# Patient Record
Sex: MALE | Race: ASIAN | NOT HISPANIC OR LATINO
[De-identification: names, ages, dates, MRNs, and addresses within clinical notes are randomized per-mention and may not be internally consistent; named-entity substitution may affect disease eponyms.]

---

## 2017-10-27 ENCOUNTER — APPOINTMENT (OUTPATIENT)
Dept: ORTHOPEDIC SURGERY | Facility: CLINIC | Age: 67
End: 2017-10-27
Payer: COMMERCIAL

## 2017-10-27 VITALS
SYSTOLIC BLOOD PRESSURE: 157 MMHG | HEART RATE: 70 BPM | BODY MASS INDEX: 32.13 KG/M2 | WEIGHT: 212 LBS | DIASTOLIC BLOOD PRESSURE: 80 MMHG | HEIGHT: 68 IN

## 2017-10-27 DIAGNOSIS — Z96.652 PRESENCE OF LEFT ARTIFICIAL KNEE JOINT: ICD-10-CM

## 2017-10-27 DIAGNOSIS — M47.812 SPONDYLOSIS W/OUT MYELOPATHY OR RADICULOPATHY, CERVICAL REGION: ICD-10-CM

## 2017-10-27 DIAGNOSIS — M50.90 CERVICAL DISC DISORDER, UNSPECIFIED, UNSPECIFIED CERVICAL REGION: ICD-10-CM

## 2017-10-27 DIAGNOSIS — M19.012 PRIMARY OSTEOARTHRITIS, LEFT SHOULDER: ICD-10-CM

## 2017-10-27 PROCEDURE — 73562 X-RAY EXAM OF KNEE 3: CPT | Mod: LT

## 2017-10-27 PROCEDURE — 73030 X-RAY EXAM OF SHOULDER: CPT | Mod: LT

## 2017-10-27 PROCEDURE — 72040 X-RAY EXAM NECK SPINE 2-3 VW: CPT

## 2017-10-27 PROCEDURE — 99202 OFFICE O/P NEW SF 15 MIN: CPT

## 2017-10-27 RX ORDER — SITAGLIPTIN 100 MG/1
100 TABLET, FILM COATED ORAL
Qty: 60 | Refills: 0 | Status: ACTIVE | COMMUNITY
Start: 2017-04-12

## 2018-02-01 ENCOUNTER — APPOINTMENT (OUTPATIENT)
Dept: RADIOLOGY | Facility: IMAGING CENTER | Age: 68
End: 2018-02-01

## 2020-12-01 ENCOUNTER — NON-APPOINTMENT (OUTPATIENT)
Age: 70
End: 2020-12-01

## 2021-02-03 ENCOUNTER — APPOINTMENT (OUTPATIENT)
Dept: GASTROENTEROLOGY | Facility: CLINIC | Age: 71
End: 2021-02-03
Payer: MEDICARE

## 2021-02-03 VITALS
HEIGHT: 68 IN | SYSTOLIC BLOOD PRESSURE: 125 MMHG | BODY MASS INDEX: 27.13 KG/M2 | TEMPERATURE: 98.7 F | DIASTOLIC BLOOD PRESSURE: 75 MMHG | HEART RATE: 62 BPM | OXYGEN SATURATION: 97 % | WEIGHT: 179 LBS

## 2021-02-03 PROCEDURE — 99204 OFFICE O/P NEW MOD 45 MIN: CPT

## 2021-02-03 NOTE — CONSULT LETTER
[Dear  ___] : Dear  [unfilled], [Courtesy Letter:] : I had the pleasure of seeing your patient, [unfilled], in my office today. [Please see my note below.] : Please see my note below. [Consult Closing:] : Thank you very much for allowing me to participate in the care of this patient.  If you have any questions, please do not hesitate to contact me. [Sincerely,] : Sincerely, [FreeTextEntry2] : Miguel Jamil MD\par [address]\par [address]\[place] [address]\par Fax [phone]\par  [FreeTextEntry3] : --\par Sai Owens MD\par Director of Endoscopy\par [facility]\par Associate Professor of Medicine\par [employer] [facility] [employer] [facility] \par [facility]\par Phone: [phone]\par Fax: [phone]\par Email: [email or web address]\par

## 2021-02-03 NOTE — ASSESSMENT
[FreeTextEntry1] : 70 year old male with likely gastroparesis.  I will order an UGIS to evaluate the distended esophagus seen on CT chest/abdomen. \par \par Depending on these results, may need an esophageal motility test. Otherwise will discuss with my endosurgical colleague and coordinate a G-POEM. \par \par I discuss this all in detail with the patient and wife. \par \par

## 2021-02-03 NOTE — PHYSICAL EXAM
[General Appearance - Alert] : alert [General Appearance - In No Acute Distress] : in no acute distress [Sclera] : the sclera and conjunctiva were normal [PERRL With Normal Accommodation] : pupils were equal in size, round, and reactive to light [Neck Appearance] : the appearance of the neck was normal [] : no respiratory distress [Respiration, Rhythm And Depth] : normal respiratory rhythm and effort [Bowel Sounds] : normal bowel sounds [Abdomen Soft] : soft [Abdomen Tenderness] : non-tender [Cervical Lymph Nodes Enlarged Posterior Bilaterally] : posterior cervical [Cervical Lymph Nodes Enlarged Anterior Bilaterally] : anterior cervical

## 2021-02-03 NOTE — HISTORY OF PRESENT ILLNESS
[de-identified] : 70 year old male referred by Dr Jamil for endoscopic management of gastroparesis. Pt with a PH of emphysema (smoked for 40 years and quit 4 years ago) , diabetes A1c controlled per pt in 6s), and paraesophageal hernia that was repaired in march 2018. \par \par Patient with symptoms of early satiety for 5 months. States feels full very early.  Also with associated bloating. +weight loss with 20 pound weight loss. Also feels like food gets caught up near epigastric area. \par \par Has a GE scan that was positive.  Tried Reglan and developed TD. Tried erythromycin and developed severe diarrhea. On domperidone (has cardiac w/u) and it helps a little bit. \par \par Had EGD in October that was OK. \par Had CT chest/abd that was OK and no signs of hernia recurrence but the esophagus was distended. \par \par Negative for COVID. No h/o of recent viral illness.

## 2021-02-04 LAB
ABO + RH PNL BLD: NORMAL
ALBUMIN SERPL ELPH-MCNC: 4.1 G/DL
ALP BLD-CCNC: 86 U/L
ALT SERPL-CCNC: 10 U/L
ANION GAP SERPL CALC-SCNC: 16 MMOL/L
AST SERPL-CCNC: 16 U/L
BASOPHILS # BLD AUTO: 0.06 K/UL
BASOPHILS NFR BLD AUTO: 0.6 %
BILIRUB SERPL-MCNC: 0.3 MG/DL
BUN SERPL-MCNC: 17 MG/DL
CALCIUM SERPL-MCNC: 9.7 MG/DL
CHLORIDE SERPL-SCNC: 100 MMOL/L
CO2 SERPL-SCNC: 20 MMOL/L
CREAT SERPL-MCNC: 1.02 MG/DL
EOSINOPHIL # BLD AUTO: 0.13 K/UL
EOSINOPHIL NFR BLD AUTO: 1.4 %
ESTIMATED AVERAGE GLUCOSE: 123 MG/DL
GLUCOSE SERPL-MCNC: 160 MG/DL
HBA1C MFR BLD HPLC: 5.9 %
HCT VFR BLD CALC: 42 %
HGB BLD-MCNC: 12.2 G/DL
IMM GRANULOCYTES NFR BLD AUTO: 0.3 %
INR PPP: 0.96 RATIO
LYMPHOCYTES # BLD AUTO: 0.98 K/UL
LYMPHOCYTES NFR BLD AUTO: 10.5 %
MAN DIFF?: NORMAL
MCHC RBC-ENTMCNC: 25.3 PG
MCHC RBC-ENTMCNC: 29 GM/DL
MCV RBC AUTO: 87.1 FL
MONOCYTES # BLD AUTO: 0.88 K/UL
MONOCYTES NFR BLD AUTO: 9.5 %
NEUTROPHILS # BLD AUTO: 7.23 K/UL
NEUTROPHILS NFR BLD AUTO: 77.7 %
PLATELET # BLD AUTO: 358 K/UL
POTASSIUM SERPL-SCNC: 4.3 MMOL/L
PROT SERPL-MCNC: 6.9 G/DL
PT BLD: 11.5 SEC
RBC # BLD: 4.82 M/UL
RBC # FLD: 14.7 %
SODIUM SERPL-SCNC: 137 MMOL/L
WBC # FLD AUTO: 9.31 K/UL

## 2021-03-04 ENCOUNTER — APPOINTMENT (OUTPATIENT)
Dept: RADIOLOGY | Facility: HOSPITAL | Age: 71
End: 2021-03-04
Payer: MEDICARE

## 2021-03-04 ENCOUNTER — RESULT REVIEW (OUTPATIENT)
Age: 71
End: 2021-03-04

## 2021-03-04 ENCOUNTER — OUTPATIENT (OUTPATIENT)
Dept: OUTPATIENT SERVICES | Facility: HOSPITAL | Age: 71
LOS: 1 days | End: 2021-03-04

## 2021-03-04 DIAGNOSIS — K31.84 GASTROPARESIS: ICD-10-CM

## 2021-03-04 PROCEDURE — 74240 X-RAY XM UPR GI TRC 1CNTRST: CPT | Mod: 26

## 2021-03-15 DIAGNOSIS — K21.00 GASTRO-ESOPHAGEAL REFLUX DISEASE WITH ESOPHAGITIS, WITHOUT BLEEDING: ICD-10-CM

## 2021-03-15 RX ORDER — PANTOPRAZOLE 40 MG/1
40 TABLET, DELAYED RELEASE ORAL
Qty: 90 | Refills: 3 | Status: ACTIVE | COMMUNITY
Start: 2021-03-15 | End: 1900-01-01

## 2021-03-18 ENCOUNTER — NON-APPOINTMENT (OUTPATIENT)
Age: 71
End: 2021-03-18

## 2021-03-31 ENCOUNTER — OUTPATIENT (OUTPATIENT)
Dept: OUTPATIENT SERVICES | Facility: HOSPITAL | Age: 71
LOS: 1 days | End: 2021-03-31
Payer: MEDICARE

## 2021-03-31 ENCOUNTER — APPOINTMENT (OUTPATIENT)
Dept: CT IMAGING | Facility: IMAGING CENTER | Age: 71
End: 2021-03-31
Payer: MEDICARE

## 2021-03-31 DIAGNOSIS — Z00.8 ENCOUNTER FOR OTHER GENERAL EXAMINATION: ICD-10-CM

## 2021-03-31 PROCEDURE — 71250 CT THORAX DX C-: CPT | Mod: 26,MH

## 2021-03-31 PROCEDURE — 71250 CT THORAX DX C-: CPT

## 2021-04-05 ENCOUNTER — APPOINTMENT (OUTPATIENT)
Dept: DISASTER EMERGENCY | Facility: CLINIC | Age: 71
End: 2021-04-05

## 2021-04-05 DIAGNOSIS — Z01.818 ENCOUNTER FOR OTHER PREPROCEDURAL EXAMINATION: ICD-10-CM

## 2021-04-06 LAB — SARS-COV-2 N GENE NPH QL NAA+PROBE: NOT DETECTED

## 2021-04-09 ENCOUNTER — APPOINTMENT (OUTPATIENT)
Dept: PULMONOLOGY | Facility: CLINIC | Age: 71
End: 2021-04-09
Payer: MEDICARE

## 2021-04-09 DIAGNOSIS — Z00.00 ENCOUNTER FOR GENERAL ADULT MEDICAL EXAMINATION W/OUT ABNORMAL FINDINGS: ICD-10-CM

## 2021-04-09 PROCEDURE — 94010 BREATHING CAPACITY TEST: CPT

## 2021-04-09 PROCEDURE — 94726 PLETHYSMOGRAPHY LUNG VOLUMES: CPT

## 2021-04-09 PROCEDURE — 94729 DIFFUSING CAPACITY: CPT

## 2021-05-12 ENCOUNTER — APPOINTMENT (OUTPATIENT)
Dept: GASTROENTEROLOGY | Facility: CLINIC | Age: 71
End: 2021-05-12
Payer: MEDICARE

## 2021-05-12 VITALS
DIASTOLIC BLOOD PRESSURE: 70 MMHG | HEIGHT: 68 IN | SYSTOLIC BLOOD PRESSURE: 110 MMHG | BODY MASS INDEX: 26.52 KG/M2 | OXYGEN SATURATION: 97 % | TEMPERATURE: 98.7 F | HEART RATE: 80 BPM | WEIGHT: 175 LBS

## 2021-05-12 PROCEDURE — 99214 OFFICE O/P EST MOD 30 MIN: CPT

## 2021-05-12 NOTE — CONSULT LETTER
[Dear  ___] : Dear  [unfilled], [Courtesy Letter:] : I had the pleasure of seeing your patient, [unfilled], in my office today. [Please see my note below.] : Please see my note below. [Consult Closing:] : Thank you very much for allowing me to participate in the care of this patient.  If you have any questions, please do not hesitate to contact me. [Sincerely,] : Sincerely, [FreeTextEntry2] : Miguel Jamil MD\par [address]\par [address]\[place] [address]\par Fax [phone] [FreeTextEntry3] : --\par Sai Owens MD\par Director of Endoscopy\par [facility]\par Associate Professor of Medicine\par [facility] [employer] [facility] \par [facility]\par Phone: [phone]\par Fax: [phone]\par Email: [email or web address]\par

## 2021-05-12 NOTE — PHYSICAL EXAM
[General Appearance - Alert] : alert [General Appearance - In No Acute Distress] : in no acute distress [Sclera] : the sclera and conjunctiva were normal [Outer Ear] : the ears and nose were normal in appearance [Neck Appearance] : the appearance of the neck was normal [] : no respiratory distress [Bowel Sounds] : normal bowel sounds [Abdomen Soft] : soft [Cervical Lymph Nodes Enlarged Posterior Bilaterally] : posterior cervical [Cervical Lymph Nodes Enlarged Anterior Bilaterally] : anterior cervical [Abnormal Walk] : normal gait [Musculoskeletal - Swelling] : no joint swelling seen

## 2021-05-12 NOTE — ASSESSMENT
[FreeTextEntry1] : 70 year old male with gastroparesis refractory to medical management. \par \par I will have the office coordinate an apt with Dr Arguello for evaluation of G-POEM. \par \par Regarding constipation, I explained this could result after healing of acute infectious colitis. I recommended to wait before taking a constipation medicine as then he will be in a cycle of constipation/diarrhea. He will follow up with his primary GI for this (Dr Jamil). \par \par \par \par \par

## 2021-05-12 NOTE — HISTORY OF PRESENT ILLNESS
[de-identified] : 70 year old patient seen for follow up of gastroparesis. \par \par Patient with a HH repair/fundoplication with Dr Ferrari. main symptom is early satiety/GERD. Had a GE study (scanned in) that was positive). UGIS also c/w GE delay. Patient still taking domperidone rx by Dr Jamil. \par \par Interval events: acute diarrhea past 2-3 weeks resolved with Flagyl and Imodium given by urgent care. Now has resulting constipation.

## 2021-06-03 ENCOUNTER — APPOINTMENT (OUTPATIENT)
Dept: GASTROENTEROLOGY | Facility: CLINIC | Age: 71
End: 2021-06-03
Payer: MEDICARE

## 2021-06-03 VITALS
DIASTOLIC BLOOD PRESSURE: 70 MMHG | HEIGHT: 68 IN | BODY MASS INDEX: 26.67 KG/M2 | SYSTOLIC BLOOD PRESSURE: 110 MMHG | TEMPERATURE: 97 F | WEIGHT: 176 LBS | HEART RATE: 65 BPM | OXYGEN SATURATION: 96 %

## 2021-06-03 DIAGNOSIS — K52.9 NONINFECTIVE GASTROENTERITIS AND COLITIS, UNSPECIFIED: ICD-10-CM

## 2021-06-03 PROCEDURE — 99214 OFFICE O/P EST MOD 30 MIN: CPT

## 2021-06-25 NOTE — REVIEW OF SYSTEMS
Medicated with Vicodin one tablet PO for c/o surgical pain right buttocks  [As Noted in HPI] : as noted in HPI [Negative] : Heme/Lymph

## 2021-06-25 NOTE — PHYSICAL EXAM
[General Appearance - Alert] : alert [General Appearance - In No Acute Distress] : in no acute distress [General Appearance - Well Nourished] : well nourished [General Appearance - Well Developed] : well developed [Sclera] : the sclera and conjunctiva were normal [Neck Appearance] : the appearance of the neck was normal [Neck Cervical Mass (___cm)] : no neck mass was observed [Jugular Venous Distention Increased] : there was no jugular-venous distention [Exaggerated Use Of Accessory Muscles For Inspiration] : no accessory muscle use [Heart Sounds] : normal S1 and S2 [Edema] : there was no peripheral edema [Bowel Sounds] : normal bowel sounds [Abdomen Soft] : soft [Abdomen Tenderness] : non-tender [] : no hepato-splenomegaly [Cervical Lymph Nodes Enlarged Posterior Bilaterally] : posterior cervical [Cervical Lymph Nodes Enlarged Anterior Bilaterally] : anterior cervical [No CVA Tenderness] : no ~M costovertebral angle tenderness [No Spinal Tenderness] : no spinal tenderness [Nail Clubbing] : no clubbing  or cyanosis of the fingernails [No Focal Deficits] : no focal deficits [Oriented To Time, Place, And Person] : oriented to person, place, and time [Impaired Insight] : insight and judgment were intact [Affect] : the affect was normal

## 2021-06-25 NOTE — HISTORY OF PRESENT ILLNESS
[FreeTextEntry1] : 70 referred for gastroparesis\par Noted to have delayed gastric emptying many years ago and again on more recent study\par Had fundoplication but it seems GP preexisted this and probably related to diabetes since he has neuropathy\par Daily GERD/regurgitation symptoms\par No weight loss\par Appetite is poor and he has early satiety with most meals\par

## 2021-07-21 ENCOUNTER — OUTPATIENT (OUTPATIENT)
Dept: OUTPATIENT SERVICES | Facility: HOSPITAL | Age: 71
LOS: 1 days | End: 2021-07-21
Payer: MEDICARE

## 2021-07-21 VITALS
DIASTOLIC BLOOD PRESSURE: 60 MMHG | TEMPERATURE: 98 F | WEIGHT: 175.93 LBS | HEIGHT: 68 IN | OXYGEN SATURATION: 96 % | HEART RATE: 58 BPM | SYSTOLIC BLOOD PRESSURE: 100 MMHG | RESPIRATION RATE: 17 BRPM

## 2021-07-21 DIAGNOSIS — M12.9 ARTHROPATHY, UNSPECIFIED: ICD-10-CM

## 2021-07-21 DIAGNOSIS — K31.84 GASTROPARESIS: ICD-10-CM

## 2021-07-21 DIAGNOSIS — R06.83 SNORING: ICD-10-CM

## 2021-07-21 DIAGNOSIS — R52 PAIN, UNSPECIFIED: ICD-10-CM

## 2021-07-21 DIAGNOSIS — Z98.890 OTHER SPECIFIED POSTPROCEDURAL STATES: Chronic | ICD-10-CM

## 2021-07-21 DIAGNOSIS — R06.02 SHORTNESS OF BREATH: ICD-10-CM

## 2021-07-21 DIAGNOSIS — M12.9 ARTHROPATHY, UNSPECIFIED: Chronic | ICD-10-CM

## 2021-07-21 LAB
A1C WITH ESTIMATED AVERAGE GLUCOSE RESULT: 5.8 % — HIGH (ref 4–5.6)
ALBUMIN SERPL ELPH-MCNC: 4.2 G/DL — SIGNIFICANT CHANGE UP (ref 3.3–5)
ALP SERPL-CCNC: 85 U/L — SIGNIFICANT CHANGE UP (ref 40–120)
ALT FLD-CCNC: 10 U/L — SIGNIFICANT CHANGE UP (ref 4–41)
ANION GAP SERPL CALC-SCNC: 15 MMOL/L — HIGH (ref 7–14)
AST SERPL-CCNC: 16 U/L — SIGNIFICANT CHANGE UP (ref 4–40)
BILIRUB SERPL-MCNC: 0.3 MG/DL — SIGNIFICANT CHANGE UP (ref 0.2–1.2)
BUN SERPL-MCNC: 10 MG/DL — SIGNIFICANT CHANGE UP (ref 7–23)
CALCIUM SERPL-MCNC: 9.6 MG/DL — SIGNIFICANT CHANGE UP (ref 8.4–10.5)
CHLORIDE SERPL-SCNC: 96 MMOL/L — LOW (ref 98–107)
CO2 SERPL-SCNC: 23 MMOL/L — SIGNIFICANT CHANGE UP (ref 22–31)
CREAT SERPL-MCNC: 0.95 MG/DL — SIGNIFICANT CHANGE UP (ref 0.5–1.3)
ESTIMATED AVERAGE GLUCOSE: 120 — SIGNIFICANT CHANGE UP
GLUCOSE SERPL-MCNC: 121 MG/DL — HIGH (ref 70–99)
HCT VFR BLD CALC: 39.9 % — SIGNIFICANT CHANGE UP (ref 39–50)
HGB BLD-MCNC: 12 G/DL — LOW (ref 13–17)
MCHC RBC-ENTMCNC: 24.9 PG — LOW (ref 27–34)
MCHC RBC-ENTMCNC: 30.1 GM/DL — LOW (ref 32–36)
MCV RBC AUTO: 82.8 FL — SIGNIFICANT CHANGE UP (ref 80–100)
NRBC # BLD: 0 /100 WBCS — SIGNIFICANT CHANGE UP
NRBC # FLD: 0 K/UL — SIGNIFICANT CHANGE UP
PLATELET # BLD AUTO: 435 K/UL — HIGH (ref 150–400)
POTASSIUM SERPL-MCNC: 3.7 MMOL/L — SIGNIFICANT CHANGE UP (ref 3.5–5.3)
POTASSIUM SERPL-SCNC: 3.7 MMOL/L — SIGNIFICANT CHANGE UP (ref 3.5–5.3)
PROT SERPL-MCNC: 7.9 G/DL — SIGNIFICANT CHANGE UP (ref 6–8.3)
RBC # BLD: 4.82 M/UL — SIGNIFICANT CHANGE UP (ref 4.2–5.8)
RBC # FLD: 13.7 % — SIGNIFICANT CHANGE UP (ref 10.3–14.5)
SODIUM SERPL-SCNC: 134 MMOL/L — LOW (ref 135–145)
WBC # BLD: 12.11 K/UL — HIGH (ref 3.8–10.5)
WBC # FLD AUTO: 12.11 K/UL — HIGH (ref 3.8–10.5)

## 2021-07-21 PROCEDURE — 93010 ELECTROCARDIOGRAM REPORT: CPT

## 2021-07-21 RX ORDER — SODIUM CHLORIDE 9 MG/ML
1000 INJECTION, SOLUTION INTRAVENOUS
Refills: 0 | Status: DISCONTINUED | OUTPATIENT
Start: 2021-08-02 | End: 2021-08-16

## 2021-07-21 NOTE — H&P PST ADULT - NSICDXPASTMEDICALHX_GEN_ALL_CORE_FT
PAST MEDICAL HISTORY:  Chronic obstructive pulmonary disease     DM (diabetes mellitus) type II    Gastroparesis with pylorus dysfunction    Hypertension      PAST MEDICAL HISTORY:  Alcohol abuse quit drinking in 2016    Chronic obstructive pulmonary disease     DM (diabetes mellitus) type II    Gastroparesis with pylorus dysfunction    Hypertension

## 2021-07-21 NOTE — H&P PST ADULT - NSICDXPROBLEM_GEN_ALL_CORE_FT
PROBLEM DIAGNOSES  Problem: Pain  Assessment and Plan: This is a 72 y/o male who is scheduled for GPOEM anesthesia on 8-2-21  * Instructed to speak with surgeon regarding covid testing -- patient left card at home  * Given preop instructions    Problem: Shortness of breath  Assessment and Plan: Await cardiac evaluation to be arranged by pcp due to METS less than 4 with h/o DM type II and hypertension and stress test 5 years ago--notified surgeon via email  * as per patient, Await pulmonlogy consult requested by surgeon  * Instructed to stop Janumet 24 hour s before surgery  * Instructed to take normal am dose of   the am of surgery    Problem: Snoring  Assessment and Plan: Notified OR booking via fax of precautions, sabine    Problem: Arthropathy  Assessment and Plan: Notified OR booking via fax of left knee replacement       PROBLEM DIAGNOSES  Problem: Pain  Assessment and Plan: This is a 72 y/o male who is scheduled for GPOEM anesthesia on 8-2-21  * Instructed to speak with surgeon regarding covid testing -- patient left card at home  * Given preop instructions    Problem: Shortness of breath  Assessment and Plan: Await cardiac evaluation to be arranged by pcp due to METS less than 4 with h/o DM type II and hypertension and stress test 5 years ago--notified surgeon via email  * as per patient, Await pulmonlogy consult requested by surgeon  * Instructed to stop Janumet 24 hour s before surgery  * Instructed to take normal am dose of telmisartan-HCTZ, pantoprazole, and trelegy  the am of surgery    Problem: Snoring  Assessment and Plan: Notified OR booking via fax of precautions, sabine    Problem: Arthropathy  Assessment and Plan: Notified OR booking via fax of left knee replacement       PROBLEM DIAGNOSES  Problem: Pain  Assessment and Plan: This is a 70 y/o male who is scheduled for GPOEM anesthesia on 8-2-21  * Instructed to speak with surgeon regarding covid testing -- patient left card at home  * Given preop instructions    Problem: Shortness of breath  Assessment and Plan: Await cardiac evaluation to be arranged by pcp due to METS less than 4 with h/o DM type II and hypertension and stress test 5 years ago--notified surgeon via email  * Await old ekg for comparison from pcp  * As per patient, Await pulmonlogy consult requested by surgeon  * Instructed to stop Janumet 24 hour s before surgery  * Instructed to take normal am dose of telmisartan-HCTZ, pantoprazole, and trelegy  the am of surgery    Problem: Snoring  Assessment and Plan: Notified OR booking via fax of precautions, sabine    Problem: Arthropathy  Assessment and Plan: Notified OR booking via fax of left knee replacement

## 2021-07-21 NOTE — H&P PST ADULT - NSANTHOSAYNRD_GEN_A_CORE
No. MELVI screening performed.  STOP BANG Legend: 0-2 = LOW Risk; 3-4 = INTERMEDIATE Risk; 5-8 = HIGH Risk

## 2021-07-21 NOTE — H&P PST ADULT - HISTORY OF PRESENT ILLNESS
This is a 72 y/o male who presents with h/o pylorus dysfunction. Evaluated by MD who recommended intervention. Scheduled for GPOEM anesthesia

## 2021-07-21 NOTE — H&P PST ADULT - NSICDXPASTSURGICALHX_GEN_ALL_CORE_FT
PAST SURGICAL HISTORY:  Arthropathy left knee replacement    History of repair of hiatal hernia

## 2021-07-30 NOTE — ASU PATIENT PROFILE, ADULT - PMH
Alcohol abuse  quit drinking in 2016  Chronic obstructive pulmonary disease    DM (diabetes mellitus)  type II  Gastroparesis  with pylorus dysfunction  Hypertension

## 2021-08-01 RX ORDER — SODIUM CHLORIDE 9 MG/ML
500 INJECTION INTRAMUSCULAR; INTRAVENOUS; SUBCUTANEOUS
Refills: 0 | Status: DISCONTINUED | OUTPATIENT
Start: 2021-08-02 | End: 2021-08-16

## 2021-08-02 ENCOUNTER — APPOINTMENT (OUTPATIENT)
Dept: GASTROENTEROLOGY | Facility: HOSPITAL | Age: 71
End: 2021-08-02

## 2021-08-02 ENCOUNTER — OUTPATIENT (OUTPATIENT)
Dept: OUTPATIENT SERVICES | Facility: HOSPITAL | Age: 71
LOS: 1 days | Discharge: HOME | End: 2021-08-02
Payer: MEDICARE

## 2021-08-02 VITALS
DIASTOLIC BLOOD PRESSURE: 71 MMHG | OXYGEN SATURATION: 95 % | SYSTOLIC BLOOD PRESSURE: 142 MMHG | RESPIRATION RATE: 15 BRPM | HEART RATE: 69 BPM

## 2021-08-02 VITALS
WEIGHT: 175.05 LBS | TEMPERATURE: 97 F | HEART RATE: 57 BPM | DIASTOLIC BLOOD PRESSURE: 75 MMHG | HEIGHT: 68 IN | SYSTOLIC BLOOD PRESSURE: 122 MMHG | OXYGEN SATURATION: 95 % | RESPIRATION RATE: 16 BRPM

## 2021-08-02 DIAGNOSIS — M12.9 ARTHROPATHY, UNSPECIFIED: Chronic | ICD-10-CM

## 2021-08-02 DIAGNOSIS — K31.84 GASTROPARESIS: ICD-10-CM

## 2021-08-02 DIAGNOSIS — Z98.890 OTHER SPECIFIED POSTPROCEDURAL STATES: Chronic | ICD-10-CM

## 2021-08-02 LAB
GLUCOSE BLDC GLUCOMTR-MCNC: 109 MG/DL — HIGH (ref 70–99)
GLUCOSE BLDC GLUCOMTR-MCNC: 115 MG/DL — HIGH (ref 70–99)

## 2021-08-02 PROCEDURE — 43247 EGD REMOVE FOREIGN BODY: CPT

## 2021-08-02 RX ADMIN — SODIUM CHLORIDE 30 MILLILITER(S): 9 INJECTION, SOLUTION INTRAVENOUS at 08:09

## 2021-09-30 PROBLEM — K52.9 COLITIS: Status: ACTIVE | Noted: 2021-09-30

## 2021-10-14 ENCOUNTER — OUTPATIENT (OUTPATIENT)
Dept: OUTPATIENT SERVICES | Facility: HOSPITAL | Age: 71
LOS: 1 days | End: 2021-10-14

## 2021-10-14 VITALS
HEART RATE: 74 BPM | OXYGEN SATURATION: 99 % | RESPIRATION RATE: 16 BRPM | DIASTOLIC BLOOD PRESSURE: 73 MMHG | HEIGHT: 68 IN | TEMPERATURE: 98 F | SYSTOLIC BLOOD PRESSURE: 137 MMHG | WEIGHT: 173.94 LBS

## 2021-10-14 DIAGNOSIS — Z98.890 OTHER SPECIFIED POSTPROCEDURAL STATES: Chronic | ICD-10-CM

## 2021-10-14 DIAGNOSIS — E11.9 TYPE 2 DIABETES MELLITUS WITHOUT COMPLICATIONS: ICD-10-CM

## 2021-10-14 DIAGNOSIS — K31.84 GASTROPARESIS: ICD-10-CM

## 2021-10-14 DIAGNOSIS — Z87.09 PERSONAL HISTORY OF OTHER DISEASES OF THE RESPIRATORY SYSTEM: ICD-10-CM

## 2021-10-14 DIAGNOSIS — K51.90 ULCERATIVE COLITIS, UNSPECIFIED, WITHOUT COMPLICATIONS: ICD-10-CM

## 2021-10-14 DIAGNOSIS — E78.5 HYPERLIPIDEMIA, UNSPECIFIED: ICD-10-CM

## 2021-10-14 DIAGNOSIS — M12.9 ARTHROPATHY, UNSPECIFIED: Chronic | ICD-10-CM

## 2021-10-14 DIAGNOSIS — I10 ESSENTIAL (PRIMARY) HYPERTENSION: ICD-10-CM

## 2021-10-14 PROBLEM — J44.9 CHRONIC OBSTRUCTIVE PULMONARY DISEASE, UNSPECIFIED: Chronic | Status: ACTIVE | Noted: 2021-07-21

## 2021-10-14 LAB
A1C WITH ESTIMATED AVERAGE GLUCOSE RESULT: 5.6 % — SIGNIFICANT CHANGE UP (ref 4–5.6)
ALBUMIN SERPL ELPH-MCNC: 3.8 G/DL — SIGNIFICANT CHANGE UP (ref 3.3–5)
ALP SERPL-CCNC: 56 U/L — SIGNIFICANT CHANGE UP (ref 40–120)
ALT FLD-CCNC: 10 U/L — SIGNIFICANT CHANGE UP (ref 4–41)
ANION GAP SERPL CALC-SCNC: 12 MMOL/L — SIGNIFICANT CHANGE UP (ref 7–14)
AST SERPL-CCNC: 15 U/L — SIGNIFICANT CHANGE UP (ref 4–40)
BILIRUB SERPL-MCNC: 0.2 MG/DL — SIGNIFICANT CHANGE UP (ref 0.2–1.2)
BUN SERPL-MCNC: 10 MG/DL — SIGNIFICANT CHANGE UP (ref 7–23)
CALCIUM SERPL-MCNC: 9.6 MG/DL — SIGNIFICANT CHANGE UP (ref 8.4–10.5)
CHLORIDE SERPL-SCNC: 96 MMOL/L — LOW (ref 98–107)
CO2 SERPL-SCNC: 27 MMOL/L — SIGNIFICANT CHANGE UP (ref 22–31)
CREAT SERPL-MCNC: 0.72 MG/DL — SIGNIFICANT CHANGE UP (ref 0.5–1.3)
ESTIMATED AVERAGE GLUCOSE: 114 — SIGNIFICANT CHANGE UP
GLUCOSE SERPL-MCNC: 95 MG/DL — SIGNIFICANT CHANGE UP (ref 70–99)
HCT VFR BLD CALC: 37.9 % — LOW (ref 39–50)
HGB BLD-MCNC: 12 G/DL — LOW (ref 13–17)
MCHC RBC-ENTMCNC: 26.1 PG — LOW (ref 27–34)
MCHC RBC-ENTMCNC: 31.7 GM/DL — LOW (ref 32–36)
MCV RBC AUTO: 82.4 FL — SIGNIFICANT CHANGE UP (ref 80–100)
NRBC # BLD: 0 /100 WBCS — SIGNIFICANT CHANGE UP
NRBC # FLD: 0 K/UL — SIGNIFICANT CHANGE UP
PLATELET # BLD AUTO: 323 K/UL — SIGNIFICANT CHANGE UP (ref 150–400)
POTASSIUM SERPL-MCNC: 3.8 MMOL/L — SIGNIFICANT CHANGE UP (ref 3.5–5.3)
POTASSIUM SERPL-SCNC: 3.8 MMOL/L — SIGNIFICANT CHANGE UP (ref 3.5–5.3)
PROT SERPL-MCNC: 6.8 G/DL — SIGNIFICANT CHANGE UP (ref 6–8.3)
RBC # BLD: 4.6 M/UL — SIGNIFICANT CHANGE UP (ref 4.2–5.8)
RBC # FLD: 14.9 % — HIGH (ref 10.3–14.5)
SODIUM SERPL-SCNC: 135 MMOL/L — SIGNIFICANT CHANGE UP (ref 135–145)
WBC # BLD: 13.84 K/UL — HIGH (ref 3.8–10.5)
WBC # FLD AUTO: 13.84 K/UL — HIGH (ref 3.8–10.5)

## 2021-10-14 RX ORDER — HYDROXYZINE HCL 10 MG
1 TABLET ORAL
Qty: 0 | Refills: 0 | DISCHARGE

## 2021-10-14 RX ORDER — SODIUM CHLORIDE 9 MG/ML
1000 INJECTION, SOLUTION INTRAVENOUS
Refills: 0 | Status: DISCONTINUED | OUTPATIENT
Start: 2021-10-25 | End: 2021-11-08

## 2021-10-14 RX ORDER — FLUTICASONE FUROATE, UMECLIDINIUM BROMIDE AND VILANTEROL TRIFENATATE 200; 62.5; 25 UG/1; UG/1; UG/1
1 POWDER RESPIRATORY (INHALATION)
Qty: 0 | Refills: 0 | DISCHARGE

## 2021-10-14 RX ORDER — MESALAMINE 400 MG
4 TABLET, DELAYED RELEASE (ENTERIC COATED) ORAL
Qty: 0 | Refills: 0 | DISCHARGE

## 2021-10-14 RX ORDER — ALBUTEROL 90 UG/1
1 AEROSOL, METERED ORAL
Qty: 0 | Refills: 0 | DISCHARGE

## 2021-10-14 RX ORDER — FAMOTIDINE 10 MG/ML
1 INJECTION INTRAVENOUS
Qty: 0 | Refills: 0 | DISCHARGE

## 2021-10-14 RX ORDER — SITAGLIPTIN AND METFORMIN HYDROCHLORIDE 500; 50 MG/1; MG/1
1 TABLET, FILM COATED ORAL
Qty: 0 | Refills: 0 | DISCHARGE

## 2021-10-14 RX ORDER — TELMISARTAN AND HYDROCHLOROTHIAZIDE 40; 12.5 MG/1; MG/1
1 TABLET ORAL
Qty: 0 | Refills: 0 | DISCHARGE

## 2021-10-14 RX ORDER — GABAPENTIN 400 MG/1
1 CAPSULE ORAL
Qty: 0 | Refills: 0 | DISCHARGE

## 2021-10-14 RX ORDER — PANTOPRAZOLE SODIUM 20 MG/1
1 TABLET, DELAYED RELEASE ORAL
Qty: 0 | Refills: 0 | DISCHARGE

## 2021-10-14 NOTE — H&P PST ADULT - GASTROINTESTINAL COMMENTS
gastroparesis, see HPI, h/o colitis h/o gastroparesis gastroparesis, see HPI, h/o colitis, tapering prednisone started 09/28/21 -40 mg x 2 weeks; 10/11/21 30 mg x 1 week; 10/18/21 20 mg x 1 week; 10/25/21 10 mg

## 2021-10-14 NOTE — H&P PST ADULT - PROBLEM SELECTOR PLAN 2
Patient instructed to hold Janumet the night before and on the morning of the surgery, pt verbalized understanding.

## 2021-10-14 NOTE — H&P PST ADULT - HISTORY OF PRESENT ILLNESS
71 y.o. male with h/o HTN, HLD, DM, hiatal hernia s/p fundoplication, presents to PST with preop diagnosis of gastroparesis, scheduled for GPOEM anesthesia, pt c/o bloating, decreased appetite, denies dysphagia, abdominal pain 71 y.o. male with h/o HTN, HLD, DM, ulcerative colitis, COPD, hiatal hernia s/p fundoplication, presents to PST with preop diagnosis of gastroparesis, scheduled for GPOEM anesthesia, pt c/o bloating, decreased appetite, denies dysphagia, abdominal pain, pt states had EGD done 07/2021, "food and medications were noted in the stomach"

## 2021-10-14 NOTE — H&P PST ADULT - PROBLEM SELECTOR PLAN 1
pt scheduled for G Poem anesthesia on 10/25/21  Preop instructions provided. Pt verbalized understanding.   s/p cardiac, pulmonary and med eval in 07/2021, copies in chart, denies changes in medical condition since  pt aware of need for COVID testing 72 hrs preop, will coordinate with the surgeon's office pt scheduled for G Poem anesthesia on 10/25/21  Preop instructions provided. Pt verbalized understanding.   pt to take famotidine for GI Prophylaxis  s/p cardiac, pulmonary and med eval in 07/2021, copies in chart, denies changes in medical condition since  pt aware of need for COVID testing 72 hrs preop, will coordinate with the surgeon's office

## 2021-10-14 NOTE — H&P PST ADULT - ACTIVITY
walking, ADL, able to climb 1 flight of stairs w/0 SOB walking, ADL, reports can climb 1 flight of stairs w/o SOB

## 2021-10-14 NOTE — H&P PST ADULT - NSICDXPASTMEDICALHX_GEN_ALL_CORE_FT
PAST MEDICAL HISTORY:  Alcohol abuse quit drinking in 2016    Chronic obstructive pulmonary disease     DM (diabetes mellitus) type II    Gastroparesis with pylorus dysfunction    Hypertension      PAST MEDICAL HISTORY:  Alcohol abuse quit drinking in 2016    Chronic obstructive pulmonary disease     DM (diabetes mellitus) type II    Gastroparesis with pylorus dysfunction    Hypertension     Ulcerative colitis

## 2021-10-14 NOTE — H&P PST ADULT - VASCULAR
----- Message from Katie Muniz sent at 7/27/2018  2:32 PM CDT -----  Contact: Self/ [phone]  Patient would like a call back to make a appt for her infectious infection.     Equal and normal pulses (carotid, femoral, dorsalis pedis)

## 2021-10-15 PROBLEM — K51.90 ULCERATIVE COLITIS, UNSPECIFIED, WITHOUT COMPLICATIONS: Chronic | Status: ACTIVE | Noted: 2021-10-14

## 2021-10-22 ENCOUNTER — APPOINTMENT (OUTPATIENT)
Dept: DISASTER EMERGENCY | Facility: CLINIC | Age: 71
End: 2021-10-22

## 2021-10-25 ENCOUNTER — OUTPATIENT (OUTPATIENT)
Dept: OUTPATIENT SERVICES | Facility: HOSPITAL | Age: 71
LOS: 1 days | Discharge: HOME | End: 2021-10-25
Payer: MEDICARE

## 2021-10-25 ENCOUNTER — APPOINTMENT (OUTPATIENT)
Dept: GASTROENTEROLOGY | Facility: HOSPITAL | Age: 71
End: 2021-10-25

## 2021-10-25 VITALS
OXYGEN SATURATION: 94 % | DIASTOLIC BLOOD PRESSURE: 75 MMHG | SYSTOLIC BLOOD PRESSURE: 130 MMHG | RESPIRATION RATE: 15 BRPM | HEART RATE: 76 BPM

## 2021-10-25 VITALS
HEART RATE: 79 BPM | SYSTOLIC BLOOD PRESSURE: 129 MMHG | OXYGEN SATURATION: 95 % | TEMPERATURE: 98 F | HEIGHT: 68 IN | RESPIRATION RATE: 14 BRPM | DIASTOLIC BLOOD PRESSURE: 75 MMHG | WEIGHT: 171.08 LBS

## 2021-10-25 DIAGNOSIS — K31.84 GASTROPARESIS: ICD-10-CM

## 2021-10-25 DIAGNOSIS — Z98.890 OTHER SPECIFIED POSTPROCEDURAL STATES: Chronic | ICD-10-CM

## 2021-10-25 DIAGNOSIS — M12.9 ARTHROPATHY, UNSPECIFIED: Chronic | ICD-10-CM

## 2021-10-25 LAB
GLUCOSE BLDC GLUCOMTR-MCNC: 74 MG/DL — SIGNIFICANT CHANGE UP (ref 70–99)
GLUCOSE BLDC GLUCOMTR-MCNC: 96 MG/DL — SIGNIFICANT CHANGE UP (ref 70–99)

## 2021-10-25 PROCEDURE — 43247 EGD REMOVE FOREIGN BODY: CPT

## 2021-10-25 PROCEDURE — 43266 EGD ENDOSCOPIC STENT PLACE: CPT | Mod: 59

## 2021-10-25 RX ADMIN — SODIUM CHLORIDE 30 MILLILITER(S): 9 INJECTION, SOLUTION INTRAVENOUS at 07:50

## 2021-10-25 NOTE — ASU PATIENT PROFILE, ADULT - NSICDXPASTMEDICALHX_GEN_ALL_CORE_FT
PAST MEDICAL HISTORY:  Alcohol abuse quit drinking in 2016    Chronic obstructive pulmonary disease     DM (diabetes mellitus) type II    Gastroparesis with pylorus dysfunction    Hypertension     Ulcerative colitis

## 2021-10-27 LAB — SARS-COV-2 N GENE NPH QL NAA+PROBE: NOT DETECTED

## 2021-10-31 ENCOUNTER — TRANSCRIPTION ENCOUNTER (OUTPATIENT)
Age: 71
End: 2021-10-31

## 2021-11-01 RX ORDER — ONDANSETRON 4 MG/1
4 TABLET ORAL EVERY 8 HOURS
Qty: 90 | Refills: 0 | Status: ACTIVE | COMMUNITY
Start: 2021-11-01 | End: 1900-01-01

## 2021-11-17 ENCOUNTER — OUTPATIENT (OUTPATIENT)
Dept: OUTPATIENT SERVICES | Facility: HOSPITAL | Age: 71
LOS: 1 days | End: 2021-11-17
Payer: MEDICARE

## 2021-11-17 VITALS
RESPIRATION RATE: 18 BRPM | DIASTOLIC BLOOD PRESSURE: 70 MMHG | HEART RATE: 62 BPM | SYSTOLIC BLOOD PRESSURE: 100 MMHG | TEMPERATURE: 97 F | WEIGHT: 164.91 LBS | OXYGEN SATURATION: 95 % | HEIGHT: 68 IN

## 2021-11-17 DIAGNOSIS — M12.9 ARTHROPATHY, UNSPECIFIED: Chronic | ICD-10-CM

## 2021-11-17 DIAGNOSIS — K31.84 GASTROPARESIS: ICD-10-CM

## 2021-11-17 DIAGNOSIS — E11.9 TYPE 2 DIABETES MELLITUS WITHOUT COMPLICATIONS: ICD-10-CM

## 2021-11-17 DIAGNOSIS — Z98.890 OTHER SPECIFIED POSTPROCEDURAL STATES: Chronic | ICD-10-CM

## 2021-11-17 LAB
HCT VFR BLD CALC: 40.2 % — SIGNIFICANT CHANGE UP (ref 39–50)
HGB BLD-MCNC: 12.2 G/DL — LOW (ref 13–17)
MCHC RBC-ENTMCNC: 25.3 PG — LOW (ref 27–34)
MCHC RBC-ENTMCNC: 30.3 GM/DL — LOW (ref 32–36)
MCV RBC AUTO: 83.4 FL — SIGNIFICANT CHANGE UP (ref 80–100)
NRBC # BLD: 0 /100 WBCS — SIGNIFICANT CHANGE UP
NRBC # FLD: 0 K/UL — SIGNIFICANT CHANGE UP
PLATELET # BLD AUTO: 427 K/UL — HIGH (ref 150–400)
RBC # BLD: 4.82 M/UL — SIGNIFICANT CHANGE UP (ref 4.2–5.8)
RBC # FLD: 14 % — SIGNIFICANT CHANGE UP (ref 10.3–14.5)
WBC # BLD: 11.45 K/UL — HIGH (ref 3.8–10.5)
WBC # FLD AUTO: 11.45 K/UL — HIGH (ref 3.8–10.5)

## 2021-11-17 PROCEDURE — 93010 ELECTROCARDIOGRAM REPORT: CPT

## 2021-11-17 NOTE — H&P PST ADULT - ENDOCRINE COMMENTS
diabetes mellitus -- finger sticks in the am range 80-90's; in the evening 100-210's; denies thyroid disease

## 2021-11-17 NOTE — H&P PST ADULT - HISTORY OF PRESENT ILLNESS
71 y.o. male with h/o HTN, HLD, DM, ulcerative colitis, COPD, hiatal hernia s/p fundoplication, presents to PST with preop diagnosis of gastroparesis.  Patient c/o bloating,  decreased appetite, denies dysphagia, abdominal pain, pt states had EGD done 07/2021, "food and medications were noted in the stomach." Had POEM October 2021. Again, scheduled for POEM (peroral endoscopic myotomy) anesthesia, This is a 71 y.o. male with h/o HTN, HLD, DM, ulcerative colitis, COPD, hiatal hernia s/p fundoplication, presents to PST with preop diagnosis of gastroparesis.  Patient c/o bloating,  decreased appetite, denies dysphagia, abdominal pain, pt states had EGD done 07/2021, "food and medications were noted in the stomach." Had POEM October 2021. Again, scheduled for POEM (peroral endoscopic myotomy) anesthesia,

## 2021-11-17 NOTE — H&P PST ADULT - PROBLEM SELECTOR PLAN 1
This is a 72 y/o male who is scheduled for POEM anesthesia on 11-29-21  * Instructed to speak with surgeon regarding covid testing  * Given preop instructions

## 2021-11-17 NOTE — H&P PST ADULT - OTHER CARE PROVIDERS
Dr. Fadi Roblero/cardiologist [phone]; [facility] [phone]

## 2021-11-17 NOTE — H&P PST ADULT - PROBLEM SELECTOR PLAN 2
Await echo from pcp  * Instructed Await echo from cardiologist  * Instructed to stop Metformin 24 hours before surgery  * Instructed to take normal am dose of telmisartan, Pepcid pantoprazole, trelegy (albuterol if needed)

## 2021-11-19 DIAGNOSIS — R11.0 NAUSEA: ICD-10-CM

## 2021-11-19 RX ORDER — ONDANSETRON 8 MG/1
8 TABLET ORAL 3 TIMES DAILY
Qty: 30 | Refills: 1 | Status: ACTIVE | COMMUNITY
Start: 2021-11-19 | End: 1900-01-01

## 2021-11-26 ENCOUNTER — LABORATORY RESULT (OUTPATIENT)
Age: 71
End: 2021-11-26

## 2021-11-26 ENCOUNTER — APPOINTMENT (OUTPATIENT)
Dept: DISASTER EMERGENCY | Facility: CLINIC | Age: 71
End: 2021-11-26

## 2021-11-26 LAB
ALBUMIN SERPL ELPH-MCNC: 3.9 G/DL
ALP BLD-CCNC: 71 U/L
ALT SERPL-CCNC: 8 U/L
ANION GAP SERPL CALC-SCNC: 12 MMOL/L
AST SERPL-CCNC: 19 U/L
BASOPHILS # BLD AUTO: 0.09 K/UL
BASOPHILS NFR BLD AUTO: 1 %
BILIRUB SERPL-MCNC: 0.2 MG/DL
BUN SERPL-MCNC: 10 MG/DL
CALCIUM SERPL-MCNC: 9.5 MG/DL
CHLORIDE SERPL-SCNC: 100 MMOL/L
CO2 SERPL-SCNC: 23 MMOL/L
CREAT SERPL-MCNC: 0.93 MG/DL
EOSINOPHIL # BLD AUTO: 0.34 K/UL
EOSINOPHIL NFR BLD AUTO: 3.6 %
GLUCOSE SERPL-MCNC: 106 MG/DL
HCT VFR BLD CALC: 37.4 %
HGB BLD-MCNC: 11.4 G/DL
IMM GRANULOCYTES NFR BLD AUTO: 0.2 %
LYMPHOCYTES # BLD AUTO: 1.03 K/UL
LYMPHOCYTES NFR BLD AUTO: 10.9 %
MAN DIFF?: NORMAL
MCHC RBC-ENTMCNC: 25.5 PG
MCHC RBC-ENTMCNC: 30.5 GM/DL
MCV RBC AUTO: 83.7 FL
MONOCYTES # BLD AUTO: 1.05 K/UL
MONOCYTES NFR BLD AUTO: 11.1 %
NEUTROPHILS # BLD AUTO: 6.9 K/UL
NEUTROPHILS NFR BLD AUTO: 73.2 %
PLATELET # BLD AUTO: 369 K/UL
POTASSIUM SERPL-SCNC: 4.6 MMOL/L
PROT SERPL-MCNC: 6.8 G/DL
RBC # BLD: 4.47 M/UL
RBC # FLD: 13.6 %
SODIUM SERPL-SCNC: 135 MMOL/L
WBC # FLD AUTO: 9.43 K/UL

## 2021-11-29 ENCOUNTER — APPOINTMENT (OUTPATIENT)
Dept: GASTROENTEROLOGY | Facility: HOSPITAL | Age: 71
End: 2021-11-29

## 2021-11-29 ENCOUNTER — OUTPATIENT (OUTPATIENT)
Dept: OUTPATIENT SERVICES | Facility: HOSPITAL | Age: 71
LOS: 1 days | Discharge: HOME | End: 2021-11-29
Payer: MEDICARE

## 2021-11-29 VITALS
RESPIRATION RATE: 16 BRPM | SYSTOLIC BLOOD PRESSURE: 156 MMHG | DIASTOLIC BLOOD PRESSURE: 72 MMHG | HEART RATE: 56 BPM | OXYGEN SATURATION: 99 %

## 2021-11-29 VITALS
DIASTOLIC BLOOD PRESSURE: 71 MMHG | WEIGHT: 169.98 LBS | TEMPERATURE: 97 F | HEIGHT: 68 IN | RESPIRATION RATE: 13 BRPM | SYSTOLIC BLOOD PRESSURE: 115 MMHG | HEART RATE: 58 BPM | OXYGEN SATURATION: 99 %

## 2021-11-29 DIAGNOSIS — M12.9 ARTHROPATHY, UNSPECIFIED: Chronic | ICD-10-CM

## 2021-11-29 DIAGNOSIS — Z98.890 OTHER SPECIFIED POSTPROCEDURAL STATES: Chronic | ICD-10-CM

## 2021-11-29 DIAGNOSIS — K31.84 GASTROPARESIS: ICD-10-CM

## 2021-11-29 LAB — GLUCOSE BLDC GLUCOMTR-MCNC: 178 MG/DL — HIGH (ref 70–99)

## 2021-11-29 PROCEDURE — 43247 EGD REMOVE FOREIGN BODY: CPT

## 2021-11-29 PROCEDURE — 43270 EGD LESION ABLATION: CPT | Mod: 59

## 2021-11-29 PROCEDURE — 43236 UPPR GI SCOPE W/SUBMUC INJ: CPT | Mod: 59

## 2021-12-10 RX ORDER — SUCRALFATE 1 G/1
1 TABLET ORAL
Qty: 30 | Refills: 1 | Status: ACTIVE | COMMUNITY
Start: 2021-12-10 | End: 1900-01-01

## 2021-12-10 RX ORDER — SUCRALFATE 1 G/1
1 TABLET ORAL
Qty: 90 | Refills: 2 | Status: DISCONTINUED | COMMUNITY
Start: 2021-03-15 | End: 2021-12-10

## 2022-01-13 ENCOUNTER — APPOINTMENT (OUTPATIENT)
Dept: GASTROENTEROLOGY | Facility: CLINIC | Age: 72
End: 2022-01-13
Payer: MEDICARE

## 2022-01-13 VITALS
HEIGHT: 68 IN | WEIGHT: 156 LBS | OXYGEN SATURATION: 98 % | BODY MASS INDEX: 23.64 KG/M2 | HEART RATE: 61 BPM | TEMPERATURE: 97 F | DIASTOLIC BLOOD PRESSURE: 58 MMHG | SYSTOLIC BLOOD PRESSURE: 110 MMHG

## 2022-01-13 PROCEDURE — 99214 OFFICE O/P EST MOD 30 MIN: CPT

## 2022-02-07 ENCOUNTER — APPOINTMENT (OUTPATIENT)
Dept: NUCLEAR MEDICINE | Facility: HOSPITAL | Age: 72
End: 2022-02-07
Payer: MEDICARE

## 2022-02-07 ENCOUNTER — OUTPATIENT (OUTPATIENT)
Dept: OUTPATIENT SERVICES | Facility: HOSPITAL | Age: 72
LOS: 1 days | End: 2022-02-07

## 2022-02-07 DIAGNOSIS — M12.9 ARTHROPATHY, UNSPECIFIED: Chronic | ICD-10-CM

## 2022-02-07 DIAGNOSIS — Z98.890 OTHER SPECIFIED POSTPROCEDURAL STATES: Chronic | ICD-10-CM

## 2022-02-07 DIAGNOSIS — K31.84 GASTROPARESIS: ICD-10-CM

## 2022-02-07 PROCEDURE — G1004: CPT

## 2022-02-07 PROCEDURE — 78264 GASTRIC EMPTYING IMG STUDY: CPT | Mod: 26,ME

## 2022-02-10 ENCOUNTER — NON-APPOINTMENT (OUTPATIENT)
Age: 72
End: 2022-02-10

## 2022-02-10 ENCOUNTER — APPOINTMENT (OUTPATIENT)
Dept: GASTROENTEROLOGY | Facility: CLINIC | Age: 72
End: 2022-02-10
Payer: MEDICARE

## 2022-02-10 VITALS
DIASTOLIC BLOOD PRESSURE: 63 MMHG | HEART RATE: 64 BPM | BODY MASS INDEX: 21.52 KG/M2 | OXYGEN SATURATION: 99 % | HEIGHT: 68 IN | SYSTOLIC BLOOD PRESSURE: 120 MMHG | RESPIRATION RATE: 15 BRPM | WEIGHT: 142 LBS | TEMPERATURE: 96.7 F

## 2022-02-10 PROCEDURE — 99214 OFFICE O/P EST MOD 30 MIN: CPT

## 2022-02-15 NOTE — ASSESSMENT
[FreeTextEntry1] : Can consider various options to improve symptoms but given chronic constipation we can consider Motegrity\par Will discuss this further with motility colleagues\par QTc within range\par Prior history of colitis is vague since he no longer takes medicine for it and no longer has ongoing inflammation\par Overall a trial of low dose motegrity may be reasonable\par He cannot tolerate domperidone or Reglan and does not wish to use these given side effects of Reglan specifically

## 2022-02-15 NOTE — HISTORY OF PRESENT ILLNESS
[FreeTextEntry1] : 71 with very severe gastroparesis. Prior records seems to suggest very severe delay, probably greater than 80% retention at 4 hrs. Had a gastric bezoar twice and finally we were able to do a G POEM. Post G POEM symptoms are moderately better but not completely improved. His emptying is now 56% at 4 hrs. \par

## 2022-03-24 NOTE — ASU PATIENT PROFILE, ADULT - HISTORY OF COVID-19 VACCINATION
is visiting for a routine visit with Lucas Uribe. Active listening, pastoral rapport, and discussion of pt interests and readings. Prayer provided.
Yes

## 2022-04-07 ENCOUNTER — APPOINTMENT (OUTPATIENT)
Dept: GASTROENTEROLOGY | Facility: CLINIC | Age: 72
End: 2022-04-07
Payer: MEDICARE

## 2022-04-07 VITALS
OXYGEN SATURATION: 97 % | DIASTOLIC BLOOD PRESSURE: 75 MMHG | BODY MASS INDEX: 22.58 KG/M2 | TEMPERATURE: 96 F | HEIGHT: 68 IN | WEIGHT: 149 LBS | SYSTOLIC BLOOD PRESSURE: 115 MMHG | RESPIRATION RATE: 15 BRPM | HEART RATE: 75 BPM

## 2022-04-07 DIAGNOSIS — K31.84 GASTROPARESIS: ICD-10-CM

## 2022-04-07 PROCEDURE — 99214 OFFICE O/P EST MOD 30 MIN: CPT

## 2022-04-07 RX ORDER — PRUCALOPRIDE 1 MG/1
1 TABLET, FILM COATED ORAL
Qty: 30 | Refills: 0 | Status: ACTIVE | COMMUNITY
Start: 2022-04-07 | End: 1900-01-01

## 2022-05-24 NOTE — H&P PST ADULT - DOES THIS PATIENT HAVE A HISTORY OF OR HAS BEEN DX WITH HEART FAILURE?
The Service to orthopedic order in workqueue [id number] requested on 5/13/2022 has been cancelled as, unable to contact. Ordering provider has been notified.    Please contact patient, if further communication is needed.    
no

## 2022-06-28 PROBLEM — K31.84 GASTROPARESIS: Status: ACTIVE | Noted: 2021-02-03

## 2022-06-28 NOTE — ASSESSMENT
[FreeTextEntry1] : Hold zofran and start motegrity\par Next options could be surgical\par Will continue discussion\par Patient to stop motegrity if he develops diarrhea or other symptoms

## 2022-06-28 NOTE — HISTORY OF PRESENT ILLNESS
[FreeTextEntry1] : 71 with diabetic GOP with very severe symptoms. Now s/p GPOEM with only mild clinical improvement and modest improvement in gastric emptying. Has mild to moderate constipation. No weight loss. Daily nausea but no vomiting. Taking PPI.

## 2022-09-01 ENCOUNTER — OUTPATIENT (OUTPATIENT)
Dept: OUTPATIENT SERVICES | Facility: HOSPITAL | Age: 72
LOS: 1 days | End: 2022-09-01

## 2022-09-01 VITALS
HEIGHT: 67 IN | OXYGEN SATURATION: 97 % | SYSTOLIC BLOOD PRESSURE: 135 MMHG | DIASTOLIC BLOOD PRESSURE: 87 MMHG | HEART RATE: 61 BPM | TEMPERATURE: 97 F | WEIGHT: 143.08 LBS | RESPIRATION RATE: 16 BRPM

## 2022-09-01 DIAGNOSIS — K40.30 UNILATERAL INGUINAL HERNIA, WITH OBSTRUCTION, WITHOUT GANGRENE, NOT SPECIFIED AS RECURRENT: ICD-10-CM

## 2022-09-01 DIAGNOSIS — M12.9 ARTHROPATHY, UNSPECIFIED: Chronic | ICD-10-CM

## 2022-09-01 DIAGNOSIS — Z98.890 OTHER SPECIFIED POSTPROCEDURAL STATES: Chronic | ICD-10-CM

## 2022-09-01 DIAGNOSIS — K40.90 UNILATERAL INGUINAL HERNIA, WITHOUT OBSTRUCTION OR GANGRENE, NOT SPECIFIED AS RECURRENT: ICD-10-CM

## 2022-09-01 LAB
A1C WITH ESTIMATED AVERAGE GLUCOSE RESULT: 6 % — HIGH (ref 4–5.6)
ANION GAP SERPL CALC-SCNC: 11 MMOL/L — SIGNIFICANT CHANGE UP (ref 7–14)
BUN SERPL-MCNC: 16 MG/DL — SIGNIFICANT CHANGE UP (ref 7–23)
CALCIUM SERPL-MCNC: 10 MG/DL — SIGNIFICANT CHANGE UP (ref 8.4–10.5)
CHLORIDE SERPL-SCNC: 94 MMOL/L — LOW (ref 98–107)
CO2 SERPL-SCNC: 27 MMOL/L — SIGNIFICANT CHANGE UP (ref 22–31)
CREAT SERPL-MCNC: 0.77 MG/DL — SIGNIFICANT CHANGE UP (ref 0.5–1.3)
EGFR: 96 ML/MIN/1.73M2 — SIGNIFICANT CHANGE UP
ESTIMATED AVERAGE GLUCOSE: 126 — SIGNIFICANT CHANGE UP
GLUCOSE SERPL-MCNC: 79 MG/DL — SIGNIFICANT CHANGE UP (ref 70–99)
HCT VFR BLD CALC: 40.2 % — SIGNIFICANT CHANGE UP (ref 39–50)
HGB BLD-MCNC: 12.7 G/DL — LOW (ref 13–17)
MCHC RBC-ENTMCNC: 25.5 PG — LOW (ref 27–34)
MCHC RBC-ENTMCNC: 31.6 GM/DL — LOW (ref 32–36)
MCV RBC AUTO: 80.7 FL — SIGNIFICANT CHANGE UP (ref 80–100)
NRBC # BLD: 0 /100 WBCS — SIGNIFICANT CHANGE UP (ref 0–0)
NRBC # FLD: 0 K/UL — SIGNIFICANT CHANGE UP (ref 0–0)
PLATELET # BLD AUTO: 390 K/UL — SIGNIFICANT CHANGE UP (ref 150–400)
POTASSIUM SERPL-MCNC: 4.7 MMOL/L — SIGNIFICANT CHANGE UP (ref 3.5–5.3)
POTASSIUM SERPL-SCNC: 4.7 MMOL/L — SIGNIFICANT CHANGE UP (ref 3.5–5.3)
RBC # BLD: 4.98 M/UL — SIGNIFICANT CHANGE UP (ref 4.2–5.8)
RBC # FLD: 14.7 % — HIGH (ref 10.3–14.5)
SODIUM SERPL-SCNC: 132 MMOL/L — LOW (ref 135–145)
WBC # BLD: 10.32 K/UL — SIGNIFICANT CHANGE UP (ref 3.8–10.5)
WBC # FLD AUTO: 10.32 K/UL — SIGNIFICANT CHANGE UP (ref 3.8–10.5)

## 2022-09-01 PROCEDURE — 93010 ELECTROCARDIOGRAM REPORT: CPT

## 2022-09-01 RX ORDER — SITAGLIPTIN AND METFORMIN HYDROCHLORIDE 500; 50 MG/1; MG/1
1 TABLET, FILM COATED ORAL
Qty: 0 | Refills: 0 | DISCHARGE

## 2022-09-01 NOTE — H&P PST ADULT - OTHER CARE PROVIDERS
Dr. Fadi Roblero cardiologist [phone]; pulmonologist Dr. Falcon [facility] [phone]

## 2022-09-01 NOTE — H&P PST ADULT - GASTROINTESTINAL COMMENTS
colitis currently taking prednisone, entyvio infusions, right scrotal inguinal hernai right scrotal inguinal hernia colitis currently taking prednisone, entyvio infusions, right scrotal inguinal hernia

## 2022-09-01 NOTE — H&P PST ADULT - GASTROINTESTINAL
normal/soft/nontender/nondistended/normal active bowel sounds/no guarding/no rigidity/no organomegaly/no palpable hari/no masses palpable details…

## 2022-09-01 NOTE — H&P PST ADULT - NSICDXPASTMEDICALHX_GEN_ALL_CORE_FT
PAST MEDICAL HISTORY:  Alcohol abuse quit drinking in 2016    Chronic obstructive pulmonary disease     DM (diabetes mellitus) type II    Gastroparesis with pylorus dysfunction    Hypertension     Ulcerative colitis      PAST MEDICAL HISTORY:  Chronic obstructive pulmonary disease     DM (diabetes mellitus) type II    Gastroparesis with pylorus dysfunction    Hypertension     Inguinal hernia     Ulcerative colitis

## 2022-09-01 NOTE — H&P PST ADULT - ENDOCRINE COMMENTS
diabetes mellitus -- finger sticks in the am range 80-90's; in the evening 100-210's; denies thyroid disease dm type 2 BS 90

## 2022-09-01 NOTE — H&P PST ADULT - PROBLEM SELECTOR PLAN 1
Pt scheduled for right scrotal inguinal hernia repair on 9/15/2022.  labs done results pending, ekg done.  Pt instructed to obtain preop covid testing.  Preop teaching done, pt able to verbalize understanding.   medications day of procedure-  prednisone, pantoprazole, trelegy, ondansetron, albuterol   dm- no medications day of surgery, Janumet last dose 9/14/2022.  anesthesia- MELVI precautions  pst request   medical eval- multiple comorbidities, Dr. Greene [phone]  echo 2020 [phone]  stres 2020 [phone] Pt scheduled for right scrotal inguinal hernia repair on 9/15/2022.  labs done results pending, ekg done.  Pt instructed to obtain preop covid testing.  Preop teaching done, pt able to verbalize understanding.   medications day of procedure-  prednisone, pantoprazole, trelegy, ondansetron, albuterol   dm- no medications day of surgery, Janumet last dose 9/14/2022.  anesthesia- MELVI precautions, on prednisone  pst request   medical eval- multiple comorbidities, Dr. Greene [phone]  echo 2020 [phone]  stres 2020 [phone]

## 2022-09-01 NOTE — H&P PST ADULT - NSICDXPASTSURGICALHX_GEN_ALL_CORE_FT
PAST SURGICAL HISTORY:  Arthropathy left knee replacement    History of repair of hiatal hernia      PAST SURGICAL HISTORY:  Arthropathy left knee replacement 2010    History of repair of hiatal hernia childhood    S/P endoscopy POEM 10/2021, 11/2021

## 2022-09-01 NOTE — H&P PST ADULT - RESPIRATORY
normal/clear to auscultation bilaterally/no wheezes/no rales/no rhonchi/no respiratory distress/no use of accessory muscles/breath sounds equal/respirations non-labored

## 2022-09-01 NOTE — H&P PST ADULT - HISTORY OF PRESENT ILLNESS
73y/o male scheduled for right scrotal inguinal hernia repair on 9/15/2022.  Pt states, "has right inguinal hernia for the past 2 months, increasing in size, c/o discomfort."

## 2022-09-14 ENCOUNTER — TRANSCRIPTION ENCOUNTER (OUTPATIENT)
Age: 72
End: 2022-09-14

## 2022-09-14 VITALS
SYSTOLIC BLOOD PRESSURE: 104 MMHG | OXYGEN SATURATION: 97 % | TEMPERATURE: 97 F | HEART RATE: 61 BPM | RESPIRATION RATE: 14 BRPM | DIASTOLIC BLOOD PRESSURE: 62 MMHG | HEIGHT: 67 IN | WEIGHT: 143.08 LBS

## 2022-09-14 NOTE — ASU PREOPERATIVE ASSESSMENT, ADULT (IPARK ONLY) - FALL HARM RISK - FACTORS NURSING JUDGEMENT
Forms placed in  in box
Paperwork was faxed on 1-11-19 pt aware
Pt dropped off paperwork to be filled  out and faxed to [phone].  P laced paperwork in Dr. Idania Iverson folder
pristiq came in today pt ware med in triage room
No

## 2022-09-14 NOTE — ASU PREOPERATIVE ASSESSMENT, ADULT ([facility] ONLY) - FALL HARM RISK - UNIVERSAL INTERVENTIONS
Bed in lowest position, wheels locked, appropriate side rails in place/Call bell, personal items and telephone in reach/Instruct patient to call for assistance before getting out of bed or chair/Non-slip footwear when patient is out of bed/[place] to call system/Physically safe environment - no spills, clutter or unnecessary equipment/Purposeful Proactive Rounding/Room/bathroom lighting operational, light cord in reach

## 2022-09-15 ENCOUNTER — RESULT REVIEW (OUTPATIENT)
Age: 72
End: 2022-09-15

## 2022-09-15 ENCOUNTER — TRANSCRIPTION ENCOUNTER (OUTPATIENT)
Age: 72
End: 2022-09-15

## 2022-09-15 ENCOUNTER — OUTPATIENT (OUTPATIENT)
Dept: OUTPATIENT SERVICES | Facility: HOSPITAL | Age: 72
LOS: 1 days | Discharge: HOME | End: 2022-09-15

## 2022-09-15 VITALS
HEART RATE: 75 BPM | OXYGEN SATURATION: 97 % | SYSTOLIC BLOOD PRESSURE: 122 MMHG | TEMPERATURE: 98 F | DIASTOLIC BLOOD PRESSURE: 67 MMHG | RESPIRATION RATE: 18 BRPM

## 2022-09-15 DIAGNOSIS — K40.30 UNILATERAL INGUINAL HERNIA, WITH OBSTRUCTION, WITHOUT GANGRENE, NOT SPECIFIED AS RECURRENT: ICD-10-CM

## 2022-09-15 DIAGNOSIS — Z98.890 OTHER SPECIFIED POSTPROCEDURAL STATES: Chronic | ICD-10-CM

## 2022-09-15 DIAGNOSIS — M12.9 ARTHROPATHY, UNSPECIFIED: Chronic | ICD-10-CM

## 2022-09-15 LAB — GLUCOSE BLDC GLUCOMTR-MCNC: 87 MG/DL — SIGNIFICANT CHANGE UP (ref 70–99)

## 2022-09-15 PROCEDURE — 88302 TISSUE EXAM BY PATHOLOGIST: CPT | Mod: 26

## 2022-09-15 PROCEDURE — 88304 TISSUE EXAM BY PATHOLOGIST: CPT | Mod: 26

## 2022-09-15 DEVICE — MESH HERNIA PERFIX PLUG LARGE 1.6 X 1.90": Type: IMPLANTABLE DEVICE | Status: FUNCTIONAL

## 2022-09-15 RX ORDER — OXYCODONE HYDROCHLORIDE 5 MG/1
1 TABLET ORAL
Qty: 30 | Refills: 0
Start: 2022-09-15

## 2022-09-15 NOTE — ASU DISCHARGE PLAN (ADULT/PEDIATRIC) - NS MD DC FALL RISK RISK
For information on Fall & Injury Prevention, visit: [email or web address] OR  [email or web address] OR  https://www.cdc.gov/steadi/patient.html

## 2022-09-15 NOTE — BRIEF OPERATIVE NOTE - NSICDXBRIEFPROCEDURE_GEN_ALL_CORE_FT
PROCEDURES:  Repair of reducible inguinal hernia without history of prior repair in patient 5 years of age or older 15-Sep-2022 15:25:22  Leif Vasquez

## 2022-09-15 NOTE — ASU DISCHARGE PLAN (ADULT/PEDIATRIC) - ASU DC SPECIAL INSTRUCTIONSFT
PAIN: Take over the counter Tylenol 1000mg Q6 and, oxycodone for breakthrough   WOUND: Please keep incisions clean and dry. Please do not scrub or rub incisions. Please to do not apply lotion or powder on incisions.   BATH: Please do not submerge wound under water. You may shower or use sponge bath.  ACTIVITY: No heavy lifting or straining until your follow up appointment. Otherwise, you may return to your usual level of physical activity. If you are taking narcotic pain medication (such as Oxycodone) DO NOT drive a car, operate machinery or make important decisions.  DIET: Return to your usual diet.  NOTIFY YOUR SURGEON IF: You have any bleeding that does not stop, any pus draining from your wound(s), any fever (over 100.4 F) or chills, persistent nausea/vomiting, persistent diarrhea, or if your pain is not controlled on your discharge pain medications.  FOLLOW-UP: Please follow-up with your surgeon, within 1-2 weeks following discharge- please call to schedule an appointment.

## 2022-09-15 NOTE — ASU DISCHARGE PLAN (ADULT/PEDIATRIC) - CARE PROVIDER_API CALL
Mauro Long)  ColonRectal Surgery; Surgery  [address] [facility], [address]  [place] [address]  Phone: [phone]  Fax: [phone]  Follow Up Time:

## 2022-09-15 NOTE — ASU PREOP CHECKLIST - PATIENT'S PERSONAL PROPERTY GIVEN TO
[facility] Heart Care Note    Assessment / Plan:    Coronary artery disease: Status post prior surgical revascularization about 22 years ago with a LIMA to the LAD and a vein graft to an obtuse marginal. Currently doing well with no anginal symptoms. Last stress test in August 2014 showed some agustin-infarct apical ischemia and an inferior infarct overall no change from prior. Continue aspirin and Toprol-XL as well as statin therapy.      Cardiomyopathy: Last ejection fraction reported at 40% from his stress test. This has been stable, it appears, for a number of years. He has had no recent heart failure exacerbations. His beta blockade will be continued but cannot be titrated up further because of his relative hypotension and fatigue. He is also not on an ACE inhibitor because of his hypotension. He does have a dual-chamber ICD in place that was implanted several years ago.  As he has not had an assessment of his left ventricular systolic function in a number of years, a follow-up echocardiogram will be scheduled.     Overall doing reasonably well from the cardiac standpoint. He's been asked to continue his current medical regimen, stay active and follow-up in about a year. However he knows to call sooner than that if he has any changes or worsening symptoms.      Thank you for the opportunity to participate in the care of Daniel Alamo. Please do not hesitate to call with any questions or concerns regarding his cardiovascular status      ______________________________________________________________________    Subjective:    It was a pleasure to see Daniel Alamo at the [facility] in follow-up for his coronary artery disease and cardiomyopathy .      Daniel Alamo is a pleasant 78 y.o. male with with established coronary artery disease having had an inferior myocardial infarction approximately 23 years ago. He was treated at that time initially with lytic therapy and subsequently underwent  surgical revascularization receiving a LIMA to the LAD and a vein graft to an obvious marginal. His right coronary artery was not grafted, it appears, because of his infarct.      Over the years Mr. Alamo has been followed by Dr. Cabrera, and has actually done reasonably well from the cardiac standpoint. He states that he will get fatigued after walking about half a mile. However this is not new and has not changed significantly over the past several years. He denies any chest discomfort or shortness of breath with activity. He is also not noticed any palpitations, orthopnea, PND or syncope.      Overall while he has had to slow down somewhat, it appears that he is reasonably satisfied with his current functional capacity.     ______________________________________________________________________    Problem List:  Patient Active Problem List   Diagnosis     Ischemic Cardiomyopathy     Atrial Fibrillation     Dyslipidemia     Coronary Artery Disease       Medical History:  Past Medical History:   Diagnosis Date     Coronary artery disease        Surgical History:  Past Surgical History:   Procedure Laterality Date     CORONARY ARTERY BYPASS GRAFT       icd       INSERT / REPLACE / REMOVE PACEMAKER       KS CABG, VEIN, SINGLE      Description: CABG (CABG);  Recorded: 10/03/2012;  Comments: LIMA to LAD, SVG to OM2, SVG to RCA       Social History:  Social History     Social History     Marital status:      Spouse name: N/A     Number of children: N/A     Years of education: N/A     Occupational History     Not on file.     Social History Main Topics     Smoking status: Current Every Day Smoker     Packs/day: 0.50     Years: 30.00     Smokeless tobacco: Never Used     Alcohol use Not on file     Drug use: No     Sexual activity: Not on file     Other Topics Concern     Not on file     Social History Narrative       Review of Systems: 12 organ system review done and negative except as noted in the HPI.    Family  "History:  No family history on file.      Allergies:  Allergies   Allergen Reactions     Carvedilol        Medications:  Current Outpatient Prescriptions   Medication Sig Dispense Refill     aspirin 325 MG tablet Take 325 mg by mouth daily.       atorvastatin (LIPITOR) 80 MG tablet Take 80 mg by mouth daily.       cholecalciferol, vitamin D3, 1,000 unit tablet Take 2,000 Units by mouth daily.       gabapentin (NEURONTIN) 300 MG capsule Take 600 mg by mouth 2 (two) times a day.       metoprolol succinate (TOPROL-XL) 25 MG Take 1.5 tablets (37.5 mg total) by mouth bedtime. 135 tablet 4     No current facility-administered medications for this visit.        Objective:   Vital signs:  Visit Vitals     /80 (Patient Site: Left Arm, Patient Position: Sitting, Cuff Size: Adult Regular)     Pulse 64     Resp 16     Ht 5' 10\" (1.778 m)     Wt 165 lb (74.8 kg)     BMI 23.68 kg/m2         Physical Exam:    GENERAL APPEARANCE: Alert, cooperative and in no acute distress.  HEENT: No scleral icterus. No Xanthelasma. Oral mucuos membranes pink and moist.  NECK: No JVD. Thyroid not visualized  CHEST: clear to auscultation  CARDIOVASCULAR: S1, S2 regular. No significant murmur noted.   PULSES: Radial and posterior tibial pulses are intact and symmetric.   ABDOMEN: Nontender. BS+.  EXTREMITIES: No cyanosis, clubbing or edema.  SKIN: Warm, well perfused  NEURO: Grossly nonfocal    Lab Results:  LIPIDS:  Lab Results   Component Value Date    CHOL 143 10/28/2016    CHOL 140 08/05/2015    CHOL 126 08/12/2014     Lab Results   Component Value Date    HDL 35 (L) 10/28/2016    HDL 34 (L) 08/05/2015    HDL 31 (L) 08/12/2014     Lab Results   Component Value Date    LDLCALC 92 10/28/2016    LDLCALC 93 08/05/2015    LDLCALC 78 08/12/2014     Lab Results   Component Value Date    TRIG 78 10/28/2016    TRIG 63 08/05/2015    TRIG 85 08/12/2014     No components found for: CHOLHDL    BMP:  Lab Results   Component Value Date    CREATININE 1.02 " 10/28/2016    BUN 15 10/28/2016     10/28/2016    K 3.6 10/28/2016     (H) 10/28/2016    CO2 27 10/28/2016           SHAKA ACEVEDO MD  [facility]               on unit

## 2022-09-22 LAB — SURGICAL PATHOLOGY STUDY: SIGNIFICANT CHANGE UP

## 2022-11-14 PROBLEM — K40.90 UNILATERAL INGUINAL HERNIA, WITHOUT OBSTRUCTION OR GANGRENE, NOT SPECIFIED AS RECURRENT: Chronic | Status: ACTIVE | Noted: 2022-09-01

## 2022-11-17 ENCOUNTER — APPOINTMENT (OUTPATIENT)
Dept: CT IMAGING | Facility: IMAGING CENTER | Age: 72
End: 2022-11-17

## 2022-11-17 ENCOUNTER — OUTPATIENT (OUTPATIENT)
Dept: OUTPATIENT SERVICES | Facility: HOSPITAL | Age: 72
LOS: 1 days | End: 2022-11-17
Payer: MEDICARE

## 2022-11-17 DIAGNOSIS — Z00.8 ENCOUNTER FOR OTHER GENERAL EXAMINATION: ICD-10-CM

## 2022-11-17 DIAGNOSIS — M12.9 ARTHROPATHY, UNSPECIFIED: Chronic | ICD-10-CM

## 2022-11-17 DIAGNOSIS — Z98.890 OTHER SPECIFIED POSTPROCEDURAL STATES: Chronic | ICD-10-CM

## 2022-11-17 PROCEDURE — 71250 CT THORAX DX C-: CPT | Mod: 26,MH

## 2022-11-17 PROCEDURE — 71250 CT THORAX DX C-: CPT | Mod: MH

## 2023-01-14 ENCOUNTER — INPATIENT (INPATIENT)
Facility: HOSPITAL | Age: 73
LOS: 5 days | Discharge: HOME | End: 2023-01-20
Attending: INTERNAL MEDICINE | Admitting: INTERNAL MEDICINE
Payer: MEDICARE

## 2023-01-14 VITALS
SYSTOLIC BLOOD PRESSURE: 109 MMHG | OXYGEN SATURATION: 96 % | RESPIRATION RATE: 18 BRPM | TEMPERATURE: 98 F | HEART RATE: 74 BPM | DIASTOLIC BLOOD PRESSURE: 54 MMHG

## 2023-01-14 DIAGNOSIS — K92.1 MELENA: ICD-10-CM

## 2023-01-14 DIAGNOSIS — E87.20 ACIDOSIS, UNSPECIFIED: ICD-10-CM

## 2023-01-14 DIAGNOSIS — K52.9 NONINFECTIVE GASTROENTERITIS AND COLITIS, UNSPECIFIED: ICD-10-CM

## 2023-01-14 DIAGNOSIS — M12.9 ARTHROPATHY, UNSPECIFIED: Chronic | ICD-10-CM

## 2023-01-14 DIAGNOSIS — Z98.890 OTHER SPECIFIED POSTPROCEDURAL STATES: Chronic | ICD-10-CM

## 2023-01-14 DIAGNOSIS — J44.9 CHRONIC OBSTRUCTIVE PULMONARY DISEASE, UNSPECIFIED: ICD-10-CM

## 2023-01-14 DIAGNOSIS — E11.9 TYPE 2 DIABETES MELLITUS WITHOUT COMPLICATIONS: ICD-10-CM

## 2023-01-14 DIAGNOSIS — J18.9 PNEUMONIA, UNSPECIFIED ORGANISM: ICD-10-CM

## 2023-01-14 DIAGNOSIS — Z29.9 ENCOUNTER FOR PROPHYLACTIC MEASURES, UNSPECIFIED: ICD-10-CM

## 2023-01-14 DIAGNOSIS — K51.90 ULCERATIVE COLITIS, UNSPECIFIED, WITHOUT COMPLICATIONS: ICD-10-CM

## 2023-01-14 LAB
ALBUMIN SERPL ELPH-MCNC: 3.3 G/DL — SIGNIFICANT CHANGE UP (ref 3.3–5)
ALP SERPL-CCNC: 80 U/L — SIGNIFICANT CHANGE UP (ref 40–120)
ALT FLD-CCNC: 7 U/L — SIGNIFICANT CHANGE UP (ref 4–41)
ANION GAP SERPL CALC-SCNC: 12 MMOL/L — SIGNIFICANT CHANGE UP (ref 7–14)
AST SERPL-CCNC: 15 U/L — SIGNIFICANT CHANGE UP (ref 4–40)
BASE EXCESS BLDV CALC-SCNC: -1.2 MMOL/L — SIGNIFICANT CHANGE UP (ref -2–3)
BASOPHILS # BLD AUTO: 0.13 K/UL — SIGNIFICANT CHANGE UP (ref 0–0.2)
BASOPHILS NFR BLD AUTO: 1.2 % — SIGNIFICANT CHANGE UP (ref 0–2)
BILIRUB SERPL-MCNC: 0.3 MG/DL — SIGNIFICANT CHANGE UP (ref 0.2–1.2)
BLOOD GAS VENOUS COMPREHENSIVE RESULT: SIGNIFICANT CHANGE UP
BLOOD GAS VENOUS COMPREHENSIVE RESULT: SIGNIFICANT CHANGE UP
BUN SERPL-MCNC: 8 MG/DL — SIGNIFICANT CHANGE UP (ref 7–23)
CALCIUM SERPL-MCNC: 9.3 MG/DL — SIGNIFICANT CHANGE UP (ref 8.4–10.5)
CHLORIDE BLDV-SCNC: 99 MMOL/L — SIGNIFICANT CHANGE UP (ref 96–108)
CHLORIDE SERPL-SCNC: 97 MMOL/L — LOW (ref 98–107)
CO2 BLDV-SCNC: 27.7 MMOL/L — HIGH (ref 22–26)
CO2 SERPL-SCNC: 22 MMOL/L — SIGNIFICANT CHANGE UP (ref 22–31)
CREAT SERPL-MCNC: 1.03 MG/DL — SIGNIFICANT CHANGE UP (ref 0.5–1.3)
CRP SERPL-MCNC: 32 MG/L — HIGH
EGFR: 78 ML/MIN/1.73M2 — SIGNIFICANT CHANGE UP
EOSINOPHIL # BLD AUTO: 0.85 K/UL — HIGH (ref 0–0.5)
EOSINOPHIL NFR BLD AUTO: 8 % — HIGH (ref 0–6)
FLUAV AG NPH QL: SIGNIFICANT CHANGE UP
FLUBV AG NPH QL: SIGNIFICANT CHANGE UP
GAS PNL BLDV: 131 MMOL/L — LOW (ref 136–145)
GAS PNL BLDV: SIGNIFICANT CHANGE UP
GLUCOSE BLDC GLUCOMTR-MCNC: 124 MG/DL — HIGH (ref 70–99)
GLUCOSE BLDC GLUCOMTR-MCNC: 166 MG/DL — HIGH (ref 70–99)
GLUCOSE BLDV-MCNC: 95 MG/DL — SIGNIFICANT CHANGE UP (ref 70–99)
GLUCOSE SERPL-MCNC: 91 MG/DL — SIGNIFICANT CHANGE UP (ref 70–99)
HCO3 BLDV-SCNC: 26 MMOL/L — SIGNIFICANT CHANGE UP (ref 22–29)
HCT VFR BLD CALC: 37.5 % — LOW (ref 39–50)
HCT VFR BLDA CALC: 35 % — LOW (ref 39–51)
HGB BLD CALC-MCNC: 11.5 G/DL — LOW (ref 13–17)
HGB BLD-MCNC: 11.4 G/DL — LOW (ref 13–17)
IANC: 6.95 K/UL — SIGNIFICANT CHANGE UP (ref 1.8–7.4)
IMM GRANULOCYTES NFR BLD AUTO: 0.3 % — SIGNIFICANT CHANGE UP (ref 0–0.9)
LACTATE BLDV-MCNC: 3.7 MMOL/L — HIGH (ref 0.5–2)
LYMPHOCYTES # BLD AUTO: 1.37 K/UL — SIGNIFICANT CHANGE UP (ref 1–3.3)
LYMPHOCYTES # BLD AUTO: 13 % — SIGNIFICANT CHANGE UP (ref 13–44)
MCHC RBC-ENTMCNC: 24.8 PG — LOW (ref 27–34)
MCHC RBC-ENTMCNC: 30.4 GM/DL — LOW (ref 32–36)
MCV RBC AUTO: 81.7 FL — SIGNIFICANT CHANGE UP (ref 80–100)
MONOCYTES # BLD AUTO: 1.24 K/UL — HIGH (ref 0–0.9)
MONOCYTES NFR BLD AUTO: 11.7 % — SIGNIFICANT CHANGE UP (ref 2–14)
NEUTROPHILS # BLD AUTO: 6.95 K/UL — SIGNIFICANT CHANGE UP (ref 1.8–7.4)
NEUTROPHILS NFR BLD AUTO: 65.8 % — SIGNIFICANT CHANGE UP (ref 43–77)
NRBC # BLD: 0 /100 WBCS — SIGNIFICANT CHANGE UP (ref 0–0)
NRBC # FLD: 0 K/UL — SIGNIFICANT CHANGE UP (ref 0–0)
PCO2 BLDV: 54 MMHG — SIGNIFICANT CHANGE UP (ref 42–55)
PH BLDV: 7.29 — LOW (ref 7.32–7.43)
PLATELET # BLD AUTO: 532 K/UL — HIGH (ref 150–400)
PO2 BLDV: 30 MMHG — SIGNIFICANT CHANGE UP
POTASSIUM BLDV-SCNC: 3.7 MMOL/L — SIGNIFICANT CHANGE UP (ref 3.5–5.1)
POTASSIUM SERPL-MCNC: 3.6 MMOL/L — SIGNIFICANT CHANGE UP (ref 3.5–5.3)
POTASSIUM SERPL-SCNC: 3.6 MMOL/L — SIGNIFICANT CHANGE UP (ref 3.5–5.3)
PROT SERPL-MCNC: 7.6 G/DL — SIGNIFICANT CHANGE UP (ref 6–8.3)
RBC # BLD: 4.59 M/UL — SIGNIFICANT CHANGE UP (ref 4.2–5.8)
RBC # FLD: 14 % — SIGNIFICANT CHANGE UP (ref 10.3–14.5)
RSV RNA NPH QL NAA+NON-PROBE: SIGNIFICANT CHANGE UP
SAO2 % BLDV: 39.2 % — SIGNIFICANT CHANGE UP
SARS-COV-2 RNA SPEC QL NAA+PROBE: SIGNIFICANT CHANGE UP
SODIUM SERPL-SCNC: 131 MMOL/L — LOW (ref 135–145)
WBC # BLD: 10.57 K/UL — HIGH (ref 3.8–10.5)
WBC # FLD AUTO: 10.57 K/UL — HIGH (ref 3.8–10.5)

## 2023-01-14 PROCEDURE — 99223 1ST HOSP IP/OBS HIGH 75: CPT | Mod: GC

## 2023-01-14 PROCEDURE — 74177 CT ABD & PELVIS W/CONTRAST: CPT | Mod: 26,ME

## 2023-01-14 PROCEDURE — 99285 EMERGENCY DEPT VISIT HI MDM: CPT

## 2023-01-14 PROCEDURE — G1004: CPT

## 2023-01-14 RX ORDER — MONTELUKAST 4 MG/1
10 TABLET, CHEWABLE ORAL DAILY
Refills: 0 | Status: DISCONTINUED | OUTPATIENT
Start: 2023-01-14 | End: 2023-01-20

## 2023-01-14 RX ORDER — ENOXAPARIN SODIUM 100 MG/ML
40 INJECTION SUBCUTANEOUS EVERY 24 HOURS
Refills: 0 | Status: DISCONTINUED | OUTPATIENT
Start: 2023-01-15 | End: 2023-01-20

## 2023-01-14 RX ORDER — SODIUM CHLORIDE 9 MG/ML
1000 INJECTION, SOLUTION INTRAVENOUS
Refills: 0 | Status: DISCONTINUED | OUTPATIENT
Start: 2023-01-14 | End: 2023-01-20

## 2023-01-14 RX ORDER — FERROUS SULFATE 325(65) MG
1 TABLET ORAL
Qty: 0 | Refills: 0 | DISCHARGE

## 2023-01-14 RX ORDER — FLUTICASONE FUROATE, UMECLIDINIUM BROMIDE AND VILANTEROL TRIFENATATE 200; 62.5; 25 UG/1; UG/1; UG/1
1 POWDER RESPIRATORY (INHALATION)
Qty: 0 | Refills: 0 | DISCHARGE

## 2023-01-14 RX ORDER — INSULIN LISPRO 100/ML
VIAL (ML) SUBCUTANEOUS AT BEDTIME
Refills: 0 | Status: DISCONTINUED | OUTPATIENT
Start: 2023-01-14 | End: 2023-01-20

## 2023-01-14 RX ORDER — FAMOTIDINE 10 MG/ML
1 INJECTION INTRAVENOUS
Qty: 0 | Refills: 0 | DISCHARGE

## 2023-01-14 RX ORDER — FAMOTIDINE 10 MG/ML
20 INJECTION INTRAVENOUS
Refills: 0 | Status: DISCONTINUED | OUTPATIENT
Start: 2023-01-14 | End: 2023-01-14

## 2023-01-14 RX ORDER — PIPERACILLIN AND TAZOBACTAM 4; .5 G/20ML; G/20ML
3.38 INJECTION, POWDER, LYOPHILIZED, FOR SOLUTION INTRAVENOUS ONCE
Refills: 0 | Status: COMPLETED | OUTPATIENT
Start: 2023-01-14 | End: 2023-01-14

## 2023-01-14 RX ORDER — AZITHROMYCIN 500 MG/1
500 TABLET, FILM COATED ORAL EVERY 24 HOURS
Refills: 0 | Status: DISCONTINUED | OUTPATIENT
Start: 2023-01-14 | End: 2023-01-14

## 2023-01-14 RX ORDER — HYDROXYZINE HCL 10 MG
1 TABLET ORAL
Qty: 0 | Refills: 0 | DISCHARGE

## 2023-01-14 RX ORDER — SODIUM CHLORIDE 9 MG/ML
1000 INJECTION, SOLUTION INTRAVENOUS ONCE
Refills: 0 | Status: COMPLETED | OUTPATIENT
Start: 2023-01-14 | End: 2023-01-14

## 2023-01-14 RX ORDER — INSULIN LISPRO 100/ML
VIAL (ML) SUBCUTANEOUS
Refills: 0 | Status: DISCONTINUED | OUTPATIENT
Start: 2023-01-14 | End: 2023-01-20

## 2023-01-14 RX ORDER — FLUTICASONE PROPIONATE 50 MCG
1 SPRAY, SUSPENSION NASAL
Refills: 0 | Status: DISCONTINUED | OUTPATIENT
Start: 2023-01-14 | End: 2023-01-20

## 2023-01-14 RX ORDER — GABAPENTIN 400 MG/1
1 CAPSULE ORAL
Qty: 0 | Refills: 0 | DISCHARGE

## 2023-01-14 RX ORDER — ENOXAPARIN SODIUM 100 MG/ML
40 INJECTION SUBCUTANEOUS EVERY 24 HOURS
Refills: 0 | Status: DISCONTINUED | OUTPATIENT
Start: 2023-01-14 | End: 2023-01-14

## 2023-01-14 RX ORDER — GLYCOPYRROLATE AND FORMOTEROL FUMARATE 9; 4.8 UG/1; UG/1
2 AEROSOL, METERED RESPIRATORY (INHALATION)
Refills: 0 | Status: DISCONTINUED | OUTPATIENT
Start: 2023-01-14 | End: 2023-01-20

## 2023-01-14 RX ORDER — BUDESONIDE, MICRONIZED 100 %
3 POWDER (GRAM) MISCELLANEOUS DAILY
Refills: 0 | Status: DISCONTINUED | OUTPATIENT
Start: 2023-01-14 | End: 2023-01-18

## 2023-01-14 RX ORDER — DEXTROSE 50 % IN WATER 50 %
15 SYRINGE (ML) INTRAVENOUS ONCE
Refills: 0 | Status: DISCONTINUED | OUTPATIENT
Start: 2023-01-14 | End: 2023-01-20

## 2023-01-14 RX ORDER — MESALAMINE 400 MG
2 TABLET, DELAYED RELEASE (ENTERIC COATED) ORAL
Qty: 0 | Refills: 0 | DISCHARGE

## 2023-01-14 RX ORDER — SODIUM CHLORIDE 9 MG/ML
1000 INJECTION, SOLUTION INTRAVENOUS
Refills: 0 | Status: COMPLETED | OUTPATIENT
Start: 2023-01-14 | End: 2023-01-14

## 2023-01-14 RX ORDER — LIPASE/PROTEASE/AMYLASE 16-48-48K
2 CAPSULE,DELAYED RELEASE (ENTERIC COATED) ORAL
Refills: 0 | Status: DISCONTINUED | OUTPATIENT
Start: 2023-01-14 | End: 2023-01-20

## 2023-01-14 RX ORDER — PANTOPRAZOLE SODIUM 20 MG/1
40 TABLET, DELAYED RELEASE ORAL
Refills: 0 | Status: DISCONTINUED | OUTPATIENT
Start: 2023-01-14 | End: 2023-01-20

## 2023-01-14 RX ORDER — MESALAMINE 400 MG
1200 TABLET, DELAYED RELEASE (ENTERIC COATED) ORAL
Refills: 0 | Status: DISCONTINUED | OUTPATIENT
Start: 2023-01-14 | End: 2023-01-14

## 2023-01-14 RX ORDER — MESALAMINE 400 MG
800 TABLET, DELAYED RELEASE (ENTERIC COATED) ORAL THREE TIMES A DAY
Refills: 0 | Status: DISCONTINUED | OUTPATIENT
Start: 2023-01-14 | End: 2023-01-20

## 2023-01-14 RX ORDER — PIPERACILLIN AND TAZOBACTAM 4; .5 G/20ML; G/20ML
3.38 INJECTION, POWDER, LYOPHILIZED, FOR SOLUTION INTRAVENOUS EVERY 8 HOURS
Refills: 0 | Status: DISCONTINUED | OUTPATIENT
Start: 2023-01-14 | End: 2023-01-14

## 2023-01-14 RX ORDER — PANTOPRAZOLE SODIUM 20 MG/1
40 TABLET, DELAYED RELEASE ORAL
Refills: 0 | Status: DISCONTINUED | OUTPATIENT
Start: 2023-01-14 | End: 2023-01-14

## 2023-01-14 RX ORDER — BUDESONIDE, MICRONIZED 100 %
3 POWDER (GRAM) MISCELLANEOUS DAILY
Refills: 0 | Status: DISCONTINUED | OUTPATIENT
Start: 2023-01-14 | End: 2023-01-14

## 2023-01-14 RX ORDER — SITAGLIPTIN AND METFORMIN HYDROCHLORIDE 500; 50 MG/1; MG/1
1 TABLET, FILM COATED ORAL
Qty: 0 | Refills: 0 | DISCHARGE

## 2023-01-14 RX ORDER — DEXTROSE 50 % IN WATER 50 %
25 SYRINGE (ML) INTRAVENOUS ONCE
Refills: 0 | Status: DISCONTINUED | OUTPATIENT
Start: 2023-01-14 | End: 2023-01-20

## 2023-01-14 RX ORDER — ONDANSETRON 8 MG/1
1 TABLET, FILM COATED ORAL
Qty: 0 | Refills: 0 | DISCHARGE

## 2023-01-14 RX ORDER — GUAIFENESIN/DEXTROMETHORPHAN 600MG-30MG
1 TABLET, EXTENDED RELEASE 12 HR ORAL
Qty: 0 | Refills: 0 | DISCHARGE

## 2023-01-14 RX ORDER — SODIUM CHLORIDE 9 MG/ML
1000 INJECTION, SOLUTION INTRAVENOUS
Refills: 0 | Status: DISCONTINUED | OUTPATIENT
Start: 2023-01-14 | End: 2023-01-15

## 2023-01-14 RX ORDER — ALBUTEROL 90 UG/1
1 AEROSOL, METERED ORAL
Qty: 0 | Refills: 0 | DISCHARGE

## 2023-01-14 RX ORDER — TELMISARTAN AND HYDROCHLOROTHIAZIDE 40; 12.5 MG/1; MG/1
1 TABLET ORAL
Qty: 0 | Refills: 0 | DISCHARGE

## 2023-01-14 RX ORDER — GLUCAGON INJECTION, SOLUTION 0.5 MG/.1ML
1 INJECTION, SOLUTION SUBCUTANEOUS ONCE
Refills: 0 | Status: DISCONTINUED | OUTPATIENT
Start: 2023-01-14 | End: 2023-01-20

## 2023-01-14 RX ORDER — DEXTROSE 50 % IN WATER 50 %
12.5 SYRINGE (ML) INTRAVENOUS ONCE
Refills: 0 | Status: DISCONTINUED | OUTPATIENT
Start: 2023-01-14 | End: 2023-01-20

## 2023-01-14 RX ADMIN — Medication 1 SPRAY(S): at 20:21

## 2023-01-14 RX ADMIN — Medication 5 MILLIGRAM(S): at 11:13

## 2023-01-14 RX ADMIN — SODIUM CHLORIDE 1000 MILLILITER(S): 9 INJECTION, SOLUTION INTRAVENOUS at 11:10

## 2023-01-14 RX ADMIN — Medication 600 MILLIGRAM(S): at 20:21

## 2023-01-14 RX ADMIN — Medication 20 MILLIGRAM(S): at 17:30

## 2023-01-14 RX ADMIN — MONTELUKAST 10 MILLIGRAM(S): 4 TABLET, CHEWABLE ORAL at 20:22

## 2023-01-14 RX ADMIN — GLYCOPYRROLATE AND FORMOTEROL FUMARATE 2 PUFF(S): 9; 4.8 AEROSOL, METERED RESPIRATORY (INHALATION) at 23:40

## 2023-01-14 RX ADMIN — ENOXAPARIN SODIUM 40 MILLIGRAM(S): 100 INJECTION SUBCUTANEOUS at 17:32

## 2023-01-14 RX ADMIN — SODIUM CHLORIDE 100 MILLILITER(S): 9 INJECTION, SOLUTION INTRAVENOUS at 17:30

## 2023-01-14 RX ADMIN — PANTOPRAZOLE SODIUM 40 MILLIGRAM(S): 20 TABLET, DELAYED RELEASE ORAL at 20:47

## 2023-01-14 RX ADMIN — SODIUM CHLORIDE 1000 MILLILITER(S): 9 INJECTION, SOLUTION INTRAVENOUS at 23:27

## 2023-01-14 RX ADMIN — PIPERACILLIN AND TAZOBACTAM 200 GRAM(S): 4; .5 INJECTION, POWDER, LYOPHILIZED, FOR SOLUTION INTRAVENOUS at 12:25

## 2023-01-14 RX ADMIN — SODIUM CHLORIDE 1000 MILLILITER(S): 9 INJECTION, SOLUTION INTRAVENOUS at 17:23

## 2023-01-14 RX ADMIN — Medication 1: at 17:32

## 2023-01-14 RX ADMIN — Medication 800 MILLIGRAM(S): at 23:40

## 2023-01-14 RX ADMIN — AZITHROMYCIN 500 MILLIGRAM(S): 500 TABLET, FILM COATED ORAL at 17:31

## 2023-01-14 NOTE — H&P ADULT - PROBLEM SELECTOR PLAN 1
- infectious vs UC flare vs diverticulitis  - UC Diagnosed 1.5 yrs ago, GI: Dr. Mushtaq Wang  - colonoscopy in Dec 22 showing mucus coating entire colon, diverticulosis, ulcerative pancolitis  - previously on humira, entyvio, prednisone, mesalamine. Planned to switch to medication soon per patient  - baseline 2-3 BMs/day, sometimes watery  - mesalamine 800 tid  - hold off on steroids until GI PCR, C Diff result  - GI consult  - ESR/CRP, fecal calprotectin

## 2023-01-14 NOTE — H&P ADULT - PROBLEM SELECTOR PLAN 6
Diet: Clear liquid for now pending GI consult  DVT: hold for now given possible GI bleed, but can start 1/15 if Hb stable  Dispo: home

## 2023-01-14 NOTE — ED ADULT NURSE REASSESSMENT NOTE - NS ED NURSE REASSESS COMMENT FT1
Pt aaox4, ambulatory, laying in bed breathing even and unlabored. Pt denies pain, cp, sob, n/v, dizziness, and headache. Pt was able to tolerate eating dinner and states he has not been able to eat without having diarrhea in days. Pt states he has not had an episode of diarrhea since eating the food. Family at beside, bed in lowest position.
Pt aaox4, denies pain, cp, sob. Pt states he had a small black diarrhea bowel movement with blood only noticeable on toilet paper. Pt medicated as per orders on eMAR, pt given education on reason for antibiotics. Awaiting further orders. Call bell within reach.
Pt returned from CT, aaox4, pt denies pain, cp, sob. Pt received 1 L of LR, tolerated well. Awaiting CT results. Pt laying in bed comfortably, call bell within reach, bed in lowest position.

## 2023-01-14 NOTE — H&P ADULT - PROBLEM SELECTOR PLAN 5
Pulmonologist: Ezekiel Sandoavl  - no home O2  - continue home meds: budesonide 3mg po qd,   - montelukast, 10mg qd

## 2023-01-14 NOTE — H&P ADULT - ATTENDING COMMENTS
72M w/ UC (mesalamine, budesonide, vedolizumab last 6 weeks ago), NIDDM w/ gastroparesis s/p GPOEM, HTN, COPD, presenting with abdominal pain, melena, and bloody diarrhea x1 week.    I reviewed outside records from outpatient gastroenterologist with patient's wife, which showed colonoscopy from 12/2022 with colonic inflammation, c diff testing negative in the first week in January.     I personally reviewed the admission CT A/P which showed proctocolitis    Discussed plan with patient and his wife at bedside      - Acute, severe flare of colitis - UC flare vs infectious colitis, send GI PCR, C diff, hold off on steroids or abx pending result, GI consult  - Melena - patient describes black, tarry stools, which would be unusual for colitis alone, trend CBC, IV PPI, GI consult as above  - Lactic acidosis - likely in setting of colitis, received IVF, repeat VBG, continue IVF  - Pulmonary opacity on CT - low clinical suspicion for pneumonia, monitor off antibiotics  - DVT ppx - note that IBD flare carries high risk for VTE; hold off on ppx overnight to ensure CBC stability given report of ongoing melena, which seems outside of typical IBD flare. If Hgb is stable in AM would start lovenox ppx

## 2023-01-14 NOTE — CONSULT NOTE ADULT - ATTENDING COMMENTS
Patient seen on rounds w Fellow. AGree w above. UC flare with symptoms of bloody diarrhea. Failed humira. Currently on Entyvio, mesalamine and budesonide. Rec stool GI PCR, C diff PCR and fecal calprotectin. IV steroids once ruled out for infection. GI will follow.

## 2023-01-14 NOTE — ED PROVIDER NOTE - OBJECTIVE STATEMENT
72M h/o HTN, DM, COPD, ulcerative colitis on mesalamine p/w 1wk bloody diarrhea and abd pain. States had previously been on Humira ~3months ago but stopped bc was not helping him. Describes as black, large volume, watery stools. +nausea but no vomiting. Denies fever/chills. Last colonoscopy performed in Dec 2022 showed thick mucus through descending colon per patient. Denies other associated symptoms.

## 2023-01-14 NOTE — ED PROVIDER NOTE - NSICDXPASTSURGICALHX_GEN_ALL_CORE_FT
PAST SURGICAL HISTORY:  Arthropathy left knee replacement 2010    History of repair of hiatal hernia childhood    S/P endoscopy POEM 10/2021, 11/2021

## 2023-01-14 NOTE — CONSULT NOTE ADULT - ASSESSMENT
Eric Lozano is a 72 year old man with PMHx notable for UC, HTN,, DM c/b gastroparesis s/p G-POEM and COPD presenting with bloody BMs c/f UC flare. GI consulted for further workup and management of the above.     #Ulcerative Colitis     Recommendations:  -Please obtain outpatient GI records from primary GI including recent colonoscopy reports   -Please check C. diff, Stool Cx and GI PCR. If infectious workup negative can -START solumedrol 20mg IV TID if C. diff and GI PCR neg  -Please check U/A and BCx   -Please check calprotectin  -Trend daily ESR/CRP  -Send quantiferon gold in addition to  Hepatitis B surface Ab, surface Ag, core Ab IgM, core Ab total  -Avoid NSAIDs/opiods    -START DVT ppx w/ either heparin or lovenox subQ; despite bleeding, IBD patients are at high risk for VTE    All recommendations are tentative until note is attested by attending.     Justin Valdovinos, PGY-4  Gastroenterology/Hepatology Fellow  Available on Microsoft Teams   [phone] (Long Range Pager)  [phone] (Short Range Pager [facility])    After 5pm, please contact the on-call GI fellow. [phone]   Eric Lozano is a 72 year old man with PMHx notable for UC, HTN,, DM c/b gastroparesis s/p G-POEM and COPD presenting with bloody BMs c/f UC flare. GI consulted for further workup and management of the above.     #Ulcerative Colitis Flare  Follows with Brodie Ramos with UC around 1.5 years ago previously on Humira with nonresponse currently on Mesalamine, Bidesnodie and Entyvio. Despite the above, continues to have 10-15 loose watery, bloody BMs per day with associated nausea and abdominal discomfort with recent c-scope during December showing IH, pan-colonic UC worse in the rectosigmoid colon with bx showing active colitis though out (no comment on CMV testing).  He was being planned to start a new medication although unclear which biolgic. Presenting with   progression of above symptoms with w/u inclduign CTAP showing proctocolitis in addition to bronchiolitis and ?cytitis. Ongoing symptoms likely related to UC flare. Would obtain infectious w/u as outlined beloiw and if negatvie start IV steroids. Will likely need intiation of Remicade given severity of symptoms.     Recommendations:  -Please obtain outpatient GI records from Dr. Wang   -Please check C. diff, Stool Cx and GI PCR. If infectious workup negative can -START solumedrol 20mg IV TID if C. diff and GI PCR neg  -Please check U/A and BCx   -Please check calprotectin  -Trend daily ESR/CRP  -Send quantiferon gold in addition to  Hepatitis B surface Ab, surface Ag, core Ab IgM, core Ab total  -Avoid NSAIDs/opiods    -START DVT ppx w/ either heparin or lovenox subQ; despite bleeding, IBD patients are at high risk for VTE    All recommendations are tentative until note is attested by attending.     Justin Valdovinos, PGY-4  Gastroenterology/Hepatology Fellow  Available on Microsoft Teams   [phone] (Long Range Pager)  [phone] (Short Range Pager [facility])    After 5pm, please contact the on-call GI fellow. [phone]

## 2023-01-14 NOTE — H&P ADULT - NSHPLABSRESULTS_GEN_ALL_CORE
11.4   10.57 )-----------( 532      ( 14 Jan 2023 12:28 )             37.5     01-14    131<L>  |  97<L>  |  8   ----------------------------<  91  3.6   |  22  |  1.03    Ca    9.3      14 Jan 2023 12:28    TPro  7.6  /  Alb  3.3  /  TBili  0.3  /  DBili  x   /  AST  15  /  ALT  7   /  AlkPhos  80  01-14        LIVER FUNCTIONS - ( 14 Jan 2023 12:28 )  Alb: 3.3 g/dL / Pro: 7.6 g/dL / ALK PHOS: 80 U/L / ALT: 7 U/L / AST: 15 U/L / GGT: x                   EKG:     RADIOLOGY STUDIES:

## 2023-01-14 NOTE — H&P ADULT - NSHPREVIEWOFSYSTEMS_GEN_ALL_CORE
REVIEW OF SYSTEMS:  CONSTITUTIONAL: No weakness, fevers or chills  EYES/ENT: No visual changes;  No vertigo or throat pain   NECK: No pain or stiffness  RESPIRATORY: +mild shortness of breath, no cough, wheezing, hemoptysis;   CARDIOVASCULAR: No chest pain or palpitations  GASTROINTESTINAL: +bloody diarrhea x5d, +some abdominal cramping. No nausea, vomiting, or hematemesis;  GENITOURINARY: No dysuria, frequency or hematuria  NEUROLOGICAL: No numbness or weakness  SKIN: No itching, rashes

## 2023-01-14 NOTE — ED ADULT NURSE NOTE - OBJECTIVE STATEMENT
HPI Comments: 62 y.o. male with past medical history significant for mental retardation,  psoriasis  who presents from group home with chief complaint of bleeding from elbow. Caregiver states that he was noted this morning to have bleeding from sheets. Unsure of exact time or mechanism or injury. States that he picks at his wounds. Caregiver denies change in behavior, fever, vomiting, change in urination or bowel movements. There are no other acute medical concerns at this time. Social hx: non-smoker, lives in group home  Significant FMHx: non-contributory  PCP: Elly Crouch MD        Patient is a 62 y.o. male presenting with wound check. The history is provided by a caregiver. Wound Check           Past Medical History:   Diagnosis Date    Cerebral palsy ([facility] [address]     CP    Developmental delay     Diabetes ([facility] [address]     Hypothyroidism     Mental retardation     Mild    Psoriasis     Psychiatric disorder     Seizures ([facility] [address]        History reviewed. No pertinent surgical history. Family History:   Problem Relation Age of Onset    Family history unknown: Yes       Social History     Social History    Marital status: SINGLE     Spouse name: N/A    Number of children: N/A    Years of education: N/A     Occupational History    Not on file. Social History Main Topics    Smoking status: Never Smoker    Smokeless tobacco: Never Used    Alcohol use No    Drug use: Yes     Special: Prescription    Sexual activity: Not on file     Other Topics Concern    Not on file     Social History Narrative         ALLERGIES: Review of patient's allergies indicates no known allergies. Review of Systems   Unable to perform ROS: Other       Vitals:    05/11/18 0816   BP: 103/57   Pulse: 69   Resp: 18   Temp: 97.2 °F (36.2 °C)   SpO2: 97%   Weight: 72.9 kg (160 lb 11.5 oz)   Height: 5' 7\" (1.702 m)            Physical Exam   Constitutional: No distress. HENT:   Head: Normocephalic and atraumatic.    Eyes: Pupils are equal, round, and reactive to light. No scleral icterus. Neck: Normal range of motion. Neck supple. No tracheal deviation present. Cardiovascular: Normal rate, regular rhythm, normal heart sounds and intact distal pulses. Exam reveals no gallop and no friction rub. No murmur heard. Pulmonary/Chest: Effort normal and breath sounds normal. No respiratory distress. He has no wheezes. He has no rales. Abdominal: Soft. He exhibits no distension. There is no tenderness. There is no rebound and no guarding. Musculoskeletal: He exhibits no edema. Neurological: He is alert. Skin: Skin is warm and dry. 1 cm superficial laceration over right olecranon process. Multiple healing excoriations   Psychiatric: He has a normal mood and affect. Nursing note and vitals reviewed. [facility]      ED Course       Wound Closure by Adhesive  Date/Time: 5/11/2018 8:49 AM  Performed by: Breanna Damon Authorized by: Breanna Damon     Consent:     Consent obtained:  Verbal    Consent given by:  Guardian    Risks discussed:  Infection and pain  Laceration details:     Location: R elbow. Length (cm):  1  Treatment:     Area cleansed with:  Natalie-Anais    Amount of cleaning:  Standard    Visualized foreign bodies/material removed: no    Skin repair:     Repair method:  Tissue adhesive  Approximation:     Approximation:  Close    Vermilion border: well-aligned    Post-procedure details:     Dressing:  Non-adherent dressing    Patient tolerance of procedure: Tolerated well, no immediate complications        4:42 AM  The patient has been reevaluated. The patient is ready for discharge. The patient's signs, symptoms, diagnosis, and discharge instructions have been discussed and the patient/ family has conveyed their understanding. The patient is to follow up as recommended or return to the ED should their symptoms worsen. Plan has been discussed and the patient is in agreement.     LABORATORY TESTS:  Labs Reviewed - No data to display    IMAGING RESULTS:  No results found. MEDICATIONS GIVEN:  Medications - No data to display    IMPRESSION:  1. Laceration of skin of right elbow, initial encounter        PLAN:  1. Current Discharge Medication List        2. Follow-up Information     Follow up With Details Comments [id number] [facility] EMERGENCY DEPT  If symptoms worsen [place] [address] [facility] [address]   [phone]        3. Return to ED for new or worsening symptoms       Emma Magaña MD 72 year old male presented to ED room 2, pt c/o of black bloody diarrhea bowel movements for the past week. PMH of UC, COPD, DM, HTN. AAOx4, ambulatory, breathing equal and unlabored. Lung sounds clear, heart sounds normal, bowel sounds active, pt states he is in no pain and denies pain upon abd palpation. Pt denies n/v, cp, sob. Pt states he does not use oxygen at home. Pt states he has experienced bloody diarrhea before r/t the UC but has never had to be hospitalized before. Pt states he is having diarrhea multiple times all day long. #18G IV in right AC , labs drawn and sent as per orders, medications administered as per eMAR. Family member present at bedside. Bed in lowest position, call bell within reach. Pt sent to CT. 72 year old male presented to ED room 2, pt c/o of black bloody diarrhea bowel movements for the past week. PMH of UC, COPD, DM, HTN. AAOx4, ambulatory, breathing equal and unlabored. Lung sounds clear, heart sounds normal, bowel sounds active, pt states he is in no pain and denies pain upon abd palpation. Pt denies n/v, cp, sob. Pt states he does not use oxygen at home. Pt states he has experienced bloody diarrhea before r/t the UC but has never had to be hospitalized before. Pt states he is having diarrhea multiple times all day long. Pt put on monitor, sinus renata noted. #18G IV in right AC , labs drawn and sent as per orders, medications administered as per eMAR. Family member present at bedside. Bed in lowest position, call bell within reach. Pt sent to CT.

## 2023-01-14 NOTE — H&P ADULT - PROBLEM SELECTOR PLAN 2
- Pt reports black stool, unusual to be from rectosigmoid bleed  - Pantoprazole 40iv bid  - GI consult

## 2023-01-14 NOTE — H&P ADULT - HISTORY OF PRESENT ILLNESS
Pt is a 72 year old man with ulcerative colitis, htn, DM c/b gastroparesis, COPD who presents with 5 days of "black stool." He describes it as black and watery with surrounding bright red blood. He was diagnosed with UC 1.5 years ago and has been treated with mesalamine, prednisone, humira, and entyvio. At baseline, he has 2-3 bowel movements per day, sometimes watery, He has been eating less due to decreased appetite and abdominal cramping from the diarrhea. There has been some mild shortness of breath as well. He went to his GI Dr Wang last week who was waiting for approval to switch him from entyvio to a new medication. Colonoscopy from Dec 2022 showed pancolonic mucus, UC and diverticulosis. He denies any other symptoms including CP, HA, fevers, dysuria, vomiting.    ED Course: afebrile, VSS, CTAP showing proctocolitis and likely bronchiolitis and developing bronchopneumonia  Received prednisone, solumedrol, azithromycin, zosyn, 2L LR

## 2023-01-14 NOTE — ED PROVIDER NOTE - PROGRESS NOTE DETAILS
Freddy Espinosa, PGY-3: Pt reexamined at bedside, resting comfortably, vitals stable. CT demonstrates proctocolitis, no free fluid. 1L IVF given, zosyn given. Pt endorsed to hospitalist.

## 2023-01-14 NOTE — PATIENT PROFILE ADULT - FALL HARM RISK - UNIVERSAL INTERVENTIONS
Bed in lowest position, wheels locked, appropriate side rails in place/Call bell, personal items and telephone in reach/Instruct patient to call for assistance before getting out of bed or chair/Non-slip footwear when patient is out of bed/[place] to call system/Physically safe environment - no spills, clutter or unnecessary equipment/Purposeful Proactive Rounding/Room/bathroom lighting operational, light cord in reach

## 2023-01-14 NOTE — H&P ADULT - NSHPPHYSICALEXAM_GEN_ALL_CORE
VITALS:   T(C): 36.8 (01-14-23 @ 15:51), Max: 36.8 (01-14-23 @ 15:51)  HR: 59 (01-14-23 @ 15:51) (59 - 74)  BP: 122/63 (01-14-23 @ 15:51) (109/54 - 122/71)  RR: 16 (01-14-23 @ 15:51) (14 - 18)  SpO2: 100% (01-14-23 @ 15:51) (96% - 100%)    GENERAL: NAD, lying in bed comfortably  HEAD:  Atraumatic, normocephalic  EYES: EOMI, PERRLA, conjunctiva and sclera clear  ENT: dry mucous membranes  NECK: Supple, no JVD  HEART: Regular rate and rhythm, no murmurs, rubs, or gallops  LUNGS: Unlabored respirations.  Clear to auscultation bilaterally, no crackles, wheezing, or rhonchi  ABDOMEN: Soft, nontender, nondistended, +BS  EXTREMITIES: 2+ peripheral pulses bilaterally. No clubbing, cyanosis, or edema  NERVOUS SYSTEM:  A&Ox3, no focal deficits   SKIN: No rashes or lesions

## 2023-01-14 NOTE — H&P ADULT - ASSESSMENT
Pt is a 72 year old man with ulcerative colitis, htn, DM c/b gastroparesis, COPD who presents with 5 days of bloody diarrhea. CT scan showing proctocolitis, consistent with a UC flare vs infectious colitis, less likely diverticulitis. Consulting GI for colitis and melena.

## 2023-01-14 NOTE — ED PROVIDER NOTE - NSICDXPASTMEDICALHX_GEN_ALL_CORE_FT
PAST MEDICAL HISTORY:  Chronic obstructive pulmonary disease     DM (diabetes mellitus) type II    Gastroparesis with pylorus dysfunction    Hypertension     Inguinal hernia     Ulcerative colitis

## 2023-01-14 NOTE — CONSULT NOTE ADULT - SUBJECTIVE AND OBJECTIVE BOX
HPI:  Eric Lozano is a 72 year old man with PMHx notable for UC, HTN,, DM c/b gastroparesi s/p G-POEM and COPD presenting with bloody BMs c/f UC flare. GI consulted for further workup and management of the above.     He notes that he was diagnosed with UC around 1.5 years ago with failed treatments including mesalamine, Humira and Entyvio He is currently on Entyvio although given poorly controlled symptoms and inflammation with recent c-scope during December showing  Cshowed pancolonic mucus, UC and diverticulosis (no reports or biospies available)his outpatient GI Dr. Wang last week who was waiting for approval to switch him from Entyvio to a new medication although he is unclear of which medication.  At baseline, he noted 2-3 bowel movements per day, sometimes watery although over the past couple of days he has been having bloody mucosi BMs with associated decreased appetite and abdominal cramping from the diarrhea. In addition to the above he also notes   ED Course: afebrile, VSS, CTAP showing proctocolitis and likely bronchiolitis and developing bronchopneumonia  Received prednisone, solumedrol, azithromycin, zosyn, 2L LR      Allergies:  angiotensin converting enzyme inhibitors (Other; Swelling)    Home Medications:  Breztri Aerosphere inhalation aerosol: 2 puff(s) inhaled 2 times a day (14 Jan 2023 16:20)  budesonide 3 mg oral capsule, extended release: 1 application orally once a day (14 Jan 2023 16:20)  Creon 36,000 units oral delayed release capsule: 2 cap(s) orally 3 times a day (14 Jan 2023 16:20)  Entyvio: 1 dose(s) intravenous every 6 weeks  next due 1/21/2023 (14 Jan 2023 16:20)  fluticasone 0.5 mg/2 mL inhalation suspension: 2 application inhaled 2 times a day (14 Jan 2023 16:20)  Jardiance 10 mg oral tablet: 1 tab(s) orally once a day (in the morning) (14 Jan 2023 16:20)  losartan 25 mg oral tablet: 1 tab(s) orally once a day (14 Jan 2023 16:20)  mesalamine 1.2 g oral delayed release tablet: 1 tab(s) orally 2 times a day (14 Jan 2023 16:20)  montelukast 10 mg oral tablet: 1 tab(s) orally once a day (14 Jan 2023 16:20)  Mucinex  mg-30 mg oral tablet, extended release: 1 tab(s) orally once a day (14 Jan 2023 16:20)  pantoprazole 40 mg oral delayed release tablet: 1 tab(s) orally once a day (14 Jan 2023 16:20)  Pepcid 20 mg oral tablet: 1 tab(s) orally 2 times a day (14 Jan 2023 16:20)      Hospital Medications:  buDESOnide    EC Capsule 3 milliGRAM(s) Oral daily  dextrose 5%. 1000 milliLiter(s) IV Continuous <Continuous>  dextrose 5%. 1000 milliLiter(s) IV Continuous <Continuous>  dextrose 50% Injectable 25 Gram(s) IV Push once  dextrose 50% Injectable 12.5 Gram(s) IV Push once  dextrose 50% Injectable 25 Gram(s) IV Push once  dextrose Oral Gel 15 Gram(s) Oral once PRN  fluticasone propionate 50 MICROgram(s)/spray Nasal Spray 1 Spray(s) Both Nostrils two times a day  glucagon  Injectable 1 milliGRAM(s) IntraMuscular once  glycopyrrolate 9 MICROgram(s)/formoterol 4.8 MICROgram(s) Inhaler 2 Puff(s) Inhalation two times a day  guaiFENesin  milliGRAM(s) Oral <User Schedule>  insulin lispro (ADMELOG) corrective regimen sliding scale   SubCutaneous three times a day before meals  insulin lispro (ADMELOG) corrective regimen sliding scale   SubCutaneous at bedtime  lactated ringers. 1000 milliLiter(s) IV Continuous <Continuous>  mesalamine DR Capsule 800 milliGRAM(s) Oral three times a day  montelukast 10 milliGRAM(s) Oral daily  pancrelipase  (CREON 36,000 Lipase Units) 2 Capsule(s) Oral three times a day with meals  pantoprazole  Injectable 40 milliGRAM(s) IV Push two times a day      PMHX/PSHX:    DM (diabetes mellitus) c/b Gastroparesis s/p G-POEM   Chronic obstructive pulmonary disease  Hypertension  Alcohol abuse  Ulcerative colitis  Inguinal hernia  Arthropathy  History of repair of hiatal hernia  S/P endoscopy      Family history:   FH: diabetes mellitus (Mother)    Social History:   Tob: Denies  EtOH: Denies  Illicit Drugs: Denies    ROS: Complete and normal except as mentioned above    PHYSICAL EXAM:   GENERAL:  No acute distress  HEENT:  NCAT, no scleral icterus   CHEST:  no respiratory distress  HEART:  Regular rate and rhythm  ABDOMEN:  Soft, non-tender, non-distended, normoactive bowel sounds,  no masses  EXTREMITIES: No edema  SKIN:  No rash/erythema/ecchymoses/petechiae/wounds/abscess/warm/dry  NEURO:  Alert and oriented x 3, no asterixis    Vital Signs:  Vital Signs Last 24 Hrs  T(C): 36.7 (14 Jan 2023 17:37), Max: 36.8 (14 Jan 2023 15:51)  T(F): 98.1 (14 Jan 2023 17:37), Max: 98.2 (14 Jan 2023 15:51)  HR: 59 (14 Jan 2023 17:37) (59 - 74)  BP: 114/66 (14 Jan 2023 17:37) (109/54 - 122/71)  BP(mean): --  RR: 16 (14 Jan 2023 17:37) (14 - 18)  SpO2: 100% (14 Jan 2023 17:37) (96% - 100%)    Parameters below as of 14 Jan 2023 17:37  Patient On (Oxygen Delivery Method): room air      Daily Height in cm: 175.26 (14 Jan 2023 15:51)    Daily     LABS:                        11.4   10.57 )-----------( 532      ( 14 Jan 2023 12:28 )             37.5     Mean Cell Volume: 81.7 fL (01-14-23 @ 12:28)    01-14    131<L>  |  97<L>  |  8   ----------------------------<  91  3.6   |  22  |  1.03    Ca    9.3      14 Jan 2023 12:28    TPro  7.6  /  Alb  3.3  /  TBili  0.3  /  DBili  x   /  AST  15  /  ALT  7   /  AlkPhos  80  01-14    LIVER FUNCTIONS - ( 14 Jan 2023 12:28 )  Alb: 3.3 g/dL / Pro: 7.6 g/dL / ALK PHOS: 80 U/L / ALT: 7 U/L / AST: 15 U/L / GGT: x                                       11.4   10.57 )-----------( 532      ( 14 Jan 2023 12:28 )             37.5       Imaging:  < from: Upper Endoscopy (11.29.21 @ 13:59) >                                                                      Findings:       The examined esophagus was normal.       A metal stent was found at the pylorus. Stent removal was accomplished        with a Raptor grasping device.       The exam of the stomach was otherwise normal.       Preparations were made for gastric endoscopic myotomy (G-POEM).        Mucosotomy followed by myotomy were performed in a posterior (5 o'clock)        orientation starting in the gastric antrum, approximately 5-6cm proximal        to the pyloric channel. First, a submucosal injection of a solution of        methylene blue and saline was used to lift the mucosa at the site of the        initial mucosotomy. The initial mucosal incision was made using a    HybridKnife I-Type. Next, the endoscope with a clear cap was used to        enter into the submucosal tunnel. The submucosal tunnel was then further        created by continued dissection. The submucosal tunnel was extended to        pyloric channel. The myotomy was started using a DualKnife to perform        myotomies of the circular and longitudinal muscle fibers. Intra        procedure bleeding was minimal. A small Coagrasper was used effectively        for hemostasis. Hemostasis was successfully accomplished throughout the        procedure. After completion of the myotomy, there was no evidence of        bleeding noted on the inspection of the myotomy edges and submucosal        tunnel. The myotomy was successfully performed. The muscular division        was complete. Edges of the myotomy were ablated using the coag grasper        and a soft coag setting. The mucosal entrance to the submucosal tunnel        was closed using endoscopic clips.                                         Impression:          - Pre-existing gastric stent across pyloric channel                        removed.                       - Gastric peroral endoscopic myotomy (G-POEM) was                        performed.  Recommendation:      - Clear liquid diet for 3 days, followed by full liquid                        diet for 3 days post-procedure.                       - Complete 7 day course of antibiotics (levofloxacin                        500mg PO daily) as prescribed.                       - Continue aspiration precautions.                       - PPI BID.                       - Discharge patient to home (ambulatory).                                                                                     < end of copied text >  < from: CT Abdomen and Pelvis w/ IV Cont (01.14.23 @ 11:36) >  FINDINGS:  LOWER CHEST: Mucoid plugging of right lower lobe bronchi with scattered   tree-in bud opacities and right lower lobe peripheral   atelectasis/consolidation.    LIVER: Within normal limits.  BILE DUCTS: Normal caliber.  GALLBLADDER: Within normal limits.  SPLEEN: Within normal limits.  PANCREAS: Within normal limits.  ADRENALS: Thickening of the adrenal glands (left greater than right),   nonspecific.  KIDNEYS/URETERS: Kidneys enhance symmetrically without hydronephrosis.   Subcentimeter low-attenuation lesion in the right kidney which is too   small to characterize.    BLADDER: Thickening of the urinary bladder walls despite underdistention   with slight infiltration of the perivesicular fat.  REPRODUCTIVE ORGANS: Enlarged with median hypertrophy.    BOWEL: Small hiatus hernia. No bowel obstruction.Appendix is normal. Mild   thickening of the walls of the rectosigmoid colon despite underdistention   with mucosal hyperenhancement and slight infiltration of the pericolonic   fat.  PERITONEUM: No ascites, pneumoperitoneum, or loculated collection.  VESSELS: Atherosclerotic calcifications of the aortoiliac tree. Normal   caliber abdominal aorta.  RETROPERITONEUM/LYMPH NODES: No lymphadenopathy.  ABDOMINAL WALL: Tiny fat-containing umbilical hernia. Postsurgical   changes at the right groin.  BONES: Degenerative changes of the spine.    IMPRESSION:  Mucoid plugging of right lower lobe bronchi with scattered tree-in bud   opacities and right lower lobe peripheral atelectasis/consolidation   likely reflective of bronchiolitis and developing bronchopneumonia.    Proctocolitis involving the rectum and sigmoid colon.    Enlarged prostate gland. Thickening of the urinary bladder walls despite   underdistention with slight infiltration of the perivesicular fat which   raises question of cystitis. Correlate with urinalysis.    < end of copied text >           HPI:  Eric Lozano is a 72 year old man with PMHx notable for UC, HTN,, DM c/b gastroparesi s/p G-POEM and COPD presenting with bloody BMs c/f UC flare. GI consulted for further workup and management of the above.     He notes that he was diagnosed with UC around 1.5 years ago. He has previously failed Humira although is currently on Mesalamine, Bidesnodie and Entyvio. Despite the above symptoms are overall poorly controlled symptoms with ongoing inflammation as noted on c-scope during December showing IH, pan-colonic UC worse in the rectosigmoid colon with bx showing active collitis though out (no comment on CMV testing). Given the above, his outpatient GI Dr. Wang was planning to switch him from Entyvio to a new medication pending insuracne approvasl although he is unclear of which medication was going to be started. He notes that  at baseline, he has 2-3 bowel movements per day, sometimes watery although over the past month he has been having 10-15 loose watery BMs per day with more noticeable blood and mucous in the past couple of days. He has appetite and abdominal cramping from the diarrhea. Given the above, he came into the hospital for further evlaution. ED Course: afebrile, VSS, CTAP showing proctocolitis and likely bronchiolitis and developing bronchopneumonia  Received prednisone, solumedrol, azithromycin, zosyn, 2L LRPatient otherwise denies any fevers, chills, oral ulcers, joint pain/swelling, rash, or family history of IBD or other autoimmune disease.  Denies any ongoing cough or dysura.       Allergies:  angiotensin converting enzyme inhibitors (Other; Swelling)    Home Medications:  Breztri Aerosphere inhalation aerosol: 2 puff(s) inhaled 2 times a day (14 Jan 2023 16:20)  budesonide 3 mg oral capsule, extended release: 1 application orally once a day (14 Jan 2023 16:20)  Creon 36,000 units oral delayed release capsule: 2 cap(s) orally 3 times a day (14 Jan 2023 16:20)  Entyvio: 1 dose(s) intravenous every 6 weeks  next due 1/21/2023 (14 Jan 2023 16:20)  fluticasone 0.5 mg/2 mL inhalation suspension: 2 application inhaled 2 times a day (14 Jan 2023 16:20)  Jardiance 10 mg oral tablet: 1 tab(s) orally once a day (in the morning) (14 Jan 2023 16:20)  losartan 25 mg oral tablet: 1 tab(s) orally once a day (14 Jan 2023 16:20)  mesalamine 1.2 g oral delayed release tablet: 1 tab(s) orally 2 times a day (14 Jan 2023 16:20)  montelukast 10 mg oral tablet: 1 tab(s) orally once a day (14 Jan 2023 16:20)  Mucinex  mg-30 mg oral tablet, extended release: 1 tab(s) orally once a day (14 Jan 2023 16:20)  pantoprazole 40 mg oral delayed release tablet: 1 tab(s) orally once a day (14 Jan 2023 16:20)  Pepcid 20 mg oral tablet: 1 tab(s) orally 2 times a day (14 Jan 2023 16:20)      Hospital Medications:  buDESOnide    EC Capsule 3 milliGRAM(s) Oral daily  dextrose 5%. 1000 milliLiter(s) IV Continuous <Continuous>  dextrose 5%. 1000 milliLiter(s) IV Continuous <Continuous>  dextrose 50% Injectable 25 Gram(s) IV Push once  dextrose 50% Injectable 12.5 Gram(s) IV Push once  dextrose 50% Injectable 25 Gram(s) IV Push once  dextrose Oral Gel 15 Gram(s) Oral once PRN  fluticasone propionate 50 MICROgram(s)/spray Nasal Spray 1 Spray(s) Both Nostrils two times a day  glucagon  Injectable 1 milliGRAM(s) IntraMuscular once  glycopyrrolate 9 MICROgram(s)/formoterol 4.8 MICROgram(s) Inhaler 2 Puff(s) Inhalation two times a day  guaiFENesin  milliGRAM(s) Oral <User Schedule>  insulin lispro (ADMELOG) corrective regimen sliding scale   SubCutaneous three times a day before meals  insulin lispro (ADMELOG) corrective regimen sliding scale   SubCutaneous at bedtime  lactated ringers. 1000 milliLiter(s) IV Continuous <Continuous>  mesalamine DR Capsule 800 milliGRAM(s) Oral three times a day  montelukast 10 milliGRAM(s) Oral daily  pancrelipase  (CREON 36,000 Lipase Units) 2 Capsule(s) Oral three times a day with meals  pantoprazole  Injectable 40 milliGRAM(s) IV Push two times a day      PMHX/PSHX:    DM (diabetes mellitus) c/b Gastroparesis s/p G-POEM   Chronic obstructive pulmonary disease  Hypertension  Alcohol abuse  Ulcerative colitis  Inguinal hernia  Arthropathy  History of repair of hiatal hernia  S/P endoscopy      Family history:   FH: diabetes mellitus (Mother)    Social History:   Born in [place]   Tob: prior smoker quit 10 yrs   EtOH: prior drinker quit 10 yrs ago   Illicit Drugs: Denies    ROS: Complete and normal except as mentioned above    PHYSICAL EXAM:   GENERAL:  No acute distress  HEENT:  NCAT, no scleral icterus   CHEST:  no respiratory distress  HEART:  Regular rate and rhythm  ABDOMEN:  Soft, non-tender, non-distended, normoactive bowel sounds  EXTREMITIES: No edema  NEURO:  Alert and oriented x 3    Vital Signs:  Vital Signs Last 24 Hrs  T(C): 36.7 (14 Jan 2023 17:37), Max: 36.8 (14 Jan 2023 15:51)  T(F): 98.1 (14 Jan 2023 17:37), Max: 98.2 (14 Jan 2023 15:51)  HR: 59 (14 Jan 2023 17:37) (59 - 74)  BP: 114/66 (14 Jan 2023 17:37) (109/54 - 122/71)  BP(mean): --  RR: 16 (14 Jan 2023 17:37) (14 - 18)  SpO2: 100% (14 Jan 2023 17:37) (96% - 100%)    Parameters below as of 14 Jan 2023 17:37  Patient On (Oxygen Delivery Method): room air      Daily Height in cm: 175.26 (14 Jan 2023 15:51)    Daily     LABS:                        11.4   10.57 )-----------( 532      ( 14 Jan 2023 12:28 )             37.5     Mean Cell Volume: 81.7 fL (01-14-23 @ 12:28)    01-14    131<L>  |  97<L>  |  8   ----------------------------<  91  3.6   |  22  |  1.03    Ca    9.3      14 Jan 2023 12:28    TPro  7.6  /  Alb  3.3  /  TBili  0.3  /  DBili  x   /  AST  15  /  ALT  7   /  AlkPhos  80  01-14    LIVER FUNCTIONS - ( 14 Jan 2023 12:28 )  Alb: 3.3 g/dL / Pro: 7.6 g/dL / ALK PHOS: 80 U/L / ALT: 7 U/L / AST: 15 U/L / GGT: x                                       11.4   10.57 )-----------( 532      ( 14 Jan 2023 12:28 )             37.5       Imaging:  < from: Upper Endoscopy (11.29.21 @ 13:59) >                                                                      Findings:       The examined esophagus was normal.       A metal stent was found at the pylorus. Stent removal was accomplished        with a Raptor grasping device.       The exam of the stomach was otherwise normal.       Preparations were made for gastric endoscopic myotomy (G-POEM).        Mucosotomy followed by myotomy were performed in a posterior (5 o'clock)        orientation starting in the gastric antrum, approximately 5-6cm proximal        to the pyloric channel. First, a submucosal injection of a solution of        methylene blue and saline was used to lift the mucosa at the site of the        initial mucosotomy. The initial mucosal incision was made using a    HybridKnife I-Type. Next, the endoscope with a clear cap was used to        enter into the submucosal tunnel. The submucosal tunnel was then further        created by continued dissection. The submucosal tunnel was extended to        pyloric channel. The myotomy was started using a DualKnife to perform        myotomies of the circular and longitudinal muscle fibers. Intra        procedure bleeding was minimal. A small Coagrasper was used effectively        for hemostasis. Hemostasis was successfully accomplished throughout the        procedure. After completion of the myotomy, there was no evidence of        bleeding noted on the inspection of the myotomy edges and submucosal        tunnel. The myotomy was successfully performed. The muscular division        was complete. Edges of the myotomy were ablated using the coag grasper        and a soft coag setting. The mucosal entrance to the submucosal tunnel        was closed using endoscopic clips.                                         Impression:          - Pre-existing gastric stent across pyloric channel                        removed.                       - Gastric peroral endoscopic myotomy (G-POEM) was                        performed.  Recommendation:      - Clear liquid diet for 3 days, followed by full liquid                        diet for 3 days post-procedure.                       - Complete 7 day course of antibiotics (levofloxacin                        500mg PO daily) as prescribed.                       - Continue aspiration precautions.                       - PPI BID.                       - Discharge patient to home (ambulatory).                                                                                     < end of copied text >  < from: CT Abdomen and Pelvis w/ IV Cont (01.14.23 @ 11:36) >  FINDINGS:  LOWER CHEST: Mucoid plugging of right lower lobe bronchi with scattered   tree-in bud opacities and right lower lobe peripheral   atelectasis/consolidation.    LIVER: Within normal limits.  BILE DUCTS: Normal caliber.  GALLBLADDER: Within normal limits.  SPLEEN: Within normal limits.  PANCREAS: Within normal limits.  ADRENALS: Thickening of the adrenal glands (left greater than right),   nonspecific.  KIDNEYS/URETERS: Kidneys enhance symmetrically without hydronephrosis.   Subcentimeter low-attenuation lesion in the right kidney which is too   small to characterize.    BLADDER: Thickening of the urinary bladder walls despite underdistention   with slight infiltration of the perivesicular fat.  REPRODUCTIVE ORGANS: Enlarged with median hypertrophy.    BOWEL: Small hiatus hernia. No bowel obstruction.Appendix is normal. Mild   thickening of the walls of the rectosigmoid colon despite underdistention   with mucosal hyperenhancement and slight infiltration of the pericolonic   fat.  PERITONEUM: No ascites, pneumoperitoneum, or loculated collection.  VESSELS: Atherosclerotic calcifications of the aortoiliac tree. Normal   caliber abdominal aorta.  RETROPERITONEUM/LYMPH NODES: No lymphadenopathy.  ABDOMINAL WALL: Tiny fat-containing umbilical hernia. Postsurgical   changes at the right groin.  BONES: Degenerative changes of the spine.    IMPRESSION:  Mucoid plugging of right lower lobe bronchi with scattered tree-in bud   opacities and right lower lobe peripheral atelectasis/consolidation   likely reflective of bronchiolitis and developing bronchopneumonia.    Proctocolitis involving the rectum and sigmoid colon.    Enlarged prostate gland. Thickening of the urinary bladder walls despite   underdistention with slight infiltration of the perivesicular fat which   raises question of cystitis. Correlate with urinalysis.    < end of copied text >

## 2023-01-14 NOTE — ED PROVIDER NOTE - CLINICAL SUMMARY MEDICAL DECISION MAKING FREE TEXT BOX
72M h/o UC on mesalamine, COPD, HTN, DM p/w 72M h/o UC on mesalamine, COPD, HTN, DM p/w 1 wk of bloody diarrhea with generalized abd pain and nausea without vomiting. Symptoms concerning for UC flare vs infectious colitis vs diverticular bleed vs lower GIB - will check cbc, cmp, type/screen, provide IVF, pt currently pain free and without nausea - will obtain CT ab/pel

## 2023-01-15 ENCOUNTER — TRANSCRIPTION ENCOUNTER (OUTPATIENT)
Age: 73
End: 2023-01-15

## 2023-01-15 DIAGNOSIS — R93.89 ABNORMAL FINDINGS ON DIAGNOSTIC IMAGING OF OTHER SPECIFIED BODY STRUCTURES: ICD-10-CM

## 2023-01-15 LAB
A1C WITH ESTIMATED AVERAGE GLUCOSE RESULT: 5.9 % — HIGH (ref 4–5.6)
ALBUMIN SERPL ELPH-MCNC: 3 G/DL — LOW (ref 3.3–5)
ALP SERPL-CCNC: 66 U/L — SIGNIFICANT CHANGE UP (ref 40–120)
ALT FLD-CCNC: 5 U/L — SIGNIFICANT CHANGE UP (ref 4–41)
ANION GAP SERPL CALC-SCNC: 10 MMOL/L — SIGNIFICANT CHANGE UP (ref 7–14)
APPEARANCE UR: CLEAR — SIGNIFICANT CHANGE UP
AST SERPL-CCNC: 15 U/L — SIGNIFICANT CHANGE UP (ref 4–40)
BASOPHILS # BLD AUTO: 0.04 K/UL — SIGNIFICANT CHANGE UP (ref 0–0.2)
BASOPHILS NFR BLD AUTO: 0.4 % — SIGNIFICANT CHANGE UP (ref 0–2)
BILIRUB SERPL-MCNC: <0.2 MG/DL — SIGNIFICANT CHANGE UP (ref 0.2–1.2)
BILIRUB UR-MCNC: NEGATIVE — SIGNIFICANT CHANGE UP
BLD GP AB SCN SERPL QL: NEGATIVE — SIGNIFICANT CHANGE UP
BUN SERPL-MCNC: 8 MG/DL — SIGNIFICANT CHANGE UP (ref 7–23)
CALCIUM SERPL-MCNC: 9.1 MG/DL — SIGNIFICANT CHANGE UP (ref 8.4–10.5)
CHLORIDE SERPL-SCNC: 100 MMOL/L — SIGNIFICANT CHANGE UP (ref 98–107)
CO2 SERPL-SCNC: 25 MMOL/L — SIGNIFICANT CHANGE UP (ref 22–31)
COLOR SPEC: SIGNIFICANT CHANGE UP
CREAT SERPL-MCNC: 0.85 MG/DL — SIGNIFICANT CHANGE UP (ref 0.5–1.3)
CRP SERPL-MCNC: 20.9 MG/L — HIGH
DIFF PNL FLD: NEGATIVE — SIGNIFICANT CHANGE UP
EGFR: 93 ML/MIN/1.73M2 — SIGNIFICANT CHANGE UP
EOSINOPHIL # BLD AUTO: 0.13 K/UL — SIGNIFICANT CHANGE UP (ref 0–0.5)
EOSINOPHIL NFR BLD AUTO: 1.5 % — SIGNIFICANT CHANGE UP (ref 0–6)
ERYTHROCYTE [SEDIMENTATION RATE] IN BLOOD: 44 MM/HR — HIGH (ref 1–15)
ESTIMATED AVERAGE GLUCOSE: 123 — SIGNIFICANT CHANGE UP
FERRITIN SERPL-MCNC: 274 NG/ML — SIGNIFICANT CHANGE UP (ref 30–400)
GI PCR PANEL: SIGNIFICANT CHANGE UP
GLUCOSE BLDC GLUCOMTR-MCNC: 100 MG/DL — HIGH (ref 70–99)
GLUCOSE BLDC GLUCOMTR-MCNC: 101 MG/DL — HIGH (ref 70–99)
GLUCOSE BLDC GLUCOMTR-MCNC: 118 MG/DL — HIGH (ref 70–99)
GLUCOSE BLDC GLUCOMTR-MCNC: 96 MG/DL — SIGNIFICANT CHANGE UP (ref 70–99)
GLUCOSE BLDC GLUCOMTR-MCNC: 99 MG/DL — SIGNIFICANT CHANGE UP (ref 70–99)
GLUCOSE SERPL-MCNC: 106 MG/DL — HIGH (ref 70–99)
GLUCOSE UR QL: ABNORMAL
HBV CORE AB SER-ACNC: SIGNIFICANT CHANGE UP
HBV CORE IGM SER-ACNC: SIGNIFICANT CHANGE UP
HBV SURFACE AB SER-ACNC: SIGNIFICANT CHANGE UP
HBV SURFACE AG SER-ACNC: SIGNIFICANT CHANGE UP
HCT VFR BLD CALC: 27.6 % — LOW (ref 39–50)
HCT VFR BLD CALC: 33 % — LOW (ref 39–50)
HCV AB S/CO SERPL IA: 0.22 S/CO — SIGNIFICANT CHANGE UP (ref 0–0.99)
HCV AB SERPL-IMP: SIGNIFICANT CHANGE UP
HGB BLD-MCNC: 10.1 G/DL — LOW (ref 13–17)
HGB BLD-MCNC: 8.5 G/DL — LOW (ref 13–17)
IANC: 6.6 K/UL — SIGNIFICANT CHANGE UP (ref 1.8–7.4)
IMM GRANULOCYTES NFR BLD AUTO: 0.3 % — SIGNIFICANT CHANGE UP (ref 0–0.9)
IRON SATN MFR SERPL: 28 % — SIGNIFICANT CHANGE UP (ref 14–50)
IRON SATN MFR SERPL: 42 UG/DL — LOW (ref 45–165)
KETONES UR-MCNC: NEGATIVE — SIGNIFICANT CHANGE UP
LACTATE SERPL-SCNC: 2.2 MMOL/L — HIGH (ref 0.5–2)
LACTATE SERPL-SCNC: 4.1 MMOL/L — CRITICAL HIGH (ref 0.5–2)
LEUKOCYTE ESTERASE UR-ACNC: NEGATIVE — SIGNIFICANT CHANGE UP
LYMPHOCYTES # BLD AUTO: 1.1 K/UL — SIGNIFICANT CHANGE UP (ref 1–3.3)
LYMPHOCYTES # BLD AUTO: 12.3 % — LOW (ref 13–44)
MAGNESIUM SERPL-MCNC: 1.9 MG/DL — SIGNIFICANT CHANGE UP (ref 1.6–2.6)
MCHC RBC-ENTMCNC: 24.6 PG — LOW (ref 27–34)
MCHC RBC-ENTMCNC: 25 PG — LOW (ref 27–34)
MCHC RBC-ENTMCNC: 30.6 GM/DL — LOW (ref 32–36)
MCHC RBC-ENTMCNC: 30.8 GM/DL — LOW (ref 32–36)
MCV RBC AUTO: 80.5 FL — SIGNIFICANT CHANGE UP (ref 80–100)
MCV RBC AUTO: 81.2 FL — SIGNIFICANT CHANGE UP (ref 80–100)
MONOCYTES # BLD AUTO: 1.01 K/UL — HIGH (ref 0–0.9)
MONOCYTES NFR BLD AUTO: 11.3 % — SIGNIFICANT CHANGE UP (ref 2–14)
NEUTROPHILS # BLD AUTO: 6.6 K/UL — SIGNIFICANT CHANGE UP (ref 1.8–7.4)
NEUTROPHILS NFR BLD AUTO: 74.2 % — SIGNIFICANT CHANGE UP (ref 43–77)
NITRITE UR-MCNC: NEGATIVE — SIGNIFICANT CHANGE UP
NRBC # BLD: 0 /100 WBCS — SIGNIFICANT CHANGE UP (ref 0–0)
NRBC # BLD: 0 /100 WBCS — SIGNIFICANT CHANGE UP (ref 0–0)
NRBC # FLD: 0 K/UL — SIGNIFICANT CHANGE UP (ref 0–0)
NRBC # FLD: 0 K/UL — SIGNIFICANT CHANGE UP (ref 0–0)
PH UR: 6.5 — SIGNIFICANT CHANGE UP (ref 5–8)
PHOSPHATE SERPL-MCNC: 4.8 MG/DL — HIGH (ref 2.5–4.5)
PLATELET # BLD AUTO: 371 K/UL — SIGNIFICANT CHANGE UP (ref 150–400)
PLATELET # BLD AUTO: 451 K/UL — HIGH (ref 150–400)
POTASSIUM SERPL-MCNC: 5.2 MMOL/L — SIGNIFICANT CHANGE UP (ref 3.5–5.3)
POTASSIUM SERPL-SCNC: 5.2 MMOL/L — SIGNIFICANT CHANGE UP (ref 3.5–5.3)
PROT SERPL-MCNC: 6.6 G/DL — SIGNIFICANT CHANGE UP (ref 6–8.3)
PROT UR-MCNC: NEGATIVE — SIGNIFICANT CHANGE UP
RBC # BLD: 3.4 M/UL — LOW (ref 4.2–5.8)
RBC # BLD: 4.1 M/UL — LOW (ref 4.2–5.8)
RBC # FLD: 13.9 % — SIGNIFICANT CHANGE UP (ref 10.3–14.5)
RBC # FLD: 13.9 % — SIGNIFICANT CHANGE UP (ref 10.3–14.5)
RH IG SCN BLD-IMP: POSITIVE — SIGNIFICANT CHANGE UP
SODIUM SERPL-SCNC: 135 MMOL/L — SIGNIFICANT CHANGE UP (ref 135–145)
SP GR SPEC: 1.01 — SIGNIFICANT CHANGE UP (ref 1.01–1.05)
TIBC SERPL-MCNC: 151 UG/DL — LOW (ref 220–430)
UIBC SERPL-MCNC: 109 UG/DL — LOW (ref 110–370)
UROBILINOGEN FLD QL: SIGNIFICANT CHANGE UP
WBC # BLD: 8.69 K/UL — SIGNIFICANT CHANGE UP (ref 3.8–10.5)
WBC # BLD: 8.91 K/UL — SIGNIFICANT CHANGE UP (ref 3.8–10.5)
WBC # FLD AUTO: 8.69 K/UL — SIGNIFICANT CHANGE UP (ref 3.8–10.5)
WBC # FLD AUTO: 8.91 K/UL — SIGNIFICANT CHANGE UP (ref 3.8–10.5)

## 2023-01-15 PROCEDURE — 99233 SBSQ HOSP IP/OBS HIGH 50: CPT | Mod: GC

## 2023-01-15 PROCEDURE — 99221 1ST HOSP IP/OBS SF/LOW 40: CPT

## 2023-01-15 RX ORDER — CIPROFLOXACIN LACTATE 400MG/40ML
200 VIAL (ML) INTRAVENOUS EVERY 12 HOURS
Refills: 0 | Status: DISCONTINUED | OUTPATIENT
Start: 2023-01-15 | End: 2023-01-16

## 2023-01-15 RX ORDER — SODIUM ZIRCONIUM CYCLOSILICATE 10 G/10G
10 POWDER, FOR SUSPENSION ORAL ONCE
Refills: 0 | Status: COMPLETED | OUTPATIENT
Start: 2023-01-15 | End: 2023-01-15

## 2023-01-15 RX ORDER — SODIUM CHLORIDE 9 MG/ML
1000 INJECTION INTRAMUSCULAR; INTRAVENOUS; SUBCUTANEOUS
Refills: 0 | Status: DISCONTINUED | OUTPATIENT
Start: 2023-01-15 | End: 2023-01-20

## 2023-01-15 RX ORDER — METRONIDAZOLE 500 MG
500 TABLET ORAL EVERY 12 HOURS
Refills: 0 | Status: DISCONTINUED | OUTPATIENT
Start: 2023-01-15 | End: 2023-01-17

## 2023-01-15 RX ORDER — HYDROCORTISONE 20 MG
100 TABLET ORAL THREE TIMES A DAY
Refills: 0 | Status: DISCONTINUED | OUTPATIENT
Start: 2023-01-15 | End: 2023-01-20

## 2023-01-15 RX ADMIN — Medication 100 MILLIGRAM(S): at 18:28

## 2023-01-15 RX ADMIN — ENOXAPARIN SODIUM 40 MILLIGRAM(S): 100 INJECTION SUBCUTANEOUS at 18:41

## 2023-01-15 RX ADMIN — Medication 800 MILLIGRAM(S): at 21:48

## 2023-01-15 RX ADMIN — Medication 1 SPRAY(S): at 07:31

## 2023-01-15 RX ADMIN — SODIUM CHLORIDE 100 MILLILITER(S): 9 INJECTION, SOLUTION INTRAVENOUS at 07:32

## 2023-01-15 RX ADMIN — Medication 3 MILLIGRAM(S): at 07:33

## 2023-01-15 RX ADMIN — Medication 800 MILLIGRAM(S): at 13:50

## 2023-01-15 RX ADMIN — Medication 100 MILLIGRAM(S): at 14:01

## 2023-01-15 RX ADMIN — PANTOPRAZOLE SODIUM 40 MILLIGRAM(S): 20 TABLET, DELAYED RELEASE ORAL at 07:32

## 2023-01-15 RX ADMIN — MONTELUKAST 10 MILLIGRAM(S): 4 TABLET, CHEWABLE ORAL at 11:11

## 2023-01-15 RX ADMIN — SODIUM CHLORIDE 75 MILLILITER(S): 9 INJECTION INTRAMUSCULAR; INTRAVENOUS; SUBCUTANEOUS at 13:45

## 2023-01-15 RX ADMIN — Medication 2 CAPSULE(S): at 13:00

## 2023-01-15 RX ADMIN — PANTOPRAZOLE SODIUM 40 MILLIGRAM(S): 20 TABLET, DELAYED RELEASE ORAL at 18:28

## 2023-01-15 RX ADMIN — SODIUM CHLORIDE 75 MILLILITER(S): 9 INJECTION INTRAMUSCULAR; INTRAVENOUS; SUBCUTANEOUS at 16:31

## 2023-01-15 RX ADMIN — Medication 800 MILLIGRAM(S): at 07:31

## 2023-01-15 RX ADMIN — GLYCOPYRROLATE AND FORMOTEROL FUMARATE 2 PUFF(S): 9; 4.8 AEROSOL, METERED RESPIRATORY (INHALATION) at 21:18

## 2023-01-15 RX ADMIN — GLYCOPYRROLATE AND FORMOTEROL FUMARATE 2 PUFF(S): 9; 4.8 AEROSOL, METERED RESPIRATORY (INHALATION) at 13:02

## 2023-01-15 RX ADMIN — SODIUM ZIRCONIUM CYCLOSILICATE 10 GRAM(S): 10 POWDER, FOR SUSPENSION ORAL at 14:06

## 2023-01-15 RX ADMIN — Medication 100 MILLIGRAM(S): at 21:48

## 2023-01-15 RX ADMIN — Medication 2 CAPSULE(S): at 18:29

## 2023-01-15 RX ADMIN — Medication 100 MILLIGRAM(S): at 18:36

## 2023-01-15 RX ADMIN — Medication 1 SPRAY(S): at 18:29

## 2023-01-15 NOTE — PROGRESS NOTE ADULT - NSPROGADDITIONALINFOA_GEN_ALL_CORE
pt with history of ulcerative colitis.  He was on humra and recently changed to entyvio.  He is still symptomatic with bleeding  and diarrhea, multiple bm per day.  He will start cipro/flagyl and steroids,  risk of steroid discussed.  used for induction not remission will defer to primary gi re: change in bologic, although onlly 6 weeks of therapy      Electronic Signatures:  Miguel Ángel Adler ()  (Signed 15-Kishore-2023 13:22)

## 2023-01-15 NOTE — PROGRESS NOTE ADULT - SUBJECTIVE AND OBJECTIVE BOX
BALA [id number]  72yMale  T(C): 36.7 (01-15-23 @ 09:14), Max: 36.8 (01-14-23 @ 15:51)  HR: 54 (01-15-23 @ 09:14) (54 - 74)  BP: 116/64 (01-15-23 @ 09:14) (109/54 - 148/78)  RR: 17 (01-15-23 @ 09:14) (14 - 20)  SpO2: 97% (01-15-23 @ 09:14) (96% - 100%)  Wt(kg): --  01-14 @ 07:01  -  01-15 @ 07:00  --------------------------------------------------------  IN: 0 mL / OUT: 300 mL / NET: -300 mL

## 2023-01-15 NOTE — PROGRESS NOTE ADULT - PROBLEM SELECTOR PLAN 5
Pulmonologist: Ezekiel Sandoval  - no home O2  - continue home meds: budesonide 3mg po qd,   - montelukast, 10mg qd

## 2023-01-15 NOTE — PROGRESS NOTE ADULT - SUBJECTIVE AND OBJECTIVE BOX
Internal Medicine Progress Note    Patient is a 72y old  Male who presents with a chief complaint of bloody diarrhea (14 Jan 2023 18:45)    OVERNIGHT EVENTS/SUBJECTIVE:    OBJECTIVE:  Vital Signs Last 24 Hrs  T(C): 36.6 (15 Kishore 2023 02:08), Max: 36.8 (14 Jan 2023 15:51)  T(F): 97.8 (15 Kishore 2023 02:08), Max: 98.2 (14 Jan 2023 15:51)  HR: 62 (15 Kishore 2023 02:08) (59 - 74)  BP: 135/72 (15 Kishore 2023 02:08) (109/54 - 148/78)  BP(mean): --  RR: 17 (15 Kishore 2023 02:08) (14 - 18)  SpO2: 100% (15 Kishore 2023 02:08) (96% - 100%)    Parameters below as of 15 Kishore 2023 02:08  Patient On (Oxygen Delivery Method): room air      I&O's Detail    14 Jan 2023 07:01  -  15 Kishore 2023 06:48  --------------------------------------------------------  IN:  Total IN: 0 mL    OUT:    Voided (mL): 300 mL  Total OUT: 300 mL    Total NET: -300 mL        Daily Height in cm: 175.26 (14 Jan 2023 15:51)    Daily   Physical Exam:  General: NAD, resting comfortably in bed  Neuro: A&Ox4, 5/5 strength in all ext  HEENT: NC/AT, EOMI, normal hearing, oral mucosa moist, no oral lesions noted, no pharyngeal erythema, uvula midline  Neck: supple, thyroid not enlarged, no LAD  Resp: Breathing comfortably on RA, LCTA b/l  CV: Normal sinus rhythm, S1 and S2, no r/m/g  Abd: soft, non-distended, non-tender. No HSM.  Ext: ROMIx4, no edema, +2 pulses bilaterally  Skin: Warm and dry, no rashes or discolorations  Psych: Appropriate affect    Medications:  MEDICATIONS  (STANDING):  buDESOnide    EC Capsule 3 milliGRAM(s) Oral daily  dextrose 5%. 1000 milliLiter(s) (100 mL/Hr) IV Continuous <Continuous>  dextrose 5%. 1000 milliLiter(s) (50 mL/Hr) IV Continuous <Continuous>  dextrose 50% Injectable 25 Gram(s) IV Push once  dextrose 50% Injectable 12.5 Gram(s) IV Push once  dextrose 50% Injectable 25 Gram(s) IV Push once  enoxaparin Injectable 40 milliGRAM(s) SubCutaneous every 24 hours  fluticasone propionate 50 MICROgram(s)/spray Nasal Spray 1 Spray(s) Both Nostrils two times a day  glucagon  Injectable 1 milliGRAM(s) IntraMuscular once  glycopyrrolate 9 MICROgram(s)/formoterol 4.8 MICROgram(s) Inhaler 2 Puff(s) Inhalation two times a day  guaiFENesin  milliGRAM(s) Oral <User Schedule>  insulin lispro (ADMELOG) corrective regimen sliding scale   SubCutaneous three times a day before meals  insulin lispro (ADMELOG) corrective regimen sliding scale   SubCutaneous at bedtime  lactated ringers. 1000 milliLiter(s) (100 mL/Hr) IV Continuous <Continuous>  mesalamine DR Capsule 800 milliGRAM(s) Oral three times a day  montelukast 10 milliGRAM(s) Oral daily  pancrelipase  (CREON 36,000 Lipase Units) 2 Capsule(s) Oral three times a day with meals  pantoprazole  Injectable 40 milliGRAM(s) IV Push two times a day    MEDICATIONS  (PRN):  dextrose Oral Gel 15 Gram(s) Oral once PRN Blood Glucose LESS THAN 70 milliGRAM(s)/deciliter      Labs:                        11.4   10.57 )-----------( 532      ( 14 Jan 2023 12:28 )             37.5     01-14    131<L>  |  97<L>  |  8   ----------------------------<  91  3.6   |  22  |  1.03    Ca    9.3      14 Jan 2023 12:28    TPro  7.6  /  Alb  3.3  /  TBili  0.3  /  DBili  x   /  AST  15  /  ALT  7   /  AlkPhos  80  01-14            Radiology: Internal Medicine Progress Note    Patient is a 72y old  Male who presents with a chief complaint of bloody diarrhea (14 Jan 2023 18:45)    OVERNIGHT EVENTS/SUBJECTIVE: Pt reports 3 small bloody bowel movements. He is otherwise feeling OK. Denies fever, ha, abd pain, n/v, cp, sob.    OBJECTIVE:  Vital Signs Last 24 Hrs  T(C): 36.6 (15 Kishore 2023 02:08), Max: 36.8 (14 Jan 2023 15:51)  T(F): 97.8 (15 Kishore 2023 02:08), Max: 98.2 (14 Jan 2023 15:51)  HR: 62 (15 Kishore 2023 02:08) (59 - 74)  BP: 135/72 (15 Kishore 2023 02:08) (109/54 - 148/78)  BP(mean): --  RR: 17 (15 Kishore 2023 02:08) (14 - 18)  SpO2: 100% (15 Kishore 2023 02:08) (96% - 100%)    Parameters below as of 15 Kishore 2023 02:08  Patient On (Oxygen Delivery Method): room air      I&O's Detail    14 Jan 2023 07:01  -  15 Kishore 2023 06:48  --------------------------------------------------------  IN:  Total IN: 0 mL    OUT:    Voided (mL): 300 mL  Total OUT: 300 mL    Total NET: -300 mL        Daily Height in cm: 175.26 (14 Jan 2023 15:51)    Daily   Physical Exam:  General: NAD, resting comfortably in bed  Neuro: A&Ox4  HEENT: NC/AT, EOMI, normal hearing, oral mucosa moist, no oral lesions noted  Resp: Breathing comfortably on RA, LCTA b/l  CV: Normal sinus rhythm, S1 and S2, no r/m/g  Abd: soft, non-distended, non-tender  Ext: no edema, +2 pulses bilaterally  Skin: Warm and dry, no rashes or discolorations  Psych: Appropriate affect    Medications:  MEDICATIONS  (STANDING):  buDESOnide    EC Capsule 3 milliGRAM(s) Oral daily  dextrose 5%. 1000 milliLiter(s) (100 mL/Hr) IV Continuous <Continuous>  dextrose 5%. 1000 milliLiter(s) (50 mL/Hr) IV Continuous <Continuous>  dextrose 50% Injectable 25 Gram(s) IV Push once  dextrose 50% Injectable 12.5 Gram(s) IV Push once  dextrose 50% Injectable 25 Gram(s) IV Push once  enoxaparin Injectable 40 milliGRAM(s) SubCutaneous every 24 hours  fluticasone propionate 50 MICROgram(s)/spray Nasal Spray 1 Spray(s) Both Nostrils two times a day  glucagon  Injectable 1 milliGRAM(s) IntraMuscular once  glycopyrrolate 9 MICROgram(s)/formoterol 4.8 MICROgram(s) Inhaler 2 Puff(s) Inhalation two times a day  guaiFENesin  milliGRAM(s) Oral <User Schedule>  insulin lispro (ADMELOG) corrective regimen sliding scale   SubCutaneous three times a day before meals  insulin lispro (ADMELOG) corrective regimen sliding scale   SubCutaneous at bedtime  lactated ringers. 1000 milliLiter(s) (100 mL/Hr) IV Continuous <Continuous>  mesalamine DR Capsule 800 milliGRAM(s) Oral three times a day  montelukast 10 milliGRAM(s) Oral daily  pancrelipase  (CREON 36,000 Lipase Units) 2 Capsule(s) Oral three times a day with meals  pantoprazole  Injectable 40 milliGRAM(s) IV Push two times a day    MEDICATIONS  (PRN):  dextrose Oral Gel 15 Gram(s) Oral once PRN Blood Glucose LESS THAN 70 milliGRAM(s)/deciliter      Labs:                        11.4   10.57 )-----------( 532      ( 14 Jan 2023 12:28 )             37.5     01-14    131<L>  |  97<L>  |  8   ----------------------------<  91  3.6   |  22  |  1.03    Ca    9.3      14 Jan 2023 12:28    TPro  7.6  /  Alb  3.3  /  TBili  0.3  /  DBili  x   /  AST  15  /  ALT  7   /  AlkPhos  80  01-14            Radiology:

## 2023-01-15 NOTE — PROGRESS NOTE ADULT - PROBLEM SELECTOR PLAN 3
Lactic acid 3.7 in setting of colitis  - recheck in morning  - LR at 100cc/hr Lactic acid 3.7 in setting of colitis, increased to 5.2 then down to 4.1  - LR at 100cc/hr  - continue to trend

## 2023-01-15 NOTE — PROGRESS NOTE ADULT - ASSESSMENT
pt with history of ulcerative colitis.  He was on humra and recently changed to entyvio.  He is still symptomatic with bleeding  and diarrhea, multiple bm per day.  He will start cipro/flagyl and steroids,  risk of steroid discussed.  used for induction not remission will defer to primary gi re: change in bologic, although onlly 6 weeks of therapy

## 2023-01-15 NOTE — DISCHARGE NOTE PROVIDER - CARE PROVIDER_API CALL
Mushtaq Wang  GASTROENTEROLOGY  [address], [address]  [place] [address]  Phone: [phone]  Fax: [phone]  Follow Up Time: 1 week    Ezekiel Eckert)  Critical Care Medicine; Internal Medicine; Pulmonary Disease  [address] [place] [address]  Phone: [phone]  Fax: [phone]  Follow Up Time: 1 week

## 2023-01-15 NOTE — PROGRESS NOTE ADULT - PROBLEM SELECTOR PLAN 4
- Hemoc and GI consulted  - Could be r/t Hemolysis or Primary Liver Dx given elevated LDH; per GI, doubtful source is GI in nature  - Hgb decreased to 6.9; will transfuse 1unit PRBCs today  - Continue H/H q 6 hours  - Transfuse as needed to keep Hgb >7.0  - Retic count 5.2  - Josy negative  - Haptoglobin pending  - Monitor  - Supportive care   On jardiance at home  - While inpt: ISS, can increase to basal/bolus if needed

## 2023-01-15 NOTE — DISCHARGE NOTE PROVIDER - PROVIDER TOKENS
PROVIDER:[TOKEN:[[id number]],FOLLOWUP:[1 week]],PROVIDER:[TOKEN:[[id number]],FOLLOWUP:[1 week]]

## 2023-01-15 NOTE — PROGRESS NOTE ADULT - PROBLEM SELECTOR PLAN 2
- Pt reports black stool, unusual to be from rectosigmoid bleed  - Pantoprazole 40iv bid  - GI consult - Pt reports black stool, unusual to be from rectosigmoid bleed  - Pantoprazole 40iv bid  - GI consulted

## 2023-01-15 NOTE — CONSULT NOTE ADULT - SUBJECTIVE AND OBJECTIVE BOX
PULMONARY CONSULT    Initial HPI on admission:  HPI:  Pt is a 72 year old man with ulcerative colitis, htn, DM c/b gastroparesis, COPD who presents with 5 days of "black stool." He describes it as black and watery with surrounding bright red blood. He was diagnosed with UC 1.5 years ago and has been treated with mesalamine, prednisone, humira, and entyvio. At baseline, he has 2-3 bowel movements per day, sometimes watery, He has been eating less due to decreased appetite and abdominal cramping from the diarrhea. There has been some mild shortness of breath as well. He went to his GI Dr Felipe last week who was waiting for approval to switch him from entyvio to a new medication. Colonoscopy from Dec 2022 showed pancolonic mucus, UC and diverticulosis. He denies any other symptoms including CP, HA, fevers, dysuria, vomiting.    ED Course: afebrile, VSS, CTAP showing proctocolitis and likely bronchiolitis and developing bronchopneumonia  Received prednisone, solumedrol, azithromycin, zosyn, 2L LR (14 Jan 2023 17:22)      PAST MEDICAL & SURGICAL HISTORY:  Gastroparesis  with pylorus dysfunction      DM (diabetes mellitus)  type II      Chronic obstructive pulmonary disease      Hypertension      Ulcerative colitis      Inguinal hernia      Arthropathy  left knee replacement 2010      History of repair of hiatal hernia  childhood      S/P endoscopy  POEM 10/2021, 11/2021        Allergies    angiotensin converting enzyme inhibitors (Other; Swelling)    Intolerances      FAMILY HISTORY:  FH: diabetes mellitus (Mother)      Social history: nonsmoker    Review of Systems:  CONSTITUTIONAL: No fever, chills, or fatigue  EYES: No eye pain, visual disturbances, or discharge  ENMT:  No difficulty hearing, tinnitus, vertigo; No sinus or throat pain  NECK: No pain or stiffness  RESPIRATORY: Per above  CARDIOVASCULAR: No chest pain, palpitations, dizziness, or leg swelling  GASTROINTESTINAL: bloody diarrhea  GENITOURINARY: No dysuria, frequency, hematuria, or incontinence  NEUROLOGICAL: No headaches, memory loss, loss of strength, numbness, or tremors  SKIN: No itching, burning, rashes, or lesions   MUSCULOSKELETAL: No joint pain or swelling; No muscle, back, or extremity pain  PSYCHIATRIC: No depression, anxiety, mood swings, or difficulty sleeping      Medications:  MEDICATIONS  (STANDING):  buDESOnide    EC Capsule 3 milliGRAM(s) Oral daily  dextrose 5%. 1000 milliLiter(s) (100 mL/Hr) IV Continuous <Continuous>  dextrose 5%. 1000 milliLiter(s) (50 mL/Hr) IV Continuous <Continuous>  dextrose 50% Injectable 25 Gram(s) IV Push once  dextrose 50% Injectable 12.5 Gram(s) IV Push once  dextrose 50% Injectable 25 Gram(s) IV Push once  enoxaparin Injectable 40 milliGRAM(s) SubCutaneous every 24 hours  fluticasone propionate 50 MICROgram(s)/spray Nasal Spray 1 Spray(s) Both Nostrils two times a day  glucagon  Injectable 1 milliGRAM(s) IntraMuscular once  glycopyrrolate 9 MICROgram(s)/formoterol 4.8 MICROgram(s) Inhaler 2 Puff(s) Inhalation two times a day  guaiFENesin  milliGRAM(s) Oral <User Schedule>  insulin lispro (ADMELOG) corrective regimen sliding scale   SubCutaneous three times a day before meals  insulin lispro (ADMELOG) corrective regimen sliding scale   SubCutaneous at bedtime  mesalamine DR Capsule 800 milliGRAM(s) Oral three times a day  montelukast 10 milliGRAM(s) Oral daily  pancrelipase  (CREON 36,000 Lipase Units) 2 Capsule(s) Oral three times a day with meals  pantoprazole  Injectable 40 milliGRAM(s) IV Push two times a day  sodium chloride 0.9%. 1000 milliLiter(s) (75 mL/Hr) IV Continuous <Continuous>  sodium zirconium cyclosilicate 10 Gram(s) Oral once    MEDICATIONS  (PRN):  dextrose Oral Gel 15 Gram(s) Oral once PRN Blood Glucose LESS THAN 70 milliGRAM(s)/deciliter    Vital Signs Last 24 Hrs  T(C): 36.7 (15 Kishore 2023 09:14), Max: 36.8 (14 Jan 2023 15:51)  T(F): 98.1 (15 Kishore 2023 09:14), Max: 98.2 (14 Jan 2023 15:51)  HR: 54 (15 Kishore 2023 09:14) (54 - 68)  BP: 116/64 (15 Kishore 2023 09:14) (114/66 - 148/78)  BP(mean): --  RR: 17 (15 Kishore 2023 09:14) (16 - 20)  SpO2: 97% (15 Kishore 2023 09:14) (97% - 100%)    Parameters below as of 15 Kishore 2023 09:14  Patient On (Oxygen Delivery Method): room air    VBG pH 7.34 01-14 @ 22:00  VBG pCO2 41 01-14 @ 22:00  VBG O2 sat 52.4 01-14 @ 22:00  VBG lactate 5.2 01-14 @ 22:00  VBG pH 7.29 01-14 @ 12:28  VBG pCO2 54 01-14 @ 12:28  VBG O2 sat 39.2 01-14 @ 12:28  VBG lactate 3.7 01-14 @ 12:28        01-14 @ 07:01  -  01-15 @ 07:00  --------------------------------------------------------  IN: 0 mL / OUT: 300 mL / NET: -300 mL          LABS:                        8.5    8.91  )-----------( 371      ( 15 Kishore 2023 07:50 )             27.6     01-15    135  |  100  |  8   ----------------------------<  106<H>  5.2   |  25  |  0.85    Ca    9.1      15 Kishore 2023 07:50  Phos  4.8     01-15  Mg     1.90     01-15    TPro  6.6  /  Alb  3.0<L>  /  TBili  <0.2  /  DBili  x   /  AST  15  /  ALT  5   /  AlkPhos  66  01-15          CAPILLARY BLOOD GLUCOSE      POCT Blood Glucose.: 100 mg/dL (15 Kishore 2023 11:03)    CULTURES: (if applicable)      Physical Examination:    General: No acute distress.      HEENT: Pupils equal, reactive to light.  Symmetric.    PULM: Clear to auscultation bilaterally, no significant sputum production    CVS: S1, S2    ABD: Soft, nondistended, nontender, normoactive bowel sounds, no masses    EXT: No edema, nontender    SKIN: Warm and well perfused, no rashes noted.    NEURO: Alert, oriented, interactive, nonfocal    RADIOLOGY REVIEWED  CXR:    CT chest: RLL opacities new from 11/22    TTE:

## 2023-01-15 NOTE — PROGRESS NOTE ADULT - SUBJECTIVE AND OBJECTIVE BOX
Patient is a 72y Male     Patient is a 72y old  Male who presents with a chief complaint of bloody diarrhea (15 Kishore 2023 12:52)      HPI:  Pt is a 72 year old man with ulcerative colitis, htn, DM c/b gastroparesis, COPD who presents with 5 days of "black stool." He describes it as black and watery with surrounding bright red blood. He was diagnosed with UC 1.5 years ago and has been treated with mesalamine, prednisone, humira, and entyvio. At baseline, he has 2-3 bowel movements per day, sometimes watery, He has been eating less due to decreased appetite and abdominal cramping from the diarrhea. There has been some mild shortness of breath as well. He went to his GI Dr Felipe last week who was waiting for approval to switch him from entyvio to a new medication. Colonoscopy from Dec 2022 showed pancolonic mucus, UC and diverticulosis. He denies any other symptoms including CP, HA, fevers, dysuria, vomiting.    ED Course: afebrile, VSS, CTAP showing proctocolitis and likely bronchiolitis and developing bronchopneumonia  Received prednisone, solumedrol, azithromycin, zosyn, 2L LR (14 Jan 2023 17:22)      PAST MEDICAL & SURGICAL HISTORY:  Gastroparesis  with pylorus dysfunction      DM (diabetes mellitus)  type II      Chronic obstructive pulmonary disease      Hypertension      Ulcerative colitis      Inguinal hernia      Arthropathy  left knee replacement 2010      History of repair of hiatal hernia  childhood      S/P endoscopy  POEM 10/2021, 11/2021          MEDICATIONS  (STANDING):  buDESOnide    EC Capsule 3 milliGRAM(s) Oral daily  dextrose 5%. 1000 milliLiter(s) (50 mL/Hr) IV Continuous <Continuous>  dextrose 5%. 1000 milliLiter(s) (100 mL/Hr) IV Continuous <Continuous>  dextrose 50% Injectable 25 Gram(s) IV Push once  dextrose 50% Injectable 12.5 Gram(s) IV Push once  dextrose 50% Injectable 25 Gram(s) IV Push once  enoxaparin Injectable 40 milliGRAM(s) SubCutaneous every 24 hours  fluticasone propionate 50 MICROgram(s)/spray Nasal Spray 1 Spray(s) Both Nostrils two times a day  glucagon  Injectable 1 milliGRAM(s) IntraMuscular once  glycopyrrolate 9 MICROgram(s)/formoterol 4.8 MICROgram(s) Inhaler 2 Puff(s) Inhalation two times a day  guaiFENesin  milliGRAM(s) Oral <User Schedule>  insulin lispro (ADMELOG) corrective regimen sliding scale   SubCutaneous three times a day before meals  insulin lispro (ADMELOG) corrective regimen sliding scale   SubCutaneous at bedtime  mesalamine DR Capsule 800 milliGRAM(s) Oral three times a day  montelukast 10 milliGRAM(s) Oral daily  pancrelipase  (CREON 36,000 Lipase Units) 2 Capsule(s) Oral three times a day with meals  pantoprazole  Injectable 40 milliGRAM(s) IV Push two times a day  sodium chloride 0.9%. 1000 milliLiter(s) (75 mL/Hr) IV Continuous <Continuous>  sodium zirconium cyclosilicate 10 Gram(s) Oral once      Allergies    angiotensin converting enzyme inhibitors (Other; Swelling)    Intolerances        SOCIAL HISTORY:  Denies ETOh,Smoking,     FAMILY HISTORY:  FH: diabetes mellitus (Mother)        REVIEW OF SYSTEMS:    CONSTITUTIONAL: No weakness, fevers or chills  EYES/ENT: No visual changes;  No vertigo or throat pain   NECK: No pain or stiffness  RESPIRATORY: No cough, wheezing, hemoptysis; No shortness of breath  CARDIOVASCULAR: No chest pain or palpitations  GASTROINTESTINAL: No abdominal or epigastric pain. No nausea, vomiting, or hematemesis; No diarrhea or constipation. No melena or hematochezia.  GENITOURINARY: No dysuria, frequency or hematuria  NEUROLOGICAL: No numbness or weakness  SKIN: No itching, burning, rashes, or lesions   All other review of systems is negative unless indicated above.    VITAL:  T(C): , Max: 36.8 (01-14-23 @ 15:51)  T(F): , Max: 98.2 (01-14-23 @ 15:51)  HR: 54 (01-15-23 @ 09:14)  BP: 116/64 (01-15-23 @ 09:14)  BP(mean): --  RR: 17 (01-15-23 @ 09:14)  SpO2: 97% (01-15-23 @ 09:14)  Wt(kg): --    I and O's:    01-14 @ 07:01  -  01-15 @ 07:00  --------------------------------------------------------  IN: 0 mL / OUT: 300 mL / NET: -300 mL      Height (cm): 175.3 (01-14 @ 15:51)  Weight (kg): 59.1 (01-14 @ 15:51)  BMI (kg/m2): 19.2 (01-14 @ 15:51)  BSA (m2): 1.72 (01-14 @ 15:51)    PHYSICAL EXAM:    Constitutional: NAD  HEENT: PERRLA,   Neck: No JVD  Respiratory: CTA B/L  Cardiovascular: S1 and S2  Gastrointestinal: BS+, soft, NT/ND  Extremities: No peripheral edema  Neurological: A/O x 3, no focal deficits  Psychiatric: Normal mood, normal affect  : No Pete  Skin: No rashes  Access: Not applicable  Back: No CVA tenderness    LABS:                        8.5    8.91  )-----------( 371      ( 15 Kishore 2023 07:50 )             27.6     01-15    135  |  100  |  8   ----------------------------<  106<H>  5.2   |  25  |  0.85    Ca    9.1      15 Kishore 2023 07:50  Phos  4.8     01-15  Mg     1.90     01-15    TPro  6.6  /  Alb  3.0<L>  /  TBili  <0.2  /  DBili  x   /  AST  15  /  ALT  5   /  AlkPhos  66  01-15          RADIOLOGY & ADDITIONAL STUDIES:

## 2023-01-15 NOTE — DISCHARGE NOTE PROVIDER - DETAILS OF MALNUTRITION DIAGNOSIS/DIAGNOSES
This patient has been assessed with a concern for Malnutrition and was treated during this hospitalization for the following Nutrition diagnosis/diagnoses:     -  01/19/2023: Moderate protein-calorie malnutrition

## 2023-01-15 NOTE — DISCHARGE NOTE PROVIDER - NSDCCPTREATMENT_GEN_ALL_CORE_FT
PRINCIPAL PROCEDURE  Procedure: CT abdomen pelvis  Findings and Treatment: LIVER: Within normal limits.  BILE DUCTS: Normal caliber.  GALLBLADDER: Within normal limits.  SPLEEN: Within normal limits.  PANCREAS: Within normal limits.  ADRENALS: Thickening of the adrenal glands (left greater than right),   nonspecific.  KIDNEYS/URETERS: Kidneys enhance symmetrically without hydronephrosis.   Subcentimeter low-attenuation lesion in the right kidney which is too   small to characterize.  BLADDER: Thickening of the urinary bladder walls despite underdistention   with slight infiltration of the perivesicular fat.  REPRODUCTIVE ORGANS: Enlarged with median hypertrophy.  BOWEL: Small hiatus hernia. No bowel obstruction.Appendix is normal. Mild   thickening of the walls of the rectosigmoid colon despite underdistention   with mucosal hyperenhancement and slight infiltration of the pericolonic   fat.  PERITONEUM: No ascites, pneumoperitoneum, or loculated collection.  VESSELS: Atherosclerotic calcifications of the aortoiliac tree. Normal   caliber abdominal aorta.  RETROPERITONEUM/LYMPH NODES: No lymphadenopathy.  ABDOMINAL WALL: Tiny fat-containing umbilical hernia. Postsurgical   changes at the right groin.  BONES: Degenerative changes of the spine.  IMPRESSION:  Mucoid plugging of right lower lobe bronchi with scattered tree-in bud   opacities and right lower lobe peripheral atelectasis/consolidation   likely reflective of bronchiolitis and developing bronchopneumonia.  Proctocolitis involving the rectum and sigmoid colon.  Enlarged prostate gland. Thickening of the urinary bladder walls despite   underdistention with slight infiltration of the perivesicular fat which   raises question of cystitis. Correlate with urinalysis.

## 2023-01-15 NOTE — CONSULT NOTE ADULT - PROBLEM SELECTOR RECOMMENDATION 3
pt is here with UC flare  Pt denies cough, SOB  Pt is not hypoxic  Would hold off abx at present  full viral panel ordered  PCT ordered  can entyvio cause pneumonitis?

## 2023-01-15 NOTE — PROGRESS NOTE ADULT - PROBLEM SELECTOR PLAN 1
- infectious vs UC flare vs diverticulitis  - UC Diagnosed 1.5 yrs ago, GI: Dr. Mushtaq Wang  - colonoscopy in Dec 22 showing mucus coating entire colon, diverticulosis, ulcerative pancolitis  - previously on humira, entyvio, prednisone, mesalamine. Planned to switch to medication soon per patient  - baseline 2-3 BMs/day, sometimes watery  - mesalamine 800 tid  - hold off on steroids until GI PCR, C Diff result  - GI consult  - ESR/CRP, fecal calprotectin - infectious vs UC flare vs diverticulitis  - UC Diagnosed 1.5 yrs ago, GI: Dr. Mushtaq Wang  - colonoscopy in Dec 22 showing mucus coating entire colon, diverticulosis, ulcerative pancolitis  - previously on humira, entyvio, prednisone, mesalamine. Planned to switch to medication soon per patient  - baseline 2-3 BMs/day, sometimes watery  - mesalamine 800 tid  - hold off on steroids until GI PCR, C Diff result  - GI consulted, appreciate recs

## 2023-01-15 NOTE — DISCHARGE NOTE PROVIDER - NSDCCPCAREPLAN_GEN_ALL_CORE_FT
PRINCIPAL DISCHARGE DIAGNOSIS  Diagnosis: Colitis  Assessment and Plan of Treatment: You came to the hospital because of inflammation in your colon, probably due to your Ulcerative Colitis. This was managed with antibiotics and steroids. You should follow up with your gastroenterologist within 1 week.       PRINCIPAL DISCHARGE DIAGNOSIS  Diagnosis: Colitis  Assessment and Plan of Treatment: You came to the hospital because of inflammation in your colon, we think this was due to your Ulcerative Colitis flare. This was managed with antibiotics and steroids. We sent infectious workup that were all negative. You should follow up with your gastroenterologist within 1 week and continue taking prednisone daily       PRINCIPAL DISCHARGE DIAGNOSIS  Diagnosis: Colitis  Assessment and Plan of Treatment: You came to the hospital because of inflammation in your colon, we think this was due to your Ulcerative Colitis flare. This was managed with antibiotics and steroids. We sent infectious workup that were all negative. You should follow up with your gastroenterologist within 1 week and continue taking prednisone 40mg daily by mouth until you see your gastroenterologist.

## 2023-01-15 NOTE — DISCHARGE NOTE PROVIDER - NSDCMRMEDTOKEN_GEN_ALL_CORE_FT
Breztri Aerosphere inhalation aerosol: 2 puff(s) inhaled 2 times a day  budesonide 3 mg oral capsule, extended release: 1 application orally once a day  Creon 36,000 units oral delayed release capsule: 2 cap(s) orally 3 times a day  Entyvio: 1 dose(s) intravenous every 6 weeks  next due 1/21/2023  fluticasone 0.5 mg/2 mL inhalation suspension: 2 application inhaled 2 times a day  Jardiance 10 mg oral tablet: 1 tab(s) orally once a day (in the morning)  losartan 25 mg oral tablet: 1 tab(s) orally once a day  mesalamine 1.2 g oral delayed release tablet: 1 tab(s) orally 2 times a day  montelukast 10 mg oral tablet: 1 tab(s) orally once a day  Mucinex  mg-30 mg oral tablet, extended release: 1 tab(s) orally once a day  pantoprazole 40 mg oral delayed release tablet: 1 tab(s) orally once a day  Pepcid 20 mg oral tablet: 1 tab(s) orally 2 times a day   Breztri Aerosphere inhalation aerosol: 2 puff(s) inhaled 2 times a day  budesonide 3 mg oral capsule, extended release: 1 application orally once a day  Creon 36,000 units oral delayed release capsule: 2 cap(s) orally 3 times a day  fluticasone 0.5 mg/2 mL inhalation suspension: 2 application inhaled 2 times a day  Jardiance 10 mg oral tablet: 1 tab(s) orally once a day (in the morning)  losartan 25 mg oral tablet: 1 tab(s) orally once a day  mesalamine 1.2 g oral delayed release tablet: 1 tab(s) orally 2 times a day  montelukast 10 mg oral tablet: 1 tab(s) orally once a day  Mucinex  mg-30 mg oral tablet, extended release: 1 tab(s) orally once a day  pantoprazole 40 mg oral delayed release tablet: 1 tab(s) orally once a day  Pepcid 20 mg oral tablet: 1 tab(s) orally 2 times a day   Breztri Aerosphere inhalation aerosol: 2 puff(s) inhaled 2 times a day  Creon 36,000 units oral delayed release capsule: 2 cap(s) orally 3 times a day  fluticasone 0.5 mg/2 mL inhalation suspension: 2 application inhaled 2 times a day  Jardiance 10 mg oral tablet: 1 tab(s) orally once a day (in the morning)  losartan 25 mg oral tablet: 1 tab(s) orally once a day  mesalamine 1.2 g oral delayed release tablet: 1 tab(s) orally 2 times a day  montelukast 10 mg oral tablet: 1 tab(s) orally once a day  Mucinex  mg-30 mg oral tablet, extended release: 1 tab(s) orally once a day  pantoprazole 40 mg oral delayed release tablet: 1 tab(s) orally once a day  Pepcid 20 mg oral tablet: 1 tab(s) orally 2 times a day  predniSONE 20 mg oral tablet: 2 tab(s) orally once a day until appointment with Gastroenterologist.

## 2023-01-15 NOTE — DISCHARGE NOTE PROVIDER - NSDCFUADDAPPT_GEN_ALL_CORE_FT
Please followup with your gastroenterologist within 1 week after discharge to followup on current hospitalization and optimal medical treatment for your UC.    Please followup with your gastroenterologist within 1 week after discharge to followup on current hospitalization and optimal medical treatment for your UC.     Please followup with your primary care within a few days after discharge (or your gastroenterologist) and obtain blood work to look at the potassium level. Your potassium level has been low in the hospital and you may need repletion.

## 2023-01-15 NOTE — PROGRESS NOTE ADULT - PROBLEM SELECTOR PLAN 6
Diet: Clear liquid for now pending GI consult  DVT: hold for now given possible GI bleed, but can start 1/15 if Hb stable  Dispo: home Diet: regular  DVT: hold for now given possible GI bleed, but can start 1/15 if Hb stable  Dispo: home

## 2023-01-15 NOTE — PROVIDER CONTACT NOTE (CRITICAL VALUE NOTIFICATION) - BACKGROUND
pt admitted for GI diarrhea, abdominal pain, black stools
pt admitted for abdominal pain, diarrhea, GI bleed

## 2023-01-15 NOTE — DISCHARGE NOTE PROVIDER - HOSPITAL COURSE
Pt is a 72 year old man with ulcerative colitis, htn, DM c/b gastroparesis, COPD who presents with 5 days of "black stool." He describes it as black and watery with surrounding bright red blood. He was diagnosed with UC 1.5 years ago and has been treated with mesalamine, prednisone, humira, and entyvio. At baseline, he has 2-3 bowel movements per day, sometimes watery, He has been eating less due to decreased appetite and abdominal cramping from the diarrhea. There has been some mild shortness of breath as well. He went to his GI Dr Wang last week who was waiting for approval to switch him from entyvio to a new medication. Colonoscopy from Dec 2022 showed pancolonic mucus, UC and diverticulosis. He denies any other symptoms including CP, HA, fevers, dysuria, vomiting.    GI was consulted for colitis, likely UC flare. He was started on steroids and cipro/flagyl after cultures drawn. Pt is a 72 year old man with ulcerative colitis, htn, DM c/b gastroparesis, COPD who presents with 5 days of "black stool." He describes it as black and watery with surrounding bright red blood. He was diagnosed with UC 1.5 years ago and has been treated with mesalamine, prednisone, humira, and entyvio. At baseline, he has 2-3 bowel movements per day, sometimes watery, He has been eating less due to decreased appetite and abdominal cramping from the diarrhea. There has been some mild shortness of breath as well. He went to his GI Dr Wnag last week who was waiting for approval to switch him from entyvio to a new medication. Colonoscopy from Dec 2022 showed pancolonic mucus, UC and diverticulosis. He denies any other symptoms including CP, HA, fevers, dysuria, vomiting.    GI was consulted for colitis, likely UC flare. He was started on steroids and cipro/flagyl after cultures drawn. All infectious workup was negative, including GI PCR, C. diff, and stool culture, and antibiotics were discontinued.   Patient was continued on IV hydrocortisone sodium succinate 100mg daily until 1/19 to finish course to prevent recurrence. Patient's diarrhea and melena resolved and deemed stable for discharge home on 1/19. Pt is a 72 year old man with ulcerative colitis, htn, DM c/b gastroparesis, COPD who presents with 5 days of "black stool." He describes it as black and watery with surrounding bright red blood. He was diagnosed with UC 1.5 years ago and has been treated with mesalamine, prednisone, humira, and entyvio. At baseline, he has 2-3 bowel movements per day, sometimes watery, He has been eating less due to decreased appetite and abdominal cramping from the diarrhea. There has been some mild shortness of breath as well. He went to his GI Dr Wang last week who was waiting for approval to switch him from entyvio to a new medication. Colonoscopy from Dec 2022 showed pancolonic mucus, UC and diverticulosis. He denies any other symptoms including CP, HA, fevers, dysuria, vomiting.    GI was consulted for colitis, likely UC flare. He was started on steroids and cipro/flagyl after cultures drawn. All infectious workup was negative, including GI PCR, C. diff, and stool culture, and antibiotics were discontinued.   Patient was continued on IV hydrocortisone sodium succinate 100mg daily until 1/19 to finish course to prevent recurrence. Patient had some diffuse dull abdominal pain on 1/19 morning, but subsided in the afternoon. Benign abdominal exam without tenderness. No fever during the episode. Had leukocytosis at 13 downtrending to 11 on 1/20. Patient no longer with abdominal pain, N/V/D or melena, and deemed stable for discharge home on 1/20. Pt is a 72 year old man with ulcerative colitis, htn, DM c/b gastroparesis, COPD who presents with 5 days of "black stool." He describes it as black and watery with surrounding bright red blood. He was diagnosed with UC 1.5 years ago and has been treated with mesalamine, prednisone, humira, and entyvio. At baseline, he has 2-3 bowel movements per day, sometimes watery, He has been eating less due to decreased appetite and abdominal cramping from the diarrhea. There has been some mild shortness of breath as well. He went to his GI Dr Wang last week who was waiting for approval to switch him from entyvio to a new medication. Colonoscopy from Dec 2022 showed pancolonic mucus, UC and diverticulosis. He denies any other symptoms including CP, HA, fevers, dysuria, vomiting.    GI was consulted for colitis, likely UC flare. He was started on steroids and cipro/flagyl after cultures drawn. All infectious workup was negative, including GI PCR, C. diff, and stool culture, and antibiotics were discontinued.   Patient was continued on IV hydrocortisone sodium succinate 100mg daily until 1/19 to finish course to prevent recurrence, discharged on prednisone 40 mg PO. Patient had some diffuse dull abdominal pain on 1/19 morning, but subsided in the afternoon. Benign abdominal exam without tenderness. No fever during the episode. Had leukocytosis at 13 downtrending to 11 on 1/20. Patient no longer with abdominal pain, N/V/D or melena, and deemed stable for discharge home on 1/20.

## 2023-01-16 LAB
ANION GAP SERPL CALC-SCNC: 15 MMOL/L — HIGH (ref 7–14)
BASOPHILS # BLD AUTO: 0.01 K/UL — SIGNIFICANT CHANGE UP (ref 0–0.2)
BASOPHILS NFR BLD AUTO: 0.2 % — SIGNIFICANT CHANGE UP (ref 0–2)
BUN SERPL-MCNC: 8 MG/DL — SIGNIFICANT CHANGE UP (ref 7–23)
C DIFF BY PCR RESULT: SIGNIFICANT CHANGE UP
CALCIUM SERPL-MCNC: 9 MG/DL — SIGNIFICANT CHANGE UP (ref 8.4–10.5)
CHLORIDE SERPL-SCNC: 96 MMOL/L — LOW (ref 98–107)
CO2 SERPL-SCNC: 21 MMOL/L — LOW (ref 22–31)
CREAT SERPL-MCNC: 0.89 MG/DL — SIGNIFICANT CHANGE UP (ref 0.5–1.3)
CRP SERPL-MCNC: 13.1 MG/L — HIGH
CULTURE RESULTS: NO GROWTH — SIGNIFICANT CHANGE UP
EGFR: 92 ML/MIN/1.73M2 — SIGNIFICANT CHANGE UP
EOSINOPHIL # BLD AUTO: 0.01 K/UL — SIGNIFICANT CHANGE UP (ref 0–0.5)
EOSINOPHIL NFR BLD AUTO: 0.2 % — SIGNIFICANT CHANGE UP (ref 0–6)
ERYTHROCYTE [SEDIMENTATION RATE] IN BLOOD: 51 MM/HR — HIGH (ref 1–15)
GAS PNL BLDV: SIGNIFICANT CHANGE UP
GLUCOSE BLDC GLUCOMTR-MCNC: 114 MG/DL — HIGH (ref 70–99)
GLUCOSE BLDC GLUCOMTR-MCNC: 145 MG/DL — HIGH (ref 70–99)
GLUCOSE BLDC GLUCOMTR-MCNC: 147 MG/DL — HIGH (ref 70–99)
GLUCOSE BLDC GLUCOMTR-MCNC: 155 MG/DL — HIGH (ref 70–99)
GLUCOSE SERPL-MCNC: 126 MG/DL — HIGH (ref 70–99)
HCT VFR BLD CALC: 32 % — LOW (ref 39–50)
HGB BLD-MCNC: 9.9 G/DL — LOW (ref 13–17)
IANC: 5.37 K/UL — SIGNIFICANT CHANGE UP (ref 1.8–7.4)
IMM GRANULOCYTES NFR BLD AUTO: 0.5 % — SIGNIFICANT CHANGE UP (ref 0–0.9)
LYMPHOCYTES # BLD AUTO: 0.64 K/UL — LOW (ref 1–3.3)
LYMPHOCYTES # BLD AUTO: 9.6 % — LOW (ref 13–44)
MAGNESIUM SERPL-MCNC: 1.7 MG/DL — SIGNIFICANT CHANGE UP (ref 1.6–2.6)
MCHC RBC-ENTMCNC: 24.6 PG — LOW (ref 27–34)
MCHC RBC-ENTMCNC: 30.9 GM/DL — LOW (ref 32–36)
MCV RBC AUTO: 79.6 FL — LOW (ref 80–100)
MONOCYTES # BLD AUTO: 0.58 K/UL — SIGNIFICANT CHANGE UP (ref 0–0.9)
MONOCYTES NFR BLD AUTO: 8.7 % — SIGNIFICANT CHANGE UP (ref 2–14)
NEUTROPHILS # BLD AUTO: 5.37 K/UL — SIGNIFICANT CHANGE UP (ref 1.8–7.4)
NEUTROPHILS NFR BLD AUTO: 80.8 % — HIGH (ref 43–77)
NRBC # BLD: 0 /100 WBCS — SIGNIFICANT CHANGE UP (ref 0–0)
NRBC # FLD: 0 K/UL — SIGNIFICANT CHANGE UP (ref 0–0)
PHOSPHATE SERPL-MCNC: 5.1 MG/DL — HIGH (ref 2.5–4.5)
PLATELET # BLD AUTO: 467 K/UL — HIGH (ref 150–400)
POTASSIUM SERPL-MCNC: 3.9 MMOL/L — SIGNIFICANT CHANGE UP (ref 3.5–5.3)
POTASSIUM SERPL-SCNC: 3.9 MMOL/L — SIGNIFICANT CHANGE UP (ref 3.5–5.3)
PROCALCITONIN SERPL-MCNC: 0.07 NG/ML — SIGNIFICANT CHANGE UP (ref 0.02–0.1)
RBC # BLD: 4.02 M/UL — LOW (ref 4.2–5.8)
RBC # FLD: 13.8 % — SIGNIFICANT CHANGE UP (ref 10.3–14.5)
SODIUM SERPL-SCNC: 132 MMOL/L — LOW (ref 135–145)
SPECIMEN SOURCE: SIGNIFICANT CHANGE UP
WBC # BLD: 6.64 K/UL — SIGNIFICANT CHANGE UP (ref 3.8–10.5)
WBC # FLD AUTO: 6.64 K/UL — SIGNIFICANT CHANGE UP (ref 3.8–10.5)

## 2023-01-16 PROCEDURE — 99232 SBSQ HOSP IP/OBS MODERATE 35: CPT | Mod: GC

## 2023-01-16 PROCEDURE — 71045 X-RAY EXAM CHEST 1 VIEW: CPT | Mod: 26

## 2023-01-16 RX ORDER — IPRATROPIUM/ALBUTEROL SULFATE 18-103MCG
3 AEROSOL WITH ADAPTER (GRAM) INHALATION EVERY 6 HOURS
Refills: 0 | Status: DISCONTINUED | OUTPATIENT
Start: 2023-01-16 | End: 2023-01-20

## 2023-01-16 RX ORDER — BUDESONIDE AND FORMOTEROL FUMARATE DIHYDRATE 160; 4.5 UG/1; UG/1
2 AEROSOL RESPIRATORY (INHALATION)
Refills: 0 | Status: DISCONTINUED | OUTPATIENT
Start: 2023-01-16 | End: 2023-01-20

## 2023-01-16 RX ADMIN — Medication 3 MILLIGRAM(S): at 05:19

## 2023-01-16 RX ADMIN — Medication 100 MILLIGRAM(S): at 17:39

## 2023-01-16 RX ADMIN — Medication 2 CAPSULE(S): at 13:19

## 2023-01-16 RX ADMIN — PANTOPRAZOLE SODIUM 40 MILLIGRAM(S): 20 TABLET, DELAYED RELEASE ORAL at 05:19

## 2023-01-16 RX ADMIN — Medication 100 MILLIGRAM(S): at 05:20

## 2023-01-16 RX ADMIN — Medication 800 MILLIGRAM(S): at 16:37

## 2023-01-16 RX ADMIN — Medication 1 SPRAY(S): at 05:17

## 2023-01-16 RX ADMIN — BUDESONIDE AND FORMOTEROL FUMARATE DIHYDRATE 2 PUFF(S): 160; 4.5 AEROSOL RESPIRATORY (INHALATION) at 22:22

## 2023-01-16 RX ADMIN — BUDESONIDE AND FORMOTEROL FUMARATE DIHYDRATE 2 PUFF(S): 160; 4.5 AEROSOL RESPIRATORY (INHALATION) at 10:39

## 2023-01-16 RX ADMIN — Medication 100 MILLIGRAM(S): at 22:23

## 2023-01-16 RX ADMIN — MONTELUKAST 10 MILLIGRAM(S): 4 TABLET, CHEWABLE ORAL at 13:19

## 2023-01-16 RX ADMIN — Medication 2 CAPSULE(S): at 09:30

## 2023-01-16 RX ADMIN — Medication 2 CAPSULE(S): at 17:40

## 2023-01-16 RX ADMIN — Medication 600 MILLIGRAM(S): at 05:19

## 2023-01-16 RX ADMIN — Medication 1 SPRAY(S): at 17:40

## 2023-01-16 RX ADMIN — PANTOPRAZOLE SODIUM 40 MILLIGRAM(S): 20 TABLET, DELAYED RELEASE ORAL at 17:42

## 2023-01-16 RX ADMIN — Medication 100 MILLIGRAM(S): at 13:18

## 2023-01-16 RX ADMIN — ENOXAPARIN SODIUM 40 MILLIGRAM(S): 100 INJECTION SUBCUTANEOUS at 17:40

## 2023-01-16 RX ADMIN — Medication 3 MILLILITER(S): at 16:19

## 2023-01-16 RX ADMIN — Medication 3 MILLILITER(S): at 23:27

## 2023-01-16 RX ADMIN — Medication 800 MILLIGRAM(S): at 05:19

## 2023-01-16 RX ADMIN — Medication 800 MILLIGRAM(S): at 22:22

## 2023-01-16 RX ADMIN — Medication 100 MILLIGRAM(S): at 05:19

## 2023-01-16 NOTE — PROVIDER CONTACT NOTE (OTHER) - ASSESSMENT
pt stable, asymptomatic. Vitals signs stable
Patient alert and oriented x4 states he is feeling better than when he arrived. V/S improved. Breathing even and unlabored. No pallor or diaphoresis noted at this time. Denies chest pain, headache and dizziness. Afebrile at this time.

## 2023-01-16 NOTE — PROGRESS NOTE ADULT - ASSESSMENT
Eric Lozano is a 72 year old man with PMHx notable for UC, HTN,, DM c/b gastroparesis s/p G-POEM and COPD presenting with bloody BMs c/f UC flare. GI consulted for further workup and management of the above.     #Ulcerative Colitis Flare  Follows with Dr. Wang, Diagnosed with UC around 1.5 years ago previously on Humira with nonresponse currently on Mesalamine, Bidesnodie and Entyvio. Despite the above, continues to have 10-15 loose watery, bloody BMs per day with associated nausea and abdominal discomfort with recent c-scope during December showing IH, pan-colonic UC worse in the rectosigmoid colon with bx showing active colitis though out (no comment on CMV testing).  He was being planned to start a new medication although unclear which biologic Presenting with   progression of above symptoms with w/u including CTAP showing proctocolitis in addition to bronchiolitis and ?cytitis although per pulm low suspicion for PNA with U/A with no signs of infection. Ongoing symptoms likely related to UC flare. GI PCT negative although still awiaitng C.diff and stool cx testing. If infectious w/u negative can start IV steroids although will likley need initiation of Remicade given severity of symptoms (will try and get in touch with primary GI)    Recommendations:  -Please obtain outpatient GI records from Dr. Wang   -Follow up C. diff andStool Cx. If infectious workup negative can START solumedrol 20mg IV TID if C. diff and GI PCR neg  -Follow up calprotectin  -Trend daily ESR/CRP  -Follow up quantiferon gold in addition to  Hepatitis B surface Ab, surface Ag, core Ab IgM, core Ab total  -Avoid NSAIDs/opiods    Continue withDVT ppx: despite bleeding, IBD patients are at high risk for VTE    All recommendations are tentative until note is attested by attending.     Justin Valdovinos, PGY-4  Gastroenterology/Hepatology Fellow  Available on [employer] Teams   [phone] (Long Range Pager)  [phone] (Short Range Pager [facility])    After 5pm, please contact the on-call GI fellow. [phone]               Eric Lozano is a 72 year old man with PMHx notable for UC, HTN,, DM c/b gastroparesis s/p G-POEM and COPD presenting with bloody BMs c/f UC flare. GI consulted for further workup and management of the above.     #Ulcerative Colitis Flare  Follows with Dr. Wang, Diagnosed with UC around 1.5 years ago previously on Humira with nonresponse currently on Mesalamine, Bidesnodie and Entyvio. Despite the above, continues to have 10-15 loose watery, bloody BMs per day with associated nausea and abdominal discomfort with recent c-scope during December showing IH, pan-colonic UC worse in the rectosigmoid colon with bx showing active colitis though out (no comment on CMV testing).  He was being planned to start a new medication although unclear which biologic Presenting with   progression of above symptoms with w/u including CTAP showing proctocolitis in addition to bronchiolitis and ?cytitis although per pulm low suspicion for PNA with U/A with no signs of infection. Ongoing symptoms likely related to UC flare. GI PCT negative although still awiaitng C.diff and stool cx testing. If infectious w/u negative can start IV steroids although will likley need initiation of Remicade given severity of symptoms (will try and get in touch with primary GI)    Recommendations:  -Please obtain outpatient GI records from Dr. Wang   -Follow up C. diff andStool Cx. If infectious workup negative can START solumedrol 20mg IV TID if C. diff and GI PCR neg  -Follow up calprotectin  -Trend daily ESR/CRP  -Follow up quantiferon gold in addition to  Hepatitis B surface Ab, surface Ag, core Ab IgM, core Ab total  -Avoid NSAIDs/opiods    -Continue withDVT ppx: despite bleeding, IBD patients are at high risk for VTE    House GI to sign off. Further management and recs per   All recommendations are tentative until note is attested by attending.     Justin Valdovinos, PGY-4  Gastroenterology/Hepatology Fellow  Available on [employer] Teams   [phone] (Long Range Pager)  [phone] (Short Range Pager [facility])    After 5pm, please contact the on-call GI fellow. [phone]

## 2023-01-16 NOTE — PROVIDER CONTACT NOTE (OTHER) - BACKGROUND
Patient admitted to ER for proctocolitis. Hx of UC.
Patient admitted to ER for proctocolitis. Hx of UC.

## 2023-01-16 NOTE — PROVIDER CONTACT NOTE (OTHER) - ACTION/TREATMENT ORDERED:
No interventions at this time. MD states he wants to wait another hour before re-draw. Awaiting MD orders.
MD notified, no interventions at this time, pt advised to not flush after next BM

## 2023-01-16 NOTE — PROGRESS NOTE ADULT - PROBLEM SELECTOR PLAN 1
- infectious vs UC flare vs diverticulitis  - UC Diagnosed 1.5 yrs ago, GI: Dr. Mushtaq Wang  - colonoscopy in Dec 22 showing mucus coating entire colon, diverticulosis, ulcerative pancolitis  - previously on humira, entyvio, prednisone, mesalamine. Planned to switch to medication soon per patient  - baseline 2-3 BMs/day, sometimes watery  - mesalamine 800 tid  - hydrocortisone IV   - GI consulted, appreciate recs - infectious vs UC flare vs diverticulitis  - UC Diagnosed 1.5 yrs ago, GI: Dr. Mushtaq Wang  - colonoscopy in Dec 22 showing mucus coating entire colon, diverticulosis, ulcerative pancolitis  - previously on humira, entyvio, prednisone, mesalamine. Planned to switch to medication soon per patient  - baseline 2-3 BMs/day, sometimes watery; now becoming more formed  - GI PCR negative. Stool culture and C. diff pending   - mesalamine 800 tid  - hydrocortisone IV   - c/w metronidazole to complete 5 day course  - d/c ciprofloxacin   - GI consulted, followed by Dr. Adler

## 2023-01-16 NOTE — PROGRESS NOTE ADULT - PROBLEM SELECTOR PLAN 3
Lactic acid 3.7 in setting of colitis, increased to 5.2 then down to 2.1  - LR at 100cc/hr  - continue to trend Lactic acid 3.7 in setting of colitis, increased to 5.2 then down to 2.1  - LR at 100cc/hr  - continue to trend  - f/u [facility]

## 2023-01-16 NOTE — PROGRESS NOTE ADULT - PROBLEM SELECTOR PLAN 2
- Pt reports black stool, unusual to be from rectosigmoid bleed  - Pantoprazole 40iv bid  - GI consulted - Pt reports black stool, unusual to be from rectosigmoid bleed  - stable Hb 9.9 today   - Pantoprazole 40 IV bid  - GI consulted

## 2023-01-16 NOTE — PROGRESS NOTE ADULT - SUBJECTIVE AND OBJECTIVE BOX
PROGRESS NOTE:   Authored by Dr. Charles Ferrari    Patient is a 72y old  Male who presents with a chief complaint of bloody diarrhea (15 Kishore 2023 16:51)      SUBJECTIVE / OVERNIGHT EVENTS: Hb stable at 10 on PM BMP. No other events overnight.     ADDITIONAL REVIEW OF SYSTEMS:      MEDICATIONS  (STANDING):  buDESOnide    EC Capsule 3 milliGRAM(s) Oral daily  ciprofloxacin   IVPB 200 milliGRAM(s) IV Intermittent every 12 hours  dextrose 5%. 1000 milliLiter(s) (100 mL/Hr) IV Continuous <Continuous>  dextrose 5%. 1000 milliLiter(s) (50 mL/Hr) IV Continuous <Continuous>  dextrose 50% Injectable 25 Gram(s) IV Push once  dextrose 50% Injectable 12.5 Gram(s) IV Push once  dextrose 50% Injectable 25 Gram(s) IV Push once  enoxaparin Injectable 40 milliGRAM(s) SubCutaneous every 24 hours  fluticasone propionate 50 MICROgram(s)/spray Nasal Spray 1 Spray(s) Both Nostrils two times a day  glucagon  Injectable 1 milliGRAM(s) IntraMuscular once  glycopyrrolate 9 MICROgram(s)/formoterol 4.8 MICROgram(s) Inhaler 2 Puff(s) Inhalation two times a day  guaiFENesin  milliGRAM(s) Oral <User Schedule>  hydrocortisone sodium succinate Injectable 100 milliGRAM(s) IV Push three times a day  insulin lispro (ADMELOG) corrective regimen sliding scale   SubCutaneous three times a day before meals  insulin lispro (ADMELOG) corrective regimen sliding scale   SubCutaneous at bedtime  mesalamine DR Capsule 800 milliGRAM(s) Oral three times a day  metroNIDAZOLE  IVPB 500 milliGRAM(s) IV Intermittent every 12 hours  montelukast 10 milliGRAM(s) Oral daily  pancrelipase  (CREON 36,000 Lipase Units) 2 Capsule(s) Oral three times a day with meals  pantoprazole  Injectable 40 milliGRAM(s) IV Push two times a day  sodium chloride 0.9%. 1000 milliLiter(s) (75 mL/Hr) IV Continuous <Continuous>    MEDICATIONS  (PRN):  dextrose Oral Gel 15 Gram(s) Oral once PRN Blood Glucose LESS THAN 70 milliGRAM(s)/deciliter      CAPILLARY BLOOD GLUCOSE      POCT Blood Glucose.: 101 mg/dL (15 Kishore 2023 23:06)  POCT Blood Glucose.: 118 mg/dL (15 Kishore 2023 18:01)  POCT Blood Glucose.: 96 mg/dL (15 Kishore 2023 14:54)  POCT Blood Glucose.: 100 mg/dL (15 Kishore 2023 11:03)    I&O's Summary    15 Kishore 2023 07:01  -  2023 07:00  --------------------------------------------------------  IN: 1280 mL / OUT: 0 mL / NET: 1280 mL        PHYSICAL EXAM:  Vital Signs Last 24 Hrs  T(C): 36.9 (2023 06:12), Max: 37 (15 Kishore 2023 23:43)  T(F): 98.5 (2023 06:12), Max: 98.6 (15 Kishore 2023 23:43)  HR: 59 (2023 06:12) (54 - 63)  BP: 126/63 (2023 06:12) (114/67 - 133/68)  BP(mean): --  RR: 17 (2023 06:12) (16 - 17)  SpO2: 97% (2023 06:12) (97% - 100%)    Parameters below as of 2023 06:12  Patient On (Oxygen Delivery Method): room air        GENERAL: NAD, lying comfortably in bed  HEAD: Atraumatic, normocephalic  EYES: EOMI b/l, conjunctiva and sclera clear  NECK: Supple, No JVD, No LAD  RESPIRATORY: Normal respiratory effort; lungs are clear to auscultation bilaterally  CARDIOVASCULAR: Regular rate and rhythm, normal S1 and S2, no murmur/rub/gallop; No lower extremity edema  ABDOMEN: Nontender, normoactive bowel sounds, no rebound/guarding; No hepatosplenomegaly  MUSCULOSKELETAL: no clubbing or cyanosis of digits; no joint swelling or tenderness to palpation  NEURO: Non focal   SKIN:   PSYCH: A+O to person, place, and time; affect appropriate    LABS:                          10.1   8.69  )-----------( 451      ( 15 Kishore 2023 17:42 )             33.0     01-15    135  |  100  |  8   ----------------------------<  106<H>  5.2   |  25  |  0.85    Ca    9.1      15 Kishore 2023 07:50  Phos  4.8     01-15  Mg     1.90     01-15    TPro  6.6  /  Alb  3.0<L>  /  TBili  <0.2  /  DBili  x   /  AST  15  /  ALT  5   /  AlkPhos  66  01-15          Urinalysis Basic - ( 15 Kishore 2023 12:45 )    Color: Light Yellow / Appearance: Clear / S.010 / pH: x  Gluc: x / Ketone: Negative  / Bili: Negative / Urobili: <2 mg/dL   Blood: x / Protein: Negative / Nitrite: Negative   Leuk Esterase: Negative / RBC: x / WBC x   Sq Epi: x / Non Sq Epi: x / Bacteria: x            RADIOLOGY:    Consulted note reviewed  Reviewed Imaging personally   PROGRESS NOTE:   Authored by Dr. Charles Ferrari    Patient is a 72y old  Male who presents with a chief complaint of bloody diarrhea (15 Kishore 2023 16:51)      SUBJECTIVE / OVERNIGHT EVENTS: Hb stable at 10 on PM BMP. Had loose stool a few times yesterday. Had BM this morning, but it is starting to form. Continues to endorse melena. Had hematochezia several days ago, but now resolved. Denies abdominal pain but endorses discomfort. No N/V. Patient feels improved overall and wants to eat.     ADDITIONAL REVIEW OF SYSTEMS:  No fever  No SOB  No abd pain, but + discomfort  No N/V  + loose stool, improving and starting to be formed  + melena  No hematochezia  No leg edema  No dysuria     MEDICATIONS  (STANDING):  buDESOnide    EC Capsule 3 milliGRAM(s) Oral daily  ciprofloxacin   IVPB 200 milliGRAM(s) IV Intermittent every 12 hours  dextrose 5%. 1000 milliLiter(s) (100 mL/Hr) IV Continuous <Continuous>  dextrose 5%. 1000 milliLiter(s) (50 mL/Hr) IV Continuous <Continuous>  dextrose 50% Injectable 25 Gram(s) IV Push once  dextrose 50% Injectable 12.5 Gram(s) IV Push once  dextrose 50% Injectable 25 Gram(s) IV Push once  enoxaparin Injectable 40 milliGRAM(s) SubCutaneous every 24 hours  fluticasone propionate 50 MICROgram(s)/spray Nasal Spray 1 Spray(s) Both Nostrils two times a day  glucagon  Injectable 1 milliGRAM(s) IntraMuscular once  glycopyrrolate 9 MICROgram(s)/formoterol 4.8 MICROgram(s) Inhaler 2 Puff(s) Inhalation two times a day  guaiFENesin  milliGRAM(s) Oral <User Schedule>  hydrocortisone sodium succinate Injectable 100 milliGRAM(s) IV Push three times a day  insulin lispro (ADMELOG) corrective regimen sliding scale   SubCutaneous three times a day before meals  insulin lispro (ADMELOG) corrective regimen sliding scale   SubCutaneous at bedtime  mesalamine DR Capsule 800 milliGRAM(s) Oral three times a day  metroNIDAZOLE  IVPB 500 milliGRAM(s) IV Intermittent every 12 hours  montelukast 10 milliGRAM(s) Oral daily  pancrelipase  (CREON 36,000 Lipase Units) 2 Capsule(s) Oral three times a day with meals  pantoprazole  Injectable 40 milliGRAM(s) IV Push two times a day  sodium chloride 0.9%. 1000 milliLiter(s) (75 mL/Hr) IV Continuous <Continuous>    MEDICATIONS  (PRN):  dextrose Oral Gel 15 Gram(s) Oral once PRN Blood Glucose LESS THAN 70 milliGRAM(s)/deciliter      CAPILLARY BLOOD GLUCOSE      POCT Blood Glucose.: 101 mg/dL (15 Kishore 2023 23:06)  POCT Blood Glucose.: 118 mg/dL (15 Kishore 2023 18:01)  POCT Blood Glucose.: 96 mg/dL (15 Kishore 2023 14:54)  POCT Blood Glucose.: 100 mg/dL (15 Kishore 2023 11:03)    I&O's Summary    15 Kishore 2023 07:01  -  2023 07:00  --------------------------------------------------------  IN: 1280 mL / OUT: 0 mL / NET: 1280 mL        PHYSICAL EXAM:  Vital Signs Last 24 Hrs  T(C): 36.9 (2023 06:12), Max: 37 (15 Kishore 2023 23:43)  T(F): 98.5 (2023 06:12), Max: 98.6 (15 Kishore 2023 23:43)  HR: 59 (2023 06:12) (54 - 63)  BP: 126/63 (2023 06:12) (114/67 - 133/68)  BP(mean): --  RR: 17 (2023 06:12) (16 - 17)  SpO2: 97% (2023 06:12) (97% - 100%)    Parameters below as of 2023 06:12  Patient On (Oxygen Delivery Method): room air        GENERAL: NAD, lying comfortably in bed  HEAD: Atraumatic, normocephalic  EYES: EOMI b/l, conjunctiva and sclera clear  NECK: Supple   RESPIRATORY: Normal respiratory effort; lungs are clear to auscultation bilaterally anteriorly   CARDIOVASCULAR: Regular rate and rhythm, normal S1 and S2, no murmur/rub/gallop; No lower extremity edema  ABDOMEN: Nontender, normoactive bowel sounds, no rebound/guarding; No hepatosplenomegaly  MUSCULOSKELETAL: no clubbing or cyanosis of digits; no joint swelling or tenderness to palpation  NEURO: Non focal   SKIN:   PSYCH: A+O to person, place, and time; affect appropriate    LABS:                          10.1   8.69  )-----------( 451      ( 15 Kishore 2023 17:42 )             33.0     01-15    135  |  100  |  8   ----------------------------<  106<H>  5.2   |  25  |  0.85    Ca    9.1      15 Kishore 2023 07:50  Phos  4.8     01-15  Mg     1.90     01-15    TPro  6.6  /  Alb  3.0<L>  /  TBili  <0.2  /  DBili  x   /  AST  15  /  ALT  5   /  AlkPhos  66  01-15          Urinalysis Basic - ( 15 Kishore 2023 12:45 )    Color: Light Yellow / Appearance: Clear / S.010 / pH: x  Gluc: x / Ketone: Negative  / Bili: Negative / Urobili: <2 mg/dL   Blood: x / Protein: Negative / Nitrite: Negative   Leuk Esterase: Negative / RBC: x / WBC x   Sq Epi: x / Non Sq Epi: x / Bacteria: x            RADIOLOGY:    Consulted note reviewed  Reviewed Imaging personally   PROGRESS NOTE:   Authored by Dr. Charles Ferrari    Patient is a 72y old  Male who presents with a chief complaint of bloody diarrhea (15 Kishore 2023 16:51)      SUBJECTIVE / OVERNIGHT EVENTS: Hb stable at 10 on PM BMP. Had loose stool a few times yesterday. Had BM this morning, but it is starting to form. Continues to endorse melena. Had hematochezia several days ago, but now resolved. Denies abdominal pain but endorses discomfort. No N/V. Patient feels improved overall and wants to eat.     ADDITIONAL REVIEW OF SYSTEMS:  No fever  No SOB  No abd pain, but + discomfort  No N/V  + loose stool, improving and starting to be formed  + melena  No hematochezia  No leg edema  No dysuria     MEDICATIONS  (STANDING):  buDESOnide    EC Capsule 3 milliGRAM(s) Oral daily  ciprofloxacin   IVPB 200 milliGRAM(s) IV Intermittent every 12 hours  dextrose 5%. 1000 milliLiter(s) (100 mL/Hr) IV Continuous <Continuous>  dextrose 5%. 1000 milliLiter(s) (50 mL/Hr) IV Continuous <Continuous>  dextrose 50% Injectable 25 Gram(s) IV Push once  dextrose 50% Injectable 12.5 Gram(s) IV Push once  dextrose 50% Injectable 25 Gram(s) IV Push once  enoxaparin Injectable 40 milliGRAM(s) SubCutaneous every 24 hours  fluticasone propionate 50 MICROgram(s)/spray Nasal Spray 1 Spray(s) Both Nostrils two times a day  glucagon  Injectable 1 milliGRAM(s) IntraMuscular once  glycopyrrolate 9 MICROgram(s)/formoterol 4.8 MICROgram(s) Inhaler 2 Puff(s) Inhalation two times a day  guaiFENesin  milliGRAM(s) Oral <User Schedule>  hydrocortisone sodium succinate Injectable 100 milliGRAM(s) IV Push three times a day  insulin lispro (ADMELOG) corrective regimen sliding scale   SubCutaneous three times a day before meals  insulin lispro (ADMELOG) corrective regimen sliding scale   SubCutaneous at bedtime  mesalamine DR Capsule 800 milliGRAM(s) Oral three times a day  metroNIDAZOLE  IVPB 500 milliGRAM(s) IV Intermittent every 12 hours  montelukast 10 milliGRAM(s) Oral daily  pancrelipase  (CREON 36,000 Lipase Units) 2 Capsule(s) Oral three times a day with meals  pantoprazole  Injectable 40 milliGRAM(s) IV Push two times a day  sodium chloride 0.9%. 1000 milliLiter(s) (75 mL/Hr) IV Continuous <Continuous>    MEDICATIONS  (PRN):  dextrose Oral Gel 15 Gram(s) Oral once PRN Blood Glucose LESS THAN 70 milliGRAM(s)/deciliter      CAPILLARY BLOOD GLUCOSE      POCT Blood Glucose.: 101 mg/dL (15 Kishore 2023 23:06)  POCT Blood Glucose.: 118 mg/dL (15 Kishore 2023 18:01)  POCT Blood Glucose.: 96 mg/dL (15 Kishore 2023 14:54)  POCT Blood Glucose.: 100 mg/dL (15 Kishore 2023 11:03)    I&O's Summary    15 Kishore 2023 07:01  -  2023 07:00  --------------------------------------------------------  IN: 1280 mL / OUT: 0 mL / NET: 1280 mL        PHYSICAL EXAM:  Vital Signs Last 24 Hrs  T(C): 36.9 (2023 06:12), Max: 37 (15 Kishore 2023 23:43)  T(F): 98.5 (2023 06:12), Max: 98.6 (15 Kishore 2023 23:43)  HR: 59 (2023 06:12) (54 - 63)  BP: 126/63 (2023 06:12) (114/67 - 133/68)  BP(mean): --  RR: 17 (2023 06:12) (16 - 17)  SpO2: 97% (2023 06:12) (97% - 100%)    Parameters below as of 2023 06:12  Patient On (Oxygen Delivery Method): room air        GENERAL: NAD, lying comfortably in bed  HEAD: Atraumatic, normocephalic  EYES: PERRL, EOMI b/l, conjunctiva and sclera clear  NECK: Supple   RESPIRATORY: Normal respiratory effort; lungs are clear to auscultation bilaterally anteriorly; no wheezes, rhonchi, crackles   CARDIOVASCULAR: Regular rate and rhythm, normal S1 and S2, no murmur/rub/gallop; No lower extremity edema  ABDOMEN: Nontender, normoactive bowel sounds, no rebound/guarding   MUSCULOSKELETAL: no joint swelling or tenderness to palpation  NEURO: Non focal   SKIN: no rashes noted   PSYCH: A+O to person, place, and time; affect appropriate        LABS:                          9.9    6.64  )-----------( 467      ( 2023 06:59 )             32.0     -    132<L>  |  96<L>  |  8   ----------------------------<  126<H>  3.9   |  21<L>  |  0.89    Ca    9.0      2023 06:59  Phos  5.1     -  Mg     1.70         TPro  6.6  /  Alb  3.0<L>  /  TBili  <0.2  /  DBili  x   /  AST  15  /  ALT  5   /  AlkPhos  66  01-15          Urinalysis Basic - ( 15 Kishore 2023 12:45 )    Color: Light Yellow / Appearance: Clear / S.010 / pH: x  Gluc: x / Ketone: Negative  / Bili: Negative / Urobili: <2 mg/dL   Blood: x / Protein: Negative / Nitrite: Negative   Leuk Esterase: Negative / RBC: x / WBC x   Sq Epi: x / Non Sq Epi: x / Bacteria: x            RADIOLOGY:    Consulted note reviewed  Reviewed Imaging personally

## 2023-01-16 NOTE — PROGRESS NOTE ADULT - ASSESSMENT
proctosigmoiditis of rectosigmoid colon  not responise to humira on entyvio  still with diarrhea and bleeding but feels somewhat better  abx and iv steroids, stool studies  likely 3-4 days and can be changed to oral treatment with op follow up

## 2023-01-16 NOTE — PROGRESS NOTE ADULT - ASSESSMENT
72 with UC flare, bloody diarrhea, COPD, abnl chest CT:    copd:   Bloody diarrhea ; UC flare:   HTN  DM     1/16:  copd: he is doing excellent:  he has no crackling sounds:  no cough , no phlegm  , wbc is normal   and no fever:  for now no need for antibiotics : he has mild cylindrical bronchiectasis in rigth lower lobe and this has always been there: likely more mucus plugging in bronchiectasis:   Bloody diarrhea ; UC flare: defer to primary team  HTN ; controlled  DM : monitor and control on steroids:   dvt prophylaxis  dw pt and wife

## 2023-01-16 NOTE — PROGRESS NOTE ADULT - SUBJECTIVE AND OBJECTIVE BOX
Date of Service: 23 @ 17:01    Patient is a 72y old  Male who presents with a chief complaint of bloody diarrhea (2023 08:42)      Any change in ROS: events noted: he he has copd:  being followed by me as an outpt:    currently no sob: no wheezing    MEDICATIONS  (STANDING):  albuterol/ipratropium for Nebulization 3 milliLiter(s) Nebulizer every 6 hours  buDESOnide    EC Capsule 3 milliGRAM(s) Oral daily  budesonide 160 MICROgram(s)/formoterol 4.5 MICROgram(s) Inhaler 2 Puff(s) Inhalation two times a day  dextrose 5%. 1000 milliLiter(s) (100 mL/Hr) IV Continuous <Continuous>  dextrose 5%. 1000 milliLiter(s) (50 mL/Hr) IV Continuous <Continuous>  dextrose 50% Injectable 25 Gram(s) IV Push once  dextrose 50% Injectable 12.5 Gram(s) IV Push once  dextrose 50% Injectable 25 Gram(s) IV Push once  enoxaparin Injectable 40 milliGRAM(s) SubCutaneous every 24 hours  fluticasone propionate 50 MICROgram(s)/spray Nasal Spray 1 Spray(s) Both Nostrils two times a day  glucagon  Injectable 1 milliGRAM(s) IntraMuscular once  glycopyrrolate 9 MICROgram(s)/formoterol 4.8 MICROgram(s) Inhaler 2 Puff(s) Inhalation two times a day  guaiFENesin  milliGRAM(s) Oral <User Schedule>  hydrocortisone sodium succinate Injectable 100 milliGRAM(s) IV Push three times a day  insulin lispro (ADMELOG) corrective regimen sliding scale   SubCutaneous three times a day before meals  insulin lispro (ADMELOG) corrective regimen sliding scale   SubCutaneous at bedtime  mesalamine DR Capsule 800 milliGRAM(s) Oral three times a day  metroNIDAZOLE  IVPB 500 milliGRAM(s) IV Intermittent every 12 hours  montelukast 10 milliGRAM(s) Oral daily  pancrelipase  (CREON 36,000 Lipase Units) 2 Capsule(s) Oral three times a day with meals  pantoprazole  Injectable 40 milliGRAM(s) IV Push two times a day  sodium chloride 0.9%. 1000 milliLiter(s) (75 mL/Hr) IV Continuous <Continuous>    MEDICATIONS  (PRN):  dextrose Oral Gel 15 Gram(s) Oral once PRN Blood Glucose LESS THAN 70 milliGRAM(s)/deciliter    Vital Signs Last 24 Hrs  T(C): 36.8 (2023 13:05), Max: 37 (15 Kishore 2023 23:43)  T(F): 98.2 (2023 13:05), Max: 98.6 (15 Kishore 2023 23:43)  HR: 58 (2023 16:23) (55 - 60)  BP: 132/68 (2023 13:05) (126/63 - 132/68)  BP(mean): --  RR: 16 (2023 13:05) (16 - 17)  SpO2: 98% (2023 16:23) (97% - 100%)    Parameters below as of 2023 16:23  Patient On (Oxygen Delivery Method): room air        I&O's Summary    15 Kishore 2023 07:01  -  2023 07:00  --------------------------------------------------------  IN: 1280 mL / OUT: 0 mL / NET: 1280 mL          Physical Exam:   GENERAL: NAD, well-groomed, well-developed  HEENT: ALYSSA/   Atraumatic, Normocephalic  ENMT: No tonsillar erythema, exudates, or enlargement; Moist mucous membranes, Good dentition, No lesions  NECK: Supple, No JVD, Normal thyroid  CHEST/LUNG: Clear to auscultaion- no wheezing at all    CVS: Regular rate and rhythm; No murmurs, rubs, or gallops  GI: : Soft, Nontender, Nondistended; Bowel sounds present  NERVOUS SYSTEM:  Alert & Oriented X3  EXTREMITIES:  2+ Peripheral Pulses, No clubbing, cyanosis, or edema  LYMPH: No lymphadenopathy noted  SKIN: No rashes or lesions  ENDOCRINOLOGY: No Thyromegaly  PSYCH: Appropriate    Labs:  25, 22, 26                            9.9    6.64  )-----------( 467      ( 2023 06:59 )             32.0                         10.1   8.69  )-----------( 451      ( 15 Kishore 2023 17:42 )             33.0                         8.5    8.91  )-----------( 371      ( 15 Kishore 2023 07:50 )             27.6                         11.4   10.57 )-----------( 532      ( 2023 12:28 )             37.5     01-16    132<L>  |  96<L>  |  8   ----------------------------<  126<H>  3.9   |  21<L>  |  0.89  01-15    135  |  100  |  8   ----------------------------<  106<H>  5.2   |  25  |  0.85  01-14    131<L>  |  97<L>  |  8   ----------------------------<  91  3.6   |  22  |  1.03    Ca    9.0      2023 06:59  Ca    9.1      15 Kishore 2023 07:50  Phos  5.1     01-16  Phos  4.8     01-15  Mg     1.70     01-16  Mg     1.90     01-15    TPro  6.6  /  Alb  3.0<L>  /  TBili  <0.2  /  DBili  x   /  AST  15  /  ALT  5   /  AlkPhos  66  01-15  TPro  7.6  /  Alb  3.3  /  TBili  0.3  /  DBili  x   /  AST  15  /  ALT  7   /  AlkPhos  80  01-14    CAPILLARY BLOOD GLUCOSE      POCT Blood Glucose.: 145 mg/dL (2023 12:38)  POCT Blood Glucose.: 114 mg/dL (2023 09:21)  POCT Blood Glucose.: 101 mg/dL (15 Kishore 2023 23:06)  POCT Blood Glucose.: 118 mg/dL (15 Kishore 2023 18:01)      LIVER FUNCTIONS - ( 15 Kishore 2023 07:50 )  Alb: 3.0 g/dL / Pro: 6.6 g/dL / ALK PHOS: 66 U/L / ALT: 5 U/L / AST: 15 U/L / GGT: x             Urinalysis Basic - ( 15 Kishore 2023 12:45 )    Color: Light Yellow / Appearance: Clear / S.010 / pH: x  Gluc: x / Ketone: Negative  / Bili: Negative / Urobili: <2 mg/dL   Blood: x / Protein: Negative / Nitrite: Negative   Leuk Esterase: Negative / RBC: x / WBC x   Sq Epi: x / Non Sq Epi: x / Bacteria: x      Procalcitonin, Serum: 0.07 ng/mL ( @ 06:59)  Lactate, Blood: 2.2 mmol/L (01-15 @ 07:50)  Lactate, Blood: 4.1 mmol/L (01-15 @ 02:50)        RECENT CULTURES:  01-15 @ 16:50 .Stool Feces                Testing in progress    01-15 @ 11:25 Clean Catch Clean Catch (Midstream)       rad< from: CT Abdomen and Pelvis w/ IV Cont (23 @ 11:36) >  BILE DUCTS: Normal caliber.  GALLBLADDER: Within normal limits.  SPLEEN: Within normal limits.  PANCREAS: Within normal limits.  ADRENALS: Thickening of the adrenal glands (left greater than right),   nonspecific.  KIDNEYS/URETERS: Kidneys enhance symmetrically without hydronephrosis.   Subcentimeter low-attenuation lesion in the right kidney which is too   small to characterize.    BLADDER: Thickening of the urinary bladder walls despite underdistention   with slight infiltration of the perivesicular fat.  REPRODUCTIVE ORGANS: Enlarged with median hypertrophy.    BOWEL: Small hiatus hernia. No bowel obstruction.Appendix is normal. Mild   thickening of the walls of the rectosigmoid colon despite underdistention   with mucosal hyperenhancement and slight infiltration of the pericolonic   fat.  PERITONEUM: No ascites, pneumoperitoneum, or loculated collection.  VESSELS: Atherosclerotic calcifications of the aortoiliac tree. Normal   caliber abdominal aorta.  RETROPERITONEUM/LYMPH NODES: No lymphadenopathy.  ABDOMINAL WALL: Tiny fat-containing umbilical hernia. Postsurgical   changes at the right groin.  BONES: Degenerative changes of the spine.    IMPRESSION:  Mucoid plugging of right lower lobe bronchi with scattered tree-in bud   opacities and right lower lobe peripheral atelectasis/consolidation   likely reflective of bronchiolitis and developing bronchopneumonia.    Proctocolitis involving the rectum and sigmoid colon.    Enlarged prostate gland. Thickening of the urinary bladder walls despite   underdistention with slight infiltration of the perivesicular fat which   raises question of cystitis. Correlate with urinalysis.      < end of copied text >  < from: CT Abdomen and Pelvis w/ IV Cont (23 @ 11:36) >  BILE DUCTS: Normal caliber.  GALLBLADDER: Within normal limits.  SPLEEN: Within normal limits.  PANCREAS: Within normal limits.  ADRENALS: Thickening of the adrenal glands (left greater than right),   nonspecific.  KIDNEYS/URETERS: Kidneys enhance symmetrically without hydronephrosis.   Subcentimeter low-attenuation lesion in the right kidney which is too   small to characterize.    BLADDER: Thickening of the urinary bladder walls despite underdistention   with slight infiltration of the perivesicular fat.  REPRODUCTIVE ORGANS: Enlarged with median hypertrophy.    BOWEL: Small hiatus hernia. No bowel obstruction.Appendix is normal. Mild   thickening of the walls of the rectosigmoid colon despite underdistention   with mucosal hyperenhancement and slight infiltration of the pericolonic   fat.  PERITONEUM: No ascites, pneumoperitoneum, or loculated collection.  VESSELS: Atherosclerotic calcifications of the aortoiliac tree. Normal   caliber abdominal aorta.  RETROPERITONEUM/LYMPH NODES: No lymphadenopathy.  ABDOMINAL WALL: Tiny fat-containing umbilical hernia. Postsurgical   changes at the right groin.  BONES: Degenerative changes of the spine.    IMPRESSION:  Mucoid plugging of right lower lobe bronchi with scattered tree-in bud   opacities and right lower lobe peripheral atelectasis/consolidation   likely reflective of bronchiolitis and developing bronchopneumonia.    Proctocolitis involving the rectum and sigmoid colon.    Enlarged prostate gland. Thickening of the urinary bladder walls despite   underdistention with slight infiltration of the perivesicular fat which   raises question of cystitis. Correlate with urinalysis.      < end of copied text >           No growth          RESPIRATORY CULTURES:      C Diff by PCR Result: NotDetec (01-15 @ 11:45)      Studies  Chest X-RAY  CT SCAN Chest   Venous Dopplers: LE:   CT Abdomen  Others    ct< from: CT Chest No Cont (22 @ 13:22) >    LYMPH NODES/MEDIASTINUM: No enlarged lymph nodes. Small hiatal hernia.    HEART/VASCULATURE: Normal heart size. Unremarkable pericardium.   Subjectively mild amount of coronary calcified plaque.    UPPER ABDOMEN: Unremarkable.    BONES/SOFT TISSUES: No acute or suspicious abnormal.          IMPRESSION:    New right lower lobe bronchiolitis/bronchiolar impaction and small   consolidation in the apical right upper lobe. These may be infectious in   the appropriate clinical setting. Follow-up in 8 weeks is recommended to   assess for improvement.    Few new small lung nodules which can be reassessed on follow-up.    --- End of Report ---               MIKKI CRAFT M.D., ATTENDING RADIOGIST   This document has been electronically signed. 2022 12:52PM    < end of copied text >          ct< from: CT Chest No Cont (21 @ 10:47) >  COMPARISON: No prior chest CT.    FINDINGS:    AIRWAYS, LUNGS and PLEURA: Patent central airways. Mild cylindrical bronchiectasis. Mucoid impaction within the segmental branches of right lower lobe bronchus. Emphysema. Clear lungs. No pleuraleffusion.    MEDIASTINUM AND KAREN: No lymphadenopathy.    HEART AND VESSELS: Heart size is normal. No pericardial effusion. Thoracic aorta and pulmonary artery normal in diameter. Coronary calcification.    VISUALIZED UPPER ABDOMEN: Small hiatal hernia.    CHEST WALL AND BONES: No aggressive osseous lesion.    LOWER NECK: Within normal limits.    IMPRESSION:    Emphysema.    Mild cylindrical bronchiectasis. Mucoid impaction within the segmental branches of right lower lobe bronchus.    Recommend annual screening with low-dose chest CT.               SHERI FAITH MD; Attending Radiologist   This document has been electronically signed. 2021  9:24AM    < end of copied text >

## 2023-01-16 NOTE — PROGRESS NOTE ADULT - SUBJECTIVE AND OBJECTIVE BOX
Gastroenterology Progress Note    Interval Events:   Still having around 6 loose watery/blood BMs per day. Having ongoing diffuse abdominal discomfort Able to tolerate a small diet although with a lot of tenesmus/urgency     Allergies:  angiotensin converting enzyme inhibitors (Other; Swelling)      Hospital Medications:  buDESOnide    EC Capsule 3 milliGRAM(s) Oral daily  ciprofloxacin   IVPB 200 milliGRAM(s) IV Intermittent every 12 hours  dextrose 5%. 1000 milliLiter(s) IV Continuous <Continuous>  dextrose 5%. 1000 milliLiter(s) IV Continuous <Continuous>  dextrose 50% Injectable 25 Gram(s) IV Push once  dextrose 50% Injectable 12.5 Gram(s) IV Push once  dextrose 50% Injectable 25 Gram(s) IV Push once  dextrose Oral Gel 15 Gram(s) Oral once PRN  enoxaparin Injectable 40 milliGRAM(s) SubCutaneous every 24 hours  fluticasone propionate 50 MICROgram(s)/spray Nasal Spray 1 Spray(s) Both Nostrils two times a day  glucagon  Injectable 1 milliGRAM(s) IntraMuscular once  glycopyrrolate 9 MICROgram(s)/formoterol 4.8 MICROgram(s) Inhaler 2 Puff(s) Inhalation two times a day  guaiFENesin  milliGRAM(s) Oral <User Schedule>  hydrocortisone sodium succinate Injectable 100 milliGRAM(s) IV Push three times a day  insulin lispro (ADMELOG) corrective regimen sliding scale   SubCutaneous three times a day before meals  insulin lispro (ADMELOG) corrective regimen sliding scale   SubCutaneous at bedtime  mesalamine DR Capsule 800 milliGRAM(s) Oral three times a day  metroNIDAZOLE  IVPB 500 milliGRAM(s) IV Intermittent every 12 hours  montelukast 10 milliGRAM(s) Oral daily  pancrelipase  (CREON 36,000 Lipase Units) 2 Capsule(s) Oral three times a day with meals  pantoprazole  Injectable 40 milliGRAM(s) IV Push two times a day  sodium chloride 0.9%. 1000 milliLiter(s) IV Continuous <Continuous>      ROS: 14 point ROS negative unless otherwise state in subjective    PHYSICAL EXAM:   Vital Signs:  Vital Signs Last 24 Hrs  T(C): 36.9 (2023 06:12), Max: 37 (15 Kishore 2023 23:43)  T(F): 98.5 (2023 06:12), Max: 98.6 (15 Kishore 2023 23:43)  HR: 59 (2023 06:12) (54 - 63)  BP: 126/63 (2023 06:12) (114/67 - 133/68)  BP(mean): --  RR: 17 (2023 06:12) (16 - 17)  SpO2: 97% (2023 06:12) (97% - 100%)    Parameters below as of 2023 06:12  Patient On (Oxygen Delivery Method): room air      Daily Height in cm: 172.72 (15 Kishore 2023 14:46)    Daily     GENERAL:  No acute distress  HEENT:  NCAT, no scleral icterus  CHEST: no resp distress  HEART:  RRR  ABDOMEN:  Soft, mild diffuse  TTP, non-distended, normoactive bowel sounds  EXTREMITIES:  No edema  NEURO:  Alert and oriented x 3    LABS:                        9.9    6.64  )-----------( 467      ( 2023 06:59 )             32.0     Mean Cell Volume: 79.6 fL (23 @ 06:59)    01-15    135  |  100  |  8   ----------------------------<  106<H>  5.2   |  25  |  0.85    Ca    9.1      15 Kishore 2023 07:50  Phos  4.8     01-15  Mg     1.90     01-15    TPro  6.6  /  Alb  3.0<L>  /  TBili  <0.2  /  DBili  x   /  AST  15  /  ALT  5   /  AlkPhos  66  -15    LIVER FUNCTIONS - ( 15 Kishore 2023 07:50 )  Alb: 3.0 g/dL / Pro: 6.6 g/dL / ALK PHOS: 66 U/L / ALT: 5 U/L / AST: 15 U/L / GGT: x             Urinalysis Basic - ( 15 Kishore 2023 12:45 )    Color: Light Yellow / Appearance: Clear / S.010 / pH: x  Gluc: x / Ketone: Negative  / Bili: Negative / Urobili: <2 mg/dL   Blood: x / Protein: Negative / Nitrite: Negative   Leuk Esterase: Negative / RBC: x / WBC x   Sq Epi: x / Non Sq Epi: x / Bacteria: x    Imaging:  Imaging:  < from: Upper Endoscopy (21 @ 13:59) >                                                                      Findings:       The examined esophagus was normal.       A metal stent was found at the pylorus. Stent removal was accomplished        with a Raptor grasping device.       The exam of the stomach was otherwise normal.       Preparations were made for gastric endoscopic myotomy (G-POEM).        Mucosotomy followed by myotomy were performed in a posterior (5 o'clock)        orientation starting in the gastric antrum, approximately 5-6cm proximal        to the pyloric channel. First, a submucosal injection of a solution of        methylene blue and saline was used to lift the mucosa at the site of the        initial mucosotomy. The initial mucosal incision was made using a    HybridKnife I-Type. Next, the endoscope with a clear cap was used to        enter into the submucosal tunnel. The submucosal tunnel was then further        created by continued dissection. The submucosal tunnel was extended to        pyloric channel. The myotomy was started using a DualKnife to perform        myotomies of the circular and longitudinal muscle fibers. Intra        procedure bleeding was minimal. A small Coagrasper was used effectively        for hemostasis. Hemostasis was successfully accomplished throughout the        procedure. After completion of the myotomy, there was no evidence of        bleeding noted on the inspection of the myotomy edges and submucosal        tunnel. The myotomy was successfully performed. The muscular division        was complete. Edges of the myotomy were ablated using the coag grasper        and a soft coag setting. The mucosal entrance to the submucosal tunnel        was closed using endoscopic clips.                                         Impression:          - Pre-existing gastric stent across pyloric channel                        removed.                       - Gastric peroral endoscopic myotomy (G-POEM) was                        performed.  Recommendation:      - Clear liquid diet for 3 days, followed by full liquid                        diet for 3 days post-procedure.                       - Complete 7 day course of antibiotics (levofloxacin                        500mg PO daily) as prescribed.                       - Continue aspiration precautions.                       - PPI BID.                       - Discharge patient to home (ambulatory).                                                                                     < end of copied text >  < from: CT Abdomen and Pelvis w/ IV Cont (23 @ 11:36) >  FINDINGS:  LOWER CHEST: Mucoid plugging of right lower lobe bronchi with scattered   tree-in bud opacities and right lower lobe peripheral   atelectasis/consolidation.    LIVER: Within normal limits.  BILE DUCTS: Normal caliber.  GALLBLADDER: Within normal limits.  SPLEEN: Within normal limits.  PANCREAS: Within normal limits.  ADRENALS: Thickening of the adrenal glands (left greater than right),   nonspecific.  KIDNEYS/URETERS: Kidneys enhance symmetrically without hydronephrosis.   Subcentimeter low-attenuation lesion in the right kidney which is too   small to characterize.    BLADDER: Thickening of the urinary bladder walls despite underdistention   with slight infiltration of the perivesicular fat.  REPRODUCTIVE ORGANS: Enlarged with median hypertrophy.    BOWEL: Small hiatus hernia. No bowel obstruction.Appendix is normal. Mild   thickening of the walls of the rectosigmoid colon despite underdistention   with mucosal hyperenhancement and slight infiltration of the pericolonic   fat.  PERITONEUM: No ascites, pneumoperitoneum, or loculated collection.  VESSELS: Atherosclerotic calcifications of the aortoiliac tree. Normal   caliber abdominal aorta.  RETROPERITONEUM/LYMPH NODES: No lymphadenopathy.  ABDOMINAL WALL: Tiny fat-containing umbilical hernia. Postsurgical   changes at the right groin.  BONES: Degenerative changes of the spine.    IMPRESSION:  Mucoid plugging of right lower lobe bronchi with scattered tree-in bud   opacities and right lower lobe peripheral atelectasis/consolidation   likely reflective of bronchiolitis and developing bronchopneumonia.    Proctocolitis involving the rectum and sigmoid colon.    Enlarged prostate gland. Thickening of the urinary bladder walls despite   underdistention with slight infiltration of the perivesicular fat which   raises question of cystitis. Correlate with urinalysis.    < end of copied text >

## 2023-01-16 NOTE — PROVIDER CONTACT NOTE (OTHER) - SITUATION
Patient noted to have elevated lactate at 3.7. Received IV fluids and IV zosyn. Asked provider if they wanted a repeat lactate.
Pt states that he had a black BM but flushed it down the toilet

## 2023-01-16 NOTE — PROGRESS NOTE ADULT - SUBJECTIVE AND OBJECTIVE BOX
Patient is a 72y Male     Patient is a 72y old  Male who presents with a chief complaint of bloody diarrhea (16 Jan 2023 08:31)      HPI:  Pt is a 72 year old man with ulcerative colitis, htn, DM c/b gastroparesis, COPD who presents with 5 days of "black stool." He describes it as black and watery with surrounding bright red blood. He was diagnosed with UC 1.5 years ago and has been treated with mesalamine, prednisone, humira, and entyvio. At baseline, he has 2-3 bowel movements per day, sometimes watery, He has been eating less due to decreased appetite and abdominal cramping from the diarrhea. There has been some mild shortness of breath as well. He went to his GI Dr Felipe last week who was waiting for approval to switch him from entyvio to a new medication. Colonoscopy from Dec 2022 showed pancolonic mucus, UC and diverticulosis. He denies any other symptoms including CP, HA, fevers, dysuria, vomiting.    ED Course: afebrile, VSS, CTAP showing proctocolitis and likely bronchiolitis and developing bronchopneumonia  Received prednisone, solumedrol, azithromycin, zosyn, 2L LR (14 Jan 2023 17:22)      PAST MEDICAL & SURGICAL HISTORY:  Gastroparesis  with pylorus dysfunction      DM (diabetes mellitus)  type II      Chronic obstructive pulmonary disease      Hypertension      Ulcerative colitis      Inguinal hernia      Arthropathy  left knee replacement 2010      History of repair of hiatal hernia  childhood      S/P endoscopy  POEM 10/2021, 11/2021          MEDICATIONS  (STANDING):  buDESOnide    EC Capsule 3 milliGRAM(s) Oral daily  ciprofloxacin   IVPB 200 milliGRAM(s) IV Intermittent every 12 hours  dextrose 5%. 1000 milliLiter(s) (100 mL/Hr) IV Continuous <Continuous>  dextrose 5%. 1000 milliLiter(s) (50 mL/Hr) IV Continuous <Continuous>  dextrose 50% Injectable 25 Gram(s) IV Push once  dextrose 50% Injectable 12.5 Gram(s) IV Push once  dextrose 50% Injectable 25 Gram(s) IV Push once  enoxaparin Injectable 40 milliGRAM(s) SubCutaneous every 24 hours  fluticasone propionate 50 MICROgram(s)/spray Nasal Spray 1 Spray(s) Both Nostrils two times a day  glucagon  Injectable 1 milliGRAM(s) IntraMuscular once  glycopyrrolate 9 MICROgram(s)/formoterol 4.8 MICROgram(s) Inhaler 2 Puff(s) Inhalation two times a day  guaiFENesin  milliGRAM(s) Oral <User Schedule>  hydrocortisone sodium succinate Injectable 100 milliGRAM(s) IV Push three times a day  insulin lispro (ADMELOG) corrective regimen sliding scale   SubCutaneous three times a day before meals  insulin lispro (ADMELOG) corrective regimen sliding scale   SubCutaneous at bedtime  mesalamine DR Capsule 800 milliGRAM(s) Oral three times a day  metroNIDAZOLE  IVPB 500 milliGRAM(s) IV Intermittent every 12 hours  montelukast 10 milliGRAM(s) Oral daily  pancrelipase  (CREON 36,000 Lipase Units) 2 Capsule(s) Oral three times a day with meals  pantoprazole  Injectable 40 milliGRAM(s) IV Push two times a day  sodium chloride 0.9%. 1000 milliLiter(s) (75 mL/Hr) IV Continuous <Continuous>      Allergies    angiotensin converting enzyme inhibitors (Other; Swelling)    Intolerances        SOCIAL HISTORY:  Denies ETOh,Smoking,     FAMILY HISTORY:  FH: diabetes mellitus (Mother)        REVIEW OF SYSTEMS:    CONSTITUTIONAL: No weakness, fevers or chills  EYES/ENT: No visual changes;  No vertigo or throat pain   NECK: No pain or stiffness  RESPIRATORY: No cough, wheezing, hemoptysis; No shortness of breath  CARDIOVASCULAR: No chest pain or palpitations  GASTROINTESTINAL: No abdominal or epigastric pain. No nausea, vomiting, or hematemesis; No diarrhea or constipation. No melena or hematochezia.  GENITOURINARY: No dysuria, frequency or hematuria  NEUROLOGICAL: No numbness or weakness  SKIN: No itching, burning, rashes, or lesions   All other review of systems is negative unless indicated above.    VITAL:  T(C): , Max: 37 (01-15-23 @ 23:43)  T(F): , Max: 98.6 (01-15-23 @ 23:43)  HR: 59 (01-16-23 @ 06:12)  BP: 126/63 (01-16-23 @ 06:12)  BP(mean): --  RR: 17 (01-16-23 @ 06:12)  SpO2: 97% (01-16-23 @ 06:12)  Wt(kg): --    I and O's:    01-14 @ 07:01  -  01-15 @ 07:00  --------------------------------------------------------  IN: 0 mL / OUT: 300 mL / NET: -300 mL    01-15 @ 07:01  -  01-16 @ 07:00  --------------------------------------------------------  IN: 1280 mL / OUT: 0 mL / NET: 1280 mL      Height (cm): 172.7 (01-15 @ 14:46)  Weight (kg): 62.6 (01-15 @ 14:46)  BMI (kg/m2): 21 (01-15 @ 14:46)  BSA (m2): 1.75 (01-15 @ 14:46)    PHYSICAL EXAM:    Constitutional: NAD  HEENT: PERRLA,   Neck: No JVD  Respiratory: CTA B/L  Cardiovascular: S1 and S2  Gastrointestinal: BS+, soft, NT/ND  Extremities: No peripheral edema  Neurological: A/O x 3, no focal deficits  Psychiatric: Normal mood, normal affect  : No Pete  Skin: No rashes  Access: Not applicable  Back: No CVA tenderness    LABS:                        9.9    6.64  )-----------( 467      ( 16 Jan 2023 06:59 )             32.0     01-15    135  |  100  |  8   ----------------------------<  106<H>  5.2   |  25  |  0.85    Ca    9.1      15 Kishore 2023 07:50  Phos  4.8     01-15  Mg     1.90     01-15    TPro  6.6  /  Alb  3.0<L>  /  TBili  <0.2  /  DBili  x   /  AST  15  /  ALT  5   /  AlkPhos  66  01-15          RADIOLOGY & ADDITIONAL STUDIES:

## 2023-01-16 NOTE — PROGRESS NOTE ADULT - PROBLEM SELECTOR PLAN 5
Pulmonologist: Ezekiel Sandoval  - no home O2  - continue home meds: budesonide 3mg po qd,   - montelukast, 10mg qd Pulmonologist: Ezekiel Sandoval  - no home O2  - continue home meds: budesonide 3mg po qd, montelukast, 10mg qd  - start Duoneb   - CXR today and VBG

## 2023-01-17 LAB
ANION GAP SERPL CALC-SCNC: 11 MMOL/L — SIGNIFICANT CHANGE UP (ref 7–14)
BASOPHILS # BLD AUTO: 0.01 K/UL — SIGNIFICANT CHANGE UP (ref 0–0.2)
BASOPHILS NFR BLD AUTO: 0.1 % — SIGNIFICANT CHANGE UP (ref 0–2)
BUN SERPL-MCNC: 8 MG/DL — SIGNIFICANT CHANGE UP (ref 7–23)
CALCIUM SERPL-MCNC: 8.4 MG/DL — SIGNIFICANT CHANGE UP (ref 8.4–10.5)
CHLORIDE SERPL-SCNC: 100 MMOL/L — SIGNIFICANT CHANGE UP (ref 98–107)
CO2 SERPL-SCNC: 22 MMOL/L — SIGNIFICANT CHANGE UP (ref 22–31)
CREAT SERPL-MCNC: 0.88 MG/DL — SIGNIFICANT CHANGE UP (ref 0.5–1.3)
CULTURE RESULTS: SIGNIFICANT CHANGE UP
EGFR: 92 ML/MIN/1.73M2 — SIGNIFICANT CHANGE UP
EOSINOPHIL # BLD AUTO: 0 K/UL — SIGNIFICANT CHANGE UP (ref 0–0.5)
EOSINOPHIL NFR BLD AUTO: 0 % — SIGNIFICANT CHANGE UP (ref 0–6)
GLUCOSE BLDC GLUCOMTR-MCNC: 124 MG/DL — HIGH (ref 70–99)
GLUCOSE BLDC GLUCOMTR-MCNC: 124 MG/DL — HIGH (ref 70–99)
GLUCOSE BLDC GLUCOMTR-MCNC: 131 MG/DL — HIGH (ref 70–99)
GLUCOSE BLDC GLUCOMTR-MCNC: 241 MG/DL — HIGH (ref 70–99)
GLUCOSE SERPL-MCNC: 154 MG/DL — HIGH (ref 70–99)
HCT VFR BLD CALC: 29.8 % — LOW (ref 39–50)
HGB BLD-MCNC: 9.2 G/DL — LOW (ref 13–17)
IANC: 5.73 K/UL — SIGNIFICANT CHANGE UP (ref 1.8–7.4)
IMM GRANULOCYTES NFR BLD AUTO: 0.8 % — SIGNIFICANT CHANGE UP (ref 0–0.9)
LYMPHOCYTES # BLD AUTO: 0.62 K/UL — LOW (ref 1–3.3)
LYMPHOCYTES # BLD AUTO: 8.4 % — LOW (ref 13–44)
MAGNESIUM SERPL-MCNC: 1.8 MG/DL — SIGNIFICANT CHANGE UP (ref 1.6–2.6)
MCHC RBC-ENTMCNC: 24.3 PG — LOW (ref 27–34)
MCHC RBC-ENTMCNC: 30.9 GM/DL — LOW (ref 32–36)
MCV RBC AUTO: 78.8 FL — LOW (ref 80–100)
MONOCYTES # BLD AUTO: 0.92 K/UL — HIGH (ref 0–0.9)
MONOCYTES NFR BLD AUTO: 12.5 % — SIGNIFICANT CHANGE UP (ref 2–14)
NEUTROPHILS # BLD AUTO: 5.73 K/UL — SIGNIFICANT CHANGE UP (ref 1.8–7.4)
NEUTROPHILS NFR BLD AUTO: 78.2 % — HIGH (ref 43–77)
NRBC # BLD: 0 /100 WBCS — SIGNIFICANT CHANGE UP (ref 0–0)
NRBC # FLD: 0 K/UL — SIGNIFICANT CHANGE UP (ref 0–0)
PHOSPHATE SERPL-MCNC: 3.7 MG/DL — SIGNIFICANT CHANGE UP (ref 2.5–4.5)
PLATELET # BLD AUTO: 440 K/UL — HIGH (ref 150–400)
POTASSIUM SERPL-MCNC: 3 MMOL/L — LOW (ref 3.5–5.3)
POTASSIUM SERPL-SCNC: 3 MMOL/L — LOW (ref 3.5–5.3)
RBC # BLD: 3.78 M/UL — LOW (ref 4.2–5.8)
RBC # FLD: 13.7 % — SIGNIFICANT CHANGE UP (ref 10.3–14.5)
S PNEUM AG UR QL: NEGATIVE — SIGNIFICANT CHANGE UP
SODIUM SERPL-SCNC: 133 MMOL/L — LOW (ref 135–145)
SPECIMEN SOURCE: SIGNIFICANT CHANGE UP
WBC # BLD: 7.34 K/UL — SIGNIFICANT CHANGE UP (ref 3.8–10.5)
WBC # FLD AUTO: 7.34 K/UL — SIGNIFICANT CHANGE UP (ref 3.8–10.5)

## 2023-01-17 RX ORDER — POTASSIUM CHLORIDE 20 MEQ
10 PACKET (EA) ORAL
Refills: 0 | Status: COMPLETED | OUTPATIENT
Start: 2023-01-17 | End: 2023-01-17

## 2023-01-17 RX ORDER — POTASSIUM CHLORIDE 20 MEQ
20 PACKET (EA) ORAL ONCE
Refills: 0 | Status: COMPLETED | OUTPATIENT
Start: 2023-01-17 | End: 2023-01-17

## 2023-01-17 RX ADMIN — PANTOPRAZOLE SODIUM 40 MILLIGRAM(S): 20 TABLET, DELAYED RELEASE ORAL at 17:35

## 2023-01-17 RX ADMIN — Medication 2 CAPSULE(S): at 18:25

## 2023-01-17 RX ADMIN — Medication 100 MILLIGRAM(S): at 06:05

## 2023-01-17 RX ADMIN — Medication 100 MILLIEQUIVALENT(S): at 09:03

## 2023-01-17 RX ADMIN — Medication 3 MILLIGRAM(S): at 06:05

## 2023-01-17 RX ADMIN — Medication 20 MILLIEQUIVALENT(S): at 09:17

## 2023-01-17 RX ADMIN — Medication 100 MILLIEQUIVALENT(S): at 07:56

## 2023-01-17 RX ADMIN — ENOXAPARIN SODIUM 40 MILLIGRAM(S): 100 INJECTION SUBCUTANEOUS at 17:35

## 2023-01-17 RX ADMIN — Medication 800 MILLIGRAM(S): at 15:20

## 2023-01-17 RX ADMIN — Medication 800 MILLIGRAM(S): at 22:58

## 2023-01-17 RX ADMIN — Medication 1 SPRAY(S): at 06:08

## 2023-01-17 RX ADMIN — GLYCOPYRROLATE AND FORMOTEROL FUMARATE 2 PUFF(S): 9; 4.8 AEROSOL, METERED RESPIRATORY (INHALATION) at 22:56

## 2023-01-17 RX ADMIN — Medication 600 MILLIGRAM(S): at 07:04

## 2023-01-17 RX ADMIN — Medication 100 MILLIGRAM(S): at 13:25

## 2023-01-17 RX ADMIN — MONTELUKAST 10 MILLIGRAM(S): 4 TABLET, CHEWABLE ORAL at 11:54

## 2023-01-17 RX ADMIN — Medication 3 MILLILITER(S): at 16:40

## 2023-01-17 RX ADMIN — Medication 1 SPRAY(S): at 17:36

## 2023-01-17 RX ADMIN — Medication 2 CAPSULE(S): at 09:32

## 2023-01-17 RX ADMIN — GLYCOPYRROLATE AND FORMOTEROL FUMARATE 2 PUFF(S): 9; 4.8 AEROSOL, METERED RESPIRATORY (INHALATION) at 11:53

## 2023-01-17 RX ADMIN — BUDESONIDE AND FORMOTEROL FUMARATE DIHYDRATE 2 PUFF(S): 160; 4.5 AEROSOL RESPIRATORY (INHALATION) at 09:05

## 2023-01-17 RX ADMIN — PANTOPRAZOLE SODIUM 40 MILLIGRAM(S): 20 TABLET, DELAYED RELEASE ORAL at 06:07

## 2023-01-17 RX ADMIN — Medication 3 MILLILITER(S): at 22:09

## 2023-01-17 RX ADMIN — Medication 800 MILLIGRAM(S): at 06:06

## 2023-01-17 RX ADMIN — Medication 100 MILLIGRAM(S): at 06:07

## 2023-01-17 RX ADMIN — Medication 2 CAPSULE(S): at 13:25

## 2023-01-17 RX ADMIN — Medication 100 MILLIGRAM(S): at 23:03

## 2023-01-17 RX ADMIN — Medication 100 MILLIGRAM(S): at 17:35

## 2023-01-17 RX ADMIN — Medication 2: at 18:25

## 2023-01-17 RX ADMIN — BUDESONIDE AND FORMOTEROL FUMARATE DIHYDRATE 2 PUFF(S): 160; 4.5 AEROSOL RESPIRATORY (INHALATION) at 22:57

## 2023-01-17 RX ADMIN — Medication 3 MILLILITER(S): at 10:00

## 2023-01-17 NOTE — PROGRESS NOTE ADULT - SUBJECTIVE AND OBJECTIVE BOX
Date of Service: 01-17-23 @ 16:34    Patient is a 72y old  Male who presents with a chief complaint of bloody diarrhea (17 Jan 2023 08:18)      Any change in ROS: Doing pretty good:  no sob:  no cough:       MEDICATIONS  (STANDING):  albuterol/ipratropium for Nebulization 3 milliLiter(s) Nebulizer every 6 hours  buDESOnide    EC Capsule 3 milliGRAM(s) Oral daily  budesonide 160 MICROgram(s)/formoterol 4.5 MICROgram(s) Inhaler 2 Puff(s) Inhalation two times a day  dextrose 5%. 1000 milliLiter(s) (100 mL/Hr) IV Continuous <Continuous>  dextrose 5%. 1000 milliLiter(s) (50 mL/Hr) IV Continuous <Continuous>  dextrose 50% Injectable 25 Gram(s) IV Push once  dextrose 50% Injectable 12.5 Gram(s) IV Push once  dextrose 50% Injectable 25 Gram(s) IV Push once  enoxaparin Injectable 40 milliGRAM(s) SubCutaneous every 24 hours  fluticasone propionate 50 MICROgram(s)/spray Nasal Spray 1 Spray(s) Both Nostrils two times a day  glucagon  Injectable 1 milliGRAM(s) IntraMuscular once  glycopyrrolate 9 MICROgram(s)/formoterol 4.8 MICROgram(s) Inhaler 2 Puff(s) Inhalation two times a day  guaiFENesin  milliGRAM(s) Oral <User Schedule>  hydrocortisone sodium succinate Injectable 100 milliGRAM(s) IV Push three times a day  insulin lispro (ADMELOG) corrective regimen sliding scale   SubCutaneous three times a day before meals  insulin lispro (ADMELOG) corrective regimen sliding scale   SubCutaneous at bedtime  mesalamine DR Capsule 800 milliGRAM(s) Oral three times a day  metroNIDAZOLE  IVPB 500 milliGRAM(s) IV Intermittent every 12 hours  montelukast 10 milliGRAM(s) Oral daily  pancrelipase  (CREON 36,000 Lipase Units) 2 Capsule(s) Oral three times a day with meals  pantoprazole  Injectable 40 milliGRAM(s) IV Push two times a day  potassium chloride  10 mEq/100 mL IVPB 10 milliEquivalent(s) IV Intermittent every 1 hour  sodium chloride 0.9%. 1000 milliLiter(s) (75 mL/Hr) IV Continuous <Continuous>    MEDICATIONS  (PRN):  dextrose Oral Gel 15 Gram(s) Oral once PRN Blood Glucose LESS THAN 70 milliGRAM(s)/deciliter    Vital Signs Last 24 Hrs  T(C): 36.4 (17 Jan 2023 15:37), Max: 36.6 (16 Jan 2023 21:30)  T(F): 97.6 (17 Jan 2023 15:37), Max: 97.8 (16 Jan 2023 21:30)  HR: 64 (17 Jan 2023 15:37) (58 - 64)  BP: 128/68 (17 Jan 2023 15:37) (117/66 - 133/77)  BP(mean): --  RR: 17 (17 Jan 2023 15:37) (16 - 17)  SpO2: 98% (17 Jan 2023 15:37) (97% - 99%)    Parameters below as of 17 Jan 2023 15:37  Patient On (Oxygen Delivery Method): room air        I&O's Summary        Physical Exam:   GENERAL: NAD, well-groomed, well-developed  HEENT: ALYSSA/   Atraumatic, Normocephalic  ENMT: No tonsillar erythema, exudates, or enlargement; Moist mucous membranes, Good dentition, No lesions  NECK: Supple, No JVD, Normal thyroid  CHEST/LUNG: Clear to auscultaion  CVS: Regular rate and rhythm; No murmurs, rubs, or gallops  GI: : Soft, Nontender, Nondistended; Bowel sounds present  NERVOUS SYSTEM:  Alert & Oriented X3  EXTREMITIES:  - edema  LYMPH: No lymphadenopathy noted  SKIN: No rashes or lesions  ENDOCRINOLOGY: No Thyromegaly  PSYCH: Appropriate    Labs:  25, 22, 26                            9.2    7.34  )-----------( 440      ( 17 Jan 2023 04:43 )             29.8                         9.9    6.64  )-----------( 467      ( 16 Jan 2023 06:59 )             32.0                         10.1   8.69  )-----------( 451      ( 15 Kishore 2023 17:42 )             33.0                         8.5    8.91  )-----------( 371      ( 15 Kishore 2023 07:50 )             27.6                         11.4   10.57 )-----------( 532      ( 14 Jan 2023 12:28 )             37.5     01-17    133<L>  |  100  |  8   ----------------------------<  154<H>  3.0<L>   |  22  |  0.88  01-16    132<L>  |  96<L>  |  8   ----------------------------<  126<H>  3.9   |  21<L>  |  0.89  01-15    135  |  100  |  8   ----------------------------<  106<H>  5.2   |  25  |  0.85  01-14    131<L>  |  97<L>  |  8   ----------------------------<  91  3.6   |  22  |  1.03    Ca    8.4      17 Jan 2023 04:43  Ca    9.0      16 Jan 2023 06:59  Phos  3.7     01-17  Phos  5.1     01-16  Mg     1.80     01-17  Mg     1.70     01-16    TPro  6.6  /  Alb  3.0<L>  /  TBili  <0.2  /  DBili  x   /  AST  15  /  ALT  5   /  AlkPhos  66  01-15  TPro  7.6  /  Alb  3.3  /  TBili  0.3  /  DBili  x   /  AST  15  /  ALT  7   /  AlkPhos  80  01-14    CAPILLARY BLOOD GLUCOSE      POCT Blood Glucose.: 131 mg/dL (17 Jan 2023 13:02)  POCT Blood Glucose.: 124 mg/dL (17 Jan 2023 09:29)  POCT Blood Glucose.: 155 mg/dL (16 Jan 2023 22:29)  POCT Blood Glucose.: 147 mg/dL (16 Jan 2023 17:47)            Procalcitonin, Serum: 0.07 ng/mL (01-16 @ 06:59)  Lactate, Blood: 2.2 mmol/L (01-15 @ 07:50)  Lactate, Blood: 4.1 mmol/L (01-15 @ 02:50)    rad< from: Xray Chest 1 View- PORTABLE-Urgent (Xray Chest 1 View- PORTABLE-Urgent .) (01.16.23 @ 12:13) >  TIME OF EXAMINATION: January 16 at 12:06 PM    EXAM: AP chest    FINDINGS:  The lungs are free of focal consolidation to suggest pneumonia. The heart   is not enlarged and there is no effusion. Mild hyperinflation.        COMPARISON: May 11, 2011        IMPRESSION: No localized pulmonary disease.    --- End of Report ---            MARLINE CRUZ MD; Attending Radiologist  This document has been electronically signed. Jan 16 2023  5:25PM    < end of copied text >  < from: CT Chest No Cont (11.17.22 @ 13:22) >    UPPER ABDOMEN: Unremarkable.    BONES/SOFT TISSUES: No acute or suspicious abnormal.          IMPRESSION:    New right lower lobe bronchiolitis/bronchiolar impaction and small   consolidation in the apical right upper lobe. These may be infectious in   the appropriate clinical setting. Follow-up in 8 weeks is recommended to   assess for improvement.    Few new small lung nodules which can be reassessed on follow-up.    --- End of Report ---               MIKKI CRAFT M.D., ATTENDING RADIOGIST   This document has been electronically signed. Nov 21 2022 12:52PM    < end of copied text >      RECENT CULTURES:  01-15 @ 16:50 .Stool Feces                No Protozoa seen by trichrome stain  No Helminths or Protozoa seen in formalin concentrate  performed by iodine stain  (routine O+P not evaluated for Microsporidia,  Cryptosporidia or Cyclospora)  One negative sample does not necessarily rule  out the presence of a parasitic infection.  Few White blood cells    01-15 @ 11:25 Clean Catch Clean Catch (Midstream)                No growth          RESPIRATORY CULTURES:      C Diff by PCR Result: NotDetec (01-15 @ 11:45)      Studies  Chest X-RAY  CT SCAN Chest   Venous Dopplers: LE:   CT Abdomen  Others

## 2023-01-17 NOTE — PROGRESS NOTE ADULT - ASSESSMENT
pt with uc flare  failed two biologics  no acute complaints   continue steroids and abx.  pt slowly improving spoke to primary gi pa.   d/c plan 1-2 days

## 2023-01-17 NOTE — PROGRESS NOTE ADULT - ASSESSMENT
72 with UC flare, bloody diarrhea, COPD, abnl chest CT:    copd / bronchiectasis:   UD flare:   HTN:  DM with gastroparesis    1/17:  copd / bronchiectasis: seems stable:  no wheezing:  has chr rigth lower lobe bronchiectasis:  cont BD:  pt is not wheezing and has no phlegm in chest    UD flare: on steroids:  defer to primary team   HTN: controlled  DM with gastroparesis : monitro and control :  dvt prophylaxis   dw  and wife

## 2023-01-17 NOTE — PROGRESS NOTE ADULT - SUBJECTIVE AND OBJECTIVE BOX
Patient is a 72y Male     Patient is a 72y old  Male who presents with a chief complaint of bloody diarrhea (17 Jan 2023 07:19)      HPI:  Pt is a 72 year old man with ulcerative colitis, htn, DM c/b gastroparesis, COPD who presents with 5 days of "black stool." He describes it as black and watery with surrounding bright red blood. He was diagnosed with UC 1.5 years ago and has been treated with mesalamine, prednisone, humira, and entyvio. At baseline, he has 2-3 bowel movements per day, sometimes watery, He has been eating less due to decreased appetite and abdominal cramping from the diarrhea. There has been some mild shortness of breath as well. He went to his GI Dr Felipe last week who was waiting for approval to switch him from entyvio to a new medication. Colonoscopy from Dec 2022 showed pancolonic mucus, UC and diverticulosis. He denies any other symptoms including CP, HA, fevers, dysuria, vomiting.    ED Course: afebrile, VSS, CTAP showing proctocolitis and likely bronchiolitis and developing bronchopneumonia  Received prednisone, solumedrol, azithromycin, zosyn, 2L LR (14 Jan 2023 17:22)      PAST MEDICAL & SURGICAL HISTORY:  Gastroparesis  with pylorus dysfunction      DM (diabetes mellitus)  type II      Chronic obstructive pulmonary disease      Hypertension      Ulcerative colitis      Inguinal hernia      Arthropathy  left knee replacement 2010      History of repair of hiatal hernia  childhood      S/P endoscopy  POEM 10/2021, 11/2021          MEDICATIONS  (STANDING):  albuterol/ipratropium for Nebulization 3 milliLiter(s) Nebulizer every 6 hours  buDESOnide    EC Capsule 3 milliGRAM(s) Oral daily  budesonide 160 MICROgram(s)/formoterol 4.5 MICROgram(s) Inhaler 2 Puff(s) Inhalation two times a day  dextrose 5%. 1000 milliLiter(s) (100 mL/Hr) IV Continuous <Continuous>  dextrose 5%. 1000 milliLiter(s) (50 mL/Hr) IV Continuous <Continuous>  dextrose 50% Injectable 25 Gram(s) IV Push once  dextrose 50% Injectable 12.5 Gram(s) IV Push once  dextrose 50% Injectable 25 Gram(s) IV Push once  enoxaparin Injectable 40 milliGRAM(s) SubCutaneous every 24 hours  fluticasone propionate 50 MICROgram(s)/spray Nasal Spray 1 Spray(s) Both Nostrils two times a day  glucagon  Injectable 1 milliGRAM(s) IntraMuscular once  glycopyrrolate 9 MICROgram(s)/formoterol 4.8 MICROgram(s) Inhaler 2 Puff(s) Inhalation two times a day  guaiFENesin  milliGRAM(s) Oral <User Schedule>  hydrocortisone sodium succinate Injectable 100 milliGRAM(s) IV Push three times a day  insulin lispro (ADMELOG) corrective regimen sliding scale   SubCutaneous three times a day before meals  insulin lispro (ADMELOG) corrective regimen sliding scale   SubCutaneous at bedtime  mesalamine DR Capsule 800 milliGRAM(s) Oral three times a day  metroNIDAZOLE  IVPB 500 milliGRAM(s) IV Intermittent every 12 hours  montelukast 10 milliGRAM(s) Oral daily  pancrelipase  (CREON 36,000 Lipase Units) 2 Capsule(s) Oral three times a day with meals  pantoprazole  Injectable 40 milliGRAM(s) IV Push two times a day  potassium chloride  10 mEq/100 mL IVPB 10 milliEquivalent(s) IV Intermittent every 1 hour  sodium chloride 0.9%. 1000 milliLiter(s) (75 mL/Hr) IV Continuous <Continuous>      Allergies    angiotensin converting enzyme inhibitors (Other; Swelling)    Intolerances        SOCIAL HISTORY:  Denies ETOh,Smoking,     FAMILY HISTORY:  FH: diabetes mellitus (Mother)        REVIEW OF SYSTEMS:    CONSTITUTIONAL: No weakness, fevers or chills  EYES/ENT: No visual changes;  No vertigo or throat pain   NECK: No pain or stiffness  RESPIRATORY: No cough, wheezing, hemoptysis; No shortness of breath  CARDIOVASCULAR: No chest pain or palpitations  GASTROINTESTINAL: No abdominal or epigastric pain. No nausea, vomiting, or hematemesis; No diarrhea or constipation. No melena or hematochezia.  GENITOURINARY: No dysuria, frequency or hematuria  NEUROLOGICAL: No numbness or weakness  SKIN: No itching, burning, rashes, or lesions   All other review of systems is negative unless indicated above.    VITAL:  T(C): , Max: 36.8 (01-16-23 @ 13:05)  T(F): , Max: 98.2 (01-16-23 @ 13:05)  HR: 62 (01-17-23 @ 05:49)  BP: 133/77 (01-17-23 @ 05:49)  BP(mean): --  RR: 16 (01-17-23 @ 05:49)  SpO2: 98% (01-17-23 @ 05:49)  Wt(kg): --    I and O's:    01-15 @ 07:01  -  01-16 @ 07:00  --------------------------------------------------------  IN: 1280 mL / OUT: 0 mL / NET: 1280 mL          PHYSICAL EXAM:    Constitutional: NAD  HEENT: PERRLA,   Neck: No JVD  Respiratory: CTA B/L  Cardiovascular: S1 and S2  Gastrointestinal: BS+, soft, NT/ND  Extremities: No peripheral edema  Neurological: A/O x 3, no focal deficits  Psychiatric: Normal mood, normal affect  : No Pete  Skin: No rashes  Access: Not applicable  Back: No CVA tenderness    LABS:                        9.2    7.34  )-----------( 440      ( 17 Jan 2023 04:43 )             29.8     01-17    133<L>  |  100  |  8   ----------------------------<  154<H>  3.0<L>   |  22  |  0.88    Ca    8.4      17 Jan 2023 04:43  Phos  3.7     01-17  Mg     1.80     01-17            RADIOLOGY & ADDITIONAL STUDIES:

## 2023-01-17 NOTE — PROGRESS NOTE ADULT - PROBLEM SELECTOR PLAN 1
- infectious vs UC flare vs diverticulitis  - UC Diagnosed 1.5 yrs ago, GI: Dr. Mushtaq Wang  - colonoscopy in Dec 22 showing mucus coating entire colon, diverticulosis, ulcerative pancolitis  - previously on humira, entyvio, prednisone, mesalamine. Planned to switch to medication soon per patient  - baseline 2-3 BMs/day, sometimes watery; now becoming more formed  - GI PCR and C. diff negative. Stool culture pending   - mesalamine 800 tid  - hydrocortisone IV   - c/w metronidazole to complete 5 day course  - d/c ciprofloxacin   - GI consulted, followed by Dr. Adler - infectious vs UC flare vs diverticulitis  - UC Diagnosed 1.5 yrs ago, GI: Dr. Mushtaq Wang  - colonoscopy in Dec 22 showing mucus coating entire colon, diverticulosis, ulcerative pancolitis  - previously on humira, entyvio, prednisone, mesalamine. Planned to switch to medication soon per patient  - baseline 2-3 BMs/day, sometimes watery; now becoming more formed  - GI PCR and C. diff negative. Stool culture pending   - mesalamine 800 tid  - hydrocortisone IV   - c/w metronidazole to complete 5 day course  - d/c ciprofloxacin   - GI consulted, followed by Dr. Adler; d/c planning Thursday after 72 hours of IV steroids to prevent recurrent flare.

## 2023-01-17 NOTE — PROGRESS NOTE ADULT - PROBLEM SELECTOR PLAN 3
Resolved  Lactic acid 3.7 in setting of colitis, increased to 5.2 then down to 1.4    - LR at 100cc/hr  - continue to trend

## 2023-01-17 NOTE — PROGRESS NOTE ADULT - PROBLEM SELECTOR PLAN 2
- Pt reports black stool, unusual to be from rectosigmoid bleed  - stable Hb 9.2 today   - Pantoprazole 40 IV bid  - GI consulted

## 2023-01-17 NOTE — PROGRESS NOTE ADULT - PROBLEM SELECTOR PLAN 4
On jardiance at home  glucose in target range   - While inpt: ISS, can increase to basal/bolus if needed

## 2023-01-17 NOTE — PROGRESS NOTE ADULT - SUBJECTIVE AND OBJECTIVE BOX
PROGRESS NOTE:   Authored by Dr. Charles Ferrari    Patient is a 72y old  Male who presents with a chief complaint of bloody diarrhea (2023 17:01)      SUBJECTIVE / OVERNIGHT EVENTS: Pt reported having melena, but unwitnessed. Hb stable.     ADDITIONAL REVIEW OF SYSTEMS:  + melena    MEDICATIONS  (STANDING):  albuterol/ipratropium for Nebulization 3 milliLiter(s) Nebulizer every 6 hours  buDESOnide    EC Capsule 3 milliGRAM(s) Oral daily  budesonide 160 MICROgram(s)/formoterol 4.5 MICROgram(s) Inhaler 2 Puff(s) Inhalation two times a day  dextrose 5%. 1000 milliLiter(s) (100 mL/Hr) IV Continuous <Continuous>  dextrose 5%. 1000 milliLiter(s) (50 mL/Hr) IV Continuous <Continuous>  dextrose 50% Injectable 25 Gram(s) IV Push once  dextrose 50% Injectable 12.5 Gram(s) IV Push once  dextrose 50% Injectable 25 Gram(s) IV Push once  enoxaparin Injectable 40 milliGRAM(s) SubCutaneous every 24 hours  fluticasone propionate 50 MICROgram(s)/spray Nasal Spray 1 Spray(s) Both Nostrils two times a day  glucagon  Injectable 1 milliGRAM(s) IntraMuscular once  glycopyrrolate 9 MICROgram(s)/formoterol 4.8 MICROgram(s) Inhaler 2 Puff(s) Inhalation two times a day  guaiFENesin  milliGRAM(s) Oral <User Schedule>  hydrocortisone sodium succinate Injectable 100 milliGRAM(s) IV Push three times a day  insulin lispro (ADMELOG) corrective regimen sliding scale   SubCutaneous three times a day before meals  insulin lispro (ADMELOG) corrective regimen sliding scale   SubCutaneous at bedtime  mesalamine DR Capsule 800 milliGRAM(s) Oral three times a day  metroNIDAZOLE  IVPB 500 milliGRAM(s) IV Intermittent every 12 hours  montelukast 10 milliGRAM(s) Oral daily  pancrelipase  (CREON 36,000 Lipase Units) 2 Capsule(s) Oral three times a day with meals  pantoprazole  Injectable 40 milliGRAM(s) IV Push two times a day  potassium chloride  10 mEq/100 mL IVPB 10 milliEquivalent(s) IV Intermittent every 1 hour  sodium chloride 0.9%. 1000 milliLiter(s) (75 mL/Hr) IV Continuous <Continuous>    MEDICATIONS  (PRN):  dextrose Oral Gel 15 Gram(s) Oral once PRN Blood Glucose LESS THAN 70 milliGRAM(s)/deciliter      CAPILLARY BLOOD GLUCOSE      POCT Blood Glucose.: 155 mg/dL (2023 22:29)  POCT Blood Glucose.: 147 mg/dL (2023 17:47)  POCT Blood Glucose.: 145 mg/dL (2023 12:38)  POCT Blood Glucose.: 114 mg/dL (2023 09:21)    I&O's Summary      PHYSICAL EXAM:  Vital Signs Last 24 Hrs  T(C): 36.4 (2023 05:49), Max: 36.8 (2023 13:05)  T(F): 97.5 (2023 05:49), Max: 98.2 (2023 13:05)  HR: 62 (2023 05:49) (55 - 62)  BP: 133/77 (2023 05:49) (117/66 - 133/77)  BP(mean): --  RR: 16 (2023 05:49) (16 - 17)  SpO2: 98% (2023 05:49) (97% - 99%)    Parameters below as of 2023 05:49  Patient On (Oxygen Delivery Method): room air        GENERAL: NAD, lying comfortably in bed  HEAD: Atraumatic, normocephalic  EYES: EOMI b/l, conjunctiva and sclera clear  NECK: Supple, No JVD, No LAD  RESPIRATORY: Normal respiratory effort; lungs are clear to auscultation bilaterally  CARDIOVASCULAR: Regular rate and rhythm, normal S1 and S2, no murmur/rub/gallop; No lower extremity edema  ABDOMEN: Nontender, normoactive bowel sounds, no rebound/guarding; No hepatosplenomegaly  MUSCULOSKELETAL: no clubbing or cyanosis of digits; no joint swelling or tenderness to palpation  NEURO: Non focal   SKIN:   PSYCH: A+O to person, place, and time; affect appropriate    LABS:                          9.2    7.34  )-----------( 440      ( 2023 04:43 )             29.8         133<L>  |  100  |  8   ----------------------------<  154<H>  3.0<L>   |  22  |  0.88    Ca    8.4      2023 04:43  Phos  3.7       Mg     1.80         TPro  6.6  /  Alb  3.0<L>  /  TBili  <0.2  /  DBili  x   /  AST  15  /  ALT  5   /  AlkPhos  66  01-15          Urinalysis Basic - ( 15 Kishore 2023 12:45 )    Color: Light Yellow / Appearance: Clear / S.010 / pH: x  Gluc: x / Ketone: Negative  / Bili: Negative / Urobili: <2 mg/dL   Blood: x / Protein: Negative / Nitrite: Negative   Leuk Esterase: Negative / RBC: x / WBC x   Sq Epi: x / Non Sq Epi: x / Bacteria: x        Culture - Urine (collected 15 Kishore 2023 11:25)  Source: Clean Catch Clean Catch (Midstream)  Final Report (2023 12:05):    No growth          RADIOLOGY:    Consulted note reviewed  Reviewed Imaging personally   PROGRESS NOTE:   Authored by Dr. Charles Ferrari    Patient is a 72y old  Male who presents with a chief complaint of bloody diarrhea (2023 17:01)      SUBJECTIVE / OVERNIGHT EVENTS: Pt reported having melena, but unwitnessed. Hb stable. States he no longer has diarrhea, has been having formed stool since yesterday evening. No hematochezia, abd pain, N/V    ADDITIONAL REVIEW OF SYSTEMS:  No fever, chills  No abd pain, N/V  + melena, no hematochezia   No LE edema     MEDICATIONS  (STANDING):  albuterol/ipratropium for Nebulization 3 milliLiter(s) Nebulizer every 6 hours  buDESOnide    EC Capsule 3 milliGRAM(s) Oral daily  budesonide 160 MICROgram(s)/formoterol 4.5 MICROgram(s) Inhaler 2 Puff(s) Inhalation two times a day  dextrose 5%. 1000 milliLiter(s) (100 mL/Hr) IV Continuous <Continuous>  dextrose 5%. 1000 milliLiter(s) (50 mL/Hr) IV Continuous <Continuous>  dextrose 50% Injectable 25 Gram(s) IV Push once  dextrose 50% Injectable 12.5 Gram(s) IV Push once  dextrose 50% Injectable 25 Gram(s) IV Push once  enoxaparin Injectable 40 milliGRAM(s) SubCutaneous every 24 hours  fluticasone propionate 50 MICROgram(s)/spray Nasal Spray 1 Spray(s) Both Nostrils two times a day  glucagon  Injectable 1 milliGRAM(s) IntraMuscular once  glycopyrrolate 9 MICROgram(s)/formoterol 4.8 MICROgram(s) Inhaler 2 Puff(s) Inhalation two times a day  guaiFENesin  milliGRAM(s) Oral <User Schedule>  hydrocortisone sodium succinate Injectable 100 milliGRAM(s) IV Push three times a day  insulin lispro (ADMELOG) corrective regimen sliding scale   SubCutaneous three times a day before meals  insulin lispro (ADMELOG) corrective regimen sliding scale   SubCutaneous at bedtime  mesalamine DR Capsule 800 milliGRAM(s) Oral three times a day  metroNIDAZOLE  IVPB 500 milliGRAM(s) IV Intermittent every 12 hours  montelukast 10 milliGRAM(s) Oral daily  pancrelipase  (CREON 36,000 Lipase Units) 2 Capsule(s) Oral three times a day with meals  pantoprazole  Injectable 40 milliGRAM(s) IV Push two times a day  potassium chloride  10 mEq/100 mL IVPB 10 milliEquivalent(s) IV Intermittent every 1 hour  sodium chloride 0.9%. 1000 milliLiter(s) (75 mL/Hr) IV Continuous <Continuous>    MEDICATIONS  (PRN):  dextrose Oral Gel 15 Gram(s) Oral once PRN Blood Glucose LESS THAN 70 milliGRAM(s)/deciliter      CAPILLARY BLOOD GLUCOSE      POCT Blood Glucose.: 155 mg/dL (2023 22:29)  POCT Blood Glucose.: 147 mg/dL (2023 17:47)  POCT Blood Glucose.: 145 mg/dL (2023 12:38)  POCT Blood Glucose.: 114 mg/dL (2023 09:21)    I&O's Summary      PHYSICAL EXAM:  Vital Signs Last 24 Hrs  T(C): 36.4 (2023 05:49), Max: 36.8 (2023 13:05)  T(F): 97.5 (2023 05:49), Max: 98.2 (2023 13:05)  HR: 62 (2023 05:49) (55 - 62)  BP: 133/77 (2023 05:49) (117/66 - 133/77)  BP(mean): --  RR: 16 (2023 05:49) (16 - 17)  SpO2: 98% (2023 05:49) (97% - 99%)    Parameters below as of 2023 05:49  Patient On (Oxygen Delivery Method): room air        GENERAL: NAD, lying comfortably in bed  HEAD: Atraumatic, normocephalic  EYES: EOMI b/l, conjunctiva and sclera clear  NECK: Supple   RESPIRATORY: Normal respiratory effort; lungs are clear to auscultation bilaterally anteriorly   CARDIOVASCULAR: distant heart sound, regular rate and rhythm, normal S1 and S2, no murmur/rub/gallop; No lower extremity edema  ABDOMEN: Nontender, normoactive bowel sounds, no rebound/guarding   MUSCULOSKELETAL: no joint swelling or tenderness to palpation  NEURO: Non focal   SKIN: no rashes noted   PSYCH: A+O to person, place, and time; affect appropriate    LABS:                          9.2    7.34  )-----------( 440      ( 2023 04:43 )             29.8     -17    133<L>  |  100  |  8   ----------------------------<  154<H>  3.0<L>   |  22  |  0.88    Ca    8.4      2023 04:43  Phos  3.7       Mg     1.80         TPro  6.6  /  Alb  3.0<L>  /  TBili  <0.2  /  DBili  x   /  AST  15  /  ALT  5   /  AlkPhos  66  01-15          Urinalysis Basic - ( 15 Kishore 2023 12:45 )    Color: Light Yellow / Appearance: Clear / S.010 / pH: x  Gluc: x / Ketone: Negative  / Bili: Negative / Urobili: <2 mg/dL   Blood: x / Protein: Negative / Nitrite: Negative   Leuk Esterase: Negative / RBC: x / WBC x   Sq Epi: x / Non Sq Epi: x / Bacteria: x        Culture - Urine (collected 15 Kishore 2023 11:25)  Source: Clean Catch Clean Catch (Midstream)  Final Report (2023 12:05):    No growth          RADIOLOGY:    Consulted note reviewed  Reviewed Imaging personally

## 2023-01-17 NOTE — PROGRESS NOTE ADULT - PROBLEM SELECTOR PLAN 5
Pulmonologist: Ezekiel Sandoval  - no home O2  - continue home meds: budesonide 3mg po qd, montelukast, 10mg qd  - c/w Duoneb while inpatient

## 2023-01-18 LAB
ANION GAP SERPL CALC-SCNC: 7 MMOL/L — SIGNIFICANT CHANGE UP (ref 7–14)
BASOPHILS # BLD AUTO: 0.01 K/UL — SIGNIFICANT CHANGE UP (ref 0–0.2)
BASOPHILS NFR BLD AUTO: 0.1 % — SIGNIFICANT CHANGE UP (ref 0–2)
BUN SERPL-MCNC: 7 MG/DL — SIGNIFICANT CHANGE UP (ref 7–23)
CALCIUM SERPL-MCNC: 8.5 MG/DL — SIGNIFICANT CHANGE UP (ref 8.4–10.5)
CHLORIDE SERPL-SCNC: 102 MMOL/L — SIGNIFICANT CHANGE UP (ref 98–107)
CO2 SERPL-SCNC: 23 MMOL/L — SIGNIFICANT CHANGE UP (ref 22–31)
CREAT SERPL-MCNC: 0.78 MG/DL — SIGNIFICANT CHANGE UP (ref 0.5–1.3)
EGFR: 95 ML/MIN/1.73M2 — SIGNIFICANT CHANGE UP
EOSINOPHIL # BLD AUTO: 0 K/UL — SIGNIFICANT CHANGE UP (ref 0–0.5)
EOSINOPHIL NFR BLD AUTO: 0 % — SIGNIFICANT CHANGE UP (ref 0–6)
GLUCOSE BLDC GLUCOMTR-MCNC: 134 MG/DL — HIGH (ref 70–99)
GLUCOSE BLDC GLUCOMTR-MCNC: 173 MG/DL — HIGH (ref 70–99)
GLUCOSE BLDC GLUCOMTR-MCNC: 194 MG/DL — HIGH (ref 70–99)
GLUCOSE BLDC GLUCOMTR-MCNC: 198 MG/DL — HIGH (ref 70–99)
GLUCOSE SERPL-MCNC: 143 MG/DL — HIGH (ref 70–99)
HCT VFR BLD CALC: 29.7 % — LOW (ref 39–50)
HGB BLD-MCNC: 9.3 G/DL — LOW (ref 13–17)
IANC: 7.17 K/UL — SIGNIFICANT CHANGE UP (ref 1.8–7.4)
IMM GRANULOCYTES NFR BLD AUTO: 0.9 % — SIGNIFICANT CHANGE UP (ref 0–0.9)
LYMPHOCYTES # BLD AUTO: 0.69 K/UL — LOW (ref 1–3.3)
LYMPHOCYTES # BLD AUTO: 7.8 % — LOW (ref 13–44)
MAGNESIUM SERPL-MCNC: 1.8 MG/DL — SIGNIFICANT CHANGE UP (ref 1.6–2.6)
MCHC RBC-ENTMCNC: 25.1 PG — LOW (ref 27–34)
MCHC RBC-ENTMCNC: 31.3 GM/DL — LOW (ref 32–36)
MCV RBC AUTO: 80.3 FL — SIGNIFICANT CHANGE UP (ref 80–100)
MONOCYTES # BLD AUTO: 0.95 K/UL — HIGH (ref 0–0.9)
MONOCYTES NFR BLD AUTO: 10.7 % — SIGNIFICANT CHANGE UP (ref 2–14)
NEUTROPHILS # BLD AUTO: 7.17 K/UL — SIGNIFICANT CHANGE UP (ref 1.8–7.4)
NEUTROPHILS NFR BLD AUTO: 80.5 % — HIGH (ref 43–77)
NRBC # BLD: 0 /100 WBCS — SIGNIFICANT CHANGE UP (ref 0–0)
NRBC # FLD: 0 K/UL — SIGNIFICANT CHANGE UP (ref 0–0)
PHOSPHATE SERPL-MCNC: 3 MG/DL — SIGNIFICANT CHANGE UP (ref 2.5–4.5)
PLATELET # BLD AUTO: 418 K/UL — HIGH (ref 150–400)
POTASSIUM SERPL-MCNC: 3.1 MMOL/L — LOW (ref 3.5–5.3)
POTASSIUM SERPL-SCNC: 3.1 MMOL/L — LOW (ref 3.5–5.3)
RBC # BLD: 3.7 M/UL — LOW (ref 4.2–5.8)
RBC # FLD: 13.9 % — SIGNIFICANT CHANGE UP (ref 10.3–14.5)
SODIUM SERPL-SCNC: 132 MMOL/L — LOW (ref 135–145)
WBC # BLD: 8.9 K/UL — SIGNIFICANT CHANGE UP (ref 3.8–10.5)
WBC # FLD AUTO: 8.9 K/UL — SIGNIFICANT CHANGE UP (ref 3.8–10.5)

## 2023-01-18 RX ORDER — ACETAMINOPHEN 500 MG
650 TABLET ORAL EVERY 6 HOURS
Refills: 0 | Status: DISCONTINUED | OUTPATIENT
Start: 2023-01-18 | End: 2023-01-20

## 2023-01-18 RX ORDER — VEDOLIZUMAB 108 MG/.68ML
1 INJECTION, SOLUTION SUBCUTANEOUS
Qty: 0 | Refills: 0 | DISCHARGE

## 2023-01-18 RX ORDER — POTASSIUM CHLORIDE 20 MEQ
40 PACKET (EA) ORAL ONCE
Refills: 0 | Status: COMPLETED | OUTPATIENT
Start: 2023-01-18 | End: 2023-01-18

## 2023-01-18 RX ADMIN — Medication 100 MILLIGRAM(S): at 05:56

## 2023-01-18 RX ADMIN — Medication 1 DROP(S): at 00:37

## 2023-01-18 RX ADMIN — Medication 2 CAPSULE(S): at 09:44

## 2023-01-18 RX ADMIN — Medication 1 DROP(S): at 05:57

## 2023-01-18 RX ADMIN — Medication 1: at 19:03

## 2023-01-18 RX ADMIN — PANTOPRAZOLE SODIUM 40 MILLIGRAM(S): 20 TABLET, DELAYED RELEASE ORAL at 05:57

## 2023-01-18 RX ADMIN — ENOXAPARIN SODIUM 40 MILLIGRAM(S): 100 INJECTION SUBCUTANEOUS at 17:43

## 2023-01-18 RX ADMIN — Medication 1 SPRAY(S): at 17:43

## 2023-01-18 RX ADMIN — BUDESONIDE AND FORMOTEROL FUMARATE DIHYDRATE 2 PUFF(S): 160; 4.5 AEROSOL RESPIRATORY (INHALATION) at 21:48

## 2023-01-18 RX ADMIN — Medication 3 MILLIGRAM(S): at 05:55

## 2023-01-18 RX ADMIN — Medication 1: at 13:29

## 2023-01-18 RX ADMIN — Medication 3 MILLILITER(S): at 22:47

## 2023-01-18 RX ADMIN — Medication 2 CAPSULE(S): at 13:28

## 2023-01-18 RX ADMIN — Medication 800 MILLIGRAM(S): at 21:49

## 2023-01-18 RX ADMIN — Medication 800 MILLIGRAM(S): at 15:58

## 2023-01-18 RX ADMIN — GLYCOPYRROLATE AND FORMOTEROL FUMARATE 2 PUFF(S): 9; 4.8 AEROSOL, METERED RESPIRATORY (INHALATION) at 09:43

## 2023-01-18 RX ADMIN — Medication 800 MILLIGRAM(S): at 05:56

## 2023-01-18 RX ADMIN — Medication 1 SPRAY(S): at 05:53

## 2023-01-18 RX ADMIN — Medication 2 CAPSULE(S): at 19:03

## 2023-01-18 RX ADMIN — PANTOPRAZOLE SODIUM 40 MILLIGRAM(S): 20 TABLET, DELAYED RELEASE ORAL at 17:43

## 2023-01-18 RX ADMIN — Medication 1 DROP(S): at 17:42

## 2023-01-18 RX ADMIN — GLYCOPYRROLATE AND FORMOTEROL FUMARATE 2 PUFF(S): 9; 4.8 AEROSOL, METERED RESPIRATORY (INHALATION) at 21:48

## 2023-01-18 RX ADMIN — Medication 3 MILLILITER(S): at 16:39

## 2023-01-18 RX ADMIN — Medication 3 MILLILITER(S): at 10:17

## 2023-01-18 RX ADMIN — Medication 40 MILLIEQUIVALENT(S): at 10:09

## 2023-01-18 RX ADMIN — Medication 600 MILLIGRAM(S): at 05:53

## 2023-01-18 RX ADMIN — Medication 100 MILLIGRAM(S): at 21:49

## 2023-01-18 RX ADMIN — Medication 100 MILLIGRAM(S): at 13:30

## 2023-01-18 RX ADMIN — BUDESONIDE AND FORMOTEROL FUMARATE DIHYDRATE 2 PUFF(S): 160; 4.5 AEROSOL RESPIRATORY (INHALATION) at 09:43

## 2023-01-18 RX ADMIN — MONTELUKAST 10 MILLIGRAM(S): 4 TABLET, CHEWABLE ORAL at 13:29

## 2023-01-18 NOTE — PROGRESS NOTE ADULT - ASSESSMENT
72 with UC flare, bloody diarrhea, COPD, abnl chest CT:    copd / bronchiectasis:   UD flare:   HTN:  DM with gastroparesis    1/17:  copd / bronchiectasis: seems stable:  no wheezing:  has chr rigth lower lobe bronchiectasis:  cont BD:  pt is not wheezing and has no phlegm in chest    UD flare: on steroids:  defer to primary team   HTN: controlled  DM with gastroparesis : monitro and control :  dvt prophylaxis   dw  and wife      1/18:    copd / bronchiectasis: seems stable:  no wheezing:  has chr rigth lower lobe bronchiectasis:  cont BD:  pt is not wheezing and has no phlegm in chest    UD flare: on steroids:  defer to primary team   HTN: controlled  DM with gastroparesis : monitro and control :  dvt prophylaxis   dw  and wife

## 2023-01-18 NOTE — PROGRESS NOTE ADULT - SUBJECTIVE AND OBJECTIVE BOX
PROGRESS NOTE:   Authored by Dr. Charles Ferrari    Patient is a 72y old  Male who presents with a chief complaint of bloody diarrhea (17 Jan 2023 16:34)      SUBJECTIVE / OVERNIGHT EVENTS: No events overnight.     ADDITIONAL REVIEW OF SYSTEMS:      MEDICATIONS  (STANDING):  albuterol/ipratropium for Nebulization 3 milliLiter(s) Nebulizer every 6 hours  artificial tears (preservative free) Ophthalmic Solution 1 Drop(s) Both EYES two times a day  buDESOnide    EC Capsule 3 milliGRAM(s) Oral daily  budesonide 160 MICROgram(s)/formoterol 4.5 MICROgram(s) Inhaler 2 Puff(s) Inhalation two times a day  dextrose 5%. 1000 milliLiter(s) (100 mL/Hr) IV Continuous <Continuous>  dextrose 5%. 1000 milliLiter(s) (50 mL/Hr) IV Continuous <Continuous>  dextrose 50% Injectable 25 Gram(s) IV Push once  dextrose 50% Injectable 12.5 Gram(s) IV Push once  dextrose 50% Injectable 25 Gram(s) IV Push once  enoxaparin Injectable 40 milliGRAM(s) SubCutaneous every 24 hours  fluticasone propionate 50 MICROgram(s)/spray Nasal Spray 1 Spray(s) Both Nostrils two times a day  glucagon  Injectable 1 milliGRAM(s) IntraMuscular once  glycopyrrolate 9 MICROgram(s)/formoterol 4.8 MICROgram(s) Inhaler 2 Puff(s) Inhalation two times a day  guaiFENesin  milliGRAM(s) Oral <User Schedule>  hydrocortisone sodium succinate Injectable 100 milliGRAM(s) IV Push three times a day  insulin lispro (ADMELOG) corrective regimen sliding scale   SubCutaneous three times a day before meals  insulin lispro (ADMELOG) corrective regimen sliding scale   SubCutaneous at bedtime  mesalamine DR Capsule 800 milliGRAM(s) Oral three times a day  montelukast 10 milliGRAM(s) Oral daily  pancrelipase  (CREON 36,000 Lipase Units) 2 Capsule(s) Oral three times a day with meals  pantoprazole  Injectable 40 milliGRAM(s) IV Push two times a day  potassium chloride  10 mEq/100 mL IVPB 10 milliEquivalent(s) IV Intermittent every 1 hour  sodium chloride 0.9%. 1000 milliLiter(s) (75 mL/Hr) IV Continuous <Continuous>    MEDICATIONS  (PRN):  dextrose Oral Gel 15 Gram(s) Oral once PRN Blood Glucose LESS THAN 70 milliGRAM(s)/deciliter      CAPILLARY BLOOD GLUCOSE      POCT Blood Glucose.: 124 mg/dL (17 Jan 2023 22:13)  POCT Blood Glucose.: 241 mg/dL (17 Jan 2023 17:58)  POCT Blood Glucose.: 131 mg/dL (17 Jan 2023 13:02)  POCT Blood Glucose.: 124 mg/dL (17 Jan 2023 09:29)    I&O's Summary      PHYSICAL EXAM:  Vital Signs Last 24 Hrs  T(C): 37 (18 Jan 2023 05:44), Max: 37 (18 Jan 2023 05:44)  T(F): 98.6 (18 Jan 2023 05:44), Max: 98.6 (18 Jan 2023 05:44)  HR: 66 (18 Jan 2023 05:44) (62 - 66)  BP: 137/76 (18 Jan 2023 05:44) (122/75 - 137/76)  BP(mean): --  RR: 18 (18 Jan 2023 05:44) (17 - 18)  SpO2: 96% (18 Jan 2023 05:44) (96% - 99%)    Parameters below as of 18 Jan 2023 05:44  Patient On (Oxygen Delivery Method): room air        GENERAL: NAD, lying comfortably in bed  HEAD: Atraumatic, normocephalic  EYES: EOMI b/l, conjunctiva and sclera clear  NECK: Supple, No JVD, No LAD  RESPIRATORY: Normal respiratory effort; lungs are clear to auscultation bilaterally  CARDIOVASCULAR: Regular rate and rhythm, normal S1 and S2, no murmur/rub/gallop; No lower extremity edema  ABDOMEN: Nontender, normoactive bowel sounds, no rebound/guarding; No hepatosplenomegaly  MUSCULOSKELETAL: no clubbing or cyanosis of digits; no joint swelling or tenderness to palpation  NEURO: Non focal   SKIN:   PSYCH: A+O to person, place, and time; affect appropriate        LABS:                      9.2    7.34  )-----------( 440      ( 17 Jan 2023 04:43 )             29.8     01-17    133<L>  |  100  |  8   ----------------------------<  154<H>  3.0<L>   |  22  |  0.88    Ca    8.4      17 Jan 2023 04:43  Phos  3.7     01-17  Mg     1.80     01-17                Culture - Blood (collected 16 Jan 2023 11:15)  Source: .Blood Blood-Peripheral  Preliminary Report (17 Jan 2023 17:01):    No growth to date.    Culture - Blood (collected 16 Jan 2023 11:05)  Source: .Blood Blood-Peripheral  Preliminary Report (17 Jan 2023 17:01):    No growth to date.    Culture - Urine (collected 15 Kishore 2023 11:25)  Source: Clean Catch Clean Catch (Midstream)  Final Report (16 Jan 2023 12:05):    No growth          RADIOLOGY:    Consulted note reviewed  Reviewed Imaging personally   PROGRESS NOTE:   Authored by Dr. Charles Ferrari    Patient is a 72y old  Male who presents with a chief complaint of bloody diarrhea (17 Jan 2023 16:34)      SUBJECTIVE / OVERNIGHT EVENTS: No events overnight. Pt feels well, no N/V. States he had 3 BM yesterday that is soft but formed. No abdominal pain. Has some mild headache.     ADDITIONAL REVIEW OF SYSTEMS:  No fever  + mild HA  No abd pain, N/V/D  dark brown stool, but no melena  No hematochezia       MEDICATIONS  (STANDING):  albuterol/ipratropium for Nebulization 3 milliLiter(s) Nebulizer every 6 hours  artificial tears (preservative free) Ophthalmic Solution 1 Drop(s) Both EYES two times a day  buDESOnide    EC Capsule 3 milliGRAM(s) Oral daily  budesonide 160 MICROgram(s)/formoterol 4.5 MICROgram(s) Inhaler 2 Puff(s) Inhalation two times a day  dextrose 5%. 1000 milliLiter(s) (100 mL/Hr) IV Continuous <Continuous>  dextrose 5%. 1000 milliLiter(s) (50 mL/Hr) IV Continuous <Continuous>  dextrose 50% Injectable 25 Gram(s) IV Push once  dextrose 50% Injectable 12.5 Gram(s) IV Push once  dextrose 50% Injectable 25 Gram(s) IV Push once  enoxaparin Injectable 40 milliGRAM(s) SubCutaneous every 24 hours  fluticasone propionate 50 MICROgram(s)/spray Nasal Spray 1 Spray(s) Both Nostrils two times a day  glucagon  Injectable 1 milliGRAM(s) IntraMuscular once  glycopyrrolate 9 MICROgram(s)/formoterol 4.8 MICROgram(s) Inhaler 2 Puff(s) Inhalation two times a day  guaiFENesin  milliGRAM(s) Oral <User Schedule>  hydrocortisone sodium succinate Injectable 100 milliGRAM(s) IV Push three times a day  insulin lispro (ADMELOG) corrective regimen sliding scale   SubCutaneous three times a day before meals  insulin lispro (ADMELOG) corrective regimen sliding scale   SubCutaneous at bedtime  mesalamine DR Capsule 800 milliGRAM(s) Oral three times a day  montelukast 10 milliGRAM(s) Oral daily  pancrelipase  (CREON 36,000 Lipase Units) 2 Capsule(s) Oral three times a day with meals  pantoprazole  Injectable 40 milliGRAM(s) IV Push two times a day  potassium chloride  10 mEq/100 mL IVPB 10 milliEquivalent(s) IV Intermittent every 1 hour  sodium chloride 0.9%. 1000 milliLiter(s) (75 mL/Hr) IV Continuous <Continuous>    MEDICATIONS  (PRN):  dextrose Oral Gel 15 Gram(s) Oral once PRN Blood Glucose LESS THAN 70 milliGRAM(s)/deciliter      CAPILLARY BLOOD GLUCOSE      POCT Blood Glucose.: 124 mg/dL (17 Jan 2023 22:13)  POCT Blood Glucose.: 241 mg/dL (17 Jan 2023 17:58)  POCT Blood Glucose.: 131 mg/dL (17 Jan 2023 13:02)  POCT Blood Glucose.: 124 mg/dL (17 Jan 2023 09:29)    I&O's Summary      PHYSICAL EXAM:  Vital Signs Last 24 Hrs  T(C): 37 (18 Jan 2023 05:44), Max: 37 (18 Jan 2023 05:44)  T(F): 98.6 (18 Jan 2023 05:44), Max: 98.6 (18 Jan 2023 05:44)  HR: 66 (18 Jan 2023 05:44) (62 - 66)  BP: 137/76 (18 Jan 2023 05:44) (122/75 - 137/76)  BP(mean): --  RR: 18 (18 Jan 2023 05:44) (17 - 18)  SpO2: 96% (18 Jan 2023 05:44) (96% - 99%)    Parameters below as of 18 Jan 2023 05:44  Patient On (Oxygen Delivery Method): room air        GENERAL: NAD, lying comfortably in bed  HEAD: Atraumatic, normocephalic  EYES: EOMI b/l, conjunctiva and sclera clear  NECK: Supple   RESPIRATORY: Normal respiratory effort; lungs are clear to auscultation bilaterally anteriorly   CARDIOVASCULAR: Regular rate and rhythm, normal S1 and S2, no murmur/rub/gallop; No lower extremity edema  ABDOMEN: Nontender, normoactive bowel sounds, no rebound/guarding  MUSCULOSKELETAL: no joint swelling or tenderness to palpation. ambulating   NEURO: Non focal   SKIN: no rashes noted   PSYCH: A+O to person, place, and time; affect appropriate        LABS:                          9.3    8.90  )-----------( 418      ( 18 Jan 2023 07:26 )             29.7     01-18    132<L>  |  102  |  7   ----------------------------<  143<H>  3.1<L>   |  23  |  0.78    Ca    8.5      18 Jan 2023 07:26  Phos  3.0     01-18  Mg     1.80     01-18                  Culture - Blood (collected 16 Jan 2023 11:15)  Source: .Blood Blood-Peripheral  Preliminary Report (17 Jan 2023 17:01):    No growth to date.    Culture - Blood (collected 16 Jan 2023 11:05)  Source: .Blood Blood-Peripheral  Preliminary Report (17 Jan 2023 17:01):    No growth to date.    Culture - Urine (collected 15 Kishore 2023 11:25)  Source: Clean Catch Clean Catch (Midstream)  Final Report (16 Jan 2023 12:05):    No growth          RADIOLOGY:    Consulted note reviewed  Reviewed Imaging personally

## 2023-01-18 NOTE — PROGRESS NOTE ADULT - SUBJECTIVE AND OBJECTIVE BOX
Date of Service: 01-18-23 @ 17:24    Patient is a 72y old  Male who presents with a chief complaint of bloody diarrhea (18 Jan 2023 09:10)      Any change in ROS:  he seems OK:  no sob:      MEDICATIONS  (STANDING):  albuterol/ipratropium for Nebulization 3 milliLiter(s) Nebulizer every 6 hours  artificial tears (preservative free) Ophthalmic Solution 1 Drop(s) Both EYES two times a day  budesonide 160 MICROgram(s)/formoterol 4.5 MICROgram(s) Inhaler 2 Puff(s) Inhalation two times a day  dextrose 5%. 1000 milliLiter(s) (100 mL/Hr) IV Continuous <Continuous>  dextrose 5%. 1000 milliLiter(s) (50 mL/Hr) IV Continuous <Continuous>  dextrose 50% Injectable 25 Gram(s) IV Push once  dextrose 50% Injectable 12.5 Gram(s) IV Push once  dextrose 50% Injectable 25 Gram(s) IV Push once  enoxaparin Injectable 40 milliGRAM(s) SubCutaneous every 24 hours  fluticasone propionate 50 MICROgram(s)/spray Nasal Spray 1 Spray(s) Both Nostrils two times a day  glucagon  Injectable 1 milliGRAM(s) IntraMuscular once  glycopyrrolate 9 MICROgram(s)/formoterol 4.8 MICROgram(s) Inhaler 2 Puff(s) Inhalation two times a day  guaiFENesin  milliGRAM(s) Oral <User Schedule>  hydrocortisone sodium succinate Injectable 100 milliGRAM(s) IV Push three times a day  insulin lispro (ADMELOG) corrective regimen sliding scale   SubCutaneous three times a day before meals  insulin lispro (ADMELOG) corrective regimen sliding scale   SubCutaneous at bedtime  mesalamine DR Capsule 800 milliGRAM(s) Oral three times a day  montelukast 10 milliGRAM(s) Oral daily  pancrelipase  (CREON 36,000 Lipase Units) 2 Capsule(s) Oral three times a day with meals  pantoprazole  Injectable 40 milliGRAM(s) IV Push two times a day  sodium chloride 0.9%. 1000 milliLiter(s) (75 mL/Hr) IV Continuous <Continuous>    MEDICATIONS  (PRN):  acetaminophen     Tablet .. 650 milliGRAM(s) Oral every 6 hours PRN Mild Pain (1 - 3), Moderate Pain (4 - 6)  dextrose Oral Gel 15 Gram(s) Oral once PRN Blood Glucose LESS THAN 70 milliGRAM(s)/deciliter    Vital Signs Last 24 Hrs  T(C): 36.6 (18 Jan 2023 15:15), Max: 37 (18 Jan 2023 05:44)  T(F): 97.9 (18 Jan 2023 15:15), Max: 98.6 (18 Jan 2023 05:44)  HR: 55 (18 Jan 2023 15:15) (55 - 66)  BP: 141/73 (18 Jan 2023 15:15) (122/75 - 141/73)  BP(mean): --  RR: 18 (18 Jan 2023 15:15) (17 - 18)  SpO2: 100% (18 Jan 2023 15:15) (96% - 100%)    Parameters below as of 18 Jan 2023 15:15  Patient On (Oxygen Delivery Method): room air        I&O's Summary        Physical Exam:   GENERAL: NAD, well-groomed, well-developed  HEENT: ALYSSA/   Atraumatic, Normocephalic  ENMT: No tonsillar erythema, exudates, or enlargement; Moist mucous membranes, Good dentition, No lesions  NECK: Supple, No JVD, Normal thyroid  CHEST/LUNG: Clear to auscultaion  CVS: Regular rate and rhythm; No murmurs, rubs, or gallops  GI: : Soft, Nontender, Nondistended; Bowel sounds present  NERVOUS SYSTEM:  Alert & Oriented X3  EXTREMITIES:  2+ Peripheral Pulses, No clubbing, cyanosis, or edema  LYMPH: No lymphadenopathy noted  SKIN: No rashes or lesions  ENDOCRINOLOGY: No Thyromegaly  PSYCH: Appropriate    Labs:  25, 22, 26                            9.3    8.90  )-----------( 418      ( 18 Jan 2023 07:26 )             29.7                         9.2    7.34  )-----------( 440      ( 17 Jan 2023 04:43 )             29.8                         9.9    6.64  )-----------( 467      ( 16 Jan 2023 06:59 )             32.0                         10.1   8.69  )-----------( 451      ( 15 Kishore 2023 17:42 )             33.0                         8.5    8.91  )-----------( 371      ( 15 Kishore 2023 07:50 )             27.6     01-18    132<L>  |  102  |  7   ----------------------------<  143<H>  3.1<L>   |  23  |  0.78  01-17    133<L>  |  100  |  8   ----------------------------<  154<H>  3.0<L>   |  22  |  0.88  01-16    132<L>  |  96<L>  |  8   ----------------------------<  126<H>  3.9   |  21<L>  |  0.89  01-15    135  |  100  |  8   ----------------------------<  106<H>  5.2   |  25  |  0.85    Ca    8.5      18 Jan 2023 07:26  Ca    8.4      17 Jan 2023 04:43  Phos  3.0     01-18  Phos  3.7     01-17  Mg     1.80     01-18  Mg     1.80     01-17    TPro  6.6  /  Alb  3.0<L>  /  TBili  <0.2  /  DBili  x   /  AST  15  /  ALT  5   /  AlkPhos  66  01-15    CAPILLARY BLOOD GLUCOSE      POCT Blood Glucose.: 194 mg/dL (18 Jan 2023 12:54)  POCT Blood Glucose.: 134 mg/dL (18 Jan 2023 08:56)  POCT Blood Glucose.: 124 mg/dL (17 Jan 2023 22:13)  POCT Blood Glucose.: 241 mg/dL (17 Jan 2023 17:58)            Procalcitonin, Serum: 0.07 ng/mL (01-16 @ 06:59)  Lactate, Blood: 2.2 mmol/L (01-15 @ 07:50)  Lactate, Blood: 4.1 mmol/L (01-15 @ 02:50)        RECENT CULTURES:  01-16 @ 11:15 .Blood Blood-Peripheral                No growth to date.    01-16 @ 11:05 .Blood Blood-Peripheral     rad< from: Xray Chest 1 View- PORTABLE-Urgent (Xray Chest 1 View- PORTABLE-Urgent .) (01.16.23 @ 12:13) >    ACC: [id number] EXAM:  XR CHEST PORTABLE URGENT 1V                          PROCEDURE DATE:  01/16/2023          INTERPRETATION:  CLINICAL INFORMATION: Dyspnea; COPD    TIME OF EXAMINATION: January 16 at 12:06 PM    EXAM: AP chest    FINDINGS:  The lungs are free of focal consolidation to suggest pneumonia. The heart   is not enlarged and there is no effusion. Mild hyperinflation.        COMPARISON: May 11, 2011        IMPRESSION: No localized pulmonary disease.    --- End of Report ---            MARLINE CRUZ MD; Attending Radiologist  This document has been electronically signed. Jan 16 2023  5:25PM    < end of copied text >             No growth to date.    01-15 @ 16:50 .Stool Feces                No Protozoa seen by trichrome stain  No Helminths or Protozoa seen in formalin concentrate  performed by iodine stain  (routine O+P not evaluated for Microsporidia,  Cryptosporidia or Cyclospora)  One negative sample does not necessarily rule  out the presence of a parasitic infection.  Few White blood cells    01-15 @ 11:25 Clean Catch Clean Catch (Midstream)                No growth          RESPIRATORY CULTURES:      C Diff by PCR Result: NotDetec (01-15 @ 11:45)      Studies  Chest X-RAY  CT SCAN Chest   Venous Dopplers: LE:   CT Abdomen  Others               Stable

## 2023-01-18 NOTE — PROGRESS NOTE ADULT - ASSESSMENT
conservative gi magnemtn  pt imroving, still with some diarrhea but "much better  would prefeer  one more day of iv steroids, but considering d/c today  pt's change in biolotic will be decided by primary gi

## 2023-01-18 NOTE — PROGRESS NOTE ADULT - PROBLEM SELECTOR PLAN 6
Diet: Regular  DVT: Lovenox subq  Dispo: home Diet: Regular  DVT: Lovenox subq  Dispo: home  acetaminophen PRN for headache

## 2023-01-18 NOTE — PROGRESS NOTE ADULT - PROBLEM SELECTOR PLAN 1
- infectious vs UC flare vs diverticulitis  - UC Diagnosed 1.5 yrs ago, GI: Dr. Mushtaq Wang  - colonoscopy in Dec 22 showing mucus coating entire colon, diverticulosis, ulcerative pancolitis  - previously on humira, entyvio, prednisone, mesalamine. Planned to switch to medication soon per patient  - baseline 2-3 BMs/day, sometimes watery; now becoming more formed  - GI PCR and C. diff negative. Stool culture pending   - mesalamine 800 tid  - hydrocortisone IV   - c/w metronidazole to complete 5 day course  - d/c ciprofloxacin   - GI consulted, followed by Dr. Adler; d/c planning Thursday after 72 hours of IV steroids to prevent recurrent flare. - infectious vs UC flare vs diverticulitis  - UC Diagnosed 1.5 yrs ago, GI: Dr. Mushtaq Wang  - colonoscopy in Dec 22 showing mucus coating entire colon, diverticulosis, ulcerative pancolitis  - previously on humira, entyvio, prednisone, mesalamine. Planned to switch to medication soon per patient  - baseline 2-3 BMs/day, sometimes watery; now becoming more formed  - GI PCR and C. diff negative. Stool culture pending   - mesalamine 800 tid  - c/w hydrocortisone IV until tomorrow   - s/p metronidazole and ciprofloxacin   - GI consulted, followed by Dr. Adler; d/c planning Thursday after 72 hours of IV steroids to prevent recurrent flare.

## 2023-01-18 NOTE — PROGRESS NOTE ADULT - PROBLEM SELECTOR PLAN 2
- Pt reports black stool, unusual to be from rectosigmoid bleed  - stable Hb 9.2 today   - Pantoprazole 40 IV bid  - GI consulted - Pt reports black stool, unusual to be from rectosigmoid bleed  - stable Hb   - Pantoprazole 40 IV bid  - GI consulted

## 2023-01-18 NOTE — PROGRESS NOTE ADULT - SUBJECTIVE AND OBJECTIVE BOX
Patient is a 72y Male     Patient is a 72y old  Male who presents with a chief complaint of bloody diarrhea (18 Jan 2023 07:12)      HPI:  Pt is a 72 year old man with ulcerative colitis, htn, DM c/b gastroparesis, COPD who presents with 5 days of "black stool." He describes it as black and watery with surrounding bright red blood. He was diagnosed with UC 1.5 years ago and has been treated with mesalamine, prednisone, humira, and entyvio. At baseline, he has 2-3 bowel movements per day, sometimes watery, He has been eating less due to decreased appetite and abdominal cramping from the diarrhea. There has been some mild shortness of breath as well. He went to his GI Dr Felipe last week who was waiting for approval to switch him from entyvio to a new medication. Colonoscopy from Dec 2022 showed pancolonic mucus, UC and diverticulosis. He denies any other symptoms including CP, HA, fevers, dysuria, vomiting.    ED Course: afebrile, VSS, CTAP showing proctocolitis and likely bronchiolitis and developing bronchopneumonia  Received prednisone, solumedrol, azithromycin, zosyn, 2L LR (14 Jan 2023 17:22)      PAST MEDICAL & SURGICAL HISTORY:  Gastroparesis  with pylorus dysfunction      DM (diabetes mellitus)  type II      Chronic obstructive pulmonary disease      Hypertension      Ulcerative colitis      Inguinal hernia      Arthropathy  left knee replacement 2010      History of repair of hiatal hernia  childhood      S/P endoscopy  POEM 10/2021, 11/2021          MEDICATIONS  (STANDING):  albuterol/ipratropium for Nebulization 3 milliLiter(s) Nebulizer every 6 hours  artificial tears (preservative free) Ophthalmic Solution 1 Drop(s) Both EYES two times a day  buDESOnide    EC Capsule 3 milliGRAM(s) Oral daily  budesonide 160 MICROgram(s)/formoterol 4.5 MICROgram(s) Inhaler 2 Puff(s) Inhalation two times a day  dextrose 5%. 1000 milliLiter(s) (100 mL/Hr) IV Continuous <Continuous>  dextrose 5%. 1000 milliLiter(s) (50 mL/Hr) IV Continuous <Continuous>  dextrose 50% Injectable 25 Gram(s) IV Push once  dextrose 50% Injectable 12.5 Gram(s) IV Push once  dextrose 50% Injectable 25 Gram(s) IV Push once  enoxaparin Injectable 40 milliGRAM(s) SubCutaneous every 24 hours  fluticasone propionate 50 MICROgram(s)/spray Nasal Spray 1 Spray(s) Both Nostrils two times a day  glucagon  Injectable 1 milliGRAM(s) IntraMuscular once  glycopyrrolate 9 MICROgram(s)/formoterol 4.8 MICROgram(s) Inhaler 2 Puff(s) Inhalation two times a day  guaiFENesin  milliGRAM(s) Oral <User Schedule>  hydrocortisone sodium succinate Injectable 100 milliGRAM(s) IV Push three times a day  insulin lispro (ADMELOG) corrective regimen sliding scale   SubCutaneous three times a day before meals  insulin lispro (ADMELOG) corrective regimen sliding scale   SubCutaneous at bedtime  mesalamine DR Capsule 800 milliGRAM(s) Oral three times a day  montelukast 10 milliGRAM(s) Oral daily  pancrelipase  (CREON 36,000 Lipase Units) 2 Capsule(s) Oral three times a day with meals  pantoprazole  Injectable 40 milliGRAM(s) IV Push two times a day  sodium chloride 0.9%. 1000 milliLiter(s) (75 mL/Hr) IV Continuous <Continuous>      Allergies    angiotensin converting enzyme inhibitors (Other; Swelling)    Intolerances        SOCIAL HISTORY:  Denies ETOh,Smoking,     FAMILY HISTORY:  FH: diabetes mellitus (Mother)        REVIEW OF SYSTEMS:    CONSTITUTIONAL: No weakness, fevers or chills  EYES/ENT: No visual changes;  No vertigo or throat pain   NECK: No pain or stiffness  RESPIRATORY: No cough, wheezing, hemoptysis; No shortness of breath  CARDIOVASCULAR: No chest pain or palpitations  GASTROINTESTINAL: No abdominal or epigastric pain. No nausea, vomiting, or hematemesis; No diarrhea or constipation. No melena or hematochezia.  GENITOURINARY: No dysuria, frequency or hematuria  NEUROLOGICAL: No numbness or weakness  SKIN: No itching, burning, rashes, or lesions   All other review of systems is negative unless indicated above.    VITAL:  T(C): , Max: 37 (01-18-23 @ 05:44)  T(F): , Max: 98.6 (01-18-23 @ 05:44)  HR: 66 (01-18-23 @ 05:44)  BP: 137/76 (01-18-23 @ 05:44)  BP(mean): --  RR: 18 (01-18-23 @ 05:44)  SpO2: 96% (01-18-23 @ 05:44)  Wt(kg): --    I and O's:        PHYSICAL EXAM:    Constitutional: NAD  HEENT: PERRLA,   Neck: No JVD  Respiratory: CTA B/L  Cardiovascular: S1 and S2  Gastrointestinal: BS+, soft, NT/ND  Extremities: No peripheral edema  Neurological: A/O x 3, no focal deficits  Psychiatric: Normal mood, normal affect  : No Pete  Skin: No rashes  Access: Not applicable  Back: No CVA tenderness    LABS:                        9.3    8.90  )-----------( 418      ( 18 Jan 2023 07:26 )             29.7     01-18    132<L>  |  102  |  7   ----------------------------<  143<H>  3.1<L>   |  23  |  0.78    Ca    8.5      18 Jan 2023 07:26  Phos  3.0     01-18  Mg     1.80     01-18            RADIOLOGY & ADDITIONAL STUDIES:

## 2023-01-19 DIAGNOSIS — R10.9 UNSPECIFIED ABDOMINAL PAIN: ICD-10-CM

## 2023-01-19 LAB
ANION GAP SERPL CALC-SCNC: 10 MMOL/L — SIGNIFICANT CHANGE UP (ref 7–14)
BASOPHILS # BLD AUTO: 0.01 K/UL — SIGNIFICANT CHANGE UP (ref 0–0.2)
BASOPHILS NFR BLD AUTO: 0.1 % — SIGNIFICANT CHANGE UP (ref 0–2)
BUN SERPL-MCNC: 9 MG/DL — SIGNIFICANT CHANGE UP (ref 7–23)
CALCIUM SERPL-MCNC: 8.7 MG/DL — SIGNIFICANT CHANGE UP (ref 8.4–10.5)
CALPROTECTIN STL-MCNT: 1057 UG/G — HIGH (ref 0–120)
CHLORIDE SERPL-SCNC: 100 MMOL/L — SIGNIFICANT CHANGE UP (ref 98–107)
CO2 SERPL-SCNC: 23 MMOL/L — SIGNIFICANT CHANGE UP (ref 22–31)
CREAT SERPL-MCNC: 0.87 MG/DL — SIGNIFICANT CHANGE UP (ref 0.5–1.3)
EGFR: 92 ML/MIN/1.73M2 — SIGNIFICANT CHANGE UP
EOSINOPHIL # BLD AUTO: 0.01 K/UL — SIGNIFICANT CHANGE UP (ref 0–0.5)
EOSINOPHIL NFR BLD AUTO: 0.1 % — SIGNIFICANT CHANGE UP (ref 0–6)
GAMMA INTERFERON BACKGROUND BLD IA-ACNC: 0.01 IU/ML — SIGNIFICANT CHANGE UP
GLUCOSE BLDC GLUCOMTR-MCNC: 122 MG/DL — HIGH (ref 70–99)
GLUCOSE BLDC GLUCOMTR-MCNC: 124 MG/DL — HIGH (ref 70–99)
GLUCOSE BLDC GLUCOMTR-MCNC: 138 MG/DL — HIGH (ref 70–99)
GLUCOSE BLDC GLUCOMTR-MCNC: 183 MG/DL — HIGH (ref 70–99)
GLUCOSE SERPL-MCNC: 133 MG/DL — HIGH (ref 70–99)
HCT VFR BLD CALC: 31.7 % — LOW (ref 39–50)
HGB BLD-MCNC: 9.8 G/DL — LOW (ref 13–17)
IANC: 11.17 K/UL — HIGH (ref 1.8–7.4)
IMM GRANULOCYTES NFR BLD AUTO: 1.8 % — HIGH (ref 0–0.9)
LYMPHOCYTES # BLD AUTO: 1.06 K/UL — SIGNIFICANT CHANGE UP (ref 1–3.3)
LYMPHOCYTES # BLD AUTO: 7.6 % — LOW (ref 13–44)
M TB IFN-G BLD-IMP: NEGATIVE — SIGNIFICANT CHANGE UP
M TB IFN-G CD4+ BCKGRND COR BLD-ACNC: 0 IU/ML — SIGNIFICANT CHANGE UP
M TB IFN-G CD4+CD8+ BCKGRND COR BLD-ACNC: 0 IU/ML — SIGNIFICANT CHANGE UP
MAGNESIUM SERPL-MCNC: 2.2 MG/DL — SIGNIFICANT CHANGE UP (ref 1.6–2.6)
MCHC RBC-ENTMCNC: 24.7 PG — LOW (ref 27–34)
MCHC RBC-ENTMCNC: 30.9 GM/DL — LOW (ref 32–36)
MCV RBC AUTO: 80.1 FL — SIGNIFICANT CHANGE UP (ref 80–100)
MONOCYTES # BLD AUTO: 1.39 K/UL — HIGH (ref 0–0.9)
MONOCYTES NFR BLD AUTO: 10 % — SIGNIFICANT CHANGE UP (ref 2–14)
NEUTROPHILS # BLD AUTO: 11.17 K/UL — HIGH (ref 1.8–7.4)
NEUTROPHILS NFR BLD AUTO: 80.4 % — HIGH (ref 43–77)
NRBC # BLD: 0 /100 WBCS — SIGNIFICANT CHANGE UP (ref 0–0)
NRBC # FLD: 0 K/UL — SIGNIFICANT CHANGE UP (ref 0–0)
PHOSPHATE SERPL-MCNC: 2.3 MG/DL — LOW (ref 2.5–4.5)
PLATELET # BLD AUTO: 458 K/UL — HIGH (ref 150–400)
POTASSIUM SERPL-MCNC: 3.3 MMOL/L — LOW (ref 3.5–5.3)
POTASSIUM SERPL-SCNC: 3.3 MMOL/L — LOW (ref 3.5–5.3)
QUANT TB PLUS MITOGEN MINUS NIL: 2.46 IU/ML — SIGNIFICANT CHANGE UP
RBC # BLD: 3.96 M/UL — LOW (ref 4.2–5.8)
RBC # FLD: 13.7 % — SIGNIFICANT CHANGE UP (ref 10.3–14.5)
SODIUM SERPL-SCNC: 133 MMOL/L — LOW (ref 135–145)
WBC # BLD: 13.89 K/UL — HIGH (ref 3.8–10.5)
WBC # FLD AUTO: 13.89 K/UL — HIGH (ref 3.8–10.5)

## 2023-01-19 RX ORDER — POTASSIUM CHLORIDE 20 MEQ
20 PACKET (EA) ORAL ONCE
Refills: 0 | Status: COMPLETED | OUTPATIENT
Start: 2023-01-19 | End: 2023-01-19

## 2023-01-19 RX ORDER — SIMETHICONE 80 MG/1
80 TABLET, CHEWABLE ORAL DAILY
Refills: 0 | Status: DISCONTINUED | OUTPATIENT
Start: 2023-01-19 | End: 2023-01-20

## 2023-01-19 RX ADMIN — Medication 2 CAPSULE(S): at 13:16

## 2023-01-19 RX ADMIN — Medication 3 MILLILITER(S): at 10:25

## 2023-01-19 RX ADMIN — MONTELUKAST 10 MILLIGRAM(S): 4 TABLET, CHEWABLE ORAL at 11:51

## 2023-01-19 RX ADMIN — Medication 800 MILLIGRAM(S): at 21:58

## 2023-01-19 RX ADMIN — Medication 2 CAPSULE(S): at 18:54

## 2023-01-19 RX ADMIN — BUDESONIDE AND FORMOTEROL FUMARATE DIHYDRATE 2 PUFF(S): 160; 4.5 AEROSOL RESPIRATORY (INHALATION) at 10:09

## 2023-01-19 RX ADMIN — PANTOPRAZOLE SODIUM 40 MILLIGRAM(S): 20 TABLET, DELAYED RELEASE ORAL at 06:09

## 2023-01-19 RX ADMIN — BUDESONIDE AND FORMOTEROL FUMARATE DIHYDRATE 2 PUFF(S): 160; 4.5 AEROSOL RESPIRATORY (INHALATION) at 21:58

## 2023-01-19 RX ADMIN — SIMETHICONE 80 MILLIGRAM(S): 80 TABLET, CHEWABLE ORAL at 10:10

## 2023-01-19 RX ADMIN — Medication 2 CAPSULE(S): at 10:09

## 2023-01-19 RX ADMIN — GLYCOPYRROLATE AND FORMOTEROL FUMARATE 2 PUFF(S): 9; 4.8 AEROSOL, METERED RESPIRATORY (INHALATION) at 21:58

## 2023-01-19 RX ADMIN — Medication 600 MILLIGRAM(S): at 06:09

## 2023-01-19 RX ADMIN — Medication 800 MILLIGRAM(S): at 13:30

## 2023-01-19 RX ADMIN — Medication 100 MILLIGRAM(S): at 13:17

## 2023-01-19 RX ADMIN — Medication 1 DROP(S): at 18:54

## 2023-01-19 RX ADMIN — Medication 3 MILLILITER(S): at 16:09

## 2023-01-19 RX ADMIN — Medication 1 SPRAY(S): at 06:08

## 2023-01-19 RX ADMIN — Medication 1 SPRAY(S): at 18:54

## 2023-01-19 RX ADMIN — Medication 800 MILLIGRAM(S): at 06:08

## 2023-01-19 RX ADMIN — GLYCOPYRROLATE AND FORMOTEROL FUMARATE 2 PUFF(S): 9; 4.8 AEROSOL, METERED RESPIRATORY (INHALATION) at 10:09

## 2023-01-19 RX ADMIN — Medication 1: at 13:15

## 2023-01-19 RX ADMIN — Medication 100 MILLIGRAM(S): at 06:08

## 2023-01-19 RX ADMIN — Medication 20 MILLIEQUIVALENT(S): at 10:12

## 2023-01-19 RX ADMIN — PANTOPRAZOLE SODIUM 40 MILLIGRAM(S): 20 TABLET, DELAYED RELEASE ORAL at 18:55

## 2023-01-19 RX ADMIN — Medication 1 DROP(S): at 06:09

## 2023-01-19 RX ADMIN — Medication 100 MILLIGRAM(S): at 21:59

## 2023-01-19 RX ADMIN — ENOXAPARIN SODIUM 40 MILLIGRAM(S): 100 INJECTION SUBCUTANEOUS at 18:55

## 2023-01-19 NOTE — PROGRESS NOTE ADULT - SUBJECTIVE AND OBJECTIVE BOX
PROGRESS NOTE:   Authored by Dr. Charles Ferrari    Patient is a 72y old  Male who presents with a chief complaint of bloody diarrhea (18 Jan 2023 17:24)      SUBJECTIVE / OVERNIGHT EVENTS:    ADDITIONAL REVIEW OF SYSTEMS:    MEDICATIONS  (STANDING):  albuterol/ipratropium for Nebulization 3 milliLiter(s) Nebulizer every 6 hours  artificial tears (preservative free) Ophthalmic Solution 1 Drop(s) Both EYES two times a day  budesonide 160 MICROgram(s)/formoterol 4.5 MICROgram(s) Inhaler 2 Puff(s) Inhalation two times a day  dextrose 5%. 1000 milliLiter(s) (100 mL/Hr) IV Continuous <Continuous>  dextrose 5%. 1000 milliLiter(s) (50 mL/Hr) IV Continuous <Continuous>  dextrose 50% Injectable 25 Gram(s) IV Push once  dextrose 50% Injectable 12.5 Gram(s) IV Push once  dextrose 50% Injectable 25 Gram(s) IV Push once  enoxaparin Injectable 40 milliGRAM(s) SubCutaneous every 24 hours  fluticasone propionate 50 MICROgram(s)/spray Nasal Spray 1 Spray(s) Both Nostrils two times a day  glucagon  Injectable 1 milliGRAM(s) IntraMuscular once  glycopyrrolate 9 MICROgram(s)/formoterol 4.8 MICROgram(s) Inhaler 2 Puff(s) Inhalation two times a day  guaiFENesin  milliGRAM(s) Oral <User Schedule>  hydrocortisone sodium succinate Injectable 100 milliGRAM(s) IV Push three times a day  insulin lispro (ADMELOG) corrective regimen sliding scale   SubCutaneous three times a day before meals  insulin lispro (ADMELOG) corrective regimen sliding scale   SubCutaneous at bedtime  mesalamine DR Capsule 800 milliGRAM(s) Oral three times a day  montelukast 10 milliGRAM(s) Oral daily  pancrelipase  (CREON 36,000 Lipase Units) 2 Capsule(s) Oral three times a day with meals  pantoprazole  Injectable 40 milliGRAM(s) IV Push two times a day  potassium chloride    Tablet ER 20 milliEquivalent(s) Oral once  sodium chloride 0.9%. 1000 milliLiter(s) (75 mL/Hr) IV Continuous <Continuous>    MEDICATIONS  (PRN):  acetaminophen     Tablet .. 650 milliGRAM(s) Oral every 6 hours PRN Mild Pain (1 - 3), Moderate Pain (4 - 6)  dextrose Oral Gel 15 Gram(s) Oral once PRN Blood Glucose LESS THAN 70 milliGRAM(s)/deciliter      CAPILLARY BLOOD GLUCOSE      POCT Blood Glucose.: 173 mg/dL (18 Jan 2023 22:31)  POCT Blood Glucose.: 198 mg/dL (18 Jan 2023 18:29)  POCT Blood Glucose.: 194 mg/dL (18 Jan 2023 12:54)  POCT Blood Glucose.: 134 mg/dL (18 Jan 2023 08:56)    I&O's Summary      PHYSICAL EXAM:  Vital Signs Last 24 Hrs  T(C): 36.5 (19 Jan 2023 05:35), Max: 36.6 (18 Jan 2023 15:15)  T(F): 97.7 (19 Jan 2023 05:35), Max: 97.9 (18 Jan 2023 15:15)  HR: 84 (19 Jan 2023 05:35) (55 - 84)  BP: 151/72 (19 Jan 2023 05:35) (139/60 - 151/72)  BP(mean): --  RR: 17 (19 Jan 2023 05:35) (14 - 18)  SpO2: 98% (19 Jan 2023 05:35) (95% - 100%)    Parameters below as of 19 Jan 2023 05:35  Patient On (Oxygen Delivery Method): room air        GENERAL: NAD, lying comfortably in bed  HEAD: Atraumatic, normocephalic  EYES: EOMI b/l, conjunctiva and sclera clear  NECK: Supple, No JVD, No LAD  RESPIRATORY: Normal respiratory effort; lungs are clear to auscultation bilaterally  CARDIOVASCULAR: Regular rate and rhythm, normal S1 and S2, no murmur/rub/gallop; No lower extremity edema  ABDOMEN: Nontender, normoactive bowel sounds, no rebound/guarding; No hepatosplenomegaly  MUSCULOSKELETAL: no clubbing or cyanosis of digits; no joint swelling or tenderness to palpation  NEURO: Non focal   SKIN:   PSYCH: A+O to person, place, and time; affect appropriate    LABS:                          9.8    13.89 )-----------( 458      ( 19 Jan 2023 05:40 )             31.7     01-19    133<L>  |  100  |  9   ----------------------------<  133<H>  3.3<L>   |  23  |  0.87    Ca    8.7      19 Jan 2023 05:40  Phos  2.3     01-19  Mg     2.20     01-19                Culture - Blood (collected 16 Jan 2023 11:15)  Source: .Blood Blood-Peripheral  Preliminary Report (17 Jan 2023 17:01):    No growth to date.    Culture - Blood (collected 16 Jan 2023 11:05)  Source: .Blood Blood-Peripheral  Preliminary Report (17 Jan 2023 17:01):    No growth to date.          RADIOLOGY:    Consulted note reviewed  Reviewed Imaging personally   PROGRESS NOTE:   Authored by Dr. Charles Ferrari    Patient is a 72y old  Male who presents with a chief complaint of bloody diarrhea (18 Jan 2023 17:24)      SUBJECTIVE / OVERNIGHT EVENTS: No events overnight. However, patient with new diffuse, and dull abdominal pain since the morning. He feels as if he needs to use the bathroom then passes gas. Denies N/V/D. Stool now soft, not watery. No melena or hematochezia.      ADDITIONAL REVIEW OF SYSTEMS:      MEDICATIONS  (STANDING):  albuterol/ipratropium for Nebulization 3 milliLiter(s) Nebulizer every 6 hours  artificial tears (preservative free) Ophthalmic Solution 1 Drop(s) Both EYES two times a day  budesonide 160 MICROgram(s)/formoterol 4.5 MICROgram(s) Inhaler 2 Puff(s) Inhalation two times a day  dextrose 5%. 1000 milliLiter(s) (100 mL/Hr) IV Continuous <Continuous>  dextrose 5%. 1000 milliLiter(s) (50 mL/Hr) IV Continuous <Continuous>  dextrose 50% Injectable 25 Gram(s) IV Push once  dextrose 50% Injectable 12.5 Gram(s) IV Push once  dextrose 50% Injectable 25 Gram(s) IV Push once  enoxaparin Injectable 40 milliGRAM(s) SubCutaneous every 24 hours  fluticasone propionate 50 MICROgram(s)/spray Nasal Spray 1 Spray(s) Both Nostrils two times a day  glucagon  Injectable 1 milliGRAM(s) IntraMuscular once  glycopyrrolate 9 MICROgram(s)/formoterol 4.8 MICROgram(s) Inhaler 2 Puff(s) Inhalation two times a day  guaiFENesin  milliGRAM(s) Oral <User Schedule>  hydrocortisone sodium succinate Injectable 100 milliGRAM(s) IV Push three times a day  insulin lispro (ADMELOG) corrective regimen sliding scale   SubCutaneous three times a day before meals  insulin lispro (ADMELOG) corrective regimen sliding scale   SubCutaneous at bedtime  mesalamine DR Capsule 800 milliGRAM(s) Oral three times a day  montelukast 10 milliGRAM(s) Oral daily  pancrelipase  (CREON 36,000 Lipase Units) 2 Capsule(s) Oral three times a day with meals  pantoprazole  Injectable 40 milliGRAM(s) IV Push two times a day  potassium chloride    Tablet ER 20 milliEquivalent(s) Oral once  sodium chloride 0.9%. 1000 milliLiter(s) (75 mL/Hr) IV Continuous <Continuous>    MEDICATIONS  (PRN):  acetaminophen     Tablet .. 650 milliGRAM(s) Oral every 6 hours PRN Mild Pain (1 - 3), Moderate Pain (4 - 6)  dextrose Oral Gel 15 Gram(s) Oral once PRN Blood Glucose LESS THAN 70 milliGRAM(s)/deciliter      CAPILLARY BLOOD GLUCOSE      POCT Blood Glucose.: 173 mg/dL (18 Jan 2023 22:31)  POCT Blood Glucose.: 198 mg/dL (18 Jan 2023 18:29)  POCT Blood Glucose.: 194 mg/dL (18 Jan 2023 12:54)  POCT Blood Glucose.: 134 mg/dL (18 Jan 2023 08:56)    I&O's Summary      PHYSICAL EXAM:  Vital Signs Last 24 Hrs  T(C): 36.5 (19 Jan 2023 05:35), Max: 36.6 (18 Jan 2023 15:15)  T(F): 97.7 (19 Jan 2023 05:35), Max: 97.9 (18 Jan 2023 15:15)  HR: 84 (19 Jan 2023 05:35) (55 - 84)  BP: 151/72 (19 Jan 2023 05:35) (139/60 - 151/72)  BP(mean): --  RR: 17 (19 Jan 2023 05:35) (14 - 18)  SpO2: 98% (19 Jan 2023 05:35) (95% - 100%)    Parameters below as of 19 Jan 2023 05:35  Patient On (Oxygen Delivery Method): room air        GENERAL: NAD, lying comfortably in bed  HEAD: Atraumatic, normocephalic  EYES: EOMI b/l, conjunctiva and sclera clear  NECK: Supple, No JVD, No LAD  RESPIRATORY: Normal respiratory effort; lungs are clear to auscultation bilaterally  CARDIOVASCULAR: Regular rate and rhythm, normal S1 and S2, no murmur/rub/gallop; No lower extremity edema  ABDOMEN: Nontender, normoactive bowel sounds, no rebound/guarding; No hepatosplenomegaly  MUSCULOSKELETAL: no clubbing or cyanosis of digits; no joint swelling or tenderness to palpation  NEURO: Non focal   SKIN:   PSYCH: A+O to person, place, and time; affect appropriate    LABS:                          9.8    13.89 )-----------( 458      ( 19 Jan 2023 05:40 )             31.7     01-19    133<L>  |  100  |  9   ----------------------------<  133<H>  3.3<L>   |  23  |  0.87    Ca    8.7      19 Jan 2023 05:40  Phos  2.3     01-19  Mg     2.20     01-19                Culture - Blood (collected 16 Jan 2023 11:15)  Source: .Blood Blood-Peripheral  Preliminary Report (17 Jan 2023 17:01):    No growth to date.    Culture - Blood (collected 16 Jan 2023 11:05)  Source: .Blood Blood-Peripheral  Preliminary Report (17 Jan 2023 17:01):    No growth to date.          RADIOLOGY:    Consulted note reviewed  Reviewed Imaging personally   PROGRESS NOTE:   Authored by Dr. Charles Ferrari    Patient is a 72y old  Male who presents with a chief complaint of bloody diarrhea (18 Jan 2023 17:24)      SUBJECTIVE / OVERNIGHT EVENTS: No events overnight. However, patient with new diffuse, and dull abdominal pain since the morning. He feels as if he needs to use the bathroom then passes gas. Denies N/V/D. Stool now soft, not watery. No melena or hematochezia.      ADDITIONAL REVIEW OF SYSTEMS:  No fever  No sob  No N/V/D  No hematochezia, melena        MEDICATIONS  (STANDING):  albuterol/ipratropium for Nebulization 3 milliLiter(s) Nebulizer every 6 hours  artificial tears (preservative free) Ophthalmic Solution 1 Drop(s) Both EYES two times a day  budesonide 160 MICROgram(s)/formoterol 4.5 MICROgram(s) Inhaler 2 Puff(s) Inhalation two times a day  dextrose 5%. 1000 milliLiter(s) (100 mL/Hr) IV Continuous <Continuous>  dextrose 5%. 1000 milliLiter(s) (50 mL/Hr) IV Continuous <Continuous>  dextrose 50% Injectable 25 Gram(s) IV Push once  dextrose 50% Injectable 12.5 Gram(s) IV Push once  dextrose 50% Injectable 25 Gram(s) IV Push once  enoxaparin Injectable 40 milliGRAM(s) SubCutaneous every 24 hours  fluticasone propionate 50 MICROgram(s)/spray Nasal Spray 1 Spray(s) Both Nostrils two times a day  glucagon  Injectable 1 milliGRAM(s) IntraMuscular once  glycopyrrolate 9 MICROgram(s)/formoterol 4.8 MICROgram(s) Inhaler 2 Puff(s) Inhalation two times a day  guaiFENesin  milliGRAM(s) Oral <User Schedule>  hydrocortisone sodium succinate Injectable 100 milliGRAM(s) IV Push three times a day  insulin lispro (ADMELOG) corrective regimen sliding scale   SubCutaneous three times a day before meals  insulin lispro (ADMELOG) corrective regimen sliding scale   SubCutaneous at bedtime  mesalamine DR Capsule 800 milliGRAM(s) Oral three times a day  montelukast 10 milliGRAM(s) Oral daily  pancrelipase  (CREON 36,000 Lipase Units) 2 Capsule(s) Oral three times a day with meals  pantoprazole  Injectable 40 milliGRAM(s) IV Push two times a day  potassium chloride    Tablet ER 20 milliEquivalent(s) Oral once  sodium chloride 0.9%. 1000 milliLiter(s) (75 mL/Hr) IV Continuous <Continuous>    MEDICATIONS  (PRN):  acetaminophen     Tablet .. 650 milliGRAM(s) Oral every 6 hours PRN Mild Pain (1 - 3), Moderate Pain (4 - 6)  dextrose Oral Gel 15 Gram(s) Oral once PRN Blood Glucose LESS THAN 70 milliGRAM(s)/deciliter      CAPILLARY BLOOD GLUCOSE      POCT Blood Glucose.: 173 mg/dL (18 Jan 2023 22:31)  POCT Blood Glucose.: 198 mg/dL (18 Jan 2023 18:29)  POCT Blood Glucose.: 194 mg/dL (18 Jan 2023 12:54)  POCT Blood Glucose.: 134 mg/dL (18 Jan 2023 08:56)    I&O's Summary      PHYSICAL EXAM:  Vital Signs Last 24 Hrs  T(C): 36.5 (19 Jan 2023 05:35), Max: 36.6 (18 Jan 2023 15:15)  T(F): 97.7 (19 Jan 2023 05:35), Max: 97.9 (18 Jan 2023 15:15)  HR: 84 (19 Jan 2023 05:35) (55 - 84)  BP: 151/72 (19 Jan 2023 05:35) (139/60 - 151/72)  BP(mean): --  RR: 17 (19 Jan 2023 05:35) (14 - 18)  SpO2: 98% (19 Jan 2023 05:35) (95% - 100%)    Parameters below as of 19 Jan 2023 05:35  Patient On (Oxygen Delivery Method): room air        GENERAL: NAD, lying comfortably in bed  HEAD: Atraumatic, normocephalic  EYES: EOMI b/l, conjunctiva and sclera clear  NECK: Supple   RESPIRATORY: Normal respiratory effort; lungs are clear to auscultation bilaterally  CARDIOVASCULAR: Regular rate and rhythm, normal S1 and S2, no murmur/rub/gallop; No lower extremity edema  ABDOMEN: Nondistended, soft, nontender, normoactive bowel sounds; No hepatosplenomegaly  MUSCULOSKELETAL: no joint swelling or tenderness to palpation; ambulating   NEURO: Non focal   SKIN: no rashes noted   PSYCH: A+O to person, place, and time; affect appropriate        LABS:                          9.8    13.89 )-----------( 458      ( 19 Jan 2023 05:40 )             31.7     01-19    133<L>  |  100  |  9   ----------------------------<  133<H>  3.3<L>   |  23  |  0.87    Ca    8.7      19 Jan 2023 05:40  Phos  2.3     01-19  Mg     2.20     01-19                Culture - Blood (collected 16 Jan 2023 11:15)  Source: .Blood Blood-Peripheral  Preliminary Report (17 Jan 2023 17:01):    No growth to date.    Culture - Blood (collected 16 Jan 2023 11:05)  Source: .Blood Blood-Peripheral  Preliminary Report (17 Jan 2023 17:01):    No growth to date.          RADIOLOGY:    Consulted note reviewed  Reviewed Imaging personally

## 2023-01-19 NOTE — DIETITIAN INITIAL EVALUATION ADULT - OTHER INFO
Per chart, pt is 72 year old male PMH UC, HTN, type 2 DM complicated by gastroparesis, COPD presenting with bloody diarrhea x5 days. CT scan showing proctocolitis. Pulmonology and GI following.    Spoke with pt and wife at bedside. Pt confirms NKFA, denies difficulties chewing/swallowing. Pt lives at home, able to prepare meals independently. History of type 2 DM, HbA1c (1/15) 5.9%. Pt diagnosed with UC <2 years ago, follows with outpatient GI. Notable medications PTA inclusive of Jardiance 10 mg qD and CREON 36,000 units 2 capsules TID. Pt endorses  PO intake since diagnosis secondary to abdominal discomfort, frequent BMs and food intolerances. Pt with further decline in PO intake immediately PTA secondary to diarrhea. Pt generally consumes three meals daily; diet recall revealing high intake of plant based proteins, roti and biscuits. Pt has historically used Glucerna shakes, does not consume beef or seafood per personal preference. Pt finds whole grains and dairy to be a trigger.    Pt reports fair PO intake thus far in house, amenable to trial of nutrition shake and Greek yogurt. Diarrhea persists. Pt continues on IVF and steroids per GI. Fingersticks WDL. Labs notable for low K+/P, repleted.     Provided in depth high calorie/high protein UC nutrition education. Discussed foods recommended/foods to avoid, consumption of frequent/small meals, inclusion of high protein foods and alternative supplement options. Addressed all concerns as able.

## 2023-01-19 NOTE — PROGRESS NOTE ADULT - SUBJECTIVE AND OBJECTIVE BOX
Patient is a 72y Male     Patient is a 72y old  Male who presents with a chief complaint of bloody diarrhea (19 Jan 2023 08:23)      HPI:  Pt is a 72 year old man with ulcerative colitis, htn, DM c/b gastroparesis, COPD who presents with 5 days of "black stool." He describes it as black and watery with surrounding bright red blood. He was diagnosed with UC 1.5 years ago and has been treated with mesalamine, prednisone, humira, and entyvio. At baseline, he has 2-3 bowel movements per day, sometimes watery, He has been eating less due to decreased appetite and abdominal cramping from the diarrhea. There has been some mild shortness of breath as well. He went to his GI Dr Felipe last week who was waiting for approval to switch him from entyvio to a new medication. Colonoscopy from Dec 2022 showed pancolonic mucus, UC and diverticulosis. He denies any other symptoms including CP, HA, fevers, dysuria, vomiting.    ED Course: afebrile, VSS, CTAP showing proctocolitis and likely bronchiolitis and developing bronchopneumonia  Received prednisone, solumedrol, azithromycin, zosyn, 2L LR (14 Jan 2023 17:22)      PAST MEDICAL & SURGICAL HISTORY:  Gastroparesis  with pylorus dysfunction      DM (diabetes mellitus)  type II      Chronic obstructive pulmonary disease      Hypertension      Ulcerative colitis      Inguinal hernia      Arthropathy  left knee replacement 2010      History of repair of hiatal hernia  childhood      S/P endoscopy  POEM 10/2021, 11/2021          MEDICATIONS  (STANDING):  albuterol/ipratropium for Nebulization 3 milliLiter(s) Nebulizer every 6 hours  artificial tears (preservative free) Ophthalmic Solution 1 Drop(s) Both EYES two times a day  budesonide 160 MICROgram(s)/formoterol 4.5 MICROgram(s) Inhaler 2 Puff(s) Inhalation two times a day  dextrose 5%. 1000 milliLiter(s) (100 mL/Hr) IV Continuous <Continuous>  dextrose 5%. 1000 milliLiter(s) (50 mL/Hr) IV Continuous <Continuous>  dextrose 50% Injectable 25 Gram(s) IV Push once  dextrose 50% Injectable 12.5 Gram(s) IV Push once  dextrose 50% Injectable 25 Gram(s) IV Push once  enoxaparin Injectable 40 milliGRAM(s) SubCutaneous every 24 hours  fluticasone propionate 50 MICROgram(s)/spray Nasal Spray 1 Spray(s) Both Nostrils two times a day  glucagon  Injectable 1 milliGRAM(s) IntraMuscular once  glycopyrrolate 9 MICROgram(s)/formoterol 4.8 MICROgram(s) Inhaler 2 Puff(s) Inhalation two times a day  guaiFENesin  milliGRAM(s) Oral <User Schedule>  hydrocortisone sodium succinate Injectable 100 milliGRAM(s) IV Push three times a day  insulin lispro (ADMELOG) corrective regimen sliding scale   SubCutaneous three times a day before meals  insulin lispro (ADMELOG) corrective regimen sliding scale   SubCutaneous at bedtime  mesalamine DR Capsule 800 milliGRAM(s) Oral three times a day  montelukast 10 milliGRAM(s) Oral daily  pancrelipase  (CREON 36,000 Lipase Units) 2 Capsule(s) Oral three times a day with meals  pantoprazole  Injectable 40 milliGRAM(s) IV Push two times a day  potassium chloride    Tablet ER 20 milliEquivalent(s) Oral once  sodium chloride 0.9%. 1000 milliLiter(s) (75 mL/Hr) IV Continuous <Continuous>      Allergies    angiotensin converting enzyme inhibitors (Other; Swelling)    Intolerances        SOCIAL HISTORY:  Denies ETOh,Smoking,     FAMILY HISTORY:  FH: diabetes mellitus (Mother)        REVIEW OF SYSTEMS:    CONSTITUTIONAL: No weakness, fevers or chills  EYES/ENT: No visual changes;  No vertigo or throat pain   NECK: No pain or stiffness  RESPIRATORY: No cough, wheezing, hemoptysis; No shortness of breath  CARDIOVASCULAR: No chest pain or palpitations  GASTROINTESTINAL: No abdominal or epigastric pain. No nausea, vomiting, or hematemesis; No diarrhea or constipation. No melena or hematochezia.  GENITOURINARY: No dysuria, frequency or hematuria  NEUROLOGICAL: No numbness or weakness  SKIN: No itching, burning, rashes, or lesions   All other review of systems is negative unless indicated above.    VITAL:  T(C): , Max: 36.6 (01-18-23 @ 15:15)  T(F): , Max: 97.9 (01-18-23 @ 15:15)  HR: 84 (01-19-23 @ 05:35)  BP: 151/72 (01-19-23 @ 05:35)  BP(mean): --  RR: 17 (01-19-23 @ 05:35)  SpO2: 98% (01-19-23 @ 05:35)  Wt(kg): --    I and O's:        PHYSICAL EXAM:    Constitutional: NAD  HEENT: PERRLA,   Neck: No JVD  Respiratory: CTA B/L  Cardiovascular: S1 and S2  Gastrointestinal: BS+, soft, NT/ND  Extremities: No peripheral edema  Neurological: A/O x 3, no focal deficits  Psychiatric: Normal mood, normal affect  : No Pete  Skin: No rashes  Access: Not applicable  Back: No CVA tenderness    LABS:                        9.8    13.89 )-----------( 458      ( 19 Jan 2023 05:40 )             31.7     01-19    133<L>  |  100  |  9   ----------------------------<  133<H>  3.3<L>   |  23  |  0.87    Ca    8.7      19 Jan 2023 05:40  Phos  2.3     01-19  Mg     2.20     01-19            RADIOLOGY & ADDITIONAL STUDIES:

## 2023-01-19 NOTE — PROGRESS NOTE ADULT - PROBLEM SELECTOR PLAN 6
Diet: Regular  DVT: Lovenox subq  Dispo: home  acetaminophen PRN for headache Pulmonologist: Ezekiel Sandoval  - no home O2  - continue home meds: budesonide 3mg po qd, montelukast, 10mg qd  - c/w Duoneb while inpatient

## 2023-01-19 NOTE — PROGRESS NOTE ADULT - ATTENDING COMMENTS
Agree w above. UC flare. Patient of Dr Wang as outpatient currently being followed by Dr Miguel Ángel Adler as inpatient. House GI will sign off for now and defer further inpatient GI management to Dr Adler.
patient seen and examined with team   Agree with Dr Perez above A/P  Patient seen by GI  Dr Adler on 1/15/23-"pt with history of ulcerative colitis.  He was on humra and recently changed to entyvio.  He is still symptomatic with bleeding and diarrhea, multiple bm per day.  He will start cipro/flagyl and steroids,  risk of steroid discussed.  used for induction not remission will defer to primary gi re: change in bologic, although onlly 6 weeks of therapy"  wife is present at bedside  Patient notes dark black stool- as well as blood in stool- will start ppi and monitor cbc  q12 with t/s.  PE nad  Vital Signs Last 24 Hrs T(F): 98.2 , HR: 57 , BP: 114/67 , RR: 17 , SpO2: 98%  room air  Lung cta on L , Couse BS to LLL, cor rrr, abd soft ,epigastric discomfort on palpation- no guarding or rebound, ext neg e/c/c  WBC 10.5--> 8.9, lactate 4.1--> 2.2  Hgb 11.4--> 8.5  UA 1/5 no leuk/ nitrate  rad< from: CT Abdomen and Pelvis w/ IV Cont (01.14.23 @ 11:36) >  IMPRESSION:  Mucoid plugging of right lower lobe bronchi with scattered tree-in bud   opacities and right lower lobe peripheral atelectasis/consolidation   likely reflective of bronchiolitis and developing bronchopneumonia.  Proctocolitis involving the rectum and sigmoid colon.  Enlarged prostate gland. Thickening of the urinary bladder walls despite   underdistention with slight infiltration of the perivesicular fat which   raises question of cystitis. Correlate with urinalysis.    Pt is a 72 year old man with ulcerative colitis, htn, DM c/b gastroparesis, COPD who presents with 5 days of bloody diarrhea. CT scan showing proctocolitis, consistent with a UC flare vs infectious colitis, less likely diverticulitis. Consulting GI for colitis and melena.   # UC with flair- Gi consult appreciated- stool studies sent start cipro and Flagyl IV as instructed by GI with steroid  #Gi Bleed- acute blood loss anemia- start PPI, cbc q12 with t/s. Keep Hgb >8  # DVT proph teds b/l  plan d/w patient / wife and team.  #Broncitis- cipro iv and monitor
discussed management plan with house staff  cont steroids   gi f/u
discussed management plan with pt and house staff  improving gi symptoms with steroids
discussed management plan with house staff and pt   improving gi symptoms
discussed management plan with pt and house staff  repeat ct abd to r/o acute abd pathology

## 2023-01-19 NOTE — PROGRESS NOTE ADULT - PROBLEM SELECTOR PLAN 5
Pulmonologist: Ezekiel Sandoval  - no home O2  - continue home meds: budesonide 3mg po qd, montelukast, 10mg qd  - c/w Duoneb while inpatient On Jardiance at home  glucose in target range   - While inpt: ISS, can increase to basal/bolus if needed

## 2023-01-19 NOTE — PROGRESS NOTE ADULT - ASSESSMENT
diarrhea  blood in stool colitis  uc- continue steroids 40 at dc  will follow up with primary gi for discussion re: biologic mangamgnet

## 2023-01-19 NOTE — DIETITIAN INITIAL EVALUATION ADULT - SIGNS/SYMPTOMS
mild muscle wasting, mild fat loss, PO intake <75% of estimated nutrition needs for >/=1 month pt requesting nutrition education

## 2023-01-19 NOTE — PROGRESS NOTE ADULT - PROBLEM SELECTOR PLAN 1
- infectious vs UC flare vs diverticulitis  - UC Diagnosed 1.5 yrs ago, GI: Dr. Mushtaq Wang  - colonoscopy in Dec 22 showing mucus coating entire colon, diverticulosis, ulcerative pancolitis  - previously on humira, entyvio, prednisone, mesalamine. Planned to switch to medication soon per patient  - baseline 2-3 BMs/day, sometimes watery; now becoming more formed  - GI PCR and C. diff negative. Stool culture pending   - mesalamine 800 tid  - c/w hydrocortisone IV until tomorrow   - s/p metronidazole and ciprofloxacin   - GI consulted, followed by Dr. Adler; d/c planning Thursday after 72 hours of IV steroids to prevent recurrent flare. - infectious vs UC flare vs diverticulitis  - UC Diagnosed 1.5 yrs ago, GI: Dr. Mushtaq Wang  - colonoscopy in Dec 22 showing mucus coating entire colon, diverticulosis, ulcerative pancolitis  - previously on humira, entyvio, prednisone, mesalamine. Planned to switch to medication soon per patient  - baseline 2-3 BMs/day, sometimes watery; now becoming more formed  - GI PCR and C. diff negative. Stool culture pending   - mesalamine 800 tid  - c/w hydrocortisone IV until today   - s/p metronidazole and ciprofloxacin   - GI consulted, followed by Dr. Adler; d/c planning on hold given new abdominal pain and leukocytosis new onset diffuse abdominal pain 1/19. mild, dull pain, more on lower abdomen.  also with new onset leukocytosis, though it could be from IV steroid treatment.   Patient feels he needs to use the bathroom but has flatus without BM.   given simethicone this morning with some improvement.  he felt the pain after eating lunch, then subsided.  most likely gas pain, exam benign without tenderness or acute abdomen sign.     - monitor for another day without d/c today  - CT A/P per Dr. Montanez

## 2023-01-19 NOTE — PROGRESS NOTE ADULT - PROBLEM SELECTOR PLAN 2
- Pt reports black stool, unusual to be from rectosigmoid bleed  - stable Hb   - Pantoprazole 40 IV bid  - GI consulted - Pt reports black stool, unusual to be from rectosigmoid bleed -> resolved now   - stable Hb   - Pantoprazole 40 IV bid  - GI consulted - infectious vs UC flare vs diverticulitis  - UC Diagnosed 1.5 yrs ago, GI: Dr. Mushtaq Wang  - colonoscopy in Dec 22 showing mucus coating entire colon, diverticulosis, ulcerative pancolitis  - previously on humira, entyvio, prednisone, mesalamine. Planned to switch to medication soon per patient  - baseline 2-3 BMs/day, sometimes watery; now becoming more formed  - GI PCR and C. diff negative. Stool culture pending   - mesalamine 800 tid  - c/w hydrocortisone IV until today   - s/p metronidazole and ciprofloxacin   - GI consulted, followed by Dr. Adler; d/c planning on hold given new abdominal pain and leukocytosis

## 2023-01-19 NOTE — PROGRESS NOTE ADULT - PROBLEM SELECTOR PLAN 3
Resolved  Lactic acid 3.7 in setting of colitis, increased to 5.2 then down to 1.4    - LR at 100cc/hr  - continue to trend - Pt reports black stool, unusual to be from rectosigmoid bleed -> resolved now   - stable Hb   - Pantoprazole 40 IV bid  - GI consulted

## 2023-01-19 NOTE — DIETITIAN INITIAL EVALUATION ADULT - PERTINENT LABORATORY DATA
(1/19) Na 133<L>, BUN 9, Cr 0.87, <H>, K+ 3.3<L>, Phos 2.3<L>, Mg 2.20  (1/15) HbA1c 5.9%<H>  POCT: (1/19) 124, (1/18) 134-198

## 2023-01-19 NOTE — DIETITIAN INITIAL EVALUATION ADULT - PROBLEM SELECTOR PROBLEM 3
< from: CT Angio Abdomen and Pelvis w/ IV Cont (05.29.21 @ 19:06) >    IMPRESSION:  1.  Large malignant gastric ulcer within a large gastric mass on the anterior wall of the body of the stomach measuring 6.7 x 3.3 x 4.7 cm.  2.  No evidence of active extravasation of contrast material to suggest acute GI bleed.  3.  Omental carcinomatosis and peritoneal carcinomatosis as described above.  4.  Retroperitoneal LEFT periaortic lymphadenopathy.        < end of copied text >
Lactic acidosis

## 2023-01-19 NOTE — PROGRESS NOTE ADULT - ASSESSMENT
72 with UC flare, bloody diarrhea, COPD, abnl chest CT:    copd / bronchiectasis:   UD flare:   HTN:  DM with gastroparesis    1/17:  copd / bronchiectasis: seems stable:  no wheezing:  has chr rigth lower lobe bronchiectasis:  cont BD:  pt is not wheezing and has no phlegm in chest    UD flare: on steroids:  defer to primary team   HTN: controlled  DM with gastroparesis : monitro and control :  dvt prophylaxis   dw  and wife      1/18:    copd / bronchiectasis: seems stable:  no wheezing:  has chr rigth lower lobe bronchiectasis:  cont BD:  pt is not wheezing and has no phlegm in chest    UD flare: on steroids:  defer to primary team   HTN: controlled  DM with gastroparesis : monitro and control :  dvt prophylaxis   dw  and wife    1/19:    copd / bronchiectasis: seems stable:  no wheezing:  has chr rigth lower lobe bronchiectasis:  cont BD:  pt is not wheezing and has no phlegm in chest   : no change overnight - stable  UD flare: on steroids:  defer to primary team  : seems much better  HTN: controlled  DM with gastroparesis : monitor and control :  dvt prophylaxis   dw  and wife :? dc planning

## 2023-01-19 NOTE — DIETITIAN INITIAL EVALUATION ADULT - PERTINENT MEDS FT
hydrocortisone sodium succinate IV, ADMELOG corrective regimen sliding scale, pancrelipase, pantoprazole IV, sodium chloride 0.9% IV Continuous, simethicone PRN

## 2023-01-19 NOTE — PROGRESS NOTE ADULT - SUBJECTIVE AND OBJECTIVE BOX
Date of Service: 01-19-23 @ 15:29    Patient is a 72y old  Male who presents with a chief complaint of Noninfectious gastroenteritis     (19 Jan 2023 12:44)      Any change in ROS: seems better : diarrhea resolved:  he has chr GREEN     MEDICATIONS  (STANDING):  albuterol/ipratropium for Nebulization 3 milliLiter(s) Nebulizer every 6 hours  artificial tears (preservative free) Ophthalmic Solution 1 Drop(s) Both EYES two times a day  budesonide 160 MICROgram(s)/formoterol 4.5 MICROgram(s) Inhaler 2 Puff(s) Inhalation two times a day  dextrose 5%. 1000 milliLiter(s) (100 mL/Hr) IV Continuous <Continuous>  dextrose 5%. 1000 milliLiter(s) (50 mL/Hr) IV Continuous <Continuous>  dextrose 50% Injectable 25 Gram(s) IV Push once  dextrose 50% Injectable 12.5 Gram(s) IV Push once  dextrose 50% Injectable 25 Gram(s) IV Push once  enoxaparin Injectable 40 milliGRAM(s) SubCutaneous every 24 hours  fluticasone propionate 50 MICROgram(s)/spray Nasal Spray 1 Spray(s) Both Nostrils two times a day  glucagon  Injectable 1 milliGRAM(s) IntraMuscular once  glycopyrrolate 9 MICROgram(s)/formoterol 4.8 MICROgram(s) Inhaler 2 Puff(s) Inhalation two times a day  guaiFENesin  milliGRAM(s) Oral <User Schedule>  hydrocortisone sodium succinate Injectable 100 milliGRAM(s) IV Push three times a day  insulin lispro (ADMELOG) corrective regimen sliding scale   SubCutaneous three times a day before meals  insulin lispro (ADMELOG) corrective regimen sliding scale   SubCutaneous at bedtime  mesalamine DR Capsule 800 milliGRAM(s) Oral three times a day  montelukast 10 milliGRAM(s) Oral daily  pancrelipase  (CREON 36,000 Lipase Units) 2 Capsule(s) Oral three times a day with meals  pantoprazole  Injectable 40 milliGRAM(s) IV Push two times a day  sodium chloride 0.9%. 1000 milliLiter(s) (75 mL/Hr) IV Continuous <Continuous>    MEDICATIONS  (PRN):  acetaminophen     Tablet .. 650 milliGRAM(s) Oral every 6 hours PRN Mild Pain (1 - 3), Moderate Pain (4 - 6)  dextrose Oral Gel 15 Gram(s) Oral once PRN Blood Glucose LESS THAN 70 milliGRAM(s)/deciliter  simethicone 80 milliGRAM(s) Chew daily PRN Gas    Vital Signs Last 24 Hrs  T(C): 36.5 (19 Jan 2023 05:35), Max: 36.5 (18 Jan 2023 21:20)  T(F): 97.7 (19 Jan 2023 05:35), Max: 97.7 (18 Jan 2023 21:20)  HR: 84 (19 Jan 2023 10:27) (70 - 84)  BP: 151/72 (19 Jan 2023 05:35) (139/60 - 151/72)  BP(mean): --  RR: 17 (19 Jan 2023 05:35) (14 - 17)  SpO2: 98% (19 Jan 2023 10:27) (95% - 100%)    Parameters below as of 19 Jan 2023 10:27  Patient On (Oxygen Delivery Method): room air        I&O's Summary        Physical Exam:   GENERAL: NAD, well-groomed, well-developed  HEENT: ALYSSA/   Atraumatic, Normocephalic  ENMT: No tonsillar erythema, exudates, or enlargement; Moist mucous membranes, Good dentition, No lesions  NECK: Supple, No JVD, Normal thyroid  CHEST/LUNG: Clear to auscultaion- no wheezing  CVS: Regular rate and rhythm; No murmurs, rubs, or gallops  GI: : Soft, Nontender, Nondistended; Bowel sounds present  NERVOUS SYSTEM:  Alert & Oriented X3  EXTREMITIES:  2+ Peripheral Pulses, No clubbing, cyanosis, or edema  LYMPH: No lymphadenopathy noted  SKIN: No rashes or lesions  ENDOCRINOLOGY: No Thyromegaly  PSYCH: Appropriate    Labs:  25, 22, 26                            9.8    13.89 )-----------( 458      ( 19 Jan 2023 05:40 )             31.7                         9.3    8.90  )-----------( 418      ( 18 Jan 2023 07:26 )             29.7                         9.2    7.34  )-----------( 440      ( 17 Jan 2023 04:43 )             29.8                         9.9    6.64  )-----------( 467      ( 16 Jan 2023 06:59 )             32.0                         10.1   8.69  )-----------( 451      ( 15 Kishore 2023 17:42 )             33.0     01-19    133<L>  |  100  |  9   ----------------------------<  133<H>  3.3<L>   |  23  |  0.87  01-18    132<L>  |  102  |  7   ----------------------------<  143<H>  3.1<L>   |  23  |  0.78  01-17    133<L>  |  100  |  8   ----------------------------<  154<H>  3.0<L>   |  22  |  0.88  01-16    132<L>  |  96<L>  |  8   ----------------------------<  126<H>  3.9   |  21<L>  |  0.89    Ca    8.7      19 Jan 2023 05:40  Ca    8.5      18 Jan 2023 07:26  Phos  2.3     01-19  Phos  3.0     01-18  Mg     2.20     01-19  Mg     1.80     01-18      CAPILLARY BLOOD GLUCOSE      POCT Blood Glucose.: 183 mg/dL (19 Jan 2023 12:53)  POCT Blood Glucose.: 124 mg/dL (19 Jan 2023 09:25)  POCT Blood Glucose.: 173 mg/dL (18 Jan 2023 22:31)  POCT Blood Glucose.: 198 mg/dL (18 Jan 2023 18:29)        rad< from: Xray Chest 1 View- PORTABLE-Urgent (Xray Chest 1 View- PORTABLE-Urgent .) (01.16.23 @ 12:13) >      INTERPRETATION:  CLINICAL INFORMATION: Dyspnea; COPD    TIME OF EXAMINATION: January 16 at 12:06 PM    EXAM: AP chest    FINDINGS:  The lungs are free of focal consolidation to suggest pneumonia. The heart   is not enlarged and there is no effusion. Mild hyperinflation.        COMPARISON: May 11, 2011        IMPRESSION: No localized pulmonary disease.    --- End of Report ---            MARLINE CRUZ MD; Attending Radiologist  This document has been electronically signed. Jan 16 2023  5:25PM    < end of copied text >      Procalcitonin, Serum: 0.07 ng/mL (01-16 @ 06:59)        RECENT CULTURES:  01-16 @ 11:15 .Blood Blood-Peripheral                No growth to date.    01-16 @ 11:05 .Blood Blood-Peripheral                No growth to date.    01-15 @ 16:50 .Stool Feces                No Protozoa seen by trichrome stain  No Helminths or Protozoa seen in formalin concentrate  performed by iodine stain  (routine O+P not evaluated for Microsporidia,  Cryptosporidia or Cyclospora)  One negative sample does not necessarily rule  out the presence of a parasitic infection.  Few White blood cells    01-15 @ 11:25 Clean Catch Clean Catch (Midstream)                No growth          RESPIRATORY CULTURES:      C Diff by PCR Result: NotDetec (01-15 @ 11:45)      Studies  Chest X-RAY  CT SCAN Chest   Venous Dopplers: LE:   CT Abdomen  Others

## 2023-01-19 NOTE — DIETITIAN INITIAL EVALUATION ADULT - OTHER CALCULATIONS
Pt reports body weight 190 lbs prior to UC diagnosis.  Apparent ~18 lb (12%) weight loss x1 year, per history obtained via chart: (1/15/23 dosing) 138 lbs, (9/1/22) 143 lbs, (1/13/22) 156 lbs.  Ideal Body Weight: 154 lbs / 70 kg +/-10%

## 2023-01-19 NOTE — PROGRESS NOTE ADULT - PROBLEM SELECTOR PROBLEM 3
Quality 130: Documentation Of Current Medications In The Medical Record: Current Medications Documented Lactic acidosis Detail Level: Detailed Quality 402: Tobacco Use And Help With Quitting Among Adolescents: Patient screened for tobacco and never smoked Janay

## 2023-01-19 NOTE — DIETITIAN NUTRITION RISK NOTIFICATION - ADDITIONAL COMMENTS/DIETITIAN RECOMMENDATIONS
Please see Dietitian Initial Assessment for complete recommendations.  Farideh Jarvis, SAIRA, CDN #[id number]  Also available on [employer] Teams

## 2023-01-19 NOTE — DIETITIAN INITIAL EVALUATION ADULT - ADD RECOMMEND
1) Recommend continue consistent carbohydrate diet to assist with glycemic management.  2) Recommend addition of Ensure Max 1 PO daily (provides 150 kcal, 30 gm protein per 11oz serving).  3) Provided UC high calorie/high protein nutrition education, RDN remains available to review.  4) Monitor electrolytes (K+, Mg, P) and replete to within desired limits as clinically indicated.  5) Obtain weekly weights.

## 2023-01-19 NOTE — PROGRESS NOTE ADULT - PROBLEM SELECTOR PLAN 4
On jardiance at home  glucose in target range   - While inpt: ISS, can increase to basal/bolus if needed On Jardiance at home  glucose in target range   - While inpt: ISS, can increase to basal/bolus if needed Resolved  Lactic acid 3.7 in setting of colitis, increased to 5.2 then down to 1.4    - LR at 100cc/hr  - continue to trend

## 2023-01-20 ENCOUNTER — TRANSCRIPTION ENCOUNTER (OUTPATIENT)
Age: 73
End: 2023-01-20

## 2023-01-20 VITALS
OXYGEN SATURATION: 100 % | SYSTOLIC BLOOD PRESSURE: 136 MMHG | RESPIRATION RATE: 18 BRPM | DIASTOLIC BLOOD PRESSURE: 77 MMHG | TEMPERATURE: 99 F | HEART RATE: 76 BPM

## 2023-01-20 LAB
ANION GAP SERPL CALC-SCNC: 9 MMOL/L — SIGNIFICANT CHANGE UP (ref 7–14)
BASOPHILS # BLD AUTO: 0.01 K/UL — SIGNIFICANT CHANGE UP (ref 0–0.2)
BASOPHILS NFR BLD AUTO: 0.1 % — SIGNIFICANT CHANGE UP (ref 0–2)
BUN SERPL-MCNC: 9 MG/DL — SIGNIFICANT CHANGE UP (ref 7–23)
CALCIUM SERPL-MCNC: 8.3 MG/DL — LOW (ref 8.4–10.5)
CHLORIDE SERPL-SCNC: 102 MMOL/L — SIGNIFICANT CHANGE UP (ref 98–107)
CO2 SERPL-SCNC: 24 MMOL/L — SIGNIFICANT CHANGE UP (ref 22–31)
CREAT SERPL-MCNC: 0.82 MG/DL — SIGNIFICANT CHANGE UP (ref 0.5–1.3)
EGFR: 94 ML/MIN/1.73M2 — SIGNIFICANT CHANGE UP
EOSINOPHIL # BLD AUTO: 0.01 K/UL — SIGNIFICANT CHANGE UP (ref 0–0.5)
EOSINOPHIL NFR BLD AUTO: 0.1 % — SIGNIFICANT CHANGE UP (ref 0–6)
GLUCOSE BLDC GLUCOMTR-MCNC: 128 MG/DL — HIGH (ref 70–99)
GLUCOSE BLDC GLUCOMTR-MCNC: 192 MG/DL — HIGH (ref 70–99)
GLUCOSE SERPL-MCNC: 132 MG/DL — HIGH (ref 70–99)
HCT VFR BLD CALC: 29 % — LOW (ref 39–50)
HGB BLD-MCNC: 9.1 G/DL — LOW (ref 13–17)
IANC: 8.98 K/UL — HIGH (ref 1.8–7.4)
IMM GRANULOCYTES NFR BLD AUTO: 0.8 % — SIGNIFICANT CHANGE UP (ref 0–0.9)
LYMPHOCYTES # BLD AUTO: 0.94 K/UL — LOW (ref 1–3.3)
LYMPHOCYTES # BLD AUTO: 8.4 % — LOW (ref 13–44)
MAGNESIUM SERPL-MCNC: 1.8 MG/DL — SIGNIFICANT CHANGE UP (ref 1.6–2.6)
MCHC RBC-ENTMCNC: 24.8 PG — LOW (ref 27–34)
MCHC RBC-ENTMCNC: 31.4 GM/DL — LOW (ref 32–36)
MCV RBC AUTO: 79 FL — LOW (ref 80–100)
MONOCYTES # BLD AUTO: 1.12 K/UL — HIGH (ref 0–0.9)
MONOCYTES NFR BLD AUTO: 10 % — SIGNIFICANT CHANGE UP (ref 2–14)
NEUTROPHILS # BLD AUTO: 8.98 K/UL — HIGH (ref 1.8–7.4)
NEUTROPHILS NFR BLD AUTO: 80.6 % — HIGH (ref 43–77)
NRBC # BLD: 0 /100 WBCS — SIGNIFICANT CHANGE UP (ref 0–0)
NRBC # FLD: 0 K/UL — SIGNIFICANT CHANGE UP (ref 0–0)
PHOSPHATE SERPL-MCNC: 3 MG/DL — SIGNIFICANT CHANGE UP (ref 2.5–4.5)
PLATELET # BLD AUTO: 383 K/UL — SIGNIFICANT CHANGE UP (ref 150–400)
POTASSIUM SERPL-MCNC: 3.2 MMOL/L — LOW (ref 3.5–5.3)
POTASSIUM SERPL-SCNC: 3.2 MMOL/L — LOW (ref 3.5–5.3)
RBC # BLD: 3.67 M/UL — LOW (ref 4.2–5.8)
RBC # FLD: 14.1 % — SIGNIFICANT CHANGE UP (ref 10.3–14.5)
SODIUM SERPL-SCNC: 135 MMOL/L — SIGNIFICANT CHANGE UP (ref 135–145)
WBC # BLD: 11.15 K/UL — HIGH (ref 3.8–10.5)
WBC # FLD AUTO: 11.15 K/UL — HIGH (ref 3.8–10.5)

## 2023-01-20 RX ORDER — POTASSIUM CHLORIDE 20 MEQ
40 PACKET (EA) ORAL ONCE
Refills: 0 | Status: COMPLETED | OUTPATIENT
Start: 2023-01-20 | End: 2023-01-20

## 2023-01-20 RX ORDER — BUDESONIDE, MICRONIZED 100 %
1 POWDER (GRAM) MISCELLANEOUS
Qty: 0 | Refills: 0 | DISCHARGE

## 2023-01-20 RX ADMIN — MONTELUKAST 10 MILLIGRAM(S): 4 TABLET, CHEWABLE ORAL at 12:26

## 2023-01-20 RX ADMIN — Medication 1 SPRAY(S): at 05:36

## 2023-01-20 RX ADMIN — Medication 2 CAPSULE(S): at 09:48

## 2023-01-20 RX ADMIN — Medication 3 MILLILITER(S): at 10:24

## 2023-01-20 RX ADMIN — BUDESONIDE AND FORMOTEROL FUMARATE DIHYDRATE 2 PUFF(S): 160; 4.5 AEROSOL RESPIRATORY (INHALATION) at 09:48

## 2023-01-20 RX ADMIN — Medication 800 MILLIGRAM(S): at 14:41

## 2023-01-20 RX ADMIN — Medication 1: at 12:26

## 2023-01-20 RX ADMIN — PANTOPRAZOLE SODIUM 40 MILLIGRAM(S): 20 TABLET, DELAYED RELEASE ORAL at 05:35

## 2023-01-20 RX ADMIN — Medication 2 CAPSULE(S): at 12:26

## 2023-01-20 RX ADMIN — Medication 1 DROP(S): at 05:36

## 2023-01-20 RX ADMIN — Medication 100 MILLIGRAM(S): at 14:41

## 2023-01-20 RX ADMIN — GLYCOPYRROLATE AND FORMOTEROL FUMARATE 2 PUFF(S): 9; 4.8 AEROSOL, METERED RESPIRATORY (INHALATION) at 09:48

## 2023-01-20 RX ADMIN — Medication 100 MILLIGRAM(S): at 05:35

## 2023-01-20 RX ADMIN — Medication 40 MILLIEQUIVALENT(S): at 09:48

## 2023-01-20 RX ADMIN — Medication 800 MILLIGRAM(S): at 05:36

## 2023-01-20 RX ADMIN — Medication 600 MILLIGRAM(S): at 05:36

## 2023-01-20 NOTE — PROGRESS NOTE ADULT - PROBLEM SELECTOR PLAN 1
new onset diffuse abdominal pain 1/19. mild, dull pain, more on lower abdomen.  also with new onset leukocytosis, though it could be from IV steroid treatment.   Patient feels he needs to use the bathroom but has flatus without BM.   given simethicone this morning with some improvement.  he felt the pain after eating lunch, then subsided.  most likely gas pain, exam benign without tenderness or acute abdomen sign.     - d/c today   - CT A/P per Dr. Montanez new onset diffuse abdominal pain 1/19. mild, dull pain, more on lower abdomen.  also with new onset leukocytosis, though it could be from IV steroid treatment.   Patient feels he needs to use the bathroom but has flatus without BM.   given simethicone this morning with some improvement.  he felt the pain after eating lunch, then subsided.  most likely gas pain, exam benign without tenderness or acute abdomen sign.   1/19 resolved from later in the afternoon.     - d/c today

## 2023-01-20 NOTE — DISCHARGE NOTE NURSING/CASE MANAGEMENT/SOCIAL WORK - NSDCFUADDAPPT_GEN_ALL_CORE_FT
Please followup with your gastroenterologist within 1 week after discharge to followup on current hospitalization and optimal medical treatment for your UC.     Please followup with your primary care within a few days after discharge (or your gastroenterologist) and obtain blood work to look at the potassium level. Your potassium level has been low in the hospital and you may need repletion.

## 2023-01-20 NOTE — PHYSICAL THERAPY INITIAL EVALUATION ADULT - NSPTDISCHREC_GEN_A_CORE
no skilled PT needs; pt safely ambulated and performed stair negotiation-->will not be placed on PT program.

## 2023-01-20 NOTE — DISCHARGE NOTE NURSING/CASE MANAGEMENT/SOCIAL WORK - NSDCPEFALRISK_GEN_ALL_CORE
For information on Fall & Injury Prevention, visit: [email or web address] OR  [email or web address] OR  https://www.cdc.gov/steadi/patient.html

## 2023-01-20 NOTE — PHYSICAL THERAPY INITIAL EVALUATION ADULT - PERTINENT HX OF CURRENT PROBLEM, REHAB EVAL
Pt is a 72 year old male presenting with bloody diarrhea. CT scan showing proctocolitis, consistent with a UC flare vs infectious colitis, less likely diverticulitis. Gastroenterology following. PMH listed below.

## 2023-01-20 NOTE — PROGRESS NOTE ADULT - PROBLEM SELECTOR PLAN 7
Diet: Regular  DVT: Lovenox subq  Dispo: home  acetaminophen PRN for headache Diet: Regular  DVT: Lovenox subq  Dispo: home today

## 2023-01-20 NOTE — PROGRESS NOTE ADULT - PROBLEM SELECTOR PLAN 2
- infectious vs UC flare vs diverticulitis  - UC Diagnosed 1.5 yrs ago, GI: Dr. Mushtaq Wang  - colonoscopy in Dec 22 showing mucus coating entire colon, diverticulosis, ulcerative pancolitis  - previously on humira, entyvio, prednisone, mesalamine. Planned to switch to medication soon per patient  - baseline 2-3 BMs/day, sometimes watery; now becoming more formed  - GI PCR and C. diff negative. Stool culture pending   - mesalamine 800 tid  - c/w hydrocortisone IV until today   - s/p metronidazole and ciprofloxacin   - GI consulted, followed by Dr. Adler; d/c planning - infectious vs UC flare vs diverticulitis  - UC Diagnosed 1.5 yrs ago, GI: Dr. Mushtaq Wang  - colonoscopy in Dec 22 showing mucus coating entire colon, diverticulosis, ulcerative pancolitis  - previously on humira, entyvio, prednisone, mesalamine. Planned to switch to medication soon per patient  - baseline 2-3 BMs/day, sometimes watery; now becoming more formed  - GI PCR and C. diff negative. Stool culture pending   - mesalamine 800 tid  - c/w hydrocortisone IV until today   - s/p metronidazole and ciprofloxacin   - GI consulted, followed by Dr. Adler; d/c planning with 40 prednisone daily until followup

## 2023-01-20 NOTE — PROGRESS NOTE ADULT - PROVIDER SPECIALTY LIST ADULT
Gastroenterology
Gastroenterology
Internal Medicine
Gastroenterology
Pulmonology
Gastroenterology
Gastroenterology
Pulmonology
Internal Medicine

## 2023-01-20 NOTE — PROGRESS NOTE ADULT - NUTRITIONAL ASSESSMENT
This patient has been assessed with a concern for Malnutrition and has been determined to have a diagnosis/diagnoses of Moderate protein-calorie malnutrition.    This patient is being managed with:   Diet Consistent Carbohydrate/No Snacks-  Entered: Jan 16 2023 11:28AM

## 2023-01-20 NOTE — PROGRESS NOTE ADULT - REASON FOR ADMISSION
bloody diarrhea

## 2023-01-20 NOTE — PHYSICAL THERAPY INITIAL EVALUATION ADULT - ADDITIONAL COMMENTS
Pt lives in a two family house with 5 exterior steps to enter, +bilateral handrail. Bedroom located on first floor. Daughter resides upstairs. Prior to admission, pt ambulated independently, no assistive device.

## 2023-01-20 NOTE — PROGRESS NOTE ADULT - PROBLEM SELECTOR PLAN 3
- Pt reports black stool, unusual to be from rectosigmoid bleed -> resolved now   - stable Hb   - Pantoprazole 40 IV bid  - GI consulted - Pt reports black stool, unusual to be from rectosigmoid bleed -> resolved    - stable Hb   - Pantoprazole 40 IV bid  - GI consulted

## 2023-01-20 NOTE — PHYSICAL THERAPY INITIAL EVALUATION ADULT - PATIENT PROFILE REVIEW, REHAB EVAL
PT evaluate and treat orders received: no formal activity orders. Consult with TAHIR VALDIVIA, pt may participate in PT evaluation./yes

## 2023-01-20 NOTE — DISCHARGE NOTE NURSING/CASE MANAGEMENT/SOCIAL WORK - PATIENT PORTAL LINK FT
You can access the FollowMyHealth Patient Portal offered by [facility] by registering at the following website: [email or web address]. By joining [facility]’s FollowMyHealth portal, you will also be able to view your health information using other applications (apps) compatible with our system.

## 2023-01-20 NOTE — PROGRESS NOTE ADULT - SUBJECTIVE AND OBJECTIVE BOX
PROGRESS NOTE:   Authored by Dr. Charles Ferrari    Patient is a 72y old  Male who presents with a chief complaint of bloody diarrhea (19 Jan 2023 15:29)      SUBJECTIVE / OVERNIGHT EVENTS: no overnight events.     ADDITIONAL REVIEW OF SYSTEMS:    MEDICATIONS  (STANDING):  albuterol/ipratropium for Nebulization 3 milliLiter(s) Nebulizer every 6 hours  artificial tears (preservative free) Ophthalmic Solution 1 Drop(s) Both EYES two times a day  budesonide 160 MICROgram(s)/formoterol 4.5 MICROgram(s) Inhaler 2 Puff(s) Inhalation two times a day  dextrose 5%. 1000 milliLiter(s) (100 mL/Hr) IV Continuous <Continuous>  dextrose 5%. 1000 milliLiter(s) (50 mL/Hr) IV Continuous <Continuous>  dextrose 50% Injectable 25 Gram(s) IV Push once  dextrose 50% Injectable 12.5 Gram(s) IV Push once  dextrose 50% Injectable 25 Gram(s) IV Push once  enoxaparin Injectable 40 milliGRAM(s) SubCutaneous every 24 hours  fluticasone propionate 50 MICROgram(s)/spray Nasal Spray 1 Spray(s) Both Nostrils two times a day  glucagon  Injectable 1 milliGRAM(s) IntraMuscular once  glycopyrrolate 9 MICROgram(s)/formoterol 4.8 MICROgram(s) Inhaler 2 Puff(s) Inhalation two times a day  guaiFENesin  milliGRAM(s) Oral <User Schedule>  hydrocortisone sodium succinate Injectable 100 milliGRAM(s) IV Push three times a day  insulin lispro (ADMELOG) corrective regimen sliding scale   SubCutaneous three times a day before meals  insulin lispro (ADMELOG) corrective regimen sliding scale   SubCutaneous at bedtime  mesalamine DR Capsule 800 milliGRAM(s) Oral three times a day  montelukast 10 milliGRAM(s) Oral daily  pancrelipase  (CREON 36,000 Lipase Units) 2 Capsule(s) Oral three times a day with meals  pantoprazole  Injectable 40 milliGRAM(s) IV Push two times a day  sodium chloride 0.9%. 1000 milliLiter(s) (75 mL/Hr) IV Continuous <Continuous>    MEDICATIONS  (PRN):  acetaminophen     Tablet .. 650 milliGRAM(s) Oral every 6 hours PRN Mild Pain (1 - 3), Moderate Pain (4 - 6)  dextrose Oral Gel 15 Gram(s) Oral once PRN Blood Glucose LESS THAN 70 milliGRAM(s)/deciliter  simethicone 80 milliGRAM(s) Chew daily PRN Gas      CAPILLARY BLOOD GLUCOSE      POCT Blood Glucose.: 122 mg/dL (19 Jan 2023 22:21)  POCT Blood Glucose.: 138 mg/dL (19 Jan 2023 17:48)  POCT Blood Glucose.: 183 mg/dL (19 Jan 2023 12:53)  POCT Blood Glucose.: 124 mg/dL (19 Jan 2023 09:25)    I&O's Summary      PHYSICAL EXAM:  Vital Signs Last 24 Hrs  T(C): 36.6 (20 Jan 2023 05:27), Max: 37.4 (19 Jan 2023 15:47)  T(F): 97.9 (20 Jan 2023 05:27), Max: 99.3 (19 Jan 2023 15:47)  HR: 61 (20 Jan 2023 05:27) (60 - 86)  BP: 148/74 (20 Jan 2023 05:27) (130/75 - 150/80)  BP(mean): --  RR: 18 (20 Jan 2023 05:27) (17 - 18)  SpO2: 100% (20 Jan 2023 05:27) (97% - 100%)    Parameters below as of 20 Jan 2023 05:27  Patient On (Oxygen Delivery Method): room air        GENERAL: NAD, lying comfortably in bed  HEAD: Atraumatic, normocephalic  EYES: EOMI b/l, conjunctiva and sclera clear  NECK: Supple, No JVD, No LAD  RESPIRATORY: Normal respiratory effort; lungs are clear to auscultation bilaterally  CARDIOVASCULAR: Regular rate and rhythm, normal S1 and S2, no murmur/rub/gallop; No lower extremity edema  ABDOMEN: Nontender, normoactive bowel sounds, no rebound/guarding; No hepatosplenomegaly  MUSCULOSKELETAL: no clubbing or cyanosis of digits; no joint swelling or tenderness to palpation  NEURO: Non focal   SKIN:   PSYCH: A+O to person, place, and time; affect appropriate    LABS:                          9.1    11.15 )-----------( 383      ( 20 Jan 2023 06:30 )             29.0     01-19    133<L>  |  100  |  9   ----------------------------<  133<H>  3.3<L>   |  23  |  0.87    Ca    8.7      19 Jan 2023 05:40  Phos  2.3     01-19  Mg     2.20     01-19                    RADIOLOGY:    Consulted note reviewed  Reviewed Imaging personally   PROGRESS NOTE:   Authored by Dr. Charles Ferrari    Patient is a 72y old  Male who presents with a chief complaint of bloody diarrhea (19 Jan 2023 15:29)      SUBJECTIVE / OVERNIGHT EVENTS: no overnight events. Patient states his abdominal pain resolved yesterday afternoon. Thinks it is related to the type of food he eats, mainly chicken and mashed potatoes. He is continuing to have soft stool. Denies melena, hematochezia. Denies N/V.     ADDITIONAL REVIEW OF SYSTEMS:  No fever  No CP  No abdominal pain  No N/V  No diarrhea; states soft but improved from before and does not think its diarrhea  No melena or hematochezia  No LE edema       MEDICATIONS  (STANDING):  albuterol/ipratropium for Nebulization 3 milliLiter(s) Nebulizer every 6 hours  artificial tears (preservative free) Ophthalmic Solution 1 Drop(s) Both EYES two times a day  budesonide 160 MICROgram(s)/formoterol 4.5 MICROgram(s) Inhaler 2 Puff(s) Inhalation two times a day  dextrose 5%. 1000 milliLiter(s) (100 mL/Hr) IV Continuous <Continuous>  dextrose 5%. 1000 milliLiter(s) (50 mL/Hr) IV Continuous <Continuous>  dextrose 50% Injectable 25 Gram(s) IV Push once  dextrose 50% Injectable 12.5 Gram(s) IV Push once  dextrose 50% Injectable 25 Gram(s) IV Push once  enoxaparin Injectable 40 milliGRAM(s) SubCutaneous every 24 hours  fluticasone propionate 50 MICROgram(s)/spray Nasal Spray 1 Spray(s) Both Nostrils two times a day  glucagon  Injectable 1 milliGRAM(s) IntraMuscular once  glycopyrrolate 9 MICROgram(s)/formoterol 4.8 MICROgram(s) Inhaler 2 Puff(s) Inhalation two times a day  guaiFENesin  milliGRAM(s) Oral <User Schedule>  hydrocortisone sodium succinate Injectable 100 milliGRAM(s) IV Push three times a day  insulin lispro (ADMELOG) corrective regimen sliding scale   SubCutaneous three times a day before meals  insulin lispro (ADMELOG) corrective regimen sliding scale   SubCutaneous at bedtime  mesalamine DR Capsule 800 milliGRAM(s) Oral three times a day  montelukast 10 milliGRAM(s) Oral daily  pancrelipase  (CREON 36,000 Lipase Units) 2 Capsule(s) Oral three times a day with meals  pantoprazole  Injectable 40 milliGRAM(s) IV Push two times a day  sodium chloride 0.9%. 1000 milliLiter(s) (75 mL/Hr) IV Continuous <Continuous>    MEDICATIONS  (PRN):  acetaminophen     Tablet .. 650 milliGRAM(s) Oral every 6 hours PRN Mild Pain (1 - 3), Moderate Pain (4 - 6)  dextrose Oral Gel 15 Gram(s) Oral once PRN Blood Glucose LESS THAN 70 milliGRAM(s)/deciliter  simethicone 80 milliGRAM(s) Chew daily PRN Gas      CAPILLARY BLOOD GLUCOSE      POCT Blood Glucose.: 122 mg/dL (19 Jan 2023 22:21)  POCT Blood Glucose.: 138 mg/dL (19 Jan 2023 17:48)  POCT Blood Glucose.: 183 mg/dL (19 Jan 2023 12:53)  POCT Blood Glucose.: 124 mg/dL (19 Jan 2023 09:25)    I&O's Summary      PHYSICAL EXAM:  Vital Signs Last 24 Hrs  T(C): 36.6 (20 Jan 2023 05:27), Max: 37.4 (19 Jan 2023 15:47)  T(F): 97.9 (20 Jan 2023 05:27), Max: 99.3 (19 Jan 2023 15:47)  HR: 61 (20 Jan 2023 05:27) (60 - 86)  BP: 148/74 (20 Jan 2023 05:27) (130/75 - 150/80)  BP(mean): --  RR: 18 (20 Jan 2023 05:27) (17 - 18)  SpO2: 100% (20 Jan 2023 05:27) (97% - 100%)    Parameters below as of 20 Jan 2023 05:27  Patient On (Oxygen Delivery Method): room air        GENERAL: NAD, lying comfortably in bed  HEAD: Atraumatic, normocephalic  EYES: EOMI b/l, conjunctiva and sclera clear  NECK: Supple   RESPIRATORY: Normal respiratory effort; lungs are clear to auscultation bilaterally  CARDIOVASCULAR: distant heart sound, difficult to appreciate; regular rate and rhythm, normal S1 and S2, no murmur/rub/gallop; No lower extremity edema  ABDOMEN: Nontender, nondistended, soft, normoactive bowel sounds, no rebound/guarding   MUSCULOSKELETAL:  no joint swelling or tenderness to palpation, ambulates on his own   NEURO: Non focal   SKIN: no rashes noted   PSYCH: A+O to person, place, and time; affect appropriate          LABS:                        9.1    11.15 )-----------( 383      ( 20 Jan 2023 06:30 )             29.0     01-20    135  |  102  |  9   ----------------------------<  132<H>  3.2<L>   |  24  |  0.82    Ca    8.3<L>      20 Jan 2023 06:30  Phos  3.0     01-20  Mg     1.80     01-20                        RADIOLOGY:    Consulted note reviewed  Reviewed Imaging personally

## 2023-01-20 NOTE — PHYSICAL THERAPY INITIAL EVALUATION ADULT - GENERAL OBSERVATIONS, REHAB EVAL
Pt received semisupine in bed, in NAD. Pt agreeable to participate in PT evaluation. Pt left seated on EOB, all needs met, call bell within reach and RN aware.

## 2023-01-21 LAB
CULTURE RESULTS: SIGNIFICANT CHANGE UP
CULTURE RESULTS: SIGNIFICANT CHANGE UP
SPECIMEN SOURCE: SIGNIFICANT CHANGE UP
SPECIMEN SOURCE: SIGNIFICANT CHANGE UP

## 2023-02-02 ENCOUNTER — OUTPATIENT (OUTPATIENT)
Dept: OUTPATIENT SERVICES | Facility: HOSPITAL | Age: 73
LOS: 1 days | End: 2023-02-02
Payer: MEDICARE

## 2023-02-02 ENCOUNTER — APPOINTMENT (OUTPATIENT)
Dept: CT IMAGING | Facility: IMAGING CENTER | Age: 73
End: 2023-02-02
Payer: MEDICARE

## 2023-02-02 DIAGNOSIS — Z98.890 OTHER SPECIFIED POSTPROCEDURAL STATES: Chronic | ICD-10-CM

## 2023-02-02 DIAGNOSIS — M12.9 ARTHROPATHY, UNSPECIFIED: Chronic | ICD-10-CM

## 2023-02-02 DIAGNOSIS — Z00.8 ENCOUNTER FOR OTHER GENERAL EXAMINATION: ICD-10-CM

## 2023-02-02 PROCEDURE — 71250 CT THORAX DX C-: CPT | Mod: 26,MH

## 2023-02-02 PROCEDURE — 71250 CT THORAX DX C-: CPT | Mod: MH

## 2023-03-01 NOTE — ASU PATIENT PROFILE, ADULT - TOBACCO USE
I reviewed the H&P, I examined the patient, and there are no changes in the patient's condition.   
Former smoker

## 2023-03-15 NOTE — H&P PST ADULT - DOCUMENT STATUS
[facility] AMBULATORY ENCOUNTER  ENDOCRINOLOGY OFFICE VISIT      PRIMARY CARE PROVIDER:   Kevin Larose MD  Requesting Physician:  Ariel Washington MD    CHIEF COMPLAINT:  Chief Complaint   Patient presents with   • Thyroid Problem     Follow-Up       SUBJECTIVE:  Lenny Olivia is a 73 year old male who we are seeing in consultation regarding thyrotoxicosis found to be Graves.  Previous notes were reviewed by this provider.     Problem   Graves Disease    Consulted regarding thyrotoxicosis  Onset:  2017 labs are normal, first lab which shows TSH suppression is 2/2018  TSI positive 2/2020  On MMI since 10/2019.  This is Graves.     Insomnia:  No  Exophthalmos:  No  Dry eyes/Sicca: No, replaced lenses due to glaucoma  Palpitations: No, afib history  Excessive sweating: No  Heat intolerance:  No  Hyperdefication  No (2-3 x a day, this is long-standing)  Tremors  No  Weight loss not attributable to diet and exercise:  Not that he is aware   Anterior neck pain:  No  Anterior neck pain radiating to ear:   No  Recent viral URI: No    Recent Steroid:  None  Recent Dopamine Agonist:   No  Recent Somatostatin Analog:   No  Recent IV CT contrast: 2/2023  Thyroid Supplements/Iodine Supplements:  No  Seaweed/Kelp:   No  Biotin intake:  No  Lithium use/history:  No  Smoking ongoing:   No, quit in 1995 when CABG; nobody smoking in house  History of thyroid US: Yes, 10/2019.  Only cyst noted  History of thyroid nodules No  History of thyroid biopsy  No  History of thyroid surgery  No  Mother had thyroid issue, but he is unsure what  History of thyroid Cancer in a first degree relative:  No, but mother had unknown type of cancer  Personal history of radiation treatment before age 16:  No    Last amiodarone use was from 12/2016 - 7/2017.  Prior to that in 2011.    Present dose:  MMI 2.5 mg qd  No missed doses.     Wt: 103.8 kg (105.3 kg)         PAST HISTORIES:  ALLERGIES:  No Known Allergies    Past Medical History:   Diagnosis  Date   • Abnormal right ventricle 2013    Moderately decreased systolic function   • Acute duodenal ulcer with hemorrhage 06/08/2014   • Adrenal mass (CMD) 11/2/2022    CT Chest 11/28/22:  2.1 cm (nonspecific nodule).  [place] to be stable on CT chest, abdomen, and pelvis 06/20/2022.  Pt sees endocrinology/   • Anemia    • Aortic regurgitation 2013    Mild   • Aortic valve calcification 2013    Moderate   • Arthritis 07/11/2015    Patient states intermittent knee pain after working from knee up to hip   • Atrial dilatation, left     Severe   • Atypical angina (CMD)    • Cardiac LV ejection fraction 10-20% 03/21/2012    20% --> 55% (2015)   • Cataracts, bilateral    • Coronary artery disease 1995   • Diabetes mellitus type 2, uncontrolled 2012   • Dilation of pulmonary artery (CMD)     CT Chest 11/28/22:  3.2 cm   • Duodenal ulcer 06/2014   • Gastric erosions 06/08/2014   • Graves disease 01/15/2020    Consulted regarding thyrotoxicosis Onset:  2017 labs are normal, first lab which shows TSH suppression is 2/2018 TSI positive 2/2020 On MMI since 10/2019.  This is Graves.   Insomnia:  No Exophthalmos:  No Dry eyes/Sicca: No, replaced lenses due to glaucoma Palpitations: No, afib history Excessive sweating: No Heat intolerance:  No Hyperdefication  No (2-3 x a day, this is long-standing) Tremors     • Hyperlipidemia    • Hypertension     borderline   • Hyperthyroidism 08/2018   • Ischemic cardiomyopathy    • Left ventricular dilatation 2013    Moderate   • Left ventricular hypertrophy 2013    Mild   • MI (myocardial infarction) (CMD)    • NSVT (nonsustained ventricular tachycardia) 10/30/2022   • Obstructive sleep apnea     c-pap   • Persistent atrial fibrillation (CMD) 1995    s/p failed cardioversion x3, failed ablation, AICD   • Pulmonary nodules 11/2019    Lung Biopsy 11/2019   • S/P cardiac catheterization     Left   • S/P carotid endarterectomy Bilateral   • Sinusitis, chronic    • Status post ablation of atrial  fibrillation     Left       Past Surgical History:   Procedure Laterality Date   • Cardiac defibrillator placement  10/2010    medtronic   • Cardioversion external     • Colonoscopy  2014    internal hemorrhoid  diverticula   • Coronary angiogram - cv  2013   • Coronary angioplasty with stent placement  12     3.5x15mm bare metal Integrity stent  SVG to PDA   • Coronary artery bypass graft      4 vessel   • Cystoscopy,ureteroscopy,lithotripsy Left 2019    with left ureteral stent insertion   • Echocardiogram     • Ep ablation  2010    • Esophagogastroduodenoscopy  2014    duodenal ulcer   • Eye surgery Right 2018   • Open coronary endarterectomy  bilateral    • Reoper, carotid endartec>1 mon      bilateral   • Stress echocardiogram  13    Ed       Family History   Problem Relation Age of Onset   • Cancer Mother         had lymphoma   • Heart disease Father         abdominal aortic aneurysm,coronary artery and bypass surgery   • Heart disease Sister         CAD Stent       Social History     Tobacco Use   • Smoking status: Former     Packs/day: 1.00     Years: 20.00     Pack years: 20.00     Types: Cigarettes     Quit date: 1995     Years since quittin.1   • Smokeless tobacco: Never   Substance Use Topics   • Alcohol use: No     Alcohol/week: 0.0 standard drinks     Comment: quit  per pt       MEDICATIONS:    He is currently taking  Current Outpatient Medications   Medication Sig Dispense Refill   • furosemide (LASIX) 40 MG tablet Take 1 tablet by mouth 3 times daily. Take two tablets in the am and one tablet at noon (Patient taking differently: Take 40 mg by mouth 2 times daily. Take two tablets in the am and one tablet at noon) 270 tablet 3   • carvedilol (COREG) 25 MG tablet TAKE 1 TABLET BY MOUTH  TWICE DAILY WITH MEALS 180 tablet 3   • rosuvastatin (CRESTOR) 10 MG tablet TAKE 1 TABLET BY MOUTH  DAILY 90 tablet 3   • niacin (NIASPAN) 500 MG CR  tablet TAKE 1 TABLET BY MOUTH 3  TIMES DAILY 270 tablet 3   • metFORMIN (GLUCOPHAGE) 500 MG tablet TAKE 2 TABLETS BY MOUTH  TWICE DAILY WITH MEALS 360 tablet 3   • OneTouch Ultra test strip USE TO CHECK BLOOD SUGAR  TWICE DAILY 200 strip 3   • olmesartan (BENICAR) 5 MG tablet TAKE 2 TABLETS BY MOUTH  DAILY 180 tablet 3   • albuterol 108 (90 Base) MCG/ACT inhaler Inhale 2 puffs into the lungs every 4 hours as needed for Shortness of Breath or Wheezing. 8.5 g 12   • guaiFENesin (MUCINEX) 600 MG 12 hr tablet Take by mouth 2 times daily as needed for Congestion.     • warfarin (COUMADIN) 2.5 MG tablet Take 1 tablet by mouth every 7 days. On Tuesdays 15 tablet 1   • warfarin (COUMADIN) 5 MG tablet Take 1 tablet by mouth 6 days a week. Daily except Tuesday 85 tablet 1   • NON FORMULARY daily. Ground up coconut     • magnesium gluconate (MAGONATE) 500 MG tablet Take 500 mg by mouth daily.     • methIMAzole (TAPAZOLE) 5 MG tablet Take 0.5 tablets by mouth daily. 45 tablet 3   • fluticasone-umeclidin-vilanterol (Trelegy Ellipta) 100-62.5-25 MCG/INH inhaler Inhale 1 puff into the lungs daily. 1 each 11   • DISPENSE daily. Amla     • potassium chloride (KLOR-CON) 10 MEQ ER tablet Take 1 tablet by mouth daily. 90 tablet 3   • fluticasone (FLONASE) 50 MCG/ACT nasal spray Spray 1 spray in each nostril daily. (Patient taking differently: Spray 1 spray in each nostril as needed.) 16 g 12   • triamcinolone (ARISTOCORT) 0.1 % ointment Apply to the affected areas twice daily for 2 weeks as needed for flares. 80 g 0   • vitamin B-12 (CYANOCOBALAMIN) 1000 MCG tablet Take 1,000 mcg by mouth daily.     • aspirin (ECOTRIN) 81 MG EC tablet Take 1 tablet by mouth daily. 30 tablet 11   • Ascorbic Acid (VITAMIN C) 500 MG tablet Take 500 mg by mouth daily.       • cholecalciferol (VITAMIN D3) 1000 UNIT tablet Take 1,000 Units by mouth daily.     • Multiple Vitamins-Minerals (MULTIVITAL) tablet Take 1 tablet by mouth daily.       No current  facility-administered medications for this visit.       REVIEW OF SYSTEMS:  Deferred [Pertinent positives were confirmed with the patient.  This collected information will relate the patient's understanding of their medical conditions and is part of the subjective portion of this medical document.  There may be conditions which the patient does not acknowledge as part of their medical history which the provider will be able to clarify in the physician-obtained subjective, objective, and/or assesment/plan portion of this document.  The patient was advised to speak with their primary care provider as soon as possible regarding ongoing symptoms/conditions which have arisen that are not related to endocrinology issues being addressed at this visit.  The patient was advised to seek medical attention if these specific issues worsen.   Review of Systems   Constitutional: Negative for chills and fever.        No night sweats   HENT: Negative for voice change.    Respiratory: Negative for shortness of breath.         No asthma, +COPD   Cardiovascular: Negative for chest pain.        +CHF, no MI, +arrhythmia   Gastrointestinal: Negative for constipation, diarrhea, nausea and vomiting.        No hepatitis, no pancreatitis   Endocrine: Negative for cold intolerance and heat intolerance.        +thyroid problems personally  +thyroid problems in the family   Genitourinary: Negative for dysuria.        No kidney failure  +kidney stones   Musculoskeletal: Negative for gait problem.        No osteoporosis  No osteopenia  No fracture history   Skin:        No purple striae  No darkening of the skin as a whole   Neurological: Negative for seizures and numbness.        No CVA   Hematological: Bruises/bleeds easily.        No DVT, no PE   No personal history of cancer   Psychiatric/Behavioral: Negative for confusion.   ]    OBJECTIVE  PHYSICAL EXAM:  VITAL SIGNS:    Visit Vitals  /68 (BP Location: RUE - Right upper extremity,  Patient Position: Sitting, Cuff Size: Large Adult)   Pulse 93   Wt 103.8 kg (228 lb 12.8 oz)   SpO2 91%   BMI 34.79 kg/m²     Body mass index is 34.79 kg/m².      Physical Exam  Vitals and nursing note reviewed.   Constitutional:       Appearance: He is well-developed.   HENT:      Head: Normocephalic.   Eyes:      Conjunctiva/sclera: Conjunctivae normal.      Comments: No lid lag, no stare   Neck:      Thyroid: No thyromegaly (left does feel larger than right as US shows).      Trachea: No tracheal deviation.      Comments: No supraclavicular adenopathy appreciated  Cardiovascular:      Rate and Rhythm: Normal rate.      Comments: Distant heart sounds, possible systolic murmur  Irregular rhythm  Pulmonary:      Effort: No respiratory distress.      Breath sounds: Normal breath sounds. No wheezing or rales.   Abdominal:      General: Bowel sounds are normal. There is no distension.      Palpations: Abdomen is soft. There is no mass.      Tenderness: There is no abdominal tenderness.      Comments: No hepatosplenomegaly appreciated   Musculoskeletal:         General: No tenderness.      Comments: No calf pain elicited   Lymphadenopathy:      Cervical: No cervical adenopathy.   Skin:     Capillary Refill: Capillary refill takes less than 2 seconds.      Coloration: Skin is not pale.   Neurological:      Mental Status: He is alert.      Comments: 2/4 brachioradialis reflex, bilaterally   Psychiatric:         Mood and Affect: Mood normal.         RESULTS REVIEW:  TSH (mcUnits/mL)   Date Value   03/09/2023 3.021   01/12/2023 3.954   11/28/2022 2.200   11/14/2022 0.011 (L)   09/10/2022 2.754   08/22/2022 2.494   07/25/2022 3.790   06/20/2022 3.980   04/26/2022 2.789   03/09/2022 3.778     T3, Free (pg/mL)   Date Value   01/12/2023 2.5   11/28/2022 2.2   11/14/2022 2.6   09/10/2022 2.4   08/22/2022 2.5   07/25/2022 2.6   06/20/2022 2.5   04/26/2022 2.3   03/09/2022 2.5   01/21/2022 2.3     T4, Free (ng/dL)   Date Value    03/09/2023 0.9   01/12/2023 0.9   11/28/2022 1.0   11/14/2022 1.0   09/10/2022 1.0   08/22/2022 0.9   07/25/2022 1.0   06/20/2022 0.9   04/26/2022 1.0   03/09/2022 1.0     THYROID PEROXIDASE A (Units/mL)   Date Value   10/03/2019 <28     Thyroid Peroxidase Antibody (Units/mL)   Date Value   04/26/2022 <28     TSI (no units)   Date Value   10/26/2020 1.58 (H)   02/20/2020 3.42 (H)     Thyroid Stimulating Immunoglobulin (IU/L)   Date Value   03/09/2023 2.24 (H)   04/26/2022 1.09 (H)   08/17/2021 1.99 (H)     Glomerular Filtration Rate (no units)   Date Value   03/02/2023 90   02/16/2023 >90   12/19/2022 >90   11/28/2022 >90   06/29/2022 79   06/20/2022 >90   04/21/2022 >90   04/20/2022 81   03/02/2022 81   10/27/2021 89       GOT/AST (Units/L)   Date Value   11/28/2022 15     GPT/ALT (Units/L)   Date Value   11/28/2022 30     No results found for: GGTP  Alkaline Phosphatase (Units/L)   Date Value   11/28/2022 88     Bilirubin, Total (mg/dL)   Date Value   11/28/2022 1.0       ASSESSMENT:  1. Graves disease    2. Adrenal mass (CMD)        PLAN:  Problem List Items Addressed This Visit        Endocrine and Metabolic    Graves disease - Primary     He is tolerating medication well.     He may have surgery for aorta upcoming.  Uncertain yet.     No recent steroid.  Pill consistency is that it breaks with cutting, but always this way.     No other interfering substances seen.     Plan due to TSH which was 0.011  1) Repeat lab now  2) If really abnormal TSH as before, we will increase dose to MMI 5 mg every other day with 2.5 mg every other day with monthly lab checks.   3) If normal on repeat, check labs in 2 months.     Repeat wasn't done and by time we contacted him he had increased to 1/2 alternating with 1 tab (5mg).  Given TSH elevation, we can reduce dose to 1/2 tab again.  This shift may be because of contrast dye.     TSI was elevated in 2/2020.   Recheck in 10/2020 was still elevated, though trending to  improvement. TSI still elevated in 2021.   US showed enlarged lobe, but no nodules.  This ruled out toxic MNG and toxic nodule.    Discussed possible etiologies and treatment modalities with patient.    Clinically, this was likely Graves given the patient was on high dose of MMI and had not developed (started med only on 10/2019, BID initially and increased to TID) hypothyroidism as would be expected with thyroiditis (would not expect thyroiditis to be ongoing for nearly 2 years).    TSI confirmed diagnosis of Graves.  Will check again in 2 months.     TSH normalized 2019, only time we had experienced further reduction in TSH was in 10/2021.  TSH was lower than preferred in 10/2021 so we used more MMI.  2.5 mg every other day was previous dose and shifted to 2.5 mg daily.    Prior TSH was ideal (higher than previous, but still normal), but FT3 is was higher value with FT4 roughly unchanged.  TSI still elevated in 2022, but TPO Ab was negative.  He was leaning towards STINSON ablation, but considering longer use of MMI presently. He has tolerated MMI well. Increased dose due to TSH getting lower, however, this may have been due to IV contrast dye effect.      He appears clinically euthyroid.     Informed patient of the risk profile of MMI including aplasia cutis of the , hyperbilirubinemia, leukopenia.  The patient was advised to seek medical attention for yellowing of eyes or skin, fever or flu symptoms which may herald leukopenia (specifically call this service or PCP to get CBC performed).    PTU use reserved for incidence of allergy to MMI specifically that doesn't involve shared adverse effects for the two agents, 1st trimester of pregnancy, thyroid storm.      Given unpredictable nature of MMI testing monthly so that we can adjust dose readily before transition out of goal.  Now that we have some stability would change testing to 3 months.  Advised him we could have him check earlier if he felt  symptoms emerge of either hypo - or hyperthyroidism    The goal for treatment is 1.5-2 years, which would put time of discontinuation around 4/2021-10/21.     Recent article has cited that TPO Ab can help clarify who will recur.      He is euthyroid in appearance.  He feels well and is tolerating med well.    Had 2 years of therapy was in 10/2021. We did not achieve cure yet so we are extended therapy another 6 months to 4/2022. Still no cure was achieved.       Informed patient that other medical conditions can be masked or affected by thyroid hormone excess of deficiency.  In transition to normal thyroid hormone levels, new medical conditions may emerge or previous medical condition may be destabilized due to effect of thyroid hormone on medical condition or on medication metabolism. Advised caution and close contact with primary service/other services if issues should emerge.     Informed patient about concept of thyroid storm and reasons to be cautious in scenarios of concomitant illness and need to seek emergency medical assistance for potential life-threatening disease state that thyroid storm produces.     We have discussed definitive therapy options.  We were trying for 2.5 years of med therapy, but that has not been successful.     Given in 10/2021 we were not idealized in terms of TSI, he wanted to trial another 6 months (until 4/2022).  Most recent TSI still high in 3/2023 and even higher than 4/2022 value.      Informed patient about single [place] study that showed better resilience of cure state with longer use of MMI.  That study did not appear to have safety as an end-point of design.  Many experts have latched onto this study as evidence of safety for long-duration of therapy.  I don't have comfort level nor clinical experience with long-term therapy (beyond 2.5 years) as if adverse event happens, which is a random event, then there is rush to definitive therapy which is surgery during what would  likely be active thyrotoxicosis or radioactive iodine ablation which takes 3-6 months to be completely effective.  These are both non-idealized and dangerous scenarios.     He had decided about continuing MMI now that I have mentioned this study data.  He understands above potential concerns for MMI use and adverse effect which may prompt sudden discontinuation and then \"rush toward definitive therapy.\"    In terms of HR, on repeat was ideal around 65.  Advised he check periodically at home.  At present no chest pain, dizziness, dyspnea. He was advised to be watchful for such.     Back in 2 months.          Relevant Orders    THYROID STIMULATING HORMONE    FREE T4    FREE T3    Adrenal mass (CMD)    Relevant Orders    DHEA SULFATE    CORTISOL, AM    ADRENOCORTICOTROPIC HORMONE    PLASMA METANEPHRINES    ALDOSTERONE AND RENIN ACTIVITY RATIO     FOLLOW-UP:  Return in about 2 months (around 5/15/2023).    I spent a total of 31 minutes on the day of the visit.  This includes pre-charting, chart review and documenting.    Instructions provided as documented in the After Visit Summary. Patient was advised to seek medical attention for worsening of medical condition or lack of improvement. All questions were answered by this physician and plan was formulated and discussed in detail with the patient.     A copy of this note was sent to the referring provider. Thank you for the kind referral and the opportunity to care for this patient.    COPY TO:  MD Ariel Velazquez MD    SIGNED:  Kerri Caceres DO  3/15/2023 4:49 PM   Authored by Resident/PA/NP

## 2023-03-22 ENCOUNTER — INPATIENT (INPATIENT)
Facility: HOSPITAL | Age: 73
LOS: 4 days | Discharge: HOME HEALTH | End: 2023-03-27
Attending: INTERNAL MEDICINE | Admitting: INTERNAL MEDICINE
Payer: MEDICARE

## 2023-03-22 VITALS
TEMPERATURE: 98 F | HEART RATE: 76 BPM | RESPIRATION RATE: 25 BRPM | OXYGEN SATURATION: 97 % | DIASTOLIC BLOOD PRESSURE: 70 MMHG | SYSTOLIC BLOOD PRESSURE: 137 MMHG

## 2023-03-22 DIAGNOSIS — R77.8 OTHER SPECIFIED ABNORMALITIES OF PLASMA PROTEINS: ICD-10-CM

## 2023-03-22 DIAGNOSIS — E11.9 TYPE 2 DIABETES MELLITUS WITHOUT COMPLICATIONS: ICD-10-CM

## 2023-03-22 DIAGNOSIS — J47.9 BRONCHIECTASIS, UNCOMPLICATED: ICD-10-CM

## 2023-03-22 DIAGNOSIS — Z98.890 OTHER SPECIFIED POSTPROCEDURAL STATES: Chronic | ICD-10-CM

## 2023-03-22 DIAGNOSIS — M12.9 ARTHROPATHY, UNSPECIFIED: Chronic | ICD-10-CM

## 2023-03-22 DIAGNOSIS — J44.1 CHRONIC OBSTRUCTIVE PULMONARY DISEASE WITH (ACUTE) EXACERBATION: ICD-10-CM

## 2023-03-22 DIAGNOSIS — K51.90 ULCERATIVE COLITIS, UNSPECIFIED, WITHOUT COMPLICATIONS: ICD-10-CM

## 2023-03-22 DIAGNOSIS — Z29.9 ENCOUNTER FOR PROPHYLACTIC MEASURES, UNSPECIFIED: ICD-10-CM

## 2023-03-22 DIAGNOSIS — J18.9 PNEUMONIA, UNSPECIFIED ORGANISM: ICD-10-CM

## 2023-03-22 DIAGNOSIS — G44.89 OTHER HEADACHE SYNDROME: ICD-10-CM

## 2023-03-22 LAB
ALBUMIN SERPL ELPH-MCNC: 3.6 G/DL — SIGNIFICANT CHANGE UP (ref 3.3–5)
ALP SERPL-CCNC: 58 U/L — SIGNIFICANT CHANGE UP (ref 40–120)
ALT FLD-CCNC: 13 U/L — SIGNIFICANT CHANGE UP (ref 4–41)
ANION GAP SERPL CALC-SCNC: 13 MMOL/L — SIGNIFICANT CHANGE UP (ref 7–14)
ANION GAP SERPL CALC-SCNC: 9 MMOL/L — SIGNIFICANT CHANGE UP (ref 7–14)
APTT BLD: 23.2 SEC — LOW (ref 27–36.3)
AST SERPL-CCNC: 66 U/L — HIGH (ref 4–40)
B PERT DNA SPEC QL NAA+PROBE: SIGNIFICANT CHANGE UP
B PERT+PARAPERT DNA PNL SPEC NAA+PROBE: SIGNIFICANT CHANGE UP
BASE EXCESS BLDV CALC-SCNC: 1.5 MMOL/L — SIGNIFICANT CHANGE UP (ref -2–3)
BASOPHILS # BLD AUTO: 0.08 K/UL — SIGNIFICANT CHANGE UP (ref 0–0.2)
BASOPHILS NFR BLD AUTO: 0.4 % — SIGNIFICANT CHANGE UP (ref 0–2)
BILIRUB SERPL-MCNC: 0.5 MG/DL — SIGNIFICANT CHANGE UP (ref 0.2–1.2)
BLOOD GAS VENOUS COMPREHENSIVE RESULT: SIGNIFICANT CHANGE UP
BORDETELLA PARAPERTUSSIS (RAPRVP): SIGNIFICANT CHANGE UP
BUN SERPL-MCNC: 12 MG/DL — SIGNIFICANT CHANGE UP (ref 7–23)
BUN SERPL-MCNC: 12 MG/DL — SIGNIFICANT CHANGE UP (ref 7–23)
C PNEUM DNA SPEC QL NAA+PROBE: SIGNIFICANT CHANGE UP
CALCIUM SERPL-MCNC: 8.4 MG/DL — SIGNIFICANT CHANGE UP (ref 8.4–10.5)
CALCIUM SERPL-MCNC: 8.9 MG/DL — SIGNIFICANT CHANGE UP (ref 8.4–10.5)
CHLORIDE BLDV-SCNC: 100 MMOL/L — SIGNIFICANT CHANGE UP (ref 96–108)
CHLORIDE SERPL-SCNC: 97 MMOL/L — LOW (ref 98–107)
CHLORIDE SERPL-SCNC: 98 MMOL/L — SIGNIFICANT CHANGE UP (ref 98–107)
CK MB BLD-MCNC: 6.5 % — HIGH (ref 0–2.5)
CK MB CFR SERPL CALC: 5.4 NG/ML — SIGNIFICANT CHANGE UP
CK SERPL-CCNC: 83 U/L — SIGNIFICANT CHANGE UP (ref 30–200)
CO2 BLDV-SCNC: 28.6 MMOL/L — HIGH (ref 22–26)
CO2 SERPL-SCNC: 23 MMOL/L — SIGNIFICANT CHANGE UP (ref 22–31)
CO2 SERPL-SCNC: 24 MMOL/L — SIGNIFICANT CHANGE UP (ref 22–31)
CREAT SERPL-MCNC: 0.71 MG/DL — SIGNIFICANT CHANGE UP (ref 0.5–1.3)
CREAT SERPL-MCNC: 0.75 MG/DL — SIGNIFICANT CHANGE UP (ref 0.5–1.3)
EGFR: 96 ML/MIN/1.73M2 — SIGNIFICANT CHANGE UP
EGFR: 98 ML/MIN/1.73M2 — SIGNIFICANT CHANGE UP
EOSINOPHIL # BLD AUTO: 0.06 K/UL — SIGNIFICANT CHANGE UP (ref 0–0.5)
EOSINOPHIL NFR BLD AUTO: 0.3 % — SIGNIFICANT CHANGE UP (ref 0–6)
FLUAV SUBTYP SPEC NAA+PROBE: SIGNIFICANT CHANGE UP
FLUBV RNA SPEC QL NAA+PROBE: SIGNIFICANT CHANGE UP
GAS PNL BLDV: 130 MMOL/L — LOW (ref 136–145)
GAS PNL BLDV: SIGNIFICANT CHANGE UP
GLUCOSE BLDC GLUCOMTR-MCNC: 210 MG/DL — HIGH (ref 70–99)
GLUCOSE BLDV-MCNC: 130 MG/DL — HIGH (ref 70–99)
GLUCOSE SERPL-MCNC: 122 MG/DL — HIGH (ref 70–99)
GLUCOSE SERPL-MCNC: 186 MG/DL — HIGH (ref 70–99)
HADV DNA SPEC QL NAA+PROBE: SIGNIFICANT CHANGE UP
HCO3 BLDV-SCNC: 27 MMOL/L — SIGNIFICANT CHANGE UP (ref 22–29)
HCOV 229E RNA SPEC QL NAA+PROBE: SIGNIFICANT CHANGE UP
HCOV HKU1 RNA SPEC QL NAA+PROBE: SIGNIFICANT CHANGE UP
HCOV NL63 RNA SPEC QL NAA+PROBE: SIGNIFICANT CHANGE UP
HCOV OC43 RNA SPEC QL NAA+PROBE: SIGNIFICANT CHANGE UP
HCT VFR BLD CALC: 35.3 % — LOW (ref 39–50)
HCT VFR BLDA CALC: 31 % — LOW (ref 39–51)
HGB BLD CALC-MCNC: 10.3 G/DL — LOW (ref 12.6–17.4)
HGB BLD-MCNC: 10.7 G/DL — LOW (ref 13–17)
HMPV RNA SPEC QL NAA+PROBE: SIGNIFICANT CHANGE UP
HPIV1 RNA SPEC QL NAA+PROBE: SIGNIFICANT CHANGE UP
HPIV2 RNA SPEC QL NAA+PROBE: SIGNIFICANT CHANGE UP
HPIV3 RNA SPEC QL NAA+PROBE: SIGNIFICANT CHANGE UP
HPIV4 RNA SPEC QL NAA+PROBE: SIGNIFICANT CHANGE UP
IANC: 18.1 K/UL — HIGH (ref 1.8–7.4)
IMM GRANULOCYTES NFR BLD AUTO: 1.4 % — HIGH (ref 0–0.9)
INR BLD: 1.14 RATIO — SIGNIFICANT CHANGE UP (ref 0.88–1.16)
LACTATE BLDV-MCNC: 1.5 MMOL/L — SIGNIFICANT CHANGE UP (ref 0.5–2)
LYMPHOCYTES # BLD AUTO: 0.55 K/UL — LOW (ref 1–3.3)
LYMPHOCYTES # BLD AUTO: 2.8 % — LOW (ref 13–44)
M PNEUMO DNA SPEC QL NAA+PROBE: SIGNIFICANT CHANGE UP
MCHC RBC-ENTMCNC: 25.8 PG — LOW (ref 27–34)
MCHC RBC-ENTMCNC: 30.3 GM/DL — LOW (ref 32–36)
MCV RBC AUTO: 85.3 FL — SIGNIFICANT CHANGE UP (ref 80–100)
MONOCYTES # BLD AUTO: 0.88 K/UL — SIGNIFICANT CHANGE UP (ref 0–0.9)
MONOCYTES NFR BLD AUTO: 4.4 % — SIGNIFICANT CHANGE UP (ref 2–14)
NEUTROPHILS # BLD AUTO: 18.1 K/UL — HIGH (ref 1.8–7.4)
NEUTROPHILS NFR BLD AUTO: 90.7 % — HIGH (ref 43–77)
NRBC # BLD: 0 /100 WBCS — SIGNIFICANT CHANGE UP (ref 0–0)
NRBC # FLD: 0 K/UL — SIGNIFICANT CHANGE UP (ref 0–0)
PCO2 BLDV: 47 MMHG — SIGNIFICANT CHANGE UP (ref 42–55)
PH BLDV: 7.37 — SIGNIFICANT CHANGE UP (ref 7.32–7.43)
PLATELET # BLD AUTO: 393 K/UL — SIGNIFICANT CHANGE UP (ref 150–400)
PO2 BLDV: 23 MMHG — LOW (ref 25–45)
POTASSIUM BLDV-SCNC: 4 MMOL/L — SIGNIFICANT CHANGE UP (ref 3.5–5.1)
POTASSIUM SERPL-MCNC: 3.8 MMOL/L — SIGNIFICANT CHANGE UP (ref 3.5–5.3)
POTASSIUM SERPL-MCNC: 6.2 MMOL/L — CRITICAL HIGH (ref 3.5–5.3)
POTASSIUM SERPL-SCNC: 3.8 MMOL/L — SIGNIFICANT CHANGE UP (ref 3.5–5.3)
POTASSIUM SERPL-SCNC: 6.2 MMOL/L — CRITICAL HIGH (ref 3.5–5.3)
PROT SERPL-MCNC: 7.2 G/DL — SIGNIFICANT CHANGE UP (ref 6–8.3)
PROTHROM AB SERPL-ACNC: 13.2 SEC — SIGNIFICANT CHANGE UP (ref 10.5–13.4)
RAPID RVP RESULT: SIGNIFICANT CHANGE UP
RBC # BLD: 4.14 M/UL — LOW (ref 4.2–5.8)
RBC # FLD: 15.6 % — HIGH (ref 10.3–14.5)
RSV RNA SPEC QL NAA+PROBE: SIGNIFICANT CHANGE UP
RV+EV RNA SPEC QL NAA+PROBE: SIGNIFICANT CHANGE UP
SAO2 % BLDV: 29.8 % — LOW (ref 67–88)
SARS-COV-2 RNA SPEC QL NAA+PROBE: SIGNIFICANT CHANGE UP
SODIUM SERPL-SCNC: 130 MMOL/L — LOW (ref 135–145)
SODIUM SERPL-SCNC: 134 MMOL/L — LOW (ref 135–145)
TROPONIN T, HIGH SENSITIVITY RESULT: 150 NG/L — CRITICAL HIGH
TROPONIN T, HIGH SENSITIVITY RESULT: 157 NG/L — CRITICAL HIGH
WBC # BLD: 19.95 K/UL — HIGH (ref 3.8–10.5)
WBC # FLD AUTO: 19.95 K/UL — HIGH (ref 3.8–10.5)

## 2023-03-22 PROCEDURE — 71045 X-RAY EXAM CHEST 1 VIEW: CPT | Mod: 26

## 2023-03-22 PROCEDURE — 99223 1ST HOSP IP/OBS HIGH 75: CPT

## 2023-03-22 PROCEDURE — 99222 1ST HOSP IP/OBS MODERATE 55: CPT

## 2023-03-22 PROCEDURE — 99285 EMERGENCY DEPT VISIT HI MDM: CPT | Mod: FS

## 2023-03-22 RX ORDER — CEFTRIAXONE 500 MG/1
1000 INJECTION, POWDER, FOR SOLUTION INTRAMUSCULAR; INTRAVENOUS ONCE
Refills: 0 | Status: COMPLETED | OUTPATIENT
Start: 2023-03-22 | End: 2023-03-22

## 2023-03-22 RX ORDER — AZITHROMYCIN 500 MG/1
TABLET, FILM COATED ORAL
Refills: 0 | Status: DISCONTINUED | OUTPATIENT
Start: 2023-03-22 | End: 2023-03-24

## 2023-03-22 RX ORDER — PANTOPRAZOLE SODIUM 20 MG/1
1 TABLET, DELAYED RELEASE ORAL
Qty: 0 | Refills: 0 | DISCHARGE

## 2023-03-22 RX ORDER — FAMOTIDINE 10 MG/ML
20 INJECTION INTRAVENOUS
Refills: 0 | Status: DISCONTINUED | OUTPATIENT
Start: 2023-03-22 | End: 2023-03-27

## 2023-03-22 RX ORDER — LANOLIN ALCOHOL/MO/W.PET/CERES
3 CREAM (GRAM) TOPICAL AT BEDTIME
Refills: 0 | Status: DISCONTINUED | OUTPATIENT
Start: 2023-03-22 | End: 2023-03-27

## 2023-03-22 RX ORDER — ASPIRIN/CALCIUM CARB/MAGNESIUM 324 MG
324 TABLET ORAL ONCE
Refills: 0 | Status: COMPLETED | OUTPATIENT
Start: 2023-03-22 | End: 2023-03-22

## 2023-03-22 RX ORDER — ACETAMINOPHEN 500 MG
650 TABLET ORAL EVERY 6 HOURS
Refills: 0 | Status: DISCONTINUED | OUTPATIENT
Start: 2023-03-22 | End: 2023-03-27

## 2023-03-22 RX ORDER — ENOXAPARIN SODIUM 100 MG/ML
40 INJECTION SUBCUTANEOUS EVERY 24 HOURS
Refills: 0 | Status: DISCONTINUED | OUTPATIENT
Start: 2023-03-22 | End: 2023-03-27

## 2023-03-22 RX ORDER — SODIUM CHLORIDE 9 MG/ML
1000 INJECTION, SOLUTION INTRAVENOUS
Refills: 0 | Status: DISCONTINUED | OUTPATIENT
Start: 2023-03-22 | End: 2023-03-27

## 2023-03-22 RX ORDER — LOSARTAN POTASSIUM 100 MG/1
25 TABLET, FILM COATED ORAL DAILY
Refills: 0 | Status: DISCONTINUED | OUTPATIENT
Start: 2023-03-22 | End: 2023-03-27

## 2023-03-22 RX ORDER — DEXTROSE 50 % IN WATER 50 %
12.5 SYRINGE (ML) INTRAVENOUS ONCE
Refills: 0 | Status: DISCONTINUED | OUTPATIENT
Start: 2023-03-22 | End: 2023-03-27

## 2023-03-22 RX ORDER — PIPERACILLIN AND TAZOBACTAM 4; .5 G/20ML; G/20ML
3.38 INJECTION, POWDER, LYOPHILIZED, FOR SOLUTION INTRAVENOUS ONCE
Refills: 0 | Status: COMPLETED | OUTPATIENT
Start: 2023-03-22 | End: 2023-03-22

## 2023-03-22 RX ORDER — MONTELUKAST 4 MG/1
10 TABLET, CHEWABLE ORAL DAILY
Refills: 0 | Status: DISCONTINUED | OUTPATIENT
Start: 2023-03-22 | End: 2023-03-27

## 2023-03-22 RX ORDER — MESALAMINE 400 MG
800 TABLET, DELAYED RELEASE (ENTERIC COATED) ORAL THREE TIMES A DAY
Refills: 0 | Status: DISCONTINUED | OUTPATIENT
Start: 2023-03-22 | End: 2023-03-27

## 2023-03-22 RX ORDER — GUAIFENESIN/DEXTROMETHORPHAN 600MG-30MG
1 TABLET, EXTENDED RELEASE 12 HR ORAL
Qty: 0 | Refills: 0 | DISCHARGE

## 2023-03-22 RX ORDER — MESALAMINE 400 MG
1 TABLET, DELAYED RELEASE (ENTERIC COATED) ORAL
Qty: 0 | Refills: 0 | DISCHARGE

## 2023-03-22 RX ORDER — LIPASE/PROTEASE/AMYLASE 16-48-48K
2 CAPSULE,DELAYED RELEASE (ENTERIC COATED) ORAL
Refills: 0 | Status: DISCONTINUED | OUTPATIENT
Start: 2023-03-22 | End: 2023-03-27

## 2023-03-22 RX ORDER — DEXTROSE 50 % IN WATER 50 %
25 SYRINGE (ML) INTRAVENOUS ONCE
Refills: 0 | Status: DISCONTINUED | OUTPATIENT
Start: 2023-03-22 | End: 2023-03-27

## 2023-03-22 RX ORDER — PIPERACILLIN AND TAZOBACTAM 4; .5 G/20ML; G/20ML
3.38 INJECTION, POWDER, LYOPHILIZED, FOR SOLUTION INTRAVENOUS EVERY 8 HOURS
Refills: 0 | Status: DISCONTINUED | OUTPATIENT
Start: 2023-03-23 | End: 2023-03-24

## 2023-03-22 RX ORDER — PANTOPRAZOLE SODIUM 20 MG/1
40 TABLET, DELAYED RELEASE ORAL
Refills: 0 | Status: DISCONTINUED | OUTPATIENT
Start: 2023-03-22 | End: 2023-03-27

## 2023-03-22 RX ORDER — GABAPENTIN 400 MG/1
300 CAPSULE ORAL THREE TIMES A DAY
Refills: 0 | Status: DISCONTINUED | OUTPATIENT
Start: 2023-03-22 | End: 2023-03-27

## 2023-03-22 RX ORDER — EMPAGLIFLOZIN 10 MG/1
1 TABLET, FILM COATED ORAL
Qty: 0 | Refills: 0 | DISCHARGE

## 2023-03-22 RX ORDER — GLUCAGON INJECTION, SOLUTION 0.5 MG/.1ML
1 INJECTION, SOLUTION SUBCUTANEOUS ONCE
Refills: 0 | Status: DISCONTINUED | OUTPATIENT
Start: 2023-03-22 | End: 2023-03-27

## 2023-03-22 RX ORDER — BUDESONIDE AND FORMOTEROL FUMARATE DIHYDRATE 160; 4.5 UG/1; UG/1
2 AEROSOL RESPIRATORY (INHALATION)
Refills: 0 | Status: DISCONTINUED | OUTPATIENT
Start: 2023-03-22 | End: 2023-03-27

## 2023-03-22 RX ORDER — DEXTROSE 50 % IN WATER 50 %
15 SYRINGE (ML) INTRAVENOUS ONCE
Refills: 0 | Status: DISCONTINUED | OUTPATIENT
Start: 2023-03-22 | End: 2023-03-27

## 2023-03-22 RX ORDER — INSULIN LISPRO 100/ML
VIAL (ML) SUBCUTANEOUS AT BEDTIME
Refills: 0 | Status: DISCONTINUED | OUTPATIENT
Start: 2023-03-22 | End: 2023-03-27

## 2023-03-22 RX ORDER — INSULIN LISPRO 100/ML
VIAL (ML) SUBCUTANEOUS
Refills: 0 | Status: DISCONTINUED | OUTPATIENT
Start: 2023-03-22 | End: 2023-03-27

## 2023-03-22 RX ORDER — IPRATROPIUM/ALBUTEROL SULFATE 18-103MCG
3 AEROSOL WITH ADAPTER (GRAM) INHALATION ONCE
Refills: 0 | Status: COMPLETED | OUTPATIENT
Start: 2023-03-22 | End: 2023-03-22

## 2023-03-22 RX ORDER — AZITHROMYCIN 500 MG/1
500 TABLET, FILM COATED ORAL EVERY 24 HOURS
Refills: 0 | Status: DISCONTINUED | OUTPATIENT
Start: 2023-03-23 | End: 2023-03-24

## 2023-03-22 RX ORDER — AZITHROMYCIN 500 MG/1
500 TABLET, FILM COATED ORAL ONCE
Refills: 0 | Status: COMPLETED | OUTPATIENT
Start: 2023-03-22 | End: 2023-03-22

## 2023-03-22 RX ORDER — FLUTICASONE PROPIONATE 50 MCG
2 SPRAY, SUSPENSION NASAL
Refills: 0 | Status: DISCONTINUED | OUTPATIENT
Start: 2023-03-22 | End: 2023-03-27

## 2023-03-22 RX ADMIN — Medication 3 MILLILITER(S): at 10:46

## 2023-03-22 RX ADMIN — FAMOTIDINE 20 MILLIGRAM(S): 10 INJECTION INTRAVENOUS at 19:35

## 2023-03-22 RX ADMIN — Medication 125 MILLIGRAM(S): at 10:46

## 2023-03-22 RX ADMIN — AZITHROMYCIN 255 MILLIGRAM(S): 500 TABLET, FILM COATED ORAL at 21:21

## 2023-03-22 RX ADMIN — ENOXAPARIN SODIUM 40 MILLIGRAM(S): 100 INJECTION SUBCUTANEOUS at 19:34

## 2023-03-22 RX ADMIN — Medication 324 MILLIGRAM(S): at 11:39

## 2023-03-22 RX ADMIN — Medication 2 SPRAY(S): at 19:36

## 2023-03-22 RX ADMIN — MONTELUKAST 10 MILLIGRAM(S): 4 TABLET, CHEWABLE ORAL at 19:47

## 2023-03-22 RX ADMIN — PIPERACILLIN AND TAZOBACTAM 200 GRAM(S): 4; .5 INJECTION, POWDER, LYOPHILIZED, FOR SOLUTION INTRAVENOUS at 19:36

## 2023-03-22 RX ADMIN — Medication 800 MILLIGRAM(S): at 23:14

## 2023-03-22 RX ADMIN — CEFTRIAXONE 100 MILLIGRAM(S): 500 INJECTION, POWDER, FOR SOLUTION INTRAMUSCULAR; INTRAVENOUS at 10:51

## 2023-03-22 RX ADMIN — GABAPENTIN 300 MILLIGRAM(S): 400 CAPSULE ORAL at 23:14

## 2023-03-22 RX ADMIN — LOSARTAN POTASSIUM 25 MILLIGRAM(S): 100 TABLET, FILM COATED ORAL at 19:34

## 2023-03-22 RX ADMIN — PIPERACILLIN AND TAZOBACTAM 25 GRAM(S): 4; .5 INJECTION, POWDER, LYOPHILIZED, FOR SOLUTION INTRAVENOUS at 23:15

## 2023-03-22 RX ADMIN — BUDESONIDE AND FORMOTEROL FUMARATE DIHYDRATE 2 PUFF(S): 160; 4.5 AEROSOL RESPIRATORY (INHALATION) at 23:14

## 2023-03-22 NOTE — ED PROVIDER NOTE - OBJECTIVE STATEMENT
73 y/o male pmh htn, COPD not on home O2, UC, c/o cough and sob x 3 days. Pt admits to productive cough with green/brownish sputum. Pt admits to sob and chills as well. pt denies using albuterol at home because his pulmonologist discontinued it. Pt also state that he was in the hospital x2 months ago for UC flare and was dc on prednisone taper which he completed last week. Pt denies chest pain, diaphoresis, abd pain, n/v/d, numbness, tingling, weakness, dizziness or syncope.

## 2023-03-22 NOTE — ED ADULT TRIAGE NOTE - CHIEF COMPLAINT QUOTE
Pt c/o SOB that started Monday night, also endorsing a cough. Says "I am coughing up brown mucous". Appears tachypneic in triage. Placed on 2L NC for comfort. Phx: COPD (not on home O2), DM, hernia, HTN. .

## 2023-03-22 NOTE — H&P ADULT - NSICDXPASTMEDICALHX_GEN_ALL_CORE_FT
PAST MEDICAL HISTORY:  Bronchiectasis     Chronic obstructive pulmonary disease     DM (diabetes mellitus) type II    Gastroparesis with pylorus dysfunction    Hypertension     Inguinal hernia     Ulcerative colitis

## 2023-03-22 NOTE — H&P ADULT - NSHPREVIEWOFSYSTEMS_GEN_ALL_CORE
Review of Systems:   CONSTITUTIONAL: No fever. +Chills. Occasional dizziness, lightheadedness. +generalized weakness episode   EYES: No eye pain, visual disturbances, or discharge  ENMT:  No difficulty hearing, tinnitus, vertigo; No sinus or throat pain  RESPIRATORY: +SOB. + cough, No wheezing or hemoptysis  CARDIOVASCULAR: +chest pain only associated with coughing, No palpitations, or leg swelling  GASTROINTESTINAL: No abdominal or epigastric pain. No nausea, vomiting, or hematemesis; No diarrhea or constipation. No melena or hematochezia. +anorexia  GENITOURINARY: No dysuria, frequency, hematuria, or incontinence  NEUROLOGICAL: +headaches, No memory loss, numbness, or tremors  SKIN: No itching, burning, rashes, or lesions   MUSCULOSKELETAL: No joint pain or swelling; No muscle, back pain  PSYCHIATRIC: No depression, anxiety, mood swings, or difficulty sleeping  HEME/LYMPH: No easy bruising, or bleeding gums

## 2023-03-22 NOTE — PATIENT PROFILE ADULT - FUNCTIONAL ASSESSMENT - BASIC MOBILITY 6.
4-calculated by average/Not able to assess (calculate score using [facility] averaging method)

## 2023-03-22 NOTE — H&P ADULT - NSHPLABSRESULTS_GEN_ALL_CORE
10.7   19.95 )-----------( 393      ( 22 Mar 2023 10:15 )             35.3    03-22    134<L>  |  98  |  12  ----------------------------<  186<H>  3.8   |  23  |  0.71    Ca    8.4      22 Mar 2023 11:38    TPro  7.2  /  Alb  3.6  /  TBili  0.5  /  DBili  x   /  AST  66<H>  /  ALT  13  /  AlkPhos  58  03-22    Troponin T, High Sensitivity Result: 157    < from: Xray Chest 1 View- PORTABLE-Urgent (Xray Chest 1 View- PORTABLE-Urgent .) (03.22.23 @ 12:54) >    FINDINGS:    The heart is not accurately assessed in this AP projection.  Mild increase in the changes since the last exam There is no pneumothorax   or pleural effusion.    IMPRESSION:  Bibasilar reticulonodular opacities, greater on the right, which may be   infectious in etiology. These have progressed compared to prior chest CT.    --- End of Report ---    < end of copied text > 10.7   19.95 )-----------( 393      ( 22 Mar 2023 10:15 )             35.3    03-22    134<L>  |  98  |  12  ----------------------------<  186<H>  3.8   |  23  |  0.71    Ca    8.4      22 Mar 2023 11:38    TPro  7.2  /  Alb  3.6  /  TBili  0.5  /  DBili  x   /  AST  66<H>  /  ALT  13  /  AlkPhos  58  03-22    Troponin T, High Sensitivity Result: 157    < from: Xray Chest 1 View- PORTABLE-Urgent (Xray Chest 1 View- PORTABLE-Urgent .) (03.22.23 @ 12:54) >    FINDINGS:    The heart is not accurately assessed in this AP projection.  Mild increase in the changes since the last exam There is no pneumothorax   or pleural effusion.    IMPRESSION:  Bibasilar reticulonodular opacities, greater on the right, which may be   infectious in etiology. These have progressed compared to prior chest CT.    --- End of Report ---    < end of copied text >    EKG with sinus rhythm 1st degree block. No ST elevations

## 2023-03-22 NOTE — ED ADULT NURSE NOTE - OBJECTIVE STATEMENT
Pt presents to the ED with Pt presents to the ED with wife due to shortness of breath, chest pain. On assessment pt tachypenic and labored breathing. Respirations 25 breathes per minute. Pt placed on 2L via nasal cannula. Pt reports PMH of COPD, HTN, DM, and ulcerative colitis. Pt alert and oriented x4. Pt reports chills at night, and chest pain only when coughing. Cardiac monitor on; ST noted at rate 110 bpm. 20g IV placed in R wrist. Pt medicated per MD orders. Support ongoing. [place]

## 2023-03-22 NOTE — H&P ADULT - PROBLEM SELECTOR PLAN 3
Global headache since discharge in February. Worse in AM concerning for possible nighttime hypoxia, no neurologic symptoms.   - Tylenol for now and monitor SpO2 overnight.

## 2023-03-22 NOTE — CONSULT NOTE ADULT - ASSESSMENT
73M w/ hx of HTN, COPD, UC, presenting with 2 days of worsening dyspnea, sputum production, and change in sputum quality, overall consistent with COPD exacerbation. Cardiology consulted for elevation in troponin. Pt remains chest pain free and hemodynamically stable    #Elevated troponin:  - unlikely to represent ACS as pt is chest pain free, EKG nonspecific, hemodynamically stable  - likely 2/2 demand from COPD exacerbation  - trend troponin-hs to peak  - if pt with chest pain stat trop/ekg  - if pt is being admitted reasonable to check TTE during this admission however if otherwise being cleared for discharge, TTE can be deferred for outpt follow-up    Discussed with consult attending.    Johnson Mixon MD  Cardiology Fellow - PGY4    Please check [email or web address]: [email or web address] for cardiology service schedule and contact information.  72M w/ hx of HTN, COPD, UC, presenting with 2 days of worsening dyspnea, sputum production, and change in sputum quality, overall consistent with COPD exacerbation. Cardiology consulted for elevation in troponin. Pt remains chest pain free and hemodynamically stable    #Elevated troponin:  - unlikely to represent ACS as pt is chest pain free, EKG nonspecific, hemodynamically stable  - likely 2/2 demand from COPD exacerbation  - trend troponin-hs to peak  - if pt with chest pain stat trop/ekg  - if pt is being admitted reasonable to check TTE during this admission however if otherwise being cleared for discharge, TTE can be deferred for outpt follow-up    Discussed with consult attending.    Johnson Mixon MD  Cardiology Fellow - PGY4    Please check [email or web address]: [email or web address] for cardiology service schedule and contact information.

## 2023-03-22 NOTE — H&P ADULT - ASSESSMENT
73M w/ hx of HTN, COPD, UC, presenting with 2 days of worsening dyspnea, sputum production, and change in sputum quality admitted for COPD exacerbation, possible pneumonia, found to have elevated troponin 73M w/ hx of HTN, COPD, UC, presenting with 2 days of worsening dyspnea, sputum production, and change in sputum quality admitted for COPD exacerbation, possible pneumonia meeting sepsis criteria, found to have elevated troponin. 72M w/ hx of HTN, COPD, UC, presenting with 2 days of worsening dyspnea, sputum production, and change in sputum quality admitted for COPD exacerbation, possible pneumonia meeting sepsis criteria, found to have elevated troponin.

## 2023-03-22 NOTE — ED PROVIDER NOTE - NS ED ATTENDING STATEMENT MOD
This was a shared visit with the JUAN FRANCISCO. I reviewed and verified the documentation and independently performed the documented:

## 2023-03-22 NOTE — ED ADULT NURSE REASSESSMENT NOTE - NS ED NURSE REASSESS COMMENT FT1
report given to essu 3 rn sadaf. pt a&ox4 ambulatory with no complaints. bilateral iv in tact. pt in NAD and moved to essu 3

## 2023-03-22 NOTE — ED PROVIDER NOTE - ATTENDING APP SHARED VISIT CONTRIBUTION OF CARE
Agree with PA note  72-year-old male with past medical history of hypertension, COPD not on home O2, recent diagnosis of bronchiectasis presents with cough and shortness of breath for 3 days.  Was recently admitted to the hospital end of January and discharged early February for colitis.  States has been doing fine at home until 2 to 3 days ago when started coughing with productive sputum.  First green in nature and now is turned to brown.  Denies fevers.  Denies chest pain.  Physical exam  Mild to moderate respiratory distress, not hypoxic  Vital signs stable  Diffuse rhonchi in lower lung fields patient  S1-S2 no murmurs rubs or gallops  Abdomen soft nontender nondistended  Extremities no edema  Impression  Likely COPD exacerbation we will get labs including proBNP and troponin, EKG, chest x-ray  Will give DuoNebs and antibiotics  Patient will need to be admitted for tachypnea and dyspnea

## 2023-03-22 NOTE — CONSULT NOTE ADULT - SUBJECTIVE AND OBJECTIVE BOX
Cardiology Consult Note   [Please check [email or web address]: [email or web address] for cardiology service schedule and contact information]    HPI:  73M w/ hx of HTN, COPD, UC, presenting with 2 days of worsening dyspnea, sputum production, and change in sputum quality. Cardiology consulted for elevation in troponin.    The pt states for last 2 days he has noticed worsening dyspnea, sputum production, and change in sputum from green to brown. He also felt subjective chills on day prior to presentation. Otherwise denies recent chest pain, light-head/syncope, palpitations, orthopnea/pnd, LE swelling.      PAST MEDICAL & SURGICAL HISTORY:  Gastroparesis  with pylorus dysfunction      DM (diabetes mellitus)  type II      Chronic obstructive pulmonary disease      Hypertension      Ulcerative colitis      Inguinal hernia      Arthropathy  left knee replacement 2010      History of repair of hiatal hernia  childhood      S/P endoscopy  POEM 10/2021, 11/2021        FAMILY HISTORY:  FH: diabetes mellitus (Mother)      SOCIAL HISTORY:  unchanged    MEDICATIONS:                  REVIEW OF SYSTEMS:  CV: chest pain (-), palpitation (-), orthopnea (-), PND (-), edema (-)  PULM: SOB (+), cough (+), wheezing (-), hemoptysis (-).   CONST: fever (-), chills (-) or fatigue (-)  GI: abdominal distension (-), abdominal pain (-) , nausea/vomiting (-), hematemesis, (-), melena (-), hematochezia (-)  : dysuria (-), frequency (-), hematuria (-).   NEURO: numbness (-), weakness (-), dizziness (-)  SKIN: itching (-), rash (-)  HEENT:  visual changes (-); vertigo or throat pain (-);  neck stiffness (-)     All other review of systems is negative unless indicated above.   -------------------------------------------------------------------------------------------  PHYSICAL EXAM:  T(C): 37.1 (03-22-23 @ 15:48), Max: 37.1 (03-22-23 @ 15:48)  HR: 72 (03-22-23 @ 15:48) (72 - 82)  BP: 132/79 (03-22-23 @ 15:48) (132/79 - 142/78)  RR: 22 (03-22-23 @ 15:48) (16 - 25)  SpO2: 96% (03-22-23 @ 15:48) (96% - 98%)  Wt(kg): --  I&O's Summary      GENERAL: NAD  HEAD: Atraumatic, Normocephalic.  EYES: EOMI, PERRLA, conjunctiva and sclera clear.  ENT: Moist mucous membranes.  NECK: Supple, No JVD.  CHEST/LUNG: Poor air movement with diffuse +crackles and mild expiratory wheezes  HEART: Regular rate and rhythm; No murmurs, rubs, or gallops.  ABDOMEN: Bowel sounds present; Soft, Nontender, Nondistended.   EXTREMITIES:  2+ Peripheral Pulses, brisk capillary refill. No clubbing, cyanosis, or edema.  PSYCH: Normal affect.  SKIN: No rashes or lesions.    -------------------------------------------------------------------------------------------  LABS:                          10.7   19.95 )-----------( 393      ( 22 Mar 2023 10:15 )             35.3     03-22    134<L>  |  98  |  12  ----------------------------<  186<H>  3.8   |  23  |  0.71    Ca    8.4      22 Mar 2023 11:38    TPro  7.2  /  Alb  3.6  /  TBili  0.5  /  DBili  x   /  AST  66<H>  /  ALT  13  /  AlkPhos  58  03-22    PT/INR - ( 22 Mar 2023 10:15 )   PT: 13.2 sec;   INR: 1.14 ratio         PTT - ( 22 Mar 2023 10:15 )  PTT:23.2 sec  CARDIAC MARKERS ( 22 Mar 2023 10:15 )  157 ng/L / x     / x     / x     / x     / x                  EKG: NSR w/ PAC's, no MENDEZ or STD    -------------------------------------------------------------------------------------------

## 2023-03-22 NOTE — ED ADULT NURSE REASSESSMENT NOTE - NS ED NURSE REASSESS COMMENT FT1
report rcd from marimar fagan. pt a&ox4 with no new complaints. 20g to the RH and LAC with no redness or swelling. pt denies chest pain, sob, nausea, vomiting, dizziness, headache. pt respirations even and unlabored. pt medicated as per md orders. pt in NAD and stable pending essu 3 at this time.

## 2023-03-22 NOTE — H&P ADULT - HISTORY OF PRESENT ILLNESS
73M w/ hx of HTN, COPD, UC, presenting with 2 days of worsening dyspnea, sputum production, and change in sputum quality. Dyspnea is not associated with exertion and happens intermittently. Reports he woke up Monday AM with chills and generalized weakness, unable to walk to the bathroom. He did have the dyspnea at this time. He reports temporary improvement in symptoms with his home inhalers, but reports he has been off of albuterol x several months due to pulmonology recommendations. Notably he was hospitalized in late Jan/early Feb for a UC flare treated with steroid taper that completed last week (last dose Wed 3/15). Came in today because he reports his green mucus changed to brown and he had ongoing dyspnea. His wife also reports he has been unable to eat due to lack of appetite x 2 days. He reports he has had some chest and abdominal pain only with coughing fits. Further, he notes a global headache that has been present since he was discharged from the hospital in February, is worse in the mornings, and is 8/10 in intensity at its worst. Denies vision changes or numbness/tingling.     In ED, afebrile, HR 76, /70, RR 25, SpO2 97% on RA. WBC 20k, troponin 157. CXR with worsening R sided reticular opacities from recent chest CT.  Given ASA 324mg, Solumedrol 125mg, CTX 1g, Albuterol x1. 72M w/ hx of HTN, COPD, UC, presenting with 2 days of worsening dyspnea, sputum production, and change in sputum quality. Dyspnea is not associated with exertion and happens intermittently. Reports he woke up Monday AM with chills and generalized weakness, unable to walk to the bathroom. He did have the dyspnea at this time. He reports temporary improvement in symptoms with his home inhalers, but reports he has been off of albuterol x several months due to pulmonology recommendations. Notably he was hospitalized in late Jan/early Feb for a UC flare treated with steroid taper that completed last week (last dose Wed 3/15). Came in today because he reports his green mucus changed to brown and he had ongoing dyspnea. His wife also reports he has been unable to eat due to lack of appetite x 2 days. He reports he has had some chest and abdominal pain only with coughing fits. Further, he notes a global headache that has been present since he was discharged from the hospital in February, is worse in the mornings, and is 8/10 in intensity at its worst. Denies vision changes or numbness/tingling.     In ED, afebrile, HR 76, /70, RR 25, SpO2 97% on RA. WBC 20k, troponin 157. CXR with worsening R sided reticular opacities from recent chest CT.  Given ASA 324mg, Solumedrol 125mg, CTX 1g, Albuterol x1.

## 2023-03-22 NOTE — H&P ADULT - NSHPPHYSICALEXAM_GEN_ALL_CORE
Vital Signs Last 24 Hrs  T(C): 37.1 (22 Mar 2023 15:48), Max: 37.1 (22 Mar 2023 15:48)  T(F): 98.7 (22 Mar 2023 15:48), Max: 98.7 (22 Mar 2023 15:48)  HR: 72 (22 Mar 2023 15:48) (72 - 82)  BP: 132/79 (22 Mar 2023 15:48) (132/79 - 142/78)  BP(mean): --  RR: 22 (22 Mar 2023 15:48) (16 - 25)  SpO2: 96% (22 Mar 2023 15:48) (96% - 98%)    Parameters below as of 22 Mar 2023 15:48  Patient On (Oxygen Delivery Method): room air        CONSTITUTIONAL: Well-groomed, in no apparent distress  EYES: No conjunctival or scleral injection, non-icteric; PERRLA and symmetric  ENMT: No external nasal lesions, moist mucous membranes  RESPIRATORY: Breathing comfortably on RA; lungs with rhonchi and scattered wheezes worse in RLL.  CARDIOVASCULAR: +S1S2, RRR, no M/G/R; pedal pulses full and symmetric; no lower extremity edema  GASTROINTESTINAL: No palpable masses or tenderness, +BS throughout, no rebound/guarding;  LYMPHATIC: No cervical LAD or tenderness  MUSCULOSKELETAL: no digital clubbing or cyanosis; examination of the limbs without misalignment, normal strength and tone of extremities  SKIN: No rashes or ulcers noted; no subcutaneous nodules or induration palpable  NEUROLOGIC: CN II-XII intact; normal reflexes in upper and lower extremities; sensation intact in LEs b/l to light touch. No focal deficit.   PSYCHIATRIC: A+O x 3; mood and affect appropriate; appropriate insight and judgment enlarged lymph nodes

## 2023-03-22 NOTE — H&P ADULT - PROBLEM SELECTOR PLAN 1
Increased sputum production, dyspnea, cough with tachypnea but no hypoxia, concern for COPD exacerbation i/s/o recent taper off of steroids for UC flare. S/p CTX and steroids in ED. CXR findings with worsening of RLL opacity. Given hx bronchiectasis on imaging, will tx with antipseudomonal abx. Not currently on supplemental O2  - Tx with Zosyn for antipseudomonal, Azithromycin for atypical coverage  - Duonebs q6h PRN  - Prednisone 40mg  - On Breztri at home, will give budesonide/formoterol inhaler  - Home Montelukast  - Fluticasone nasal spray Increased sputum production, dyspnea, cough with tachypnea but no hypoxia, concern for COPD exacerbation i/s/o recent taper off of steroids for UC flare. S/p CTX and steroids in ED. CXR findings with worsening of RLL opacity. Given hx bronchiectasis on imaging, will tx with antipseudomonal abx. Not currently on supplemental O2  - Tx with Zosyn for antipseudomonal, Azithromycin for atypical coverage  - Duonebs q6h PRN  - F/u BCx, Sputum Cx, Urine Legionellla  - Prednisone 40mg  - On Breztri at home, will give budesonide/formoterol inhaler  - Home Montelukast  - Fluticasone nasal spray

## 2023-03-22 NOTE — ED PROVIDER NOTE - CLINICAL SUMMARY MEDICAL DECISION MAKING FREE TEXT BOX
71 y/o male pmh htn, dm, COPD not on home O2, UC c/o cough, sob and chills x 3 days. Pt is well appearing but tachypnic on initial presentation. No chest pain or vomiting, afebrile- concern for COPD exacerbation vs PNA vs ACS- will obtain labs, cultures, duonebs, cxr, abx, admit

## 2023-03-22 NOTE — H&P ADULT - PROBLEM SELECTOR PLAN 2
Troponin 157. Unlikely to be ACS without chest pain or EKG changes suggestive of active ischemia  - Appreciate cardiology consult  - May benefit from statin  - Trend troponin to peak  - TTE as patient will be inpatient

## 2023-03-23 LAB
ANION GAP SERPL CALC-SCNC: 13 MMOL/L — SIGNIFICANT CHANGE UP (ref 7–14)
BUN SERPL-MCNC: 11 MG/DL — SIGNIFICANT CHANGE UP (ref 7–23)
CALCIUM SERPL-MCNC: 9.2 MG/DL — SIGNIFICANT CHANGE UP (ref 8.4–10.5)
CHLORIDE SERPL-SCNC: 97 MMOL/L — LOW (ref 98–107)
CO2 SERPL-SCNC: 23 MMOL/L — SIGNIFICANT CHANGE UP (ref 22–31)
CREAT SERPL-MCNC: 0.74 MG/DL — SIGNIFICANT CHANGE UP (ref 0.5–1.3)
EGFR: 97 ML/MIN/1.73M2 — SIGNIFICANT CHANGE UP
GLUCOSE BLDC GLUCOMTR-MCNC: 158 MG/DL — HIGH (ref 70–99)
GLUCOSE BLDC GLUCOMTR-MCNC: 159 MG/DL — HIGH (ref 70–99)
GLUCOSE BLDC GLUCOMTR-MCNC: 216 MG/DL — HIGH (ref 70–99)
GLUCOSE BLDC GLUCOMTR-MCNC: 227 MG/DL — HIGH (ref 70–99)
GLUCOSE SERPL-MCNC: 201 MG/DL — HIGH (ref 70–99)
GRAM STN FLD: SIGNIFICANT CHANGE UP
HCT VFR BLD CALC: 34.3 % — LOW (ref 39–50)
HGB BLD-MCNC: 10.8 G/DL — LOW (ref 13–17)
MAGNESIUM SERPL-MCNC: 1.9 MG/DL — SIGNIFICANT CHANGE UP (ref 1.6–2.6)
MCHC RBC-ENTMCNC: 25.5 PG — LOW (ref 27–34)
MCHC RBC-ENTMCNC: 31.5 GM/DL — LOW (ref 32–36)
MCV RBC AUTO: 81.1 FL — SIGNIFICANT CHANGE UP (ref 80–100)
NRBC # BLD: 0 /100 WBCS — SIGNIFICANT CHANGE UP (ref 0–0)
NRBC # FLD: 0 K/UL — SIGNIFICANT CHANGE UP (ref 0–0)
PHOSPHATE SERPL-MCNC: 3.2 MG/DL — SIGNIFICANT CHANGE UP (ref 2.5–4.5)
PLATELET # BLD AUTO: 400 K/UL — SIGNIFICANT CHANGE UP (ref 150–400)
POTASSIUM SERPL-MCNC: 3.9 MMOL/L — SIGNIFICANT CHANGE UP (ref 3.5–5.3)
POTASSIUM SERPL-SCNC: 3.9 MMOL/L — SIGNIFICANT CHANGE UP (ref 3.5–5.3)
RBC # BLD: 4.23 M/UL — SIGNIFICANT CHANGE UP (ref 4.2–5.8)
RBC # FLD: 15 % — HIGH (ref 10.3–14.5)
SODIUM SERPL-SCNC: 133 MMOL/L — LOW (ref 135–145)
SPECIMEN SOURCE: SIGNIFICANT CHANGE UP
TROPONIN T, HIGH SENSITIVITY RESULT: 138 NG/L — CRITICAL HIGH
WBC # BLD: 15.93 K/UL — HIGH (ref 3.8–10.5)
WBC # FLD AUTO: 15.93 K/UL — HIGH (ref 3.8–10.5)

## 2023-03-23 PROCEDURE — 99223 1ST HOSP IP/OBS HIGH 75: CPT

## 2023-03-23 PROCEDURE — 99232 SBSQ HOSP IP/OBS MODERATE 35: CPT

## 2023-03-23 RX ADMIN — Medication 800 MILLIGRAM(S): at 06:54

## 2023-03-23 RX ADMIN — BUDESONIDE AND FORMOTEROL FUMARATE DIHYDRATE 2 PUFF(S): 160; 4.5 AEROSOL RESPIRATORY (INHALATION) at 10:07

## 2023-03-23 RX ADMIN — Medication 800 MILLIGRAM(S): at 12:53

## 2023-03-23 RX ADMIN — Medication 2: at 18:02

## 2023-03-23 RX ADMIN — Medication 2 SPRAY(S): at 06:45

## 2023-03-23 RX ADMIN — Medication 1: at 08:50

## 2023-03-23 RX ADMIN — GABAPENTIN 300 MILLIGRAM(S): 400 CAPSULE ORAL at 12:53

## 2023-03-23 RX ADMIN — Medication 2 SPRAY(S): at 18:02

## 2023-03-23 RX ADMIN — Medication 2: at 12:52

## 2023-03-23 RX ADMIN — PIPERACILLIN AND TAZOBACTAM 25 GRAM(S): 4; .5 INJECTION, POWDER, LYOPHILIZED, FOR SOLUTION INTRAVENOUS at 12:54

## 2023-03-23 RX ADMIN — BUDESONIDE AND FORMOTEROL FUMARATE DIHYDRATE 2 PUFF(S): 160; 4.5 AEROSOL RESPIRATORY (INHALATION) at 21:25

## 2023-03-23 RX ADMIN — Medication 40 MILLIGRAM(S): at 06:55

## 2023-03-23 RX ADMIN — ENOXAPARIN SODIUM 40 MILLIGRAM(S): 100 INJECTION SUBCUTANEOUS at 19:04

## 2023-03-23 RX ADMIN — GABAPENTIN 300 MILLIGRAM(S): 400 CAPSULE ORAL at 21:26

## 2023-03-23 RX ADMIN — Medication 2 CAPSULE(S): at 10:07

## 2023-03-23 RX ADMIN — Medication 2 CAPSULE(S): at 12:52

## 2023-03-23 RX ADMIN — FAMOTIDINE 20 MILLIGRAM(S): 10 INJECTION INTRAVENOUS at 18:03

## 2023-03-23 RX ADMIN — Medication 2 CAPSULE(S): at 18:01

## 2023-03-23 RX ADMIN — PANTOPRAZOLE SODIUM 40 MILLIGRAM(S): 20 TABLET, DELAYED RELEASE ORAL at 06:43

## 2023-03-23 RX ADMIN — FAMOTIDINE 20 MILLIGRAM(S): 10 INJECTION INTRAVENOUS at 06:44

## 2023-03-23 RX ADMIN — MONTELUKAST 10 MILLIGRAM(S): 4 TABLET, CHEWABLE ORAL at 12:52

## 2023-03-23 RX ADMIN — AZITHROMYCIN 255 MILLIGRAM(S): 500 TABLET, FILM COATED ORAL at 21:25

## 2023-03-23 RX ADMIN — GABAPENTIN 300 MILLIGRAM(S): 400 CAPSULE ORAL at 06:43

## 2023-03-23 RX ADMIN — LOSARTAN POTASSIUM 25 MILLIGRAM(S): 100 TABLET, FILM COATED ORAL at 06:42

## 2023-03-23 RX ADMIN — PIPERACILLIN AND TAZOBACTAM 25 GRAM(S): 4; .5 INJECTION, POWDER, LYOPHILIZED, FOR SOLUTION INTRAVENOUS at 21:32

## 2023-03-23 RX ADMIN — Medication 800 MILLIGRAM(S): at 21:32

## 2023-03-23 RX ADMIN — PIPERACILLIN AND TAZOBACTAM 25 GRAM(S): 4; .5 INJECTION, POWDER, LYOPHILIZED, FOR SOLUTION INTRAVENOUS at 06:38

## 2023-03-23 NOTE — PROGRESS NOTE ADULT - ASSESSMENT
73M w/ hx of HTN, COPD, UC, presenting with 2 days of worsening dyspnea, sputum production, and change in sputum quality, overall consistent with COPD exacerbation, now admitted to medicine for further care. Cardiology consulted for elevation in troponin at presentation. Pt remains chest pain free and hemodynamically stable.    #Elevated troponin:  - unlikely to have represented ACS as pt was chest pain free, EKG nonspecific, hemodynamically stable  - likely 2/2 demand from COPD exacerbation  - trop-hs 157 -> 150 -> 138, can stop trending  - if pt with chest pain stat trop/ekg, call cardiology  - f/u TTE    Johnson Mixon MD  Cardiology Fellow - PGY4    Please check [email or web address] password: [email or web address]" for cardiology service schedule and contact information.    72M w/ hx of HTN, COPD, UC, presenting with 2 days of worsening dyspnea, sputum production, and change in sputum quality, overall consistent with COPD exacerbation, now admitted to medicine for further care. Cardiology consulted for elevation in troponin at presentation. Pt remains chest pain free and hemodynamically stable.    #Elevated troponin:  - unlikely to have represented ACS as pt was chest pain free, EKG nonspecific, hemodynamically stable  - likely 2/2 demand from COPD exacerbation  - trop-hs 157 -> 150 -> 138, can stop trending  - if pt with chest pain stat trop/ekg, call cardiology  - f/u TTE    Johnson Mixon MD  Cardiology Fellow - PGY4    Please check [email or web address] password: [email or web address]" for cardiology service schedule and contact information.

## 2023-03-23 NOTE — CONSULT NOTE ADULT - SUBJECTIVE AND OBJECTIVE BOX
Pulmonary Consult Note    BALA SARGENT  MRN-[id number]    Chief Complaint: Patient is a 72y old  Male who presents with a chief complaint of COPD exacerbation (23 Mar 2023 08:23)    per chart review:  72M w/ hx of HTN, COPD, UC, presenting with 2 days of worsening dyspnea, sputum production, and change in sputum quality. Dyspnea is not associated with exertion and happens intermittently. Reports he woke up Monday AM with chills and generalized weakness, unable to walk to the bathroom. He did have the dyspnea at this time. He reports temporary improvement in symptoms with his home inhalers, but reports he has been off of albuterol x several months due to pulmonology recommendations. Notably he was hospitalized in late Jan/early Feb for a UC flare treated with steroid taper that completed last week (last dose Wed 3/15). Came in today because he reports his green mucus changed to brown and he had ongoing dyspnea. His wife also reports he has been unable to eat due to lack of appetite x 2 days. He reports he has had some chest and abdominal pain only with coughing fits. Further, he notes a global headache that has been present since he was discharged from the hospital in February, is worse in the mornings, and is 8/10 in intensity at its worst. Denies vision changes or numbness/tingling.     In ED, afebrile, HR 76, /70, RR 25, SpO2 97% on RA. WBC 20k, troponin 157. CXR with worsening R sided reticular opacities from recent chest CT.  Given ASA 324mg, Solumedrol 125mg, CTX 1g, Albuterol x1.    -on my exam:  -has cough with green sputum, breathing feels ok    ROS:  -    All other systems reviewed and negative    ACTIVE MEDICATION LIST:  MEDICATIONS  (STANDING):  azithromycin  IVPB      azithromycin  IVPB 500 milliGRAM(s) IV Intermittent every 24 hours  budesonide 160 MICROgram(s)/formoterol 4.5 MICROgram(s) Inhaler 2 Puff(s) Inhalation two times a day  dextrose 5%. 1000 milliLiter(s) (100 mL/Hr) IV Continuous <Continuous>  dextrose 5%. 1000 milliLiter(s) (50 mL/Hr) IV Continuous <Continuous>  dextrose 50% Injectable 25 Gram(s) IV Push once  dextrose 50% Injectable 12.5 Gram(s) IV Push once  dextrose 50% Injectable 25 Gram(s) IV Push once  enoxaparin Injectable 40 milliGRAM(s) SubCutaneous every 24 hours  famotidine    Tablet 20 milliGRAM(s) Oral two times a day  fluticasone propionate 50 MICROgram(s)/spray Nasal Spray 2 Spray(s) Both Nostrils two times a day  gabapentin 300 milliGRAM(s) Oral three times a day  glucagon  Injectable 1 milliGRAM(s) IntraMuscular once  insulin lispro (ADMELOG) corrective regimen sliding scale   SubCutaneous three times a day before meals  insulin lispro (ADMELOG) corrective regimen sliding scale   SubCutaneous at bedtime  losartan 25 milliGRAM(s) Oral daily  mesalamine DR Capsule 800 milliGRAM(s) Oral three times a day  montelukast 10 milliGRAM(s) Oral daily  pancrelipase  (CREON 36,000 Lipase Units) 2 Capsule(s) Oral three times a day with meals  pantoprazole    Tablet 40 milliGRAM(s) Oral before breakfast  piperacillin/tazobactam IVPB.. 3.375 Gram(s) IV Intermittent every 8 hours  predniSONE   Tablet 40 milliGRAM(s) Oral daily    MEDICATIONS  (PRN):  acetaminophen     Tablet .. 650 milliGRAM(s) Oral every 6 hours PRN Temp greater or equal to 38C (100.4F), Mild Pain (1 - 3)  dextrose Oral Gel 15 Gram(s) Oral once PRN Blood Glucose LESS THAN 70 milliGRAM(s)/deciliter  melatonin 3 milliGRAM(s) Oral at bedtime PRN Insomnia      EXAM:  Vital Signs Last 24 Hrs  T(C): 36.4 (23 Mar 2023 06:25), Max: 37.2 (22 Mar 2023 19:39)  T(F): 97.5 (23 Mar 2023 06:25), Max: 99 (22 Mar 2023 19:39)  HR: 64 (23 Mar 2023 06:25) (64 - 82)  BP: 152/87 (23 Mar 2023 06:25) (126/86 - 152/87)  BP(mean): --  RR: 18 (23 Mar 2023 06:25) (15 - 25)  SpO2: 95% (23 Mar 2023 06:25) (95% - 98%)    Parameters below as of 23 Mar 2023 06:25  Patient On (Oxygen Delivery Method): room air        GENERAL: No acute distress  NEURO: Alert and oriented x 3  LUNGS: Crackles at bases  CV: S1/S2, no murmur    LABS/IMAGING: reviewed                        10.8   15.93 )-----------( 400      ( 23 Mar 2023 08:08 )             34.3   03-22    134<L>  |  98  |  12  ----------------------------<  186<H>  3.8   |  23  |  0.71    Ca    8.4      22 Mar 2023 11:38    TPro  7.2  /  Alb  3.6  /  TBili  0.5  /  DBili  x   /  AST  66<H>  /  ALT  13  /  AlkPhos  58  03-22      PROBLEM LIST:  72yMale with HEALTH ISSUES - PROBLEM Dx:  COPD exacerbation    Elevated troponin level    Headache syndrome    Ulcerative colitis    DM (diabetes mellitus)  type II    Pna      RECS:  -agree with tx of pna  -cont symbicort/singulair  -prednisone 40mg x 5 days    Thank you for this consultation, please feel free to call with any questions [phone]  eJri Mcmillan MD

## 2023-03-23 NOTE — PROGRESS NOTE ADULT - ASSESSMENT
72M w/ hx of HTN, COPD, UC, presenting with 2 days of worsening dyspnea, sputum production, and change in sputum quality admitted for COPD exacerbation, possible pneumonia meeting sepsis criteria, found to have elevated troponin.

## 2023-03-23 NOTE — CONSULT NOTE ADULT - SUBJECTIVE AND OBJECTIVE BOX
HPI:  72M COPD, UC.   Here 3/22 with two days of worsening dyspnea, sputum production and change in sputum quality, now green/brown.   Dyspnea is not associated with exertion and happens intermittently.   Associated generalized weakness, unable to walk to the bathroom.   Felt better with his home inhalers but reports he has been off of albuterol x several months due to pulmonology recommendations.     Notably he was hospitalized in late Jan/early Feb for a UC flare treated with steroid taper that completed last week (last dose Wed 3/15).     His wife also reports he has been unable to eat due to lack of appetite x 2 days. He reports he has had some chest and abdominal pain only with coughing fits. Further, he notes a global headache that has been present since he was discharged from the hospital in February, is worse in the mornings, and is 8/10 in intensity at its worst. Denies vision changes or numbness/tingling.       PAST MEDICAL & SURGICAL HISTORY:  Gastroparesis  with pylorus dysfunction      DM (diabetes mellitus)  type II      Chronic obstructive pulmonary disease      Hypertension      Ulcerative colitis      Inguinal hernia      Bronchiectasis      Arthropathy  left knee replacement 2010      History of repair of hiatal hernia  childhood      S/P endoscopy  POEM 10/2021, 11/2021          Allergies    angiotensin converting enzyme inhibitors (Other; Swelling)    Intolerances        ANTIMICROBIALS:  azithromycin  IVPB    azithromycin  IVPB 500 every 24 hours  piperacillin/tazobactam IVPB.. 3.375 every 8 hours      OTHER MEDS:  acetaminophen     Tablet .. 650 milliGRAM(s) Oral every 6 hours PRN  budesonide 160 MICROgram(s)/formoterol 4.5 MICROgram(s) Inhaler 2 Puff(s) Inhalation two times a day  dextrose 5%. 1000 milliLiter(s) IV Continuous <Continuous>  dextrose 5%. 1000 milliLiter(s) IV Continuous <Continuous>  dextrose 50% Injectable 25 Gram(s) IV Push once  dextrose 50% Injectable 12.5 Gram(s) IV Push once  dextrose 50% Injectable 25 Gram(s) IV Push once  dextrose Oral Gel 15 Gram(s) Oral once PRN  enoxaparin Injectable 40 milliGRAM(s) SubCutaneous every 24 hours  famotidine    Tablet 20 milliGRAM(s) Oral two times a day  fluticasone propionate 50 MICROgram(s)/spray Nasal Spray 2 Spray(s) Both Nostrils two times a day  gabapentin 300 milliGRAM(s) Oral three times a day  glucagon  Injectable 1 milliGRAM(s) IntraMuscular once  insulin lispro (ADMELOG) corrective regimen sliding scale   SubCutaneous three times a day before meals  insulin lispro (ADMELOG) corrective regimen sliding scale   SubCutaneous at bedtime  losartan 25 milliGRAM(s) Oral daily  melatonin 3 milliGRAM(s) Oral at bedtime PRN  mesalamine DR Capsule 800 milliGRAM(s) Oral three times a day  montelukast 10 milliGRAM(s) Oral daily  pancrelipase  (CREON 36,000 Lipase Units) 2 Capsule(s) Oral three times a day with meals  pantoprazole    Tablet 40 milliGRAM(s) Oral before breakfast  predniSONE   Tablet 40 milliGRAM(s) Oral daily      SOCIAL HISTORY:    FAMILY HISTORY:  FH: diabetes mellitus (Mother)        ROS:  Unobtainable because:   All other systems negative   Constitutional: no fever, no chills  Eye: no vision changes  ENT: no sore throat, no rhinorrhea  Cardiovascular: no chest pain, no palpitation  Respiratory: no SOB, no cough  GI:  no abd pain, no vomiting, no diarrhea  urinary: no dysuria, no hematuria, no flank pain  musculoskeletal: no joint pain, no joint swelling  skin: no rash  neurology: no headache, no change in mental status  psych: no anxiety    Physical Exam:  General: awake, alert, non toxic  Head: atraumatic, normocephalic  Eyes: normal sclera and conjunctiva  ENT: no LAD, neck supple  Cardio: regular rate and rhythm   Respiratory: nonlabored on room air, clear bilaterally, no wheezing  abd: soft, bowel sounds present, not tender  : no suprapubic tenderness, no howe  Musculoskeletal: no joint swelling, no edema  Skin: no rash  vascular: no phlebitis  Neurologic: no focal deficits  psych: normal affect       Drug Dosing Weight  Height (cm): 172.7 (22 Mar 2023 19:18)  Weight (kg): 65.8 (22 Mar 2023 19:18)  BMI (kg/m2): 22.1 (22 Mar 2023 19:18)  BSA (m2): 1.78 (22 Mar 2023 19:18)    Vital Signs Last 24 Hrs  T(F): 97.5 (03-23-23 @ 06:25), Max: 99 (03-22-23 @ 19:39)    Vital Signs Last 24 Hrs  HR: 64 (03-23-23 @ 06:25) (64 - 81)  BP: 152/87 (03-23-23 @ 06:25) (126/86 - 152/87)  RR: 18 (03-23-23 @ 06:25)  SpO2: 95% (03-23-23 @ 06:25) (95% - 97%)  Wt(kg): --                          10.8   15.93 )-----------( 400      ( 23 Mar 2023 08:08 )             34.3       03-23    133<L>  |  97<L>  |  11  ----------------------------<  201<H>  3.9   |  23  |  0.74    Ca    9.2      23 Mar 2023 08:08  Phos  3.2     03-23  Mg     1.90     03-23    TPro  7.2  /  Alb  3.6  /  TBili  0.5  /  DBili  x   /  AST  66<H>  /  ALT  13  /  AlkPhos  58  03-22          MICROBIOLOGY:  Rapid RVP Result: NotDetec (03-22 @ 11:28)        RADIOLOGY:  Images below reviewed personally  Xray Chest 1 View- PORTABLE-Urgent (Xray Chest 1 View- PORTABLE-Urgent .) (03.22.23 @ 12:54)   Bibasilar reticulonodular opacities, greater on the right, which may be   infectious in etiology. These have progressed compared to prior chest CT.    CT Chest No Cont (02.02.23 @ 11:08)   Bronchiectasis in the right lower lobe.   HPI:  72M COPD, UC.   Here 3/22 with two days of worsening dyspnea, sputum production and change in sputum quality.   Dyspnea is not associated with exertion and happens intermittently.   Associated generalized weakness, unable to walk to the bathroom.   Completed a steroid course on 3/15 for UC flare after hospitalization in Jan.   Wife at bedside.   He's eating lunch. Feels much better. No fevers or chills. No sore throat or rhinorrhea. Now able to breathe more comfortably and talk.   Former smoker.   No bowel movement in two days. No blood per rectum. No abdominal pain.       PAST MEDICAL & SURGICAL HISTORY:  Gastroparesis  with pylorus dysfunction      DM (diabetes mellitus)  type II      Chronic obstructive pulmonary disease      Hypertension      Ulcerative colitis      Inguinal hernia      Bronchiectasis      Arthropathy  left knee replacement 2010      History of repair of hiatal hernia  childhood      S/P endoscopy  POEM 10/2021, 11/2021          Allergies    angiotensin converting enzyme inhibitors (Other; Swelling)    Intolerances        ANTIMICROBIALS:  azithromycin  IVPB    azithromycin  IVPB 500 every 24 hours  piperacillin/tazobactam IVPB.. 3.375 every 8 hours      OTHER MEDS:  acetaminophen     Tablet .. 650 milliGRAM(s) Oral every 6 hours PRN  budesonide 160 MICROgram(s)/formoterol 4.5 MICROgram(s) Inhaler 2 Puff(s) Inhalation two times a day  dextrose 5%. 1000 milliLiter(s) IV Continuous <Continuous>  dextrose 5%. 1000 milliLiter(s) IV Continuous <Continuous>  dextrose 50% Injectable 25 Gram(s) IV Push once  dextrose 50% Injectable 12.5 Gram(s) IV Push once  dextrose 50% Injectable 25 Gram(s) IV Push once  dextrose Oral Gel 15 Gram(s) Oral once PRN  enoxaparin Injectable 40 milliGRAM(s) SubCutaneous every 24 hours  famotidine    Tablet 20 milliGRAM(s) Oral two times a day  fluticasone propionate 50 MICROgram(s)/spray Nasal Spray 2 Spray(s) Both Nostrils two times a day  gabapentin 300 milliGRAM(s) Oral three times a day  glucagon  Injectable 1 milliGRAM(s) IntraMuscular once  insulin lispro (ADMELOG) corrective regimen sliding scale   SubCutaneous three times a day before meals  insulin lispro (ADMELOG) corrective regimen sliding scale   SubCutaneous at bedtime  losartan 25 milliGRAM(s) Oral daily  melatonin 3 milliGRAM(s) Oral at bedtime PRN  mesalamine DR Capsule 800 milliGRAM(s) Oral three times a day  montelukast 10 milliGRAM(s) Oral daily  pancrelipase  (CREON 36,000 Lipase Units) 2 Capsule(s) Oral three times a day with meals  pantoprazole    Tablet 40 milliGRAM(s) Oral before breakfast  predniSONE   Tablet 40 milliGRAM(s) Oral daily      SOCIAL HISTORY: Former smoker.      FAMILY HISTORY:  FH: diabetes mellitus (Mother)        ROS:  All other systems negative   Constitutional: no fever, no chills  Eye: no vision changes  ENT: no sore throat, no rhinorrhea  Cardiovascular: no chest pain, no palpitation  Respiratory: per HPI   GI:  no abd pain, no vomiting, no diarrhea  urinary: no dysuria, no hematuria, no flank pain  musculoskeletal: no joint pain, no joint swelling  skin: no rash  neurology: no change in mental status  psych: no anxiety    Physical Exam:  General: awake, alert, non toxic  Head: atraumatic, normocephalic  Eyes: normal sclera and conjunctiva  ENT: no LAD, neck supple  Cardio: regular rate and rhythm   Respiratory: nonlabored on room air, fine crackles, otherwise clear bilaterally, no wheezing  abd: soft, bowel sounds present, not tender  : no suprapubic tenderness, no howe  Musculoskeletal: no joint swelling, no edema  Skin: no rash  vascular: no phlebitis  Neurologic: no focal deficits  psych: normal affect       Drug Dosing Weight  Height (cm): 172.7 (22 Mar 2023 19:18)  Weight (kg): 65.8 (22 Mar 2023 19:18)  BMI (kg/m2): 22.1 (22 Mar 2023 19:18)  BSA (m2): 1.78 (22 Mar 2023 19:18)    Vital Signs Last 24 Hrs  T(F): 97.5 (03-23-23 @ 06:25), Max: 99 (03-22-23 @ 19:39)    Vital Signs Last 24 Hrs  HR: 64 (03-23-23 @ 06:25) (64 - 81)  BP: 152/87 (03-23-23 @ 06:25) (126/86 - 152/87)  RR: 18 (03-23-23 @ 06:25)  SpO2: 95% (03-23-23 @ 06:25) (95% - 97%)  Wt(kg): --                          10.8   15.93 )-----------( 400      ( 23 Mar 2023 08:08 )             34.3       03-23    133<L>  |  97<L>  |  11  ----------------------------<  201<H>  3.9   |  23  |  0.74    Ca    9.2      23 Mar 2023 08:08  Phos  3.2     03-23  Mg     1.90     03-23    TPro  7.2  /  Alb  3.6  /  TBili  0.5  /  DBili  x   /  AST  66<H>  /  ALT  13  /  AlkPhos  58  03-22          MICROBIOLOGY:  Rapid RVP Result: NotDetec (03-22 @ 11:28)        RADIOLOGY:  Images below reviewed personally  Xray Chest 1 View- PORTABLE-Urgent (Xray Chest 1 View- PORTABLE-Urgent .) (03.22.23 @ 12:54)   Bibasilar reticulonodular opacities, greater on the right, which may be   infectious in etiology. These have progressed compared to prior chest CT.    CT Chest No Cont (02.02.23 @ 11:08)   Bronchiectasis in the right lower lobe.

## 2023-03-23 NOTE — CONSULT NOTE ADULT - ASSESSMENT
72M here 3/22 with COPD exacerbation.   On Zosyn and Azithromycin?   Recent steroids and antibiotics for UC.   I ordered sputum culture and urine Legionella.     Homer Crwaford MD   Infectious Disease   Available on TEAMS. After 5PM and on weekends please page fellow on call or call [phone] 72M UC, COPD.   Here 3/22 with COPD exacerbation.   Agree with antibiotics given increased sputum, progression of opacities.   Zosyn and Azithromycin seem overly broad but risk with recent steroid use (finish one week prior for UC flare).   I ordered sputum culture and urine Legionella to guide/narrow therapy.   Feels much better today.     Discussed with medicine     Homer Crawford MD   Infectious Disease   Available on TEAMS. After 5PM and on weekends please page fellow on call or call [phone]

## 2023-03-24 ENCOUNTER — TRANSCRIPTION ENCOUNTER (OUTPATIENT)
Age: 73
End: 2023-03-24

## 2023-03-24 LAB
ANION GAP SERPL CALC-SCNC: 13 MMOL/L — SIGNIFICANT CHANGE UP (ref 7–14)
BUN SERPL-MCNC: 15 MG/DL — SIGNIFICANT CHANGE UP (ref 7–23)
CALCIUM SERPL-MCNC: 9.3 MG/DL — SIGNIFICANT CHANGE UP (ref 8.4–10.5)
CHLORIDE SERPL-SCNC: 98 MMOL/L — SIGNIFICANT CHANGE UP (ref 98–107)
CO2 SERPL-SCNC: 24 MMOL/L — SIGNIFICANT CHANGE UP (ref 22–31)
CREAT SERPL-MCNC: 0.85 MG/DL — SIGNIFICANT CHANGE UP (ref 0.5–1.3)
EGFR: 93 ML/MIN/1.73M2 — SIGNIFICANT CHANGE UP
GLUCOSE BLDC GLUCOMTR-MCNC: 122 MG/DL — HIGH (ref 70–99)
GLUCOSE BLDC GLUCOMTR-MCNC: 128 MG/DL — HIGH (ref 70–99)
GLUCOSE BLDC GLUCOMTR-MCNC: 178 MG/DL — HIGH (ref 70–99)
GLUCOSE BLDC GLUCOMTR-MCNC: 199 MG/DL — HIGH (ref 70–99)
GLUCOSE BLDC GLUCOMTR-MCNC: 224 MG/DL — HIGH (ref 70–99)
GLUCOSE SERPL-MCNC: 96 MG/DL — SIGNIFICANT CHANGE UP (ref 70–99)
HCT VFR BLD CALC: 36.4 % — LOW (ref 39–50)
HGB BLD-MCNC: 11.3 G/DL — LOW (ref 13–17)
LEGIONELLA AG UR QL: NEGATIVE — SIGNIFICANT CHANGE UP
MAGNESIUM SERPL-MCNC: 2 MG/DL — SIGNIFICANT CHANGE UP (ref 1.6–2.6)
MCHC RBC-ENTMCNC: 25.5 PG — LOW (ref 27–34)
MCHC RBC-ENTMCNC: 31 GM/DL — LOW (ref 32–36)
MCV RBC AUTO: 82 FL — SIGNIFICANT CHANGE UP (ref 80–100)
NRBC # BLD: 0 /100 WBCS — SIGNIFICANT CHANGE UP (ref 0–0)
NRBC # FLD: 0 K/UL — SIGNIFICANT CHANGE UP (ref 0–0)
PHOSPHATE SERPL-MCNC: 2.9 MG/DL — SIGNIFICANT CHANGE UP (ref 2.5–4.5)
PLATELET # BLD AUTO: 442 K/UL — HIGH (ref 150–400)
POTASSIUM SERPL-MCNC: 3.2 MMOL/L — LOW (ref 3.5–5.3)
POTASSIUM SERPL-SCNC: 3.2 MMOL/L — LOW (ref 3.5–5.3)
RBC # BLD: 4.44 M/UL — SIGNIFICANT CHANGE UP (ref 4.2–5.8)
RBC # FLD: 14.9 % — HIGH (ref 10.3–14.5)
SODIUM SERPL-SCNC: 135 MMOL/L — SIGNIFICANT CHANGE UP (ref 135–145)
WBC # BLD: 19.34 K/UL — HIGH (ref 3.8–10.5)
WBC # FLD AUTO: 19.34 K/UL — HIGH (ref 3.8–10.5)

## 2023-03-24 PROCEDURE — 99232 SBSQ HOSP IP/OBS MODERATE 35: CPT

## 2023-03-24 PROCEDURE — 93306 TTE W/DOPPLER COMPLETE: CPT | Mod: 26

## 2023-03-24 RX ORDER — CEFTRIAXONE 500 MG/1
1000 INJECTION, POWDER, FOR SOLUTION INTRAMUSCULAR; INTRAVENOUS EVERY 24 HOURS
Refills: 0 | Status: DISCONTINUED | OUTPATIENT
Start: 2023-03-24 | End: 2023-03-27

## 2023-03-24 RX ORDER — POTASSIUM CHLORIDE 20 MEQ
40 PACKET (EA) ORAL ONCE
Refills: 0 | Status: COMPLETED | OUTPATIENT
Start: 2023-03-24 | End: 2023-03-24

## 2023-03-24 RX ORDER — BENZOCAINE AND MENTHOL 5; 1 G/100ML; G/100ML
1 LIQUID ORAL THREE TIMES A DAY
Refills: 0 | Status: DISCONTINUED | OUTPATIENT
Start: 2023-03-24 | End: 2023-03-27

## 2023-03-24 RX ORDER — IPRATROPIUM/ALBUTEROL SULFATE 18-103MCG
3 AEROSOL WITH ADAPTER (GRAM) INHALATION ONCE
Refills: 0 | Status: COMPLETED | OUTPATIENT
Start: 2023-03-24 | End: 2023-03-24

## 2023-03-24 RX ORDER — BENZOCAINE AND MENTHOL 5; 1 G/100ML; G/100ML
1 LIQUID ORAL ONCE
Refills: 0 | Status: DISCONTINUED | OUTPATIENT
Start: 2023-03-24 | End: 2023-03-27

## 2023-03-24 RX ADMIN — CEFTRIAXONE 100 MILLIGRAM(S): 500 INJECTION, POWDER, FOR SOLUTION INTRAMUSCULAR; INTRAVENOUS at 21:21

## 2023-03-24 RX ADMIN — BUDESONIDE AND FORMOTEROL FUMARATE DIHYDRATE 2 PUFF(S): 160; 4.5 AEROSOL RESPIRATORY (INHALATION) at 09:35

## 2023-03-24 RX ADMIN — GABAPENTIN 300 MILLIGRAM(S): 400 CAPSULE ORAL at 12:18

## 2023-03-24 RX ADMIN — FAMOTIDINE 20 MILLIGRAM(S): 10 INJECTION INTRAVENOUS at 17:45

## 2023-03-24 RX ADMIN — LOSARTAN POTASSIUM 25 MILLIGRAM(S): 100 TABLET, FILM COATED ORAL at 04:58

## 2023-03-24 RX ADMIN — Medication 800 MILLIGRAM(S): at 04:59

## 2023-03-24 RX ADMIN — Medication 1: at 12:19

## 2023-03-24 RX ADMIN — FAMOTIDINE 20 MILLIGRAM(S): 10 INJECTION INTRAVENOUS at 04:59

## 2023-03-24 RX ADMIN — Medication 40 MILLIGRAM(S): at 05:00

## 2023-03-24 RX ADMIN — Medication 40 MILLIEQUIVALENT(S): at 08:36

## 2023-03-24 RX ADMIN — Medication 800 MILLIGRAM(S): at 12:18

## 2023-03-24 RX ADMIN — PIPERACILLIN AND TAZOBACTAM 25 GRAM(S): 4; .5 INJECTION, POWDER, LYOPHILIZED, FOR SOLUTION INTRAVENOUS at 05:01

## 2023-03-24 RX ADMIN — GABAPENTIN 300 MILLIGRAM(S): 400 CAPSULE ORAL at 04:58

## 2023-03-24 RX ADMIN — Medication 2 SPRAY(S): at 17:41

## 2023-03-24 RX ADMIN — Medication 2: at 16:51

## 2023-03-24 RX ADMIN — MONTELUKAST 10 MILLIGRAM(S): 4 TABLET, CHEWABLE ORAL at 08:37

## 2023-03-24 RX ADMIN — Medication 800 MILLIGRAM(S): at 21:20

## 2023-03-24 RX ADMIN — GABAPENTIN 300 MILLIGRAM(S): 400 CAPSULE ORAL at 21:23

## 2023-03-24 RX ADMIN — PANTOPRAZOLE SODIUM 40 MILLIGRAM(S): 20 TABLET, DELAYED RELEASE ORAL at 05:00

## 2023-03-24 RX ADMIN — Medication 2 CAPSULE(S): at 08:38

## 2023-03-24 RX ADMIN — Medication 1: at 08:40

## 2023-03-24 RX ADMIN — PIPERACILLIN AND TAZOBACTAM 25 GRAM(S): 4; .5 INJECTION, POWDER, LYOPHILIZED, FOR SOLUTION INTRAVENOUS at 13:18

## 2023-03-24 RX ADMIN — Medication 2 CAPSULE(S): at 13:19

## 2023-03-24 RX ADMIN — Medication 2 SPRAY(S): at 05:01

## 2023-03-24 RX ADMIN — ENOXAPARIN SODIUM 40 MILLIGRAM(S): 100 INJECTION SUBCUTANEOUS at 17:50

## 2023-03-24 RX ADMIN — BUDESONIDE AND FORMOTEROL FUMARATE DIHYDRATE 2 PUFF(S): 160; 4.5 AEROSOL RESPIRATORY (INHALATION) at 21:21

## 2023-03-24 RX ADMIN — Medication 1 DROP(S): at 21:19

## 2023-03-24 RX ADMIN — Medication 2 CAPSULE(S): at 17:40

## 2023-03-24 NOTE — DIETITIAN INITIAL EVALUATION ADULT - PERTINENT MEDS FT
MEDICATIONS  (STANDING):  azithromycin  IVPB      azithromycin  IVPB 500 milliGRAM(s) IV Intermittent every 24 hours  budesonide 160 MICROgram(s)/formoterol 4.5 MICROgram(s) Inhaler 2 Puff(s) Inhalation two times a day  dextrose 5%. 1000 milliLiter(s) (100 mL/Hr) IV Continuous <Continuous>  dextrose 5%. 1000 milliLiter(s) (50 mL/Hr) IV Continuous <Continuous>  dextrose 50% Injectable 25 Gram(s) IV Push once  dextrose 50% Injectable 12.5 Gram(s) IV Push once  dextrose 50% Injectable 25 Gram(s) IV Push once  enoxaparin Injectable 40 milliGRAM(s) SubCutaneous every 24 hours  famotidine    Tablet 20 milliGRAM(s) Oral two times a day  fluticasone propionate 50 MICROgram(s)/spray Nasal Spray 2 Spray(s) Both Nostrils two times a day  gabapentin 300 milliGRAM(s) Oral three times a day  glucagon  Injectable 1 milliGRAM(s) IntraMuscular once  insulin lispro (ADMELOG) corrective regimen sliding scale   SubCutaneous three times a day before meals  insulin lispro (ADMELOG) corrective regimen sliding scale   SubCutaneous at bedtime  losartan 25 milliGRAM(s) Oral daily  mesalamine DR Capsule 800 milliGRAM(s) Oral three times a day  montelukast 10 milliGRAM(s) Oral daily  pancrelipase  (CREON 36,000 Lipase Units) 2 Capsule(s) Oral three times a day with meals  pantoprazole    Tablet 40 milliGRAM(s) Oral before breakfast  piperacillin/tazobactam IVPB.. 3.375 Gram(s) IV Intermittent every 8 hours  predniSONE   Tablet 40 milliGRAM(s) Oral daily    MEDICATIONS  (PRN):  acetaminophen     Tablet .. 650 milliGRAM(s) Oral every 6 hours PRN Temp greater or equal to 38C (100.4F), Mild Pain (1 - 3)  dextrose Oral Gel 15 Gram(s) Oral once PRN Blood Glucose LESS THAN 70 milliGRAM(s)/deciliter  melatonin 3 milliGRAM(s) Oral at bedtime PRN Insomnia

## 2023-03-24 NOTE — DIETITIAN INITIAL EVALUATION ADULT - PERTINENT LABORATORY DATA
03-24    135  |  98  |  15  ----------------------------<  96  3.2<L>   |  24  |  0.85    Ca    9.3      24 Mar 2023 05:45  Phos  2.9     03-24  Mg     2.00     03-24    POCT Blood Glucose.: 199 mg/dL (03-24-23 @ 12:16)  A1C with Estimated Average Glucose Result: 5.9 % (01-15-23 @ 07:50)  A1C with Estimated Average Glucose Result: 6.0 % (09-01-22 @ 13:48)

## 2023-03-24 NOTE — DIETITIAN INITIAL EVALUATION ADULT - ENTER FROM (G/KG)
Results   CT ABDOMEN AND PELVIS W CON   14 Apr 2017 08:51 AM  -   CT ABDOMEN AND PELVIS W [facility]  Accession #     [id number]     Comparison: None.     HISTORY: Right upper quadrant discomfort.  Abdominal swelling.     Postinfusion scan of the abdomen pelvis was performed with 127 mL of Omnipaque 300.  Oral   contrast was administered.  KV and MA were adjusted for patient body habitus.     Small hiatal hernia.  Negative for free air.  There is a 13.8 x 6.3 cm fatty mass beginning   in the right flank extending inferiorly into the right pelvis.  It lies outside the rectus   sheath and appears encapsulated.  No definite associated spigelian hernia demonstrated.  The   liver, spleen, pancreas, adrenals, aorta, retroperitoneum, kidneys, and ureters are normal in   appearance.  There is a left renal cyst.  There is cholelithiasis.     Exam of the pelvis is negative for appendicitis, diverticulitis, free fluid, and sidewall   adenopathy.     IMPRESSION:     There is an encapsulated fatty mass lying outside the right rectus sheath laterally.  No definite   associated hernia.  This is likely a lipoma.     Small hiatal hernia.     ****  F I N A L  ****     Transcribed By: ROBERTA   04/14/17 12:50 pm     Dictated By:            ARTURO GUO     Electronically Reviewed and Approved By:           ARTURO GUO  04/14/17 1:08 pm.  Discussed   As discussed.  
1.4

## 2023-03-24 NOTE — DIETITIAN INITIAL EVALUATION ADULT - ORAL INTAKE PTA/DIET HISTORY
pt with hx of UC. Hospitalized 2 months ago for flare. Since hospitalization, he has been avoiding red meat, spicy foods, fatty foods, and greens. Pt with fish/shellfish allergy. Per RD assessment on 1/19, pt weighed 62.6 kg; pt has gained weight since then with current wt 65.8 kg.

## 2023-03-24 NOTE — PROGRESS NOTE ADULT - ASSESSMENT
72M w/ hx of HTN, COPD, UC, presenting with 2 days of worsening dyspnea, sputum production, and change in sputum quality, overall consistent with COPD exacerbation, now admitted to medicine for further care. Cardiology consulted for elevation in troponin at presentation. Pt remains chest pain free and hemodynamically stable.    #Elevated troponin:  - unlikely to have represented ACS as pt was chest pain free, EKG nonspecific, hemodynamically stable  - likely 2/2 demand from COPD exacerbation  - trop-hs 157 -> 150 -> 138, can stop trending  - if pt with chest pain stat trop/ekg, call cardiology  - f/u TTE    Johnson Mixon MD  Cardiology Fellow - PGY4    Please check [email or web address]: [email or web address]" for cardiology service schedule and contact information.    72M w/ hx of HTN, COPD, UC, presenting with 2 days of worsening dyspnea, sputum production, and change in sputum quality, overall consistent with COPD exacerbation, now admitted to medicine for further care. Cardiology consulted for elevation in troponin at presentation. Pt remains chest pain free and hemodynamically stable.    #Elevated troponin:  - unlikely to have represented ACS as pt was chest pain free, EKG nonspecific, hemodynamically stable  - likely 2/2 demand from COPD exacerbation  - trop-hs 157 -> 150 -> 138, can stop trending  - if pt with chest pain stat trop/ekg, call cardiology  - TTE with normal LVEF 72%, grossly normal LVSF, normal RVSF    Cardiology will sign off at this time, please reconsult with any questions.    Johnson Mixon MD  Cardiology Fellow - PGY4    Please check [email or web address]: [email or web address] for cardiology service schedule and contact information.

## 2023-03-24 NOTE — DISCHARGE NOTE PROVIDER - PROVIDER TOKENS
PROVIDER:[TOKEN:[[id number]],SCHEDULEDAPPT:[04/03/2023],SCHEDULEDAPPTTIME:[11:30 AM]],FREE:[LAST:[Dr. Eckert],PHONE:[(   )    -],FAX:[(   )    -],FOLLOWUP:[1 week]]

## 2023-03-24 NOTE — DISCHARGE NOTE PROVIDER - NSDCMRMEDTOKEN_GEN_ALL_CORE_FT
Breztri Aerosphere inhalation aerosol: 2 puff(s) inhaled 2 times a day  Creon 36,000 units oral delayed release capsule: 2 cap(s) orally 3 times a day  fluticasone 0.5 mg/2 mL inhalation suspension: 2 application inhaled 2 times a day  gabapentin 300 mg oral capsule: 1 cap(s) orally 3 times a day  hydrOXYzine hydrochloride 25 mg oral tablet: 1 tab(s) orally once a day, As Needed  Janumet 50 mg-500 mg oral tablet: 2 tab(s) orally 2 times a day  loperamide 2 mg oral capsule: 2 cap(s) orally every 4 hours, As Needed  losartan 25 mg oral tablet: 1 tab(s) orally once a day  mesalamine 1.2 g oral delayed release tablet: 2 tab(s) orally 2 times a day  montelukast 10 mg oral tablet: 1 tab(s) orally once a day  pantoprazole 20 mg oral delayed release tablet: 1 tab(s) orally once a day  Pepcid 20 mg oral tablet: 1 tab(s) orally 2 times a day   amoxicillin-clavulanate 875 mg-125 mg oral tablet: 1 tab(s) orally 2 times a day Please start on 3/27/2023 evening.  Breztri Aerosphere inhalation aerosol: 2 puff(s) inhaled 2 times a day  Creon 36,000 units oral delayed release capsule: 2 cap(s) orally 3 times a day  fluticasone 0.5 mg/2 mL inhalation suspension: 2 application inhaled 2 times a day  gabapentin 300 mg oral capsule: 1 cap(s) orally 3 times a day  hydrOXYzine hydrochloride 25 mg oral tablet: 1 tab(s) orally once a day, As Needed  Janumet 50 mg-500 mg oral tablet: 2 tab(s) orally 2 times a day  loperamide 2 mg oral capsule: 2 cap(s) orally every 4 hours, As Needed  losartan 25 mg oral tablet: 1 tab(s) orally once a day  mesalamine 1.2 g oral delayed release tablet: 2 tab(s) orally 2 times a day  montelukast 10 mg oral tablet: 1 tab(s) orally once a day  pantoprazole 20 mg oral delayed release tablet: 1 tab(s) orally once a day  Pepcid 20 mg oral tablet: 1 tab(s) orally 2 times a day  predniSONE 10 mg oral tablet: 3 tab(s) orally once a day Please take taper as follows starting 3/28/2023:  3 tabs orally once a day for 2 days   Then, 2 tabs orally once a day for 2 days  Then, 1 tab orally once a day for 2 days.

## 2023-03-24 NOTE — DIETITIAN INITIAL EVALUATION ADULT - REASON FOR ADMISSION
Chronic obstructive pulmonary disease with acute exacerbation  72M w/ hx of HTN, COPD, UC, presenting with 2 days of worsening dyspnea, sputum production, and change in sputum quality admitted for COPD exacerbation, possible pneumonia meeting sepsis criteria, found to have elevated troponin.

## 2023-03-24 NOTE — DISCHARGE NOTE PROVIDER - NSRESEARCHGRANT_HIDDEN_GEN_A_CORE
Preventing Falls: Care Instructions  Overview     Getting around your home safely can be a challenge if you have injuries or health problems that make it easy for you to fall. Loose rugs and furniture in walkways are among the dangers for many older people who have problems walking or who have poor eyesight. People who have conditions such as arthritis, osteoporosis, or dementia also have to be careful not to fall. You can make your home safer with a few simple measures. Follow-up care is a key part of your treatment and safety. Be sure to make and go to all appointments, and call your doctor if you are having problems. It's also a good idea to know your test results and keep a list of the medicines you take. How can you care for yourself at home? Taking care of yourself  Exercise regularly to improve your strength, muscle tone, and balance. Walk if you can. Swimming may be a good choice if you cannot walk easily. Have your vision and hearing checked each year or any time you notice a change. If you have trouble seeing and hearing, you might not be able to avoid objects and could lose your balance. Know the side effects of the medicines you take. Ask your doctor or pharmacist whether the medicines you take can affect your balance. Sleeping pills or sedatives can affect your balance. Limit the amount of alcohol you drink. Alcohol can impair your balance and other senses. Ask your doctor whether calluses or corns on your feet need to be removed. If you wear loose-fitting shoes because of calluses or corns, you can lose your balance and fall. Talk to your doctor if you have numbness in your feet. You may get dizzy if you do not drink enough water. To prevent dehydration, drink plenty of fluids. Choose water and other clear liquids. If you have kidney, heart, or liver disease and have to limit fluids, talk with your doctor before you increase the amount of fluids you drink.   Preventing falls at home  Remove raised doorway thresholds, throw rugs, and clutter. Repair loose carpet or raised areas in the floor. Move furniture and electrical cords to keep them out of walking paths. Use nonskid floor wax, and wipe up spills right away, especially on ceramic tile floors. If you use a walker or cane, put rubber tips on it. If you use crutches, clean the bottoms of them regularly with an abrasive pad, such as steel wool. Keep your house well lit, especially stairways, porches, and outside walkways. Use night-lights in areas such as hallways and bathrooms. Add extra light switches or use remote switches (such as switches that go on or off when you clap your hands) to make it easier to turn lights on if you have to get up during the night. Install sturdy handrails on stairways. Move items in your cabinets so that the things you use a lot are on the lower shelves (about waist level). Keep a cordless phone and a flashlight with new batteries by your bed. If possible, put a phone in each of the main rooms of your house, or carry a cell phone in case you fall and cannot reach a phone. Or, you can wear a device around your neck or wrist. You push a button that sends a signal for help. Wear low-heeled shoes that fit well and give your feet good support. Use footwear with nonskid soles. Check the heels and soles of your shoes for wear. Repair or replace worn heels or soles. Do not wear socks without shoes on smooth floors, such as wood. Walk on the grass when the sidewalks are slippery. If you live in an area that gets snow and ice in the winter, sprinkle salt on slippery steps and sidewalks. Or ask a family member or friend to do this for you. Preventing falls in the bath  Install grab bars and nonskid mats inside and outside your shower or tub and near the toilet and sinks. Use shower chairs and bath benches. Use a hand-held shower head that will allow you to sit while showering.   Get into a tub or shower by putting the weaker leg in first. Get out of a tub or shower with your strong side first.  Repair loose toilet seats and consider installing a raised toilet seat to make getting on and off the toilet easier. Keep your bathroom door unlocked while you are in the shower. Where can you learn more? Go to http://www.salinas.com/ and enter G117 to learn more about \"Preventing Falls: Care Instructions. \"  Current as of: May 4, 2022               Content Version: 13.5  © 9949-4731 Healthwise, Incorporated. Care instructions adapted under license by [facility] ([facility]). If you have questions about a medical condition or this instruction, always ask your healthcare professional. [address] any warranty or liability for your use of this information. For more information on your local Area Agency on Aging or [place] on Aging please visit the appropriate web site below:    [place]: MobileCycles.pl    [facility]: https://aging. [place].gov/    [place]: https://aging.[place].gov/    [place]: InsuranceSquad.es           Learning About Being Active as an Older Adult  Why is being active important as you get older? Being active is one of the best things you can do for your health. And it's never too late to start. Being active--or getting active, if you aren't already--has definite benefits. It can:  Give you more energy,  Keep your mind sharp. Improve balance to reduce your risk of falls. Help you manage chronic illness with fewer medicines. No matter how old you are, how fit you are, or what health problems you have, there is a form of activity that will work for you. And the more physical activity you can do, the better your overall health will be. What kinds of activity can help you stay healthy? Being more active will make your daily activities easier. Physical activity includes planned exercise and things you do in daily life.  There are four types of activity:  Aerobic. Doing aerobic activity makes your heart and lungs strong. Includes walking, dancing, and gardening. Aim for at least 2½ hours spread throughout the week. It improves your energy and can help you sleep better. Muscle-strengthening. This type of activity can help maintain muscle and strengthen bones. Includes climbing stairs, using resistance bands, and lifting or carrying heavy loads. Aim for at least twice a week. It can help protect the knees and other joints. Stretching. Stretching gives you better range of motion in joints and muscles. Includes upper arm stretches, calf stretches, and gentle yoga. Aim for at least twice a week, preferably after your muscles are warmed up from other activities. It can help you function better in daily life. Balancing. This helps you stay coordinated and have good posture. Includes heel-to-toe walking, thuan chi, and certain types of yoga. Aim for at least 3 days a week. It can reduce your risk of falling. Even if you have a hard time meeting the recommendations, it's better to be more active than less active. All activity done in each category counts toward your weekly total. You'd be surprised how daily things like carrying groceries, keeping up with grandchildren, and taking the stairs can add up. What keeps you from being active? If you've had a hard time being more active, you're not alone. Maybe you remember being able to do more. Or maybe you've never thought of yourself as being active. It's frustrating when you can't do the things you want. Being more active can help. What's holding you back? Getting started. Have a goal, but break it into easy tasks. Small steps build into big accomplishments. Staying motivated. If you feel like skipping your activity, remember your goal. Maybe you want to move better and stay independent. Every activity gets you one step closer.   Not feeling your best.  Start with 5 minutes of an activity you enjoy. Prove to yourself you can do it. As you get comfortable, increase your time. You may not be where you want to be. But you're in the process of getting there. Everyone starts somewhere. How can you find safe ways to stay active? Talk with your doctor about any physical challenges you're facing. Make a plan with your doctor if you have a health problem or aren't sure how to get started with activity. If you're already active, ask your doctor if there is anything you should change to stay safe as your body and health change. If you tend to feel dizzy after you take medicine, avoid activity at that time. Try being active before you take your medicine. This will reduce your risk of falls. If you plan to be active at home, make sure to clear your space before you get started. Remove things like TV cords, coffee tables, and throw rugs. It's safest to have plenty of space to move freely. The key to getting more active is to take it slow and steady. Try to improve only a little bit at a time. Pick just one area to improve on at first. And if an activity hurts, stop and talk to your doctor. Where can you learn more? Go to http://www.salinas.com/ and enter P600 to learn more about \"Learning About Being Active as an Older Adult. \"  Current as of: October 10, 2022               Content Version: 13.5  © 6596-7633 Healthwise, Incorporated. Care instructions adapted under license by [facility] ([facility]). If you have questions about a medical condition or this instruction, always ask your healthcare professional. [address] any warranty or liability for your use of this information. Hearing Loss: Care Instructions  Overview     Hearing loss is a sudden or slow decrease in how well you hear. It can range from mild to severe. Permanent hearing loss can occur with aging. It also can happen when you are exposed long-term to loud noise.  Examples include listening to Yes loud music, riding motorcycles, or being around other loud machines. Hearing loss can affect your work and home life. It can make you feel lonely or depressed. You may feel that you have lost your independence. But hearing aids and other devices can help you hear better and feel connected to others. Follow-up care is a key part of your treatment and safety. Be sure to make and go to all appointments, and call your doctor if you are having problems. It's also a good idea to know your test results and keep a list of the medicines you take. How can you care for yourself at home? Avoid loud noises whenever possible. This helps keep your hearing from getting worse. Always wear hearing protection around loud noises. Wear a hearing aid as directed. See a professional who can help you pick a hearing aid that fits you. Have hearing tests as your doctor suggests. They can show whether your hearing has changed. Your hearing aid may need to be adjusted. Use other devices as needed. These may include:  Telephone amplifiers and hearing aids that can connect to a television, stereo, radio, or microphone. Devices that use lights or vibrations. These alert you to the doorbell, a ringing telephone, or a baby monitor. Television closed-captioning. This shows the words at the bottom of the screen. Most new TVs can do this. TTY (text telephone). This lets you type messages back and forth on the telephone instead of talking or listening. These devices are also called TDD. When messages are typed on the keyboard, they are sent over the phone line to a receiving TTY. The message is shown on a monitor. Use text messaging, social media, and email if it is hard for you to communicate by telephone. Try to learn a listening technique called speechreading. It is not lipreading. You pay attention to people's gestures, expressions, posture, and tone of voice. These clues can help you understand what a person is saying.  Face the person you are talking to, and have them face you. Make sure the lighting is good. You need to see the other person's face clearly. Think about counseling if you need help to adjust to your hearing loss. When should you call for help? Watch closely for changes in your health, and be sure to contact your doctor if:    You think your hearing is getting worse. You have new symptoms, such as dizziness or nausea. Where can you learn more? Go to http://www.salinas.com/ and enter R798 to learn more about \"Hearing Loss: Care Instructions. \"  Current as of: May 4, 2022               Content Version: 13.5  © 0180-6543 Pets are family too. Care instructions adapted under license by [facility] ([facility]). If you have questions about a medical condition or this instruction, always ask your healthcare professional. [address] any warranty or liability for your use of this information. Learning About Vision Tests  What are vision tests? The four most common vision tests are visual acuity tests, refraction, visual field tests, and color vision tests. Visual acuity (sharpness) tests  These tests are used: To see if you need glasses or contact lenses. To monitor an eye problem. To check an eye injury. Visual acuity tests are done as part of routine exams. You may also have this test when you get your 's license or apply for some types of jobs. Visual field tests  These tests are used: To check for vision loss in any area of your range of vision. To screen for certain eye diseases. To look for nerve damage after a stroke, head injury, or other problem that could reduce blood flow to the brain. Refraction and color tests  A refraction test is done to find the right prescription for glasses and contact lenses. A color vision test is done to check for color blindness.   Color vision is often tested as part of a routine exam. You may also have this test when you apply for a job where recognizing different colors is important, such as , electronics, or the [employer]. How are vision tests done? Visual acuity test   You cover one eye at a time. You read aloud from a wall chart across the room. You read aloud from a small card that you hold in your hand. Refraction   You look into a special device. The device puts lenses of different strengths in front of each eye to see how strong your glasses or contact lenses need to be. Visual field tests   Your doctor may have you look through special machines. Or your doctor may simply have you stare straight ahead while they move a finger into and out of your field of vision. Color vision test   You look at pieces of printed test patterns in various colors. You say what number or symbol you see. Your doctor may have you trace the number or symbol using a pointer. How do these tests feel? There is very little chance of having a problem from this test. If dilating drops are used for a vision test, they may make the eyes sting and cause a medicine taste in the mouth. Follow-up care is a key part of your treatment and safety. Be sure to make and go to all appointments, and call your doctor if you are having problems. It's also a good idea to know your test results and keep a list of the medicines you take. Where can you learn more? Go to http://www.salinas.com/ and enter G551 to learn more about \"Learning About Vision Tests. \"  Current as of: October 12, 2022               Content Version: 13.5  © 2006-2022 Healthwise, Incorporated. Care instructions adapted under license by [facility] [address] If you have questions about a medical condition or this instruction, always ask your healthcare professional. [place] [address] any warranty or liability for your use of this information.            Advance Directives: Care Instructions  Overview  An advance directive is a legal way to state your wishes at the end of your life. It tells your family and your doctor what to do if you can't say what you want. There are two main types of advance directives. You can change them any time your wishes change. Living will. This form tells your family and your doctor your wishes about life support and other treatment. The form is also called a declaration. Medical power of . This form lets you name a person to make treatment decisions for you when you can't speak for yourself. This person is called a health care agent (health care proxy, health care surrogate). The form is also called a durable power of  for health care. If you do not have an advance directive, decisions about your medical care may be made by a family member, or by a doctor or a  who doesn't know you. It may help to think of an advance directive as a gift to the people who care for you. If you have one, they won't have to make tough decisions by themselves. For more information, including forms for your state, see the [address] [facility] website (www.caringinfo.org/planning/advance-directives/). Follow-up care is a key part of your treatment and safety. Be sure to make and go to all appointments, and call your doctor if you are having problems. It's also a good idea to know your test results and keep a list of the medicines you take. What should you include in an advance directive? Many states have a unique advance directive form. (It may ask you to address specific issues.) Or you might use a universal form that's approved by many states. If your form doesn't tell you what to address, it may be hard to know what to include in your advance directive. Use the questions below to help you get started. Who do you want to make decisions about your medical care if you are not able to? What life-support measures do you want if you have a serious illness that gets worse over time or can't be cured? What are you most afraid of that might happen? (Maybe you're afraid of having pain, losing your independence, or being kept alive by machines.)  Where would you prefer to die? (Your home? A hospital? A nursing home?)  Do you want to donate your organs when you die? Do you want certain Temple practices performed before you die? When should you call for help? Be sure to contact your doctor if you have any questions. Where can you learn more? Go to http://www.salinas.com/ and enter R264 to learn more about \"Advance Directives: Care Instructions. \"  Current as of: June 16, 2022               Content Version: 13.5  © 2314-1445 [employer]. Care instructions adapted under license by [facility] ([facility]). If you have questions about a medical condition or this instruction, always ask your healthcare professional. [address] any warranty or liability for your use of this information. Learning About Colonoscopy  What is a colonoscopy? A colonoscopy is a test (also called a procedure) that lets a doctor look inside your large intestine. The doctor uses a thin, lighted tube called a colonoscope. The doctor uses it to look for small growths called polyps, colon or rectal cancer (colorectal cancer), or other problems like bleeding. During the procedure, the doctor can take samples of tissue. The samples can then be checked for cancer or other conditions. The doctor can also take out polyps. How is a colonoscopy done? This procedure is done in a doctor's office or a clinic or hospital. You will get medicine to help you relax and not feel pain. Some people find that they don't remember having the test because of the medicine. The doctor gently moves the colonoscope, or scope, through the colon. The scope is also a small video camera. It lets the doctor see the colon and take pictures. How do you prepare for the procedure?   You need to clean out your colon before the procedure so the doctor can see your colon. This depends on which \"colon prep\" your doctor recommends. To clean out your colon, you'll do a \"colon prep\" before the test. This means you stop eating solid foods and drink only clear liquids. You can have water, tea, coffee, clear juices, clear broths, flavored ice pops, and gelatin (such as Jell-O). Do not drink anything red or purple. The day or night before the procedure, you drink a large amount of a special liquid. This causes loose, frequent stools. You will go to the bathroom a lot. Your doctor may have you drink part of the liquid the evening before and the rest on the day of the test. It's very important to drink all of the liquid. If you have problems drinking it, call your doctor. Arrange to have someone take you home after the test.  What can you expect after a colonoscopy? Your doctor will tell you when you can eat and do your usual activities. Drink a lot of fluid after the test to replace the fluids you may have lost during the colon prep. But don't drink alcohol. Your doctor will talk to you about when you'll need your next colonoscopy. The results of your test and your risk for colorectal cancer will help your doctor decide how often you need to be checked. After the test, you may be bloated or have gas pains. You may need to pass gas. If a biopsy was done or a polyp was removed, you may have streaks of blood in your stool (feces) for a few days. Check with your doctor to see when it is safe to take aspirin and nonsteroidal anti-inflammatory drugs (NSAIDs) again. Problems such as heavy rectal bleeding may not occur until several weeks after the test. This isn't common. But it can happen after polyps are removed. Follow-up care is a key part of your treatment and safety. Be sure to make and go to all appointments, and call your doctor if you are having problems. It's also a good idea to know your test results and keep a list of the medicines you take. Where can you learn more?   Go to http://Portable Scores.woods.com/ and enter Z368 to learn more about \"Learning About Colonoscopy. \"  Current as of: May 4, 2022               Content Version: 13.5  © 2006-2022 [employer]. Care instructions adapted under license by [facility] ([facility]). If you have questions about a medical condition or this instruction, always ask your healthcare professional. [address] any warranty or liability for your use of this information. A Healthy Heart: Care Instructions  Your Care Instructions     Coronary artery disease, also called heart disease, occurs when a substance called plaque builds up in the vessels that supply oxygen-rich blood to your heart muscle. This can narrow the blood vessels and reduce blood flow. A heart attack happens when blood flow is completely blocked. A high-fat diet, smoking, and other factors increase the risk of heart disease. Your doctor has found that you have a chance of having heart disease. You can do lots of things to keep your heart healthy. It may not be easy, but you can change your diet, exercise more, and quit smoking. These steps really work to lower your chance of heart disease. Follow-up care is a key part of your treatment and safety. Be sure to make and go to all appointments, and call your doctor if you are having problems. It's also a good idea to know your test results and keep a list of the medicines you take. How can you care for yourself at home? Diet    Use less salt when you cook and eat. This helps lower your blood pressure. Taste food before salting. Add only a little salt when you think you need it. With time, your taste buds will adjust to less salt. Eat fewer snack items, fast foods, canned soups, and other high-salt, high-fat, processed foods. Read food labels and try to avoid saturated and trans fats. They increase your risk of heart disease by raising cholesterol levels.      Limit the amount of solid fat-butter, margarine, and shortening-you eat. Use olive, peanut, or canola oil when you cook. Bake, broil, and steam foods instead of frying them. Eat a variety of fruit and vegetables every day. Dark green, deep orange, red, or yellow fruits and vegetables are especially good for you. Examples include spinach, carrots, peaches, and berries. Foods high in fiber can reduce your cholesterol and provide important vitamins and minerals. High-fiber foods include whole-grain cereals and breads, oatmeal, beans, brown rice, citrus fruits, and apples. Eat lean proteins. Heart-healthy proteins include seafood, lean meats and poultry, eggs, beans, peas, nuts, seeds, and soy products. Limit drinks and foods with added sugar. These include candy, desserts, and soda pop. Lifestyle changes    If your doctor recommends it, get more exercise. Walking is a good choice. Bit by bit, increase the amount you walk every day. Try for at least 30 minutes on most days of the week. You also may want to swim, bike, or do other activities. Do not smoke. If you need help quitting, talk to your doctor about stop-smoking programs and medicines. These can increase your chances of quitting for good. Quitting smoking may be the most important step you can take to protect your heart. It is never too late to quit. Limit alcohol to 2 drinks a day for men and 1 drink a day for women. Too much alcohol can cause health problems. Manage other health problems such as diabetes, high blood pressure, and high cholesterol. If you think you may have a problem with alcohol or drug use, talk to your doctor. Medicines    Take your medicines exactly as prescribed. Call your doctor if you think you are having a problem with your medicine. If your doctor recommends aspirin, take the amount directed each day. Make sure you take aspirin and not another kind of pain reliever, such as acetaminophen (Tylenol).    When should you call for help?   Call 911 if you have symptoms of a heart attack. These may include:    Chest pain or pressure, or a strange feeling in the chest.     Sweating. Shortness of breath. Pain, pressure, or a strange feeling in the back, neck, jaw, or upper belly or in one or both shoulders or arms. Lightheadedness or sudden weakness. A fast or irregular heartbeat. After you call 911, the  may tell you to chew 1 adult-strength or 2 to 4 low-dose aspirin. Wait for an ambulance. Do not try to drive yourself. Watch closely for changes in your health, and be sure to contact your doctor if you have any problems. Where can you learn more? Go to http://www.salinas.com/ and enter F075 to learn more about \"A Healthy Heart: Care Instructions. \"  Current as of: September 7, 2022               Content Version: 13.5  © 9704-1799 [employer]. Care instructions adapted under license by [facility] ([facility]). If you have questions about a medical condition or this instruction, always ask your healthcare professional. [place] [address] any warranty or liability for your use of this information. Personalized Preventive Plan for Josiah Mann - 2/9/2023  Medicare offers a range of preventive health benefits. Some of the tests and screenings are paid in full while other may be subject to a deductible, co-insurance, and/or copay. Some of these benefits include a comprehensive review of your medical history including lifestyle, illnesses that may run in your family, and various assessments and screenings as appropriate. After reviewing your medical record and screening and assessments performed today your provider may have ordered immunizations, labs, imaging, and/or referrals for you. A list of these orders (if applicable) as well as your Preventive Care list are included within your After Visit Summary for your review.     Other Preventive Recommendations:    A preventive eye exam performed by an eye specialist is recommended every 1-2 years to screen for glaucoma; cataracts, macular degeneration, and other eye disorders. A preventive dental visit is recommended every 6 months. Try to get at least 150 minutes of exercise per week or 10,000 steps per day on a pedometer . Order or download the FREE \"Exercise & Physical Activity: Your Everyday Guide\" from [employer]. Call 9-208.401.4932 or search [employer] online. You need 7125-9682 mg of calcium and 7543-2019 IU of vitamin D per day. It is possible to meet your calcium requirement with diet alone, but a vitamin D supplement is usually necessary to meet this goal.  When exposed to the sun, use a sunscreen that protects against both UVA and UVB radiation with an SPF of 30 or greater. Reapply every 2 to 3 hours or after sweating, drying off with a towel, or swimming. Always wear a seat belt when traveling in a car. Always wear a helmet when riding a bicycle or motorcycle. Your radiology test was are ordered. Please call [phone] to help facilitate scheduling.

## 2023-03-24 NOTE — DIETITIAN INITIAL EVALUATION ADULT - NS FNS DIET ORDER
Diet, Regular:   Consistent Carbohydrate {Evening Snack} (CSTCHOSN)  DASH/TLC {Sodium & Cholesterol Restricted} (DASH) (03-22-23 @ 18:27)

## 2023-03-24 NOTE — CHART NOTE - NSCHARTNOTEFT_GEN_A_CORE
Informed by RN that patient desatted to 87% on room air. Patient placed on 2L O2 with improvement to 90%.     Patient seen at bedside and assessed. States he feels well with no complaints. Denies shortness of breath, chest pain, nausea, vomiting, fevers, chills, dizziness, diaphoresis or other complaints. Patient is A&Ox3 and in no respiratory distress. Appears stable. Lungs clear to auscultation bilaterally with no labored breathing.    T(C): 36.6 (03-23-23 @ 21:00), Max: 36.6 (03-23-23 @ 12:31)  HR: 61 (03-23-23 @ 21:00) (61 - 77)  BP: 146/76 (03-23-23 @ 21:00) (132/84 - 152/87)  RR: 18 (03-24-23 @ 03:37) (17 - 18)  SpO2: 87% (03-24-23 @ 03:37) (87% - 98%)    CONSTITUTIONAL: Well groomed, no apparent distress  RESP: No respiratory distress, no use of accessory muscles; CTA b/l, no WRR  CV: RRR, +S1S2, no MRG; no JVD; no peripheral edema  GI: Soft, NT, ND, no rebound, no guarding; no palpable masses; no hepatosplenomegaly; no hernia palpated  PSYCH: Appropriate insight/judgment; A+O x 3, mood and affect appropriate, recent/remote memory intact    #Hypoxic event likely 2/2 COPD w/ PNA vs ?sleep apnea  Plan:  - Placed on 2L NC with improvement of oxygen. Will attempt to wean off as tolerated  - Duoneb ordered for relief  - Prior CXR from 3/23 shows bibasilar reticulonodular opacities, greater on the right, which may be infectious in etiology   - Monitor on   - C/w Ceftriaxone and Azithromycin  - C/w prednisone  - Continue to monitor for any change in status. Will relay events to day team for further continuity of care

## 2023-03-24 NOTE — DIETITIAN INITIAL EVALUATION ADULT - PERSON TAUGHT/METHOD
- importance of meeting protein-calorie needs.   - small frequent meals   - Strategies for adding fiber to diet while he is not in a flare./verbal instruction/patient instructed

## 2023-03-24 NOTE — PHARMACOTHERAPY INTERVENTION NOTE - COMMENTS
Current and new medications reviewed with the patient and his wife. Current medication schedule was discussed in detail including: medication name, indication, dose, administration times, treatment duration, side effects, and special instructions. Patient questions and concerns were answered and addressed. Patient demonstrated understanding.     Emily Parekh, PharmD, [facility]  Clinical Pharmacy Specialist  [id number]

## 2023-03-24 NOTE — DISCHARGE NOTE NURSING/CASE MANAGEMENT/SOCIAL WORK - PATIENT PORTAL LINK FT
You can access the FollowMyHealth Patient Portal offered by [facility] by registering at the following website: [email or web address]. By joining [employer]’s FollowMyHealth portal, you will also be able to view your health information using other applications (apps) compatible with our system.

## 2023-03-24 NOTE — DISCHARGE NOTE PROVIDER - NSDCCPCAREPLAN_GEN_ALL_CORE_FT
PRINCIPAL DISCHARGE DIAGNOSIS  Diagnosis: COPD exacerbation  Assessment and Plan of Treatment: You were admitted fo COPD exacerbation with pneumonia. Continue your antibiotics to complete your course and complete your steroid taper. Follow up with Dr. Eckert in 1-2 weeks and follow up with Dr. Greene on 4/3/23. You will need a repeat chest CT in 4 weeks.      SECONDARY DISCHARGE DIAGNOSES  Diagnosis: Headache syndrome  Assessment and Plan of Treatment: Continue to take your medications as prescribed. Please follow up with your primary care provider.      Diagnosis: DM (diabetes mellitus)  Assessment and Plan of Treatment: Your Hemoglobin A1C is  5.9. Target goal for hemoglobin A1C is <7. Monitor blood glucose levels throughout the day before meals and at bedtime. Record blood sugars and bring to outpatient providers appointment in order to be reviewed by your doctor for management modifications. If your sugars are more than 400 or less than 70 you should contact your PCP immediately. Monitor for signs/symptoms of low blood glucose, such as, dizziness, altered mental status, or cool/clammy skin. In addition, monitor for signs/symptoms of high blood glucose, such as, feeling hot, dry, fatigued, or with increased thirst/urination. Make regular podiatry appointments in order to have feet checked for wounds and uncontrolled toe nail growth to prevent infections, as well as, appointments with an ophthalmologist to monitor your vision.      Diagnosis: Ulcerative colitis  Assessment and Plan of Treatment: Continue to take your medications as prescribed. Please follow up with your primary care provider.

## 2023-03-24 NOTE — PROGRESS NOTE ADULT - ASSESSMENT
72M w/ hx of HTN, COPD, UC, presenting with 2 days of worsening dyspnea, sputum production, and change in sputum quality admitted for COPD exacerbation, possible pneumonia meeting sepsis criteria, found to have elevated troponin.:    copd exacerbation secondary to pneumonia:    Bronchiectasis:   HTN:  UC:       3/24:    copd exacerbation secondary to pneumonia:  he is on antibiotics for suspected pneumonia and steroids  : cont BD: will decide about steroids duration depending upon how he responds to the current rx: seems reasonable: follow up legchad  Bronchiectasis: cont BD: he has chr RML bronchiectasis   HTN: Controlled  UC:  no flare at this time:    dvt prophylaxis    dw acp

## 2023-03-24 NOTE — DIETITIAN INITIAL EVALUATION ADULT - OTHER INFO
A&Ox4. Pt reports fair intake yesterday, but decreased appetite and PO today. Reports lactose intolerance. No reported GI issues (nausea/vomiting/diarrhea/constipation.) Last BM yesterday. Pt is amenable to trialing [email or web address] supplement. Receptive to diet education.

## 2023-03-24 NOTE — DISCHARGE NOTE PROVIDER - HOSPITAL COURSE
72M w/ hx of HTN, COPD, UC, presenting with 2 days of worsening dyspnea, sputum production, and change in sputum quality admitted for COPD exacerbation, possible pneumonia meeting sepsis criteria, found to have elevated troponin.       Problem/Plan - 1:  ·  Problem: COPD exacerbation.   ·  Plan: Increased sputum production, dyspnea, cough with tachypnea but no hypoxia, concern for COPD exacerbation i/s/o recent taper off of steroids for UC flare. S/p CTX and steroids in ED. CXR findings with worsening of RLL opacity. Given hx bronchiectasis on imaging, will tx with antipseudomonal abx. Not currently on supplemental O2  - Tx with Zosyn for antipseudomonal, Azithromycin for atypical coverage  - Duonebs q6h PRN  - F/u BCx, Sputum Cx, Urine Legionellla  - Prednisone 40mg  - On Breztri at home, will give budesonide/formoterol inhaler  - Home Montelukast  - Fluticasone nasal spray  -pulmonary f/u noted.    #Elevated troponin:  - unlikely to have represented ACS as pt was chest pain free, EKG nonspecific, hemodynamically stable  - likely 2/2 demand from COPD exacerbation  - trop-hs 157 -> 150 -> 138, can stop trending  - if pt with chest pain stat trop/ekg, call cardiology  - TTE with normal LVEF 72%, grossly normal LVSF, normal RVSFrmal right ventricular size and function.       Problem/Plan - 3:  ·  Problem: Headache syndrome.   ·  Plan: Global headache since discharge in February. Worse in AM concerning for possible nighttime hypoxia, no neurologic symptoms.   - Tylenol for now and monitor SpO2 overnight.     Problem/Plan - 4:  ·  Problem: Ulcerative colitis.   ·  Plan: Recently admitted in Jan/Feb.  - C/w maintenance    diarrhea+- check stool for  c diff.     Problem/Plan - 5:  ·  Problem: DM (diabetes mellitus).   ·  Plan: Recent A1c 5.7%  - SSI  - Carb consistent diet.     Problem/Plan - 6:  ·  Problem: Need for prophylactic measure.   ·  Plan: DVT: Lovenox  Diet: CC/DASH  Dispo: Pending improvement.    On 3/27/2023 this case was reviewed with Dr. Montanez, the patient is medically stable and optimized for discharge. All medications were reviewed and prescriptions were sent to mutually agreed upon pharmacy. The patient agrees to follow up with providers as recommended.     72M w/ hx of HTN, COPD, UC, presenting with 2 days of worsening dyspnea, sputum production, and change in sputum quality admitted for COPD exacerbation, possible pneumonia meeting sepsis criteria, found to have elevated troponin.       Problem/Plan - 1:  ·  Problem: COPD exacerbation.   ·  Plan: Increased sputum production, dyspnea, cough with tachypnea but no hypoxia, concern for COPD exacerbation i/s/o recent taper off of steroids for UC flare. S/p CTX and steroids in ED. CXR findings with worsening of RLL opacity. Given hx bronchiectasis on imaging, will tx with antipseudomonal abx. Not currently on supplemental O2  - Tx with Zosyn for antipseudomonal, Azithromycin for atypical coverage  - Duonebs q6h PRN  - F/u BCx, Sputum Cx, Urine Legionellla  - Prednisone 40mg  - On Breztri at home, will give budesonide/formoterol inhaler  - Home Montelukast  - Fluticasone nasal spray  -pulmonary f/u noted.    #Elevated troponin:  - unlikely to have represented ACS as pt was chest pain free, EKG nonspecific, hemodynamically stable  - likely 2/2 demand from COPD exacerbation  - trop-hs 157 -> 150 -> 138, can stop trending  - if pt with chest pain stat trop/ekg, call cardiology  - TTE with normal LVEF 72%, grossly normal LVSF, normal RVSFrmal right ventricular size and function.       Problem/Plan - 3:  ·  Problem: Headache syndrome.   ·  Plan: Global headache since discharge in February. Worse in AM concerning for possible nighttime hypoxia, no neurologic symptoms.   - Tylenol for now and monitor SpO2 overnight.     Problem/Plan - 4:  ·  Problem: Ulcerative colitis.   ·  Plan: Recently admitted in Jan/Feb.  - C/w maintenance    diarrhea+- check stool for  c diff.     Problem/Plan - 5:  ·  Problem: DM (diabetes mellitus).   ·  Plan: Recent A1c 5.7%  - SSI  - Carb consistent diet.     Problem/Plan - 6:  ·  Problem: Need for prophylactic measure.   ·  Plan: DVT: Lovenox  Diet: CC/DASH  Dispo: Pending improvement.    On 3/27/2023 this case was reviewed with Dr. Montanez, the patient is medically stable and optimized for discharge. All medications were reviewed and prescriptions were sent to mutually agreed upon pharmacy. The patient agrees to follow up with providers as recommended.    Abx prescription initially sent for 1 day, but then resent for 2 days.   Patient made aware that he needs to take the prescription for 2 days. ACP attempted to call [facility] on multiple occasions but unable to get through to a pharmacist to cancel incorrect prescription. Will continue to attempt but will not delay discharge if unable to cancel, patient made aware to confirm prescription with pharmacy.

## 2023-03-24 NOTE — PROGRESS NOTE ADULT - ASSESSMENT
72 M UC, COPD  No fever, has leukocytosis  Here 3/22 with COPD exacerbation  Agree with antibiotics given increased sputum, progression of opacities  Recent steroid use (finish one week prior for UC flare)  CXR with reticulonodular opacities  Possible PNA  Overall, PNA, leukocytosis, COPD  - Ceftriaxone 1g q 24  - DC Zosyn/Azithro  - F/U sputum culture, F/U legionella  - Trend WBC to normal  - Monitor resp status  - Leukocytosis with some contribution from steroids?    Reese Hall MD  Contact on TEAMS messaging from 9am - 5pm  From 5pm-9am, on weekends, or if no response call [phone]

## 2023-03-24 NOTE — DIETITIAN INITIAL EVALUATION ADULT - ORAL NUTRITION SUPPLEMENTS
[facility] Standard 1.4 PO supplement 1x daily (455 cristino, 20 gm protein)

## 2023-03-24 NOTE — DISCHARGE NOTE PROVIDER - CARE PROVIDER_API CALL
Mikey Greene)  Internal Medicine  [address] [place] [address]  Phone: [phone]  Fax: [phone]  Scheduled Appointment: 04/03/2023 11:30 AM    Dr. Eckert,   Phone: (   )    -  Fax: (   )    -  Follow Up Time: 1 week

## 2023-03-24 NOTE — DISCHARGE NOTE NURSING/CASE MANAGEMENT/SOCIAL WORK - NSDCPEFALRISK_GEN_ALL_CORE
For information on Fall & Injury Prevention, visit: [email or web address] OR  [email or web address] OR  https://www.cdc.gov/steadi/patient.html

## 2023-03-25 LAB
ANION GAP SERPL CALC-SCNC: 12 MMOL/L — SIGNIFICANT CHANGE UP (ref 7–14)
BUN SERPL-MCNC: 12 MG/DL — SIGNIFICANT CHANGE UP (ref 7–23)
CALCIUM SERPL-MCNC: 9 MG/DL — SIGNIFICANT CHANGE UP (ref 8.4–10.5)
CHLORIDE SERPL-SCNC: 102 MMOL/L — SIGNIFICANT CHANGE UP (ref 98–107)
CO2 SERPL-SCNC: 24 MMOL/L — SIGNIFICANT CHANGE UP (ref 22–31)
CREAT SERPL-MCNC: 0.82 MG/DL — SIGNIFICANT CHANGE UP (ref 0.5–1.3)
EGFR: 94 ML/MIN/1.73M2 — SIGNIFICANT CHANGE UP
GLUCOSE BLDC GLUCOMTR-MCNC: 142 MG/DL — HIGH (ref 70–99)
GLUCOSE BLDC GLUCOMTR-MCNC: 258 MG/DL — HIGH (ref 70–99)
GLUCOSE BLDC GLUCOMTR-MCNC: 270 MG/DL — HIGH (ref 70–99)
GLUCOSE BLDC GLUCOMTR-MCNC: 86 MG/DL — SIGNIFICANT CHANGE UP (ref 70–99)
GLUCOSE SERPL-MCNC: 95 MG/DL — SIGNIFICANT CHANGE UP (ref 70–99)
HCT VFR BLD CALC: 32.4 % — LOW (ref 39–50)
HGB BLD-MCNC: 9.8 G/DL — LOW (ref 13–17)
MAGNESIUM SERPL-MCNC: 1.9 MG/DL — SIGNIFICANT CHANGE UP (ref 1.6–2.6)
MCHC RBC-ENTMCNC: 24.9 PG — LOW (ref 27–34)
MCHC RBC-ENTMCNC: 30.2 GM/DL — LOW (ref 32–36)
MCV RBC AUTO: 82.4 FL — SIGNIFICANT CHANGE UP (ref 80–100)
NRBC # BLD: 0 /100 WBCS — SIGNIFICANT CHANGE UP (ref 0–0)
NRBC # FLD: 0 K/UL — SIGNIFICANT CHANGE UP (ref 0–0)
PHOSPHATE SERPL-MCNC: 3.4 MG/DL — SIGNIFICANT CHANGE UP (ref 2.5–4.5)
PLATELET # BLD AUTO: 401 K/UL — HIGH (ref 150–400)
POTASSIUM SERPL-MCNC: 3.4 MMOL/L — LOW (ref 3.5–5.3)
POTASSIUM SERPL-SCNC: 3.4 MMOL/L — LOW (ref 3.5–5.3)
RBC # BLD: 3.93 M/UL — LOW (ref 4.2–5.8)
RBC # FLD: 14.7 % — HIGH (ref 10.3–14.5)
SODIUM SERPL-SCNC: 138 MMOL/L — SIGNIFICANT CHANGE UP (ref 135–145)
WBC # BLD: 13.04 K/UL — HIGH (ref 3.8–10.5)
WBC # FLD AUTO: 13.04 K/UL — HIGH (ref 3.8–10.5)

## 2023-03-25 RX ORDER — POTASSIUM CHLORIDE 20 MEQ
20 PACKET (EA) ORAL
Refills: 0 | Status: DISCONTINUED | OUTPATIENT
Start: 2023-03-25 | End: 2023-03-25

## 2023-03-25 RX ORDER — POTASSIUM CHLORIDE 20 MEQ
20 PACKET (EA) ORAL
Refills: 0 | Status: COMPLETED | OUTPATIENT
Start: 2023-03-25 | End: 2023-03-25

## 2023-03-25 RX ADMIN — BUDESONIDE AND FORMOTEROL FUMARATE DIHYDRATE 2 PUFF(S): 160; 4.5 AEROSOL RESPIRATORY (INHALATION) at 09:30

## 2023-03-25 RX ADMIN — ENOXAPARIN SODIUM 40 MILLIGRAM(S): 100 INJECTION SUBCUTANEOUS at 17:07

## 2023-03-25 RX ADMIN — GABAPENTIN 300 MILLIGRAM(S): 400 CAPSULE ORAL at 06:05

## 2023-03-25 RX ADMIN — Medication 20 MILLIEQUIVALENT(S): at 11:30

## 2023-03-25 RX ADMIN — Medication 2 CAPSULE(S): at 16:10

## 2023-03-25 RX ADMIN — Medication 20 MILLIEQUIVALENT(S): at 13:32

## 2023-03-25 RX ADMIN — Medication 40 MILLIGRAM(S): at 06:05

## 2023-03-25 RX ADMIN — CEFTRIAXONE 100 MILLIGRAM(S): 500 INJECTION, POWDER, FOR SOLUTION INTRAMUSCULAR; INTRAVENOUS at 22:33

## 2023-03-25 RX ADMIN — Medication 800 MILLIGRAM(S): at 12:31

## 2023-03-25 RX ADMIN — GABAPENTIN 300 MILLIGRAM(S): 400 CAPSULE ORAL at 22:33

## 2023-03-25 RX ADMIN — FAMOTIDINE 20 MILLIGRAM(S): 10 INJECTION INTRAVENOUS at 06:05

## 2023-03-25 RX ADMIN — LOSARTAN POTASSIUM 25 MILLIGRAM(S): 100 TABLET, FILM COATED ORAL at 06:05

## 2023-03-25 RX ADMIN — Medication 20 MILLIEQUIVALENT(S): at 17:10

## 2023-03-25 RX ADMIN — PANTOPRAZOLE SODIUM 40 MILLIGRAM(S): 20 TABLET, DELAYED RELEASE ORAL at 12:31

## 2023-03-25 RX ADMIN — FAMOTIDINE 20 MILLIGRAM(S): 10 INJECTION INTRAVENOUS at 17:07

## 2023-03-25 RX ADMIN — Medication 800 MILLIGRAM(S): at 06:05

## 2023-03-25 RX ADMIN — Medication 2 SPRAY(S): at 06:05

## 2023-03-25 RX ADMIN — BUDESONIDE AND FORMOTEROL FUMARATE DIHYDRATE 2 PUFF(S): 160; 4.5 AEROSOL RESPIRATORY (INHALATION) at 22:33

## 2023-03-25 RX ADMIN — Medication 2 SPRAY(S): at 17:09

## 2023-03-25 RX ADMIN — Medication 2 CAPSULE(S): at 09:29

## 2023-03-25 RX ADMIN — Medication 2 CAPSULE(S): at 12:29

## 2023-03-25 RX ADMIN — Medication 3: at 16:26

## 2023-03-25 RX ADMIN — Medication 800 MILLIGRAM(S): at 22:33

## 2023-03-25 RX ADMIN — MONTELUKAST 10 MILLIGRAM(S): 4 TABLET, CHEWABLE ORAL at 12:29

## 2023-03-25 RX ADMIN — GABAPENTIN 300 MILLIGRAM(S): 400 CAPSULE ORAL at 12:29

## 2023-03-25 NOTE — PROGRESS NOTE ADULT - ASSESSMENT
72M w/ hx of HTN, COPD, UC, presenting with 2 days of worsening dyspnea, sputum production, and change in sputum quality admitted for COPD exacerbation, possible pneumonia meeting sepsis criteria, found to have elevated troponin.:    copd exacerbation secondary to pneumonia:    Bronchiectasis:   HTN:  UC:       3/24:    copd exacerbation secondary to pneumonia:  he is on antibiotics for suspected pneumonia and steroids  : cont BD: will decide about steroids duration depending upon how he responds to the current rx: seems reasonable: follow up legdinolla  Bronchiectasis: cont BD: he has chr RML bronchiectasis   HTN: Controlled  UC:  no flare at this time:    dvt prophylaxis    dw acp     3/25:    copd exacerbation secondary to pneumonia:  he is on antibiotics for suspected pneumonia and steroids  : cont BD: will decide about steroids duration depending upon how he responds to the current rx: seems reasonable: legionella is negative  Bronchiectasis: cont BD: he has chr RML bronchiectasis   HTN: Controlled  UC:  no flare at this time:    dvt prophylaxis    dw acp

## 2023-03-26 LAB
ANION GAP SERPL CALC-SCNC: 13 MMOL/L — SIGNIFICANT CHANGE UP (ref 7–14)
BUN SERPL-MCNC: 10 MG/DL — SIGNIFICANT CHANGE UP (ref 7–23)
CALCIUM SERPL-MCNC: 9 MG/DL — SIGNIFICANT CHANGE UP (ref 8.4–10.5)
CHLORIDE SERPL-SCNC: 102 MMOL/L — SIGNIFICANT CHANGE UP (ref 98–107)
CO2 SERPL-SCNC: 22 MMOL/L — SIGNIFICANT CHANGE UP (ref 22–31)
CREAT SERPL-MCNC: 0.77 MG/DL — SIGNIFICANT CHANGE UP (ref 0.5–1.3)
CULTURE RESULTS: SIGNIFICANT CHANGE UP
EGFR: 96 ML/MIN/1.73M2 — SIGNIFICANT CHANGE UP
GLUCOSE BLDC GLUCOMTR-MCNC: 110 MG/DL — HIGH (ref 70–99)
GLUCOSE BLDC GLUCOMTR-MCNC: 119 MG/DL — HIGH (ref 70–99)
GLUCOSE BLDC GLUCOMTR-MCNC: 151 MG/DL — HIGH (ref 70–99)
GLUCOSE BLDC GLUCOMTR-MCNC: 175 MG/DL — HIGH (ref 70–99)
GLUCOSE BLDC GLUCOMTR-MCNC: 201 MG/DL — HIGH (ref 70–99)
GLUCOSE BLDC GLUCOMTR-MCNC: 268 MG/DL — HIGH (ref 70–99)
GLUCOSE SERPL-MCNC: 104 MG/DL — HIGH (ref 70–99)
HCT VFR BLD CALC: 34.8 % — LOW (ref 39–50)
HGB BLD-MCNC: 10.8 G/DL — LOW (ref 13–17)
MAGNESIUM SERPL-MCNC: 1.9 MG/DL — SIGNIFICANT CHANGE UP (ref 1.6–2.6)
MCHC RBC-ENTMCNC: 25.4 PG — LOW (ref 27–34)
MCHC RBC-ENTMCNC: 31 GM/DL — LOW (ref 32–36)
MCV RBC AUTO: 81.9 FL — SIGNIFICANT CHANGE UP (ref 80–100)
NRBC # BLD: 0 /100 WBCS — SIGNIFICANT CHANGE UP (ref 0–0)
NRBC # FLD: 0 K/UL — SIGNIFICANT CHANGE UP (ref 0–0)
PHOSPHATE SERPL-MCNC: 3.3 MG/DL — SIGNIFICANT CHANGE UP (ref 2.5–4.5)
PLATELET # BLD AUTO: 416 K/UL — HIGH (ref 150–400)
POTASSIUM SERPL-MCNC: 3.6 MMOL/L — SIGNIFICANT CHANGE UP (ref 3.5–5.3)
POTASSIUM SERPL-SCNC: 3.6 MMOL/L — SIGNIFICANT CHANGE UP (ref 3.5–5.3)
RBC # BLD: 4.25 M/UL — SIGNIFICANT CHANGE UP (ref 4.2–5.8)
RBC # FLD: 14.9 % — HIGH (ref 10.3–14.5)
SODIUM SERPL-SCNC: 137 MMOL/L — SIGNIFICANT CHANGE UP (ref 135–145)
SPECIMEN SOURCE: SIGNIFICANT CHANGE UP
WBC # BLD: 14.06 K/UL — HIGH (ref 3.8–10.5)
WBC # FLD AUTO: 14.06 K/UL — HIGH (ref 3.8–10.5)

## 2023-03-26 RX ADMIN — GABAPENTIN 300 MILLIGRAM(S): 400 CAPSULE ORAL at 12:44

## 2023-03-26 RX ADMIN — Medication 40 MILLIGRAM(S): at 06:31

## 2023-03-26 RX ADMIN — FAMOTIDINE 20 MILLIGRAM(S): 10 INJECTION INTRAVENOUS at 06:29

## 2023-03-26 RX ADMIN — Medication 800 MILLIGRAM(S): at 06:30

## 2023-03-26 RX ADMIN — LOSARTAN POTASSIUM 25 MILLIGRAM(S): 100 TABLET, FILM COATED ORAL at 06:30

## 2023-03-26 RX ADMIN — Medication 800 MILLIGRAM(S): at 21:35

## 2023-03-26 RX ADMIN — BUDESONIDE AND FORMOTEROL FUMARATE DIHYDRATE 2 PUFF(S): 160; 4.5 AEROSOL RESPIRATORY (INHALATION) at 09:59

## 2023-03-26 RX ADMIN — Medication 1: at 17:28

## 2023-03-26 RX ADMIN — GABAPENTIN 300 MILLIGRAM(S): 400 CAPSULE ORAL at 06:29

## 2023-03-26 RX ADMIN — GABAPENTIN 300 MILLIGRAM(S): 400 CAPSULE ORAL at 21:36

## 2023-03-26 RX ADMIN — MONTELUKAST 10 MILLIGRAM(S): 4 TABLET, CHEWABLE ORAL at 10:08

## 2023-03-26 RX ADMIN — Medication 3: at 13:06

## 2023-03-26 RX ADMIN — PANTOPRAZOLE SODIUM 40 MILLIGRAM(S): 20 TABLET, DELAYED RELEASE ORAL at 06:30

## 2023-03-26 RX ADMIN — BUDESONIDE AND FORMOTEROL FUMARATE DIHYDRATE 2 PUFF(S): 160; 4.5 AEROSOL RESPIRATORY (INHALATION) at 21:35

## 2023-03-26 RX ADMIN — Medication 2 CAPSULE(S): at 09:58

## 2023-03-26 RX ADMIN — Medication 800 MILLIGRAM(S): at 12:44

## 2023-03-26 RX ADMIN — Medication 2 CAPSULE(S): at 17:14

## 2023-03-26 RX ADMIN — Medication 2 CAPSULE(S): at 12:44

## 2023-03-26 RX ADMIN — Medication 2 SPRAY(S): at 06:29

## 2023-03-26 RX ADMIN — ENOXAPARIN SODIUM 40 MILLIGRAM(S): 100 INJECTION SUBCUTANEOUS at 18:13

## 2023-03-26 RX ADMIN — FAMOTIDINE 20 MILLIGRAM(S): 10 INJECTION INTRAVENOUS at 17:13

## 2023-03-26 NOTE — PROVIDER CONTACT NOTE (OTHER) - SITUATION
3 beats vtach
Pt. c/o of heavy headache while returning from the bathroom.
On cardiac monitor HR drop to 39 for 1 second. Pt. Rhythm returned to Sinus renata   59.
noted with losses stool times 4

## 2023-03-26 NOTE — PROVIDER CONTACT NOTE (OTHER) - RECOMMENDATIONS
Notified the provider
seen by Attending ready   stool and c diff ordeed
ACP notified - will continue to monitor
Notified the provider

## 2023-03-26 NOTE — PROGRESS NOTE ADULT - PROBLEM SELECTOR PLAN 4
Recently admitted in Jan/Feb.  - C/w maintenance    diarrhea+- check stool for  c diff
Recently admitted in Jan/Feb.  - C/w maintenance
Recently admitted in Jan/Feb.  - C/w maintenance    diarrhea+- check stool for  c diff
Recently admitted in Jan/Feb.  - C/w maintenance

## 2023-03-26 NOTE — PROGRESS NOTE ADULT - PROBLEM SELECTOR PLAN 6
DVT: Lovenox  Diet: CC/DASH  Dispo: Pending improvement

## 2023-03-26 NOTE — PROVIDER CONTACT NOTE (OTHER) - BACKGROUND
Dx COPD with exacerbation
Dx COPD Exacerbation
admitted for copd; pmhx of htn, dm, ulcerative colitis, and gastroparesis
(Admit Diagnosis) Chronic obstructive pulmonary disease with acute exacerbation  (PMH) Bronchiectasis  (PMH) Inguinal hernia

## 2023-03-26 NOTE — PROVIDER CONTACT NOTE (OTHER) - ASSESSMENT
pt resting in bed; asymptomatic; vss
vitals stable   no acute distress noted fall safety maintained
Pt. is AOX4 remains neurologically intact. VSS, Pt. reported that got OOB fast. Pt. was educated about fall precautions. Pt. also reported that the headache is getting better. blood glucose 119
Pt. is AOx4  denies distress or discomfort. Pt. reported 2 loose BM.

## 2023-03-26 NOTE — PROVIDER CONTACT NOTE (OTHER) - REASON
noted with losses stool times 4
On cardiac monitor HR drop to 39 for 1 second
Pt. c/o of heavy headache while returning from the bathroom
3 beats vtach

## 2023-03-26 NOTE — PROGRESS NOTE ADULT - PROBLEM SELECTOR PLAN 1
Increased sputum production, dyspnea, cough with tachypnea but no hypoxia, concern for COPD exacerbation i/s/o recent taper off of steroids for UC flare. S/p CTX and steroids in ED. CXR findings with worsening of RLL opacity. Given hx bronchiectasis on imaging, will tx with antipseudomonal abx. Not currently on supplemental O2  - Tx with Zosyn for antipseudomonal, Azithromycin for atypical coverage  - Duonebs q6h PRN  - F/u BCx, Sputum Cx, Urine Legionellla  - Prednisone 40mg  - On Breztri at home, will give budesonide/formoterol inhaler  - Home Montelukast  - Fluticasone nasal spray  -pulmonary consult appreciated
Increased sputum production, dyspnea, cough with tachypnea but no hypoxia, concern for COPD exacerbation i/s/o recent taper off of steroids for UC flare. S/p CTX and steroids in ED. CXR findings with worsening of RLL opacity. Given hx bronchiectasis on imaging, will tx with antipseudomonal abx. Not currently on supplemental O2  - Tx with Zosyn for antipseudomonal, Azithromycin for atypical coverage  - Duonebs q6h PRN  - F/u BCx, Sputum Cx, Urine Legionellla  - Prednisone 40mg  - On Breztri at home, will give budesonide/formoterol inhaler  - Home Montelukast  - Fluticasone nasal spray  -pulmonary f/u noted

## 2023-03-26 NOTE — PROGRESS NOTE ADULT - PROBLEM SELECTOR PLAN 2
Troponin 157. Unlikely to be ACS without chest pain or EKG changes suggestive of active ischemia  - Appreciate cardiology consult  - May benefit from statin  - TTE
Troponin 157. Unlikely to be ACS without chest pain or EKG changes suggestive of active ischemia  - Appreciate cardiology consult  - May benefit from statin  - TTE
Troponin 157. Unlikely to be ACS without chest pain or EKG changes suggestive of active ischemia  - Appreciate cardiology consult  -statin  - TTE
Troponin 157. Unlikely to be ACS without chest pain or EKG changes suggestive of active ischemia  - Appreciate cardiology consult  - May benefit from statin  - TTE

## 2023-03-26 NOTE — PROGRESS NOTE ADULT - PROBLEM SELECTOR PLAN 5
Recent A1c 5.7%  - SSI  - Carb consistent diet

## 2023-03-26 NOTE — PROVIDER CONTACT NOTE (OTHER) - ACTION/TREATMENT ORDERED:
BLAS Coffey made aware. No further instructions at this time. Safety maintained.
BLAS Coffey made aware. No further instructions at this time.
seen by Attending ready   stool and c diff ordeed
ACP notified - will continue to monitor

## 2023-03-26 NOTE — PROGRESS NOTE ADULT - ASSESSMENT
72M w/ hx of HTN, COPD, UC, presenting with 2 days of worsening dyspnea, sputum production, and change in sputum quality admitted for COPD exacerbation, possible pneumonia meeting sepsis criteria, found to have elevated troponin.:    copd exacerbation secondary to pneumonia:    Bronchiectasis:   HTN:  UC:       3/24:    copd exacerbation secondary to pneumonia:  he is on antibiotics for suspected pneumonia and steroids  : cont BD: will decide about steroids duration depending upon how he responds to the current rx: seems reasonable: follow up melizaa  Bronchiectasis: cont BD: he has chr RML bronchiectasis   HTN: Controlled  UC:  no flare at this time:    dvt prophylaxis    dw acp     3/25:    copd exacerbation secondary to pneumonia:  he is on antibiotics for suspected pneumonia and steroids  : cont BD: will decide about steroids duration depending upon how he responds to the current rx: seems reasonable: legionella is negative  Bronchiectasis: cont BD: he has chr RML bronchiectasis   HTN: Controlled  UC:  no flare at this time:    dvt prophylaxis    dw acp     3/26:    copd exacerbation secondary to pneumonia:  he is on antibiotics for suspected pneumonia and steroids  : cont BD: will decide about steroids duration depending upon how he responds to the current rx: seems reasonable: legionella is negative : doing better today  : on roomair:  he would need repeat ct scan chest after dc in 4 weeks ti me:   Bronchiectasis: cont BD: he has chr RML bronchiectasis   HTN: Controlled  UC:  no flare at this time:  has 1-2 times diarrhea ? antibiotics related    dvt prophylaxis    dw acp

## 2023-03-27 VITALS
HEART RATE: 57 BPM | DIASTOLIC BLOOD PRESSURE: 83 MMHG | RESPIRATION RATE: 16 BRPM | SYSTOLIC BLOOD PRESSURE: 148 MMHG | OXYGEN SATURATION: 98 % | TEMPERATURE: 98 F

## 2023-03-27 LAB
ANION GAP SERPL CALC-SCNC: 9 MMOL/L — SIGNIFICANT CHANGE UP (ref 7–14)
BUN SERPL-MCNC: 12 MG/DL — SIGNIFICANT CHANGE UP (ref 7–23)
CALCIUM SERPL-MCNC: 8.8 MG/DL — SIGNIFICANT CHANGE UP (ref 8.4–10.5)
CHLORIDE SERPL-SCNC: 101 MMOL/L — SIGNIFICANT CHANGE UP (ref 98–107)
CO2 SERPL-SCNC: 25 MMOL/L — SIGNIFICANT CHANGE UP (ref 22–31)
CREAT SERPL-MCNC: 0.77 MG/DL — SIGNIFICANT CHANGE UP (ref 0.5–1.3)
CULTURE RESULTS: SIGNIFICANT CHANGE UP
EGFR: 96 ML/MIN/1.73M2 — SIGNIFICANT CHANGE UP
GLUCOSE BLDC GLUCOMTR-MCNC: 106 MG/DL — HIGH (ref 70–99)
GLUCOSE BLDC GLUCOMTR-MCNC: 224 MG/DL — HIGH (ref 70–99)
GLUCOSE SERPL-MCNC: 105 MG/DL — HIGH (ref 70–99)
HCT VFR BLD CALC: 34.8 % — LOW (ref 39–50)
HGB BLD-MCNC: 10.8 G/DL — LOW (ref 13–17)
MAGNESIUM SERPL-MCNC: 1.8 MG/DL — SIGNIFICANT CHANGE UP (ref 1.6–2.6)
MCHC RBC-ENTMCNC: 25.4 PG — LOW (ref 27–34)
MCHC RBC-ENTMCNC: 31 GM/DL — LOW (ref 32–36)
MCV RBC AUTO: 81.9 FL — SIGNIFICANT CHANGE UP (ref 80–100)
METHOD TYPE: SIGNIFICANT CHANGE UP
NRBC # BLD: 0 /100 WBCS — SIGNIFICANT CHANGE UP (ref 0–0)
NRBC # FLD: 0 K/UL — SIGNIFICANT CHANGE UP (ref 0–0)
ORGANISM # SPEC MICROSCOPIC CNT: SIGNIFICANT CHANGE UP
PHOSPHATE SERPL-MCNC: 4.3 MG/DL — SIGNIFICANT CHANGE UP (ref 2.5–4.5)
PLATELET # BLD AUTO: 456 K/UL — HIGH (ref 150–400)
POTASSIUM SERPL-MCNC: 3.5 MMOL/L — SIGNIFICANT CHANGE UP (ref 3.5–5.3)
POTASSIUM SERPL-SCNC: 3.5 MMOL/L — SIGNIFICANT CHANGE UP (ref 3.5–5.3)
RBC # BLD: 4.25 M/UL — SIGNIFICANT CHANGE UP (ref 4.2–5.8)
RBC # FLD: 15.2 % — HIGH (ref 10.3–14.5)
SODIUM SERPL-SCNC: 135 MMOL/L — SIGNIFICANT CHANGE UP (ref 135–145)
SPECIMEN SOURCE: SIGNIFICANT CHANGE UP
WBC # BLD: 14.72 K/UL — HIGH (ref 3.8–10.5)
WBC # FLD AUTO: 14.72 K/UL — HIGH (ref 3.8–10.5)

## 2023-03-27 PROCEDURE — 99232 SBSQ HOSP IP/OBS MODERATE 35: CPT

## 2023-03-27 RX ADMIN — BUDESONIDE AND FORMOTEROL FUMARATE DIHYDRATE 2 PUFF(S): 160; 4.5 AEROSOL RESPIRATORY (INHALATION) at 08:16

## 2023-03-27 RX ADMIN — Medication 40 MILLIGRAM(S): at 05:15

## 2023-03-27 RX ADMIN — GABAPENTIN 300 MILLIGRAM(S): 400 CAPSULE ORAL at 05:16

## 2023-03-27 RX ADMIN — Medication 2 CAPSULE(S): at 08:16

## 2023-03-27 RX ADMIN — GABAPENTIN 300 MILLIGRAM(S): 400 CAPSULE ORAL at 15:08

## 2023-03-27 RX ADMIN — FAMOTIDINE 20 MILLIGRAM(S): 10 INJECTION INTRAVENOUS at 05:16

## 2023-03-27 RX ADMIN — MONTELUKAST 10 MILLIGRAM(S): 4 TABLET, CHEWABLE ORAL at 12:58

## 2023-03-27 RX ADMIN — Medication 2: at 12:48

## 2023-03-27 RX ADMIN — Medication 2 SPRAY(S): at 05:15

## 2023-03-27 RX ADMIN — Medication 2 CAPSULE(S): at 12:58

## 2023-03-27 RX ADMIN — Medication 800 MILLIGRAM(S): at 05:16

## 2023-03-27 RX ADMIN — LOSARTAN POTASSIUM 25 MILLIGRAM(S): 100 TABLET, FILM COATED ORAL at 05:16

## 2023-03-27 RX ADMIN — Medication 800 MILLIGRAM(S): at 15:06

## 2023-03-27 RX ADMIN — PANTOPRAZOLE SODIUM 40 MILLIGRAM(S): 20 TABLET, DELAYED RELEASE ORAL at 05:16

## 2023-03-27 NOTE — PROGRESS NOTE ADULT - ASSESSMENT
72 M UC, COPD  No fever, has leukocytosis  Here 3/22 with COPD exacerbation  Agree with antibiotics given increased sputum, progression of opacities  Recent steroid use (finish one week prior for UC flare)  CXR with reticulonodular opacities  Possible PNA, complete treatment  No further signs infection  Overall, PNA, leukocytosis, COPD  - Ceftriaxone 1g q 24, complete 7 days then DC--when discharge planning can send out on PO Ceftin 500mg q 12 to complete course  - Trend WBC to normal  - Monitor resp status  - Leukocytosis with some contribution from steroids  - If not having diarrhea can DC contact for C diff (if diarrhea present, check C diff)    Signing off. Please call with further questions or change in status.    Reese Hall MD  Contact on TEAMS messaging from 9am - 5pm  From 5pm-9am, on weekends, or if no response call [phone]

## 2023-03-27 NOTE — PROGRESS NOTE ADULT - ASSESSMENT
72M w/ hx of HTN, COPD, UC, presenting with 2 days of worsening dyspnea, sputum production, and change in sputum quality admitted for COPD exacerbation, possible pneumonia meeting sepsis criteria, found to have elevated troponin.:    copd exacerbation secondary to pneumonia:    Bronchiectasis:   HTN:  UC:       3/24:    copd exacerbation secondary to pneumonia:  he is on antibiotics for suspected pneumonia and steroids  : cont BD: will decide about steroids duration depending upon how he responds to the current rx: seems reasonable: follow up anil  Bronchiectasis: cont BD: he has chr RML bronchiectasis   HTN: Controlled  UC:  no flare at this time:    dvt prophylaxis    dw acp     3/25:    copd exacerbation secondary to pneumonia:  he is on antibiotics for suspected pneumonia and steroids  : cont BD: will decide about steroids duration depending upon how he responds to the current rx: seems reasonable: legionella is negative  Bronchiectasis: cont BD: he has chr RML bronchiectasis   HTN: Controlled  UC:  no flare at this time:    dvt prophylaxis    dw acp     3/26:    copd exacerbation secondary to pneumonia:  he is on antibiotics for suspected pneumonia and steroids  : cont BD: will decide about steroids duration depending upon how he responds to the current rx: seems reasonable: legionella is negative : doing better today  : on roomair:  he would need repeat ct scan chest after dc in 4 weeks ti me:   Bronchiectasis: cont BD: he has chr RML bronchiectasis   HTN: Controlled  UC:  no flare at this time:  has 1-2 times diarrhea ? antibiotics related    dvt prophylaxis    dw acp       3/27:    copd exacerbation secondary to pneumonia:  he is on antibiotics for suspected pneumonia and steroids  : cont BD: will decide about steroids duration depending upon how he responds to the current rx: seems reasonable: legionella is negative : doing better today  : on roomair:   total of 7 days  : and taper steroids: he would need repeat ct scan chest after dc in 4 weeks ti me:   Bronchiectasis: cont BD: he has chr RML bronchiectasis   HTN: Controlled  UC:  no flare at this time:  has 1-2 times diarrhea ? antibiotics related    dvt prophylaxis : follwo with me in 1-2 weeks    dw acp

## 2023-03-27 NOTE — PROGRESS NOTE ADULT - PROBLEM SELECTOR PROBLEM 4
Ulcerative colitis

## 2023-03-27 NOTE — PROGRESS NOTE ADULT - PROBLEM SELECTOR PROBLEM 3
Headache syndrome

## 2023-03-27 NOTE — PROGRESS NOTE ADULT - PROVIDER SPECIALTY LIST ADULT
Infectious Disease
Internal Medicine
Infectious Disease
Cardiology
Cardiology
Pulmonology
Pulmonology
Internal Medicine
Pulmonology
Pulmonology
Internal Medicine

## 2023-03-27 NOTE — CHART NOTE - NSCHARTNOTEFT_GEN_A_CORE
patient discharged prior to ID note. Pulm had recommended Augmentin 875 BID to complete 7 day abx course. ID recommendation was Ceftin. Discussed with Dr. Eckert who is patient's outpatient pulmonologist, keeping augmentin.     Milka Mitchell PA-C  Medicine  pager [phone]

## 2023-03-27 NOTE — PROGRESS NOTE ADULT - PROBLEM SELECTOR PROBLEM 2
Elevated troponin level

## 2023-03-27 NOTE — PROGRESS NOTE ADULT - SUBJECTIVE AND OBJECTIVE BOX
BALA SARGENT  72y  Male      Patient is a 72y old  Male who presents with a chief complaint of COPD exacerbation (23 Mar 2023 11:31)  Patient was seen and examined.chart reviewed.feels better,no sob,no cp    REVIEW OF SYSTEMS:  CONSTITUTIONAL: No fever  RESPIRATORY: No cough, hemoptysis or shortness of breath  CARDIOVASCULAR: No chest pain, palpitations, dizziness, or leg swelling  GASTROINTESTINAL: No abdominal pain. nausea, vomiting, hematemesis  GENITOURINARY: No dysuria, frequency, hematuria   NEUROLOGICAL: No headaches, no dizziness  MUSCULOSKELETAL: No joint pain or swelling;     INTERVAL HPI/OVERNIGHT EVENTS:  T(C): 36.6 (03-23-23 @ 21:00), Max: 36.6 (03-23-23 @ 12:31)  HR: 61 (03-23-23 @ 21:00) (61 - 77)  BP: 146/76 (03-23-23 @ 21:00) (132/84 - 152/87)  RR: 17 (03-23-23 @ 21:00) (17 - 18)  SpO2: 95% (03-23-23 @ 21:00) (87% - 98%)  Wt(kg): --  I&O's Summary    T(C): 36.6 (03-23-23 @ 21:00), Max: 36.6 (03-23-23 @ 12:31)  HR: 61 (03-23-23 @ 21:00) (61 - 77)  BP: 146/76 (03-23-23 @ 21:00) (132/84 - 152/87)  RR: 17 (03-23-23 @ 21:00) (17 - 18)  SpO2: 95% (03-23-23 @ 21:00) (87% - 98%)  Wt(kg): --Vital Signs Last 24 Hrs  T(C): 36.6 (23 Mar 2023 21:00), Max: 36.6 (23 Mar 2023 12:31)  T(F): 97.9 (23 Mar 2023 21:00), Max: 97.9 (23 Mar 2023 21:00)  HR: 61 (23 Mar 2023 21:00) (61 - 77)  BP: 146/76 (23 Mar 2023 21:00) (132/84 - 152/87)  BP(mean): --  RR: 17 (23 Mar 2023 21:00) (17 - 18)  SpO2: 95% (23 Mar 2023 21:00) (87% - 98%)    Parameters below as of 23 Mar 2023 21:00  Patient On (Oxygen Delivery Method): room air        LABS:                        10.8   15.93 )-----------( 400      ( 23 Mar 2023 08:08 )             34.3     03-23    133<L>  |  97<L>  |  11  ----------------------------<  201<H>  3.9   |  23  |  0.74    Ca    9.2      23 Mar 2023 08:08  Phos  3.2     03-23  Mg     1.90     03-23    TPro  7.2  /  Alb  3.6  /  TBili  0.5  /  DBili  x   /  AST  66<H>  /  ALT  13  /  AlkPhos  58  03-22    PT/INR - ( 22 Mar 2023 10:15 )   PT: 13.2 sec;   INR: 1.14 ratio         PTT - ( 22 Mar 2023 10:15 )  PTT:23.2 sec    CAPILLARY BLOOD GLUCOSE      POCT Blood Glucose.: 159 mg/dL (23 Mar 2023 22:19)  POCT Blood Glucose.: 227 mg/dL (23 Mar 2023 17:35)  POCT Blood Glucose.: 216 mg/dL (23 Mar 2023 12:07)  POCT Blood Glucose.: 158 mg/dL (23 Mar 2023 08:09)            PAST MEDICAL & SURGICAL HISTORY:  Gastroparesis  with pylorus dysfunction      DM (diabetes mellitus)  type II      Chronic obstructive pulmonary disease      Hypertension      Ulcerative colitis      Inguinal hernia      Bronchiectasis      Arthropathy  left knee replacement 2010      History of repair of hiatal hernia  childhood      S/P endoscopy  POEM 10/2021, 11/2021          MEDICATIONS  (STANDING):  azithromycin  IVPB      azithromycin  IVPB 500 milliGRAM(s) IV Intermittent every 24 hours  budesonide 160 MICROgram(s)/formoterol 4.5 MICROgram(s) Inhaler 2 Puff(s) Inhalation two times a day  dextrose 5%. 1000 milliLiter(s) (50 mL/Hr) IV Continuous <Continuous>  dextrose 5%. 1000 milliLiter(s) (100 mL/Hr) IV Continuous <Continuous>  dextrose 50% Injectable 25 Gram(s) IV Push once  dextrose 50% Injectable 12.5 Gram(s) IV Push once  dextrose 50% Injectable 25 Gram(s) IV Push once  enoxaparin Injectable 40 milliGRAM(s) SubCutaneous every 24 hours  famotidine    Tablet 20 milliGRAM(s) Oral two times a day  fluticasone propionate 50 MICROgram(s)/spray Nasal Spray 2 Spray(s) Both Nostrils two times a day  gabapentin 300 milliGRAM(s) Oral three times a day  glucagon  Injectable 1 milliGRAM(s) IntraMuscular once  insulin lispro (ADMELOG) corrective regimen sliding scale   SubCutaneous three times a day before meals  insulin lispro (ADMELOG) corrective regimen sliding scale   SubCutaneous at bedtime  losartan 25 milliGRAM(s) Oral daily  mesalamine DR Capsule 800 milliGRAM(s) Oral three times a day  montelukast 10 milliGRAM(s) Oral daily  pancrelipase  (CREON 36,000 Lipase Units) 2 Capsule(s) Oral three times a day with meals  pantoprazole    Tablet 40 milliGRAM(s) Oral before breakfast  piperacillin/tazobactam IVPB.. 3.375 Gram(s) IV Intermittent every 8 hours  predniSONE   Tablet 40 milliGRAM(s) Oral daily    MEDICATIONS  (PRN):  acetaminophen     Tablet .. 650 milliGRAM(s) Oral every 6 hours PRN Temp greater or equal to 38C (100.4F), Mild Pain (1 - 3)  dextrose Oral Gel 15 Gram(s) Oral once PRN Blood Glucose LESS THAN 70 milliGRAM(s)/deciliter  melatonin 3 milliGRAM(s) Oral at bedtime PRN Insomnia        RADIOLOGY & ADDITIONAL TESTS:    Imaging Personally Reviewed:  [ ] YES  [ ] NO    Consultant(s) Notes Reviewed:  [x ] YES  [ ] NO    PHYSICAL EXAM:  GENERAL: Alert and awake lying in bed in no distress  HEAD:  Atraumatic, Normocephalic  EYES: EOMI, ALYSSA, conjunctiva and sclera clear  NECK: Supple, No JVD, Normal thyroid  NERVOUS SYSTEM:  Alert & Oriented X3, Motor and sensory systems are intact,   CHEST/LUNG: Bilateral clear breath sounds, no rhochi, no wheezing, no crepitations,  HEART: Regular rate and rhythm; No murmurs, rubs, or gallops  ABDOMEN: Soft, Nontender, Nondistended; Bowel sounds present  EXTREMITIES:   Peripheral Pulses are palpable, no  edema        Care Discussed with Consultants/Other Providers [ ] YES  [ ] NO      Code Status: [] Full Code [] DNR [] DNI [] Goals of Care:   Disposition: [] ICU [] Stroke Unit [] RCU []PCU []Floor [] Discharge Home         [facility]    
BALA SARGENT  72y  Male      Patient is a 72y old  Male who presents with a chief complaint of COPD exacerbation (23 Mar 2023 11:31)  Patient was seen and examined.chart reviewed.feels better,no sob,no cp    REVIEW OF SYSTEMS:  CONSTITUTIONAL: No fever  RESPIRATORY: No cough, hemoptysis or shortness of breath  CARDIOVASCULAR: No chest pain, palpitations, dizziness, or leg swelling  GASTROINTESTINAL: No abdominal pain. nausea, vomiting, hematemesis diarrhea+  GENITOURINARY: No dysuria, frequency, hematuria   NEUROLOGICAL: No headaches, no dizziness  MUSCULOSKELETAL: No joint pain or swelling;     MEDICATIONS  (STANDING):  benzocaine/menthol Lozenge 1 Lozenge Oral once  budesonide 160 MICROgram(s)/formoterol 4.5 MICROgram(s) Inhaler 2 Puff(s) Inhalation two times a day  cefTRIAXone   IVPB 1000 milliGRAM(s) IV Intermittent every 24 hours  dextrose 5%. 1000 milliLiter(s) (50 mL/Hr) IV Continuous <Continuous>  dextrose 5%. 1000 milliLiter(s) (100 mL/Hr) IV Continuous <Continuous>  dextrose 50% Injectable 25 Gram(s) IV Push once  dextrose 50% Injectable 12.5 Gram(s) IV Push once  dextrose 50% Injectable 25 Gram(s) IV Push once  enoxaparin Injectable 40 milliGRAM(s) SubCutaneous every 24 hours  famotidine    Tablet 20 milliGRAM(s) Oral two times a day  fluticasone propionate 50 MICROgram(s)/spray Nasal Spray 2 Spray(s) Both Nostrils two times a day  gabapentin 300 milliGRAM(s) Oral three times a day  glucagon  Injectable 1 milliGRAM(s) IntraMuscular once  insulin lispro (ADMELOG) corrective regimen sliding scale   SubCutaneous three times a day before meals  insulin lispro (ADMELOG) corrective regimen sliding scale   SubCutaneous at bedtime  losartan 25 milliGRAM(s) Oral daily  mesalamine DR Capsule 800 milliGRAM(s) Oral three times a day  montelukast 10 milliGRAM(s) Oral daily  pancrelipase  (CREON 36,000 Lipase Units) 2 Capsule(s) Oral three times a day with meals  pantoprazole    Tablet 40 milliGRAM(s) Oral before breakfast  predniSONE   Tablet 40 milliGRAM(s) Oral daily    MEDICATIONS  (PRN):  acetaminophen     Tablet .. 650 milliGRAM(s) Oral every 6 hours PRN Temp greater or equal to 38C (100.4F), Mild Pain (1 - 3)  artificial tears (preservative free) Ophthalmic Solution 1 Drop(s) Both EYES three times a day PRN Dry Eyes  benzocaine/menthol Lozenge 1 Lozenge Oral three times a day PRN Sore Throat  dextrose Oral Gel 15 Gram(s) Oral once PRN Blood Glucose LESS THAN 70 milliGRAM(s)/deciliter  melatonin 3 milliGRAM(s) Oral at bedtime PRN Insomnia    Vital Signs Last 24 Hrs  T(C): 36.7 (03-25-23 @ 16:50), Max: 36.8 (03-25-23 @ 15:44)  T(F): 98 (03-25-23 @ 16:50), Max: 98.2 (03-25-23 @ 15:44)  HR: 60 (03-25-23 @ 16:50) (60 - 67)  BP: 140/60 (03-25-23 @ 16:50) (133/67 - 142/68)  BP(mean): --  RR: 16 (03-25-23 @ 16:50) (16 - 16)  SpO2: 100% (03-25-23 @ 16:50) (100% - 100%)                PAST MEDICAL & SURGICAL HISTORY:  Gastroparesis  with pylorus dysfunction      DM (diabetes mellitus)  type II      Chronic obstructive pulmonary disease      Hypertension      Ulcerative colitis      Inguinal hernia      Bronchiectasis      Arthropathy  left knee replacement 2010      History of repair of hiatal hernia  childhood      S/P endoscopy  POEM 10/2021, 11/2021      RADIOLOGY & ADDITIONAL TESTS:    Imaging Personally Reviewed:  [ ] YES  [ ] NO    Consultant(s) Notes Reviewed:  [x ] YES  [ ] NO    PHYSICAL EXAM:  GENERAL: Alert and awake lying in bed in no distress  HEAD:  Atraumatic, Normocephalic  EYES: EOMI, ALYSSA, conjunctiva and sclera clear  NECK: Supple, No JVD, Normal thyroid  NERVOUS SYSTEM:  Alert & Oriented X3, Motor and sensory systems are intact,   CHEST/LUNG: dec breath sounds at bases  cvs- s1 s2+  ABDOMEN: Soft, Nontender, Nondistended; Bowel sounds present  EXTREMITIES:   Peripheral Pulses are palpable, no  edema        Care Discussed with Consultants/Other Providers [ x YES  [ ] NO    LABS:  03-25    138  |  102  |  12  ----------------------------<  95  3.4<L>   |  24  |  0.82    Ca    9.0      25 Mar 2023 05:50  Phos  3.4     03-25  Mg     1.90     03-25      Creatinine Trend: 0.82 <--, 0.85 <--, 0.74 <--, 0.71 <--, 0.75 <--                        9.8    13.04 )-----------( 401      ( 25 Mar 2023 05:50 )             32.4     Urine Studies:                
Date of Service: 03-25-23 @ 12:57    Patient is a 72y old  Male who presents with a chief complaint of COPD exacerbation (24 Mar 2023 14:00)      Any change in ROS:  doing ok : no sob: on room air:     MEDICATIONS  (STANDING):  benzocaine/menthol Lozenge 1 Lozenge Oral once  budesonide 160 MICROgram(s)/formoterol 4.5 MICROgram(s) Inhaler 2 Puff(s) Inhalation two times a day  cefTRIAXone   IVPB 1000 milliGRAM(s) IV Intermittent every 24 hours  dextrose 5%. 1000 milliLiter(s) (100 mL/Hr) IV Continuous <Continuous>  dextrose 5%. 1000 milliLiter(s) (50 mL/Hr) IV Continuous <Continuous>  dextrose 50% Injectable 25 Gram(s) IV Push once  dextrose 50% Injectable 12.5 Gram(s) IV Push once  dextrose 50% Injectable 25 Gram(s) IV Push once  enoxaparin Injectable 40 milliGRAM(s) SubCutaneous every 24 hours  famotidine    Tablet 20 milliGRAM(s) Oral two times a day  fluticasone propionate 50 MICROgram(s)/spray Nasal Spray 2 Spray(s) Both Nostrils two times a day  gabapentin 300 milliGRAM(s) Oral three times a day  glucagon  Injectable 1 milliGRAM(s) IntraMuscular once  insulin lispro (ADMELOG) corrective regimen sliding scale   SubCutaneous three times a day before meals  insulin lispro (ADMELOG) corrective regimen sliding scale   SubCutaneous at bedtime  losartan 25 milliGRAM(s) Oral daily  mesalamine DR Capsule 800 milliGRAM(s) Oral three times a day  montelukast 10 milliGRAM(s) Oral daily  pancrelipase  (CREON 36,000 Lipase Units) 2 Capsule(s) Oral three times a day with meals  pantoprazole    Tablet 40 milliGRAM(s) Oral before breakfast  potassium chloride    Tablet ER 20 milliEquivalent(s) Oral every 2 hours  predniSONE   Tablet 40 milliGRAM(s) Oral daily    MEDICATIONS  (PRN):  acetaminophen     Tablet .. 650 milliGRAM(s) Oral every 6 hours PRN Temp greater or equal to 38C (100.4F), Mild Pain (1 - 3)  artificial tears (preservative free) Ophthalmic Solution 1 Drop(s) Both EYES three times a day PRN Dry Eyes  benzocaine/menthol Lozenge 1 Lozenge Oral three times a day PRN Sore Throat  dextrose Oral Gel 15 Gram(s) Oral once PRN Blood Glucose LESS THAN 70 milliGRAM(s)/deciliter  melatonin 3 milliGRAM(s) Oral at bedtime PRN Insomnia    Vital Signs Last 24 Hrs  T(C): 36.5 (24 Mar 2023 20:39), Max: 36.7 (24 Mar 2023 17:34)  T(F): 97.7 (24 Mar 2023 20:39), Max: 98 (24 Mar 2023 17:34)  HR: 52 (24 Mar 2023 20:39) (52 - 72)  BP: 143/79 (24 Mar 2023 20:39) (137/80 - 143/79)  BP(mean): --  RR: 16 (24 Mar 2023 20:39) (16 - 16)  SpO2: 99% (24 Mar 2023 20:39) (99% - 100%)    Parameters below as of 24 Mar 2023 20:39  Patient On (Oxygen Delivery Method): room air        I&O's Summary    24 Mar 2023 07:01  -  25 Mar 2023 07:00  --------------------------------------------------------  IN: 720 mL / OUT: 700 mL / NET: 20 mL          Physical Exam:   GENERAL: NAD, well-groomed, well-developed  HEENT: ALYSSA/   Atraumatic, Normocephalic  ENMT: No tonsillar erythema, exudates, or enlargement; Moist mucous membranes, Good dentition, No lesions  NECK: Supple, No JVD, Normal thyroid  CHEST/LUNG: Clear to auscultaion- no wheezing  CVS: Regular rate and rhythm; No murmurs, rubs, or gallops  GI: : Soft, Nontender, Nondistended; Bowel sounds present  NERVOUS SYSTEM:  Alert & Oriented X3  EXTREMITIES: -edema  LYMPH: No lymphadenopathy noted  SKIN: No rashes or lesions  ENDOCRINOLOGY: No Thyromegaly  PSYCH: Appropriate    Labs:  27                            9.8    13.04 )-----------( 401      ( 25 Mar 2023 05:50 )             32.4                         11.3   19.34 )-----------( 442      ( 24 Mar 2023 05:45 )             36.4                         10.8   15.93 )-----------( 400      ( 23 Mar 2023 08:08 )             34.3                         10.7   19.95 )-----------( 393      ( 22 Mar 2023 10:15 )             35.3     03-25    138  |  102  |  12  ----------------------------<  95  3.4<L>   |  24  |  0.82  03-24    135  |  98  |  15  ----------------------------<  96  3.2<L>   |  24  |  0.85  03-23    133<L>  |  97<L>  |  11  ----------------------------<  201<H>  3.9   |  23  |  0.74  03-22    134<L>  |  98  |  12  ----------------------------<  186<H>  3.8   |  23  |  0.71  03-22    130<L>  |  97<L>  |  12  ----------------------------<  122<H>  6.2<HH>   |  24  |  0.75    Ca    9.0      25 Mar 2023 05:50  Ca    9.3      24 Mar 2023 05:45  Phos  3.4     03-25  Phos  2.9     03-24  Mg     1.90     03-25  Mg     2.00     03-24    TPro  7.2  /  Alb  3.6  /  TBili  0.5  /  DBili  x   /  AST  66<H>  /  ALT  13  /  AlkPhos  58  03-22    CAPILLARY BLOOD GLUCOSE      POCT Blood Glucose.: 270 mg/dL (25 Mar 2023 12:19)  POCT Blood Glucose.: 142 mg/dL (25 Mar 2023 09:17)  POCT Blood Glucose.: 122 mg/dL (24 Mar 2023 21:32)  POCT Blood Glucose.: 128 mg/dL (24 Mar 2023 21:05)  POCT Blood Glucose.: 224 mg/dL (24 Mar 2023 16:51)          ra< from: Xray Chest 1 View- PORTABLE-Urgent (Xray Chest 1 View- PORTABLE-Urgent .) (03.22.23 @ 12:54) >    FINDINGS:    The heart is not accurately assessed in this AP projection.  Mild increase in the changes since the last exam There is no pneumothorax   or pleural effusion.    IMPRESSION:  Bibasilar reticulonodular opacities, greater on the right, which may be   infectious in etiology. These have progressed compared to prior chest CT.    --- End of Report ---          DAIN TINEO MD; Resident Radiologist  This document has been electronically signed.  MARLINE CRUZ MD; Attending Radiologist  This document has been electronically signed. Mar 22 2023  1:50PM    < end of copied text >  < from: CT Chest No Cont (02.02.23 @ 11:08) >  T scan of the chest was obtained without administration of intravenous   contrast.    Few small lymph nodes are present in the mediastinum.    Heart is normal in size. Calcification the coronary arteries is noted. No   pericardial effusion is noted.    Small hiatal hernia is noted.    No endobronchial lesions are noted. Emphysematous changes are present in   both lungs. Bronchiectasis is noted in the right lower lobe. Some of the   dilated bronchi are filled with mucus. Linear opacities representing   atelectasis are noted in the right lower lobe. Few branching tubular   opacities in the right lower lobe likely representing mucoid impacted   distal small airways are noted. Additional small branching tubular   opacities are noted within both lungs. They also likely representmucoid   impacted distal small airways. Few small nodules are noted involving the   major fissures bilaterally. They represent fissural lymph nodes. No   pleural effusions are noted.    Below the diaphragm, visualized portions of the abdomen demonstrate   thickening of both adrenal glands.    Degenerative changes of the spine are noted.    IMPRESSION: Bronchiectasis in the right lower lobe.    --- End of Report ---    < end of copied text >          RECENT CULTURES:  03-23 @ 16:44 .Sputum Sputum       No polymorphonuclear leukocytes seen per low power field  No Squamous epithelial cells seen per low power field  No organisms seen per oil power field           Normal Respiratory Jeannie present    03-22 @ 10:30 .Blood Blood-Venous                No growth to date.    03-22 @ 10:15 .Blood Blood-Peripheral                No growth to date.          RESPIRATORY CULTURES:          Studies  Chest X-RAY  CT SCAN Chest   Venous Dopplers: LE:   CT Abdomen  Others              
CC: F/U for PNA    Saw/spoke to patient. Unchanged. Doing well. No diarrhea.    Allergies  angiotensin converting enzyme inhibitors (Other; Swelling)    ANTIMICROBIALS:  cefTRIAXone   IVPB 1000 every 24 hours    PE:    Vital Signs Last 24 Hrs  T(C): 36.5 (27 Mar 2023 10:18), Max: 36.6 (26 Mar 2023 17:12)  T(F): 97.7 (27 Mar 2023 10:18), Max: 97.9 (26 Mar 2023 17:12)  HR: 57 (27 Mar 2023 10:18) (57 - 72)  BP: 148/83 (27 Mar 2023 10:18) (136/66 - 148/83)  RR: 16 (27 Mar 2023 10:18) (16 - 16)  SpO2: 98% (27 Mar 2023 10:18) (98% - 100%)    Gen: AOx3, NAD, non-toxic  CV: Nontachycardic  Resp: Breathing comfortably, RA  Abd: Soft, nontender  IV/Skin: No thrombophlebitis    LABS:                        10.8   14.72 )-----------( 456      ( 27 Mar 2023 04:56 )             34.8     03-27    135  |  101  |  12  ----------------------------<  105<H>  3.5   |  25  |  0.77    Ca    8.8      27 Mar 2023 04:56  Phos  4.3     03-27  Mg     1.80     03-27    MICROBIOLOGY:    .Stool Feces  03-25-23   No enteric pathogens to date: Final culture pending  Moderate Yeast like cells  No enteric gram negative rods isolated  --  --    .Sputum Sputum  03-23-23   Normal Respiratory Jeannie present  --    No polymorphonuclear leukocytes seen per low power field  No Squamous epithelial cells seen per low power field  No organisms seen per oil power field    .Blood Blood-Venous  03-22-23   No growth to date.  --  --    .Blood Blood-Peripheral  03-22-23   No Growth Final  --  --    Rapid RVP Result: NotDetec (03-22 @ 11:28)    (otherwise reviewed)    RADIOLOGY:    3/22 XR:    FINDINGS:    The heart is not accurately assessed in this AP projection.  Mild increase in the changes since the last exam There is no pneumothorax   or pleural effusion.    IMPRESSION:  Bibasilar reticulonodular opacities, greater on the right, which may be   infectious in etiology. These have progressed compared to prior chest CT.
Cardiology Progress Note  ------------------------------------------------------------------------------------------  SUBJECTIVE:   - Tele: NSR w/ sinus pauses overnight, longest 2.05 seconds  - states overall feeling improved today, states breathing and cough are improved today compared to yesterday. Denies any symptoms overnight with the pauses, denies chest pain, palpitations, light-headed/syncope  -------------------------------------------------------------------------------------------  ROS:  CV: chest pain (-), palpitation (-), orthopnea (-), PND (-), edema (-)  PULM: SOB (-), cough (+), wheezing (-), hemoptysis (-).   CONST: fever (-), chills (-) or fatigue (-)  GI: abdominal distension (-), abdominal pain (-) , nausea/vomiting (-), hematemesis, (-), melena (-), hematochezia (-)  : dysuria (-), frequency (-), hematuria (-).   NEURO: numbness (-), weakness (-), dizziness (-)  MSK: myalgia (-), joint pain (-)   SKIN: itching (-), rash (-)  HEENT:  visual changes (-); vertigo or throat pain (-);  neck stiffness (-)   Psych: change in mood (-), anxiety (-), depression (-)     All other review of systems is negative unless indicated above.   -------------------------------------------------------------------------------------------  VS:  T(F): 98 (03-24), Max: 98 (03-24)  HR: 60 (03-24) (38 - 77)  BP: 143/61 (03-24) (132/84 - 146/76)  RR: 18 (03-24)  SpO2: 98% (03-24)  I&O's Summary    PHYSICAL EXAM:  GENERAL: NAD  HEAD:  Atraumatic, Normocephalic.  EYES: EOMI, PERRLA, conjunctiva and sclera clear.  ENT: Moist mucous membranes.  NECK: Supple, No JVD.  CHEST/LUNG: Decreased air movement throughout.  HEART: Regular rate and rhythm; No murmurs, rubs, or gallops.  ABDOMEN: Bowel sounds present; Soft, Nontender, Nondistended.   EXTREMITIES: No edema. 2+ Peripheral Pulses, brisk capillary refill. No clubbing or cyanosis.   PSYCH: Normal affect.  SKIN: No rashes or lesions.  -------------------------------------------------------------------------------------------  LABS:                          11.3   19.34 )-----------( 442      ( 24 Mar 2023 05:45 )             36.4     03-24    135  |  98  |  15  ----------------------------<  96  3.2<L>   |  24  |  0.85    Ca    9.3      24 Mar 2023 05:45  Phos  2.9     03-24  Mg     2.00     03-24    TPro  7.2  /  Alb  3.6  /  TBili  0.5  /  DBili  x   /  AST  66<H>  /  ALT  13  /  AlkPhos  58  03-22    PT/INR - ( 22 Mar 2023 10:15 )   PT: 13.2 sec;   INR: 1.14 ratio         PTT - ( 22 Mar 2023 10:15 )  PTT:23.2 sec  CARDIAC MARKERS ( 22 Mar 2023 23:34 )  138 ng/L / x     / x     / x     / x     / x      CARDIAC MARKERS ( 22 Mar 2023 17:15 )  150 ng/L / x     / x     / 83 U/L / x     / 5.4 ng/mL  CARDIAC MARKERS ( 22 Mar 2023 10:15 )  157 ng/L / x     / x     / x     / x     / x                -------------------------------------------------------------------------------------------  Meds:  acetaminophen     Tablet .. 650 milliGRAM(s) Oral every 6 hours PRN  azithromycin  IVPB      azithromycin  IVPB 500 milliGRAM(s) IV Intermittent every 24 hours  budesonide 160 MICROgram(s)/formoterol 4.5 MICROgram(s) Inhaler 2 Puff(s) Inhalation two times a day  dextrose 5%. 1000 milliLiter(s) IV Continuous <Continuous>  dextrose 5%. 1000 milliLiter(s) IV Continuous <Continuous>  dextrose 50% Injectable 25 Gram(s) IV Push once  dextrose 50% Injectable 12.5 Gram(s) IV Push once  dextrose 50% Injectable 25 Gram(s) IV Push once  dextrose Oral Gel 15 Gram(s) Oral once PRN  enoxaparin Injectable 40 milliGRAM(s) SubCutaneous every 24 hours  famotidine    Tablet 20 milliGRAM(s) Oral two times a day  fluticasone propionate 50 MICROgram(s)/spray Nasal Spray 2 Spray(s) Both Nostrils two times a day  gabapentin 300 milliGRAM(s) Oral three times a day  glucagon  Injectable 1 milliGRAM(s) IntraMuscular once  insulin lispro (ADMELOG) corrective regimen sliding scale   SubCutaneous three times a day before meals  insulin lispro (ADMELOG) corrective regimen sliding scale   SubCutaneous at bedtime  losartan 25 milliGRAM(s) Oral daily  melatonin 3 milliGRAM(s) Oral at bedtime PRN  mesalamine DR Capsule 800 milliGRAM(s) Oral three times a day  montelukast 10 milliGRAM(s) Oral daily  pancrelipase  (CREON 36,000 Lipase Units) 2 Capsule(s) Oral three times a day with meals  pantoprazole    Tablet 40 milliGRAM(s) Oral before breakfast  piperacillin/tazobactam IVPB.. 3.375 Gram(s) IV Intermittent every 8 hours  predniSONE   Tablet 40 milliGRAM(s) Oral daily        -------------------------------------------------------------------------------------------  
pt was evaluated by hospitalist earlier during the day 
CC: F/U for PNA    Saw/spoke to patient. No fevers, no chills. No new complaints.    Allergies  angiotensin converting enzyme inhibitors (Other; Swelling)    ANTIMICROBIALS:  azithromycin  IVPB    azithromycin  IVPB 500 every 24 hours  piperacillin/tazobactam IVPB.. 3.375 every 8 hours    PE:    Vital Signs Last 24 Hrs  T(C): 36.4 (24 Mar 2023 10:46), Max: 36.7 (24 Mar 2023 05:00)  T(F): 97.5 (24 Mar 2023 10:46), Max: 98 (24 Mar 2023 05:00)  HR: 64 (24 Mar 2023 10:46) (38 - 64)  BP: 144/83 (24 Mar 2023 10:46) (143/61 - 146/76)  RR: 18 (24 Mar 2023 10:46) (17 - 18)  SpO2: 96% (24 Mar 2023 10:46) (87% - 98%)    Gen: AOx3, NAD, non-toxic  CV: Nontachycardic  Resp: Breathing comfortably, RA  Abd: Soft, nontender  IV/Skin: No thrombophlebitis    LABS:                        11.3   19.34 )-----------( 442      ( 24 Mar 2023 05:45 )             36.4     03-24    135  |  98  |  15  ----------------------------<  96  3.2<L>   |  24  |  0.85    Ca    9.3      24 Mar 2023 05:45  Phos  2.9     03-24  Mg     2.00     03-24    MICROBIOLOGY:    .Sputum Sputum  03-23-23 --  --    No polymorphonuclear leukocytes seen per low power field  No Squamous epithelial cells seen per low power field  No organisms seen per oil power field    .Blood Blood-Venous  03-22-23   No growth to date.  --  --    .Blood Blood-Peripheral  03-22-23   No growth to date.  --  --    Rapid RVP Result: NotDetec (03-22 @ 11:28)    (otherwise reviewed)    RADIOLOGY:    3/22 XR:    FINDINGS:    The heart is not accurately assessed in this AP projection.  Mild increase in the changes since the last exam There is no pneumothorax   or pleural effusion.    IMPRESSION:  Bibasilar reticulonodular opacities, greater on the right, which may be   infectious in etiology. These have progressed compared to prior chest CT.
Cardiology Progress Note  ------------------------------------------------------------------------------------------  SUBJECTIVE:   - Tele: sinus with 1st degree AV block, PAC's, PVC's  - No events overnight. States breathing feels improved today but not back to baseline. Denies CP or Palpitations.   -------------------------------------------------------------------------------------------  ROS:  CV: chest pain (-), palpitation (-), orthopnea (-), PND (-), edema (-)  PULM: SOB (+), cough (+), wheezing (-), hemoptysis (-).   CONST: fever (-), chills (-) or fatigue (-)  GI: abdominal distension (-), abdominal pain (-) , nausea/vomiting (-), hematemesis, (-), melena (-), hematochezia (-)  : dysuria (-), frequency (-), hematuria (-).   NEURO: numbness (-), weakness (-), dizziness (-)  MSK: myalgia (-), joint pain (-)   SKIN: itching (-), rash (-)  HEENT:  visual changes (-); vertigo or throat pain (-);  neck stiffness (-)   Psych: change in mood (-), anxiety (-), depression (-)     All other review of systems is negative unless indicated above.   -------------------------------------------------------------------------------------------  VS:  T(F): 97.5 (03-23), Max: 99 (03-22)  HR: 64 (03-23) (64 - 82)  BP: 152/87 (03-23) (126/86 - 152/87)  RR: 18 (03-23)  SpO2: 95% (03-23)  I&O's Summary    PHYSICAL EXAM:  GENERAL: NAD  HEAD:  Atraumatic, Normocephalic.  EYES: EOMI, PERRLA, conjunctiva and sclera clear.  ENT: Moist mucous membranes.  NECK: Supple, No JVD.  CHEST/LUNG: Diffuse crackles heard throughout lung fields.  HEART: Regular rate and rhythm; No murmurs, rubs, or gallops.  ABDOMEN: Bowel sounds present; Soft, Nontender, Nondistended.   EXTREMITIES: No edema. 2+ Peripheral Pulses, brisk capillary refill. No clubbing or cyanosis.   PSYCH: Normal affect.  SKIN: No rashes or lesions.  -------------------------------------------------------------------------------------------  LABS:                          10.7   19.95 )-----------( 393      ( 22 Mar 2023 10:15 )             35.3     03-22    134<L>  |  98  |  12  ----------------------------<  186<H>  3.8   |  23  |  0.71    Ca    8.4      22 Mar 2023 11:38    TPro  7.2  /  Alb  3.6  /  TBili  0.5  /  DBili  x   /  AST  66<H>  /  ALT  13  /  AlkPhos  58  03-22    PT/INR - ( 22 Mar 2023 10:15 )   PT: 13.2 sec;   INR: 1.14 ratio         PTT - ( 22 Mar 2023 10:15 )  PTT:23.2 sec  CARDIAC MARKERS ( 22 Mar 2023 23:34 )  138 ng/L / x     / x     / x     / x     / x      CARDIAC MARKERS ( 22 Mar 2023 17:15 )  150 ng/L / x     / x     / 83 U/L / x     / 5.4 ng/mL  CARDIAC MARKERS ( 22 Mar 2023 10:15 )  157 ng/L / x     / x     / x     / x     / x                -------------------------------------------------------------------------------------------  Meds:  acetaminophen     Tablet .. 650 milliGRAM(s) Oral every 6 hours PRN  azithromycin  IVPB      azithromycin  IVPB 500 milliGRAM(s) IV Intermittent every 24 hours  budesonide 160 MICROgram(s)/formoterol 4.5 MICROgram(s) Inhaler 2 Puff(s) Inhalation two times a day  dextrose 5%. 1000 milliLiter(s) IV Continuous <Continuous>  dextrose 5%. 1000 milliLiter(s) IV Continuous <Continuous>  dextrose 50% Injectable 25 Gram(s) IV Push once  dextrose 50% Injectable 12.5 Gram(s) IV Push once  dextrose 50% Injectable 25 Gram(s) IV Push once  dextrose Oral Gel 15 Gram(s) Oral once PRN  enoxaparin Injectable 40 milliGRAM(s) SubCutaneous every 24 hours  famotidine    Tablet 20 milliGRAM(s) Oral two times a day  fluticasone propionate 50 MICROgram(s)/spray Nasal Spray 2 Spray(s) Both Nostrils two times a day  gabapentin 300 milliGRAM(s) Oral three times a day  glucagon  Injectable 1 milliGRAM(s) IntraMuscular once  insulin lispro (ADMELOG) corrective regimen sliding scale   SubCutaneous three times a day before meals  insulin lispro (ADMELOG) corrective regimen sliding scale   SubCutaneous at bedtime  losartan 25 milliGRAM(s) Oral daily  melatonin 3 milliGRAM(s) Oral at bedtime PRN  mesalamine DR Capsule 800 milliGRAM(s) Oral three times a day  montelukast 10 milliGRAM(s) Oral daily  pancrelipase  (CREON 36,000 Lipase Units) 2 Capsule(s) Oral three times a day with meals  pantoprazole    Tablet 40 milliGRAM(s) Oral before breakfast  piperacillin/tazobactam IVPB.. 3.375 Gram(s) IV Intermittent every 8 hours  predniSONE   Tablet 40 milliGRAM(s) Oral daily      -------------------------------------------------------------------------------------------  
Date of Service: 03-26-23 @ 11:07    Patient is a 72y old  Male who presents with a chief complaint of COPD exacerbation (25 Mar 2023 12:56)      Any change in ROS: he feels OK:no sob:  no cough  : some diarrhea today      MEDICATIONS  (STANDING):  benzocaine/menthol Lozenge 1 Lozenge Oral once  budesonide 160 MICROgram(s)/formoterol 4.5 MICROgram(s) Inhaler 2 Puff(s) Inhalation two times a day  cefTRIAXone   IVPB 1000 milliGRAM(s) IV Intermittent every 24 hours  dextrose 5%. 1000 milliLiter(s) (100 mL/Hr) IV Continuous <Continuous>  dextrose 5%. 1000 milliLiter(s) (50 mL/Hr) IV Continuous <Continuous>  dextrose 50% Injectable 25 Gram(s) IV Push once  dextrose 50% Injectable 12.5 Gram(s) IV Push once  dextrose 50% Injectable 25 Gram(s) IV Push once  enoxaparin Injectable 40 milliGRAM(s) SubCutaneous every 24 hours  famotidine    Tablet 20 milliGRAM(s) Oral two times a day  fluticasone propionate 50 MICROgram(s)/spray Nasal Spray 2 Spray(s) Both Nostrils two times a day  gabapentin 300 milliGRAM(s) Oral three times a day  glucagon  Injectable 1 milliGRAM(s) IntraMuscular once  insulin lispro (ADMELOG) corrective regimen sliding scale   SubCutaneous three times a day before meals  insulin lispro (ADMELOG) corrective regimen sliding scale   SubCutaneous at bedtime  losartan 25 milliGRAM(s) Oral daily  mesalamine DR Capsule 800 milliGRAM(s) Oral three times a day  montelukast 10 milliGRAM(s) Oral daily  pancrelipase  (CREON 36,000 Lipase Units) 2 Capsule(s) Oral three times a day with meals  pantoprazole    Tablet 40 milliGRAM(s) Oral before breakfast  predniSONE   Tablet 40 milliGRAM(s) Oral daily    MEDICATIONS  (PRN):  acetaminophen     Tablet .. 650 milliGRAM(s) Oral every 6 hours PRN Temp greater or equal to 38C (100.4F), Mild Pain (1 - 3)  artificial tears (preservative free) Ophthalmic Solution 1 Drop(s) Both EYES three times a day PRN Dry Eyes  benzocaine/menthol Lozenge 1 Lozenge Oral three times a day PRN Sore Throat  dextrose Oral Gel 15 Gram(s) Oral once PRN Blood Glucose LESS THAN 70 milliGRAM(s)/deciliter  melatonin 3 milliGRAM(s) Oral at bedtime PRN Insomnia    Vital Signs Last 24 Hrs  T(C): 36.8 (26 Mar 2023 06:35), Max: 36.8 (25 Mar 2023 15:44)  T(F): 98.2 (26 Mar 2023 06:35), Max: 98.2 (25 Mar 2023 15:44)  HR: 61 (26 Mar 2023 06:35) (56 - 67)  BP: 149/84 (26 Mar 2023 06:35) (133/67 - 149/84)  BP(mean): --  RR: 18 (26 Mar 2023 06:35) (16 - 18)  SpO2: 100% (26 Mar 2023 06:35) (100% - 100%)    Parameters below as of 26 Mar 2023 06:35  Patient On (Oxygen Delivery Method): room air        I&O's Summary        Physical Exam:   GENERAL: NAD, well-groomed, well-developed  HEENT: ALYSSA/   Atraumatic, Normocephalic  ENMT: No tonsillar erythema, exudates, or enlargement; Moist mucous membranes, Good dentition, No lesions  NECK: Supple, No JVD, Normal thyroid  CHEST/LUNG: Left basialr crackles  CVS: Regular rate and rhythm; No murmurs, rubs, or gallops  GI: : Soft, Nontender, Nondistended; Bowel sounds present  NERVOUS SYSTEM:  Alert & Oriented X3  EXTREMITIES:  2+ Peripheral Pulses, No clubbing, cyanosis, or edema  LYMPH: No lymphadenopathy noted  SKIN: No rashes or lesions  ENDOCRINOLOGY: No Thyromegaly  PSYCH: Appropriate    Labs:  27                            10.8   14.06 )-----------( 416      ( 26 Mar 2023 07:46 )             34.8                         9.8    13.04 )-----------( 401      ( 25 Mar 2023 05:50 )             32.4                         11.3   19.34 )-----------( 442      ( 24 Mar 2023 05:45 )             36.4                         10.8   15.93 )-----------( 400      ( 23 Mar 2023 08:08 )             34.3     03-26    137  |  102  |  10  ----------------------------<  104<H>  3.6   |  22  |  0.77  03-25    138  |  102  |  12  ----------------------------<  95  3.4<L>   |  24  |  0.82  03-24    135  |  98  |  15  ----------------------------<  96  3.2<L>   |  24  |  0.85  03-23    133<L>  |  97<L>  |  11  ----------------------------<  201<H>  3.9   |  23  |  0.74  03-22    134<L>  |  98  |  12  ----------------------------<  186<H>  3.8   |  23  |  0.71    Ca    9.0      26 Mar 2023 07:46  Ca    9.0      25 Mar 2023 05:50  Phos  3.3     03-26  Phos  3.4     03-25  Mg     1.90     03-26  Mg     1.90     03-25      CAPILLARY BLOOD GLUCOSE      POCT Blood Glucose.: 110 mg/dL (26 Mar 2023 08:45)  POCT Blood Glucose.: 119 mg/dL (26 Mar 2023 01:22)  POCT Blood Glucose.: 86 mg/dL (25 Mar 2023 21:53)  POCT Blood Glucose.: 258 mg/dL (25 Mar 2023 16:22)  POCT Blood Glucose.: 270 mg/dL (25 Mar 2023 12:19)          ra< from: Xray Chest 1 View- PORTABLE-Urgent (Xray Chest 1 View- PORTABLE-Urgent .) (03.22.23 @ 12:54) >  JUAN ALBERTO     PROCEDURE DATE:  03/22/2023          INTERPRETATION:  EXAMINATION: XR CHEST URGENT    CLINICAL INDICATION: sob    TECHNIQUE: Single frontal, portable view of the chestwas obtained.    COMPARISON: Chest CT  2/2/2023    FINDINGS:    The heart is not accurately assessed in this AP projection.  Mild increase in the changes since the last exam There is no pneumothorax   or pleural effusion.    IMPRESSION:  Bibasilar reticulonodular opacities, greater on the right, which may be   infectious in etiology. These have progressed compared to prior chest CT.    --- End of Report ---          DAIN TINEO MD; Resident Radiologist  This document has been electronically signed.  MARLINE CRUZ MD; Attending Radiologist  This document has been electronically signed. Mar 22 2023  1:50PM    < end of copied text >  < from: CT Chest No Cont (02.02.23 @ 11:08) >    No endobronchial lesions are noted. Emphysematous changes are present in   both lungs. Bronchiectasis is noted in the right lower lobe. Some of the   dilated bronchi are filled with mucus. Linear opacities representing   atelectasis are noted in the right lower lobe. Few branching tubular   opacities in the right lower lobe likely representing mucoid impacted   distal small airways are noted. Additional small branching tubular   opacities are noted within both lungs. They also likely representmucoid   impacted distal small airways. Few small nodules are noted involving the   major fissures bilaterally. They represent fissural lymph nodes. No   pleural effusions are noted.    Below the diaphragm, visualized portions of the abdomen demonstrate   thickening of both adrenal glands.    Degenerative changes of the spine are noted.    IMPRESSION: Bronchiectasis in the right lower lobe.    --- End of Report ---               SONU CADET MD; Attending Radiologist   This document has been electronically signed. Feb 6 2023  2:06PM    < end of copied text >          RECENT CULTURES:  03-23 @ 16:44 .Sputum Sputum       No polymorphonuclear leukocytes seen per low power field  No Squamous epithelial cells seen per low power field  No organisms seen per oil power field           Normal Respiratory Jeannie present    03-22 @ 10:30 .Blood Blood-Venous                No growth to date.    03-22 @ 10:15 .Blood Blood-Peripheral                No growth to date.          RESPIRATORY CULTURES:          Studies  Chest X-RAY  CT SCAN Chest   Venous Dopplers: LE:   CT Abdomen  Others              
Date of Service: 03-24-23 @ 14:00    Patient is a 72y old  Male who presents with a chief complaint of COPD exacerbation (24 Mar 2023 13:51)      Any change in ROS: he says his breathing is better:     I just saw him few dys ago in office and he was doing pretty well:       MEDICATIONS  (STANDING):  azithromycin  IVPB      azithromycin  IVPB 500 milliGRAM(s) IV Intermittent every 24 hours  budesonide 160 MICROgram(s)/formoterol 4.5 MICROgram(s) Inhaler 2 Puff(s) Inhalation two times a day  dextrose 5%. 1000 milliLiter(s) (100 mL/Hr) IV Continuous <Continuous>  dextrose 5%. 1000 milliLiter(s) (50 mL/Hr) IV Continuous <Continuous>  dextrose 50% Injectable 25 Gram(s) IV Push once  dextrose 50% Injectable 12.5 Gram(s) IV Push once  dextrose 50% Injectable 25 Gram(s) IV Push once  enoxaparin Injectable 40 milliGRAM(s) SubCutaneous every 24 hours  famotidine    Tablet 20 milliGRAM(s) Oral two times a day  fluticasone propionate 50 MICROgram(s)/spray Nasal Spray 2 Spray(s) Both Nostrils two times a day  gabapentin 300 milliGRAM(s) Oral three times a day  glucagon  Injectable 1 milliGRAM(s) IntraMuscular once  insulin lispro (ADMELOG) corrective regimen sliding scale   SubCutaneous three times a day before meals  insulin lispro (ADMELOG) corrective regimen sliding scale   SubCutaneous at bedtime  losartan 25 milliGRAM(s) Oral daily  mesalamine DR Capsule 800 milliGRAM(s) Oral three times a day  montelukast 10 milliGRAM(s) Oral daily  pancrelipase  (CREON 36,000 Lipase Units) 2 Capsule(s) Oral three times a day with meals  pantoprazole    Tablet 40 milliGRAM(s) Oral before breakfast  piperacillin/tazobactam IVPB.. 3.375 Gram(s) IV Intermittent every 8 hours  predniSONE   Tablet 40 milliGRAM(s) Oral daily    MEDICATIONS  (PRN):  acetaminophen     Tablet .. 650 milliGRAM(s) Oral every 6 hours PRN Temp greater or equal to 38C (100.4F), Mild Pain (1 - 3)  dextrose Oral Gel 15 Gram(s) Oral once PRN Blood Glucose LESS THAN 70 milliGRAM(s)/deciliter  melatonin 3 milliGRAM(s) Oral at bedtime PRN Insomnia    Vital Signs Last 24 Hrs  T(C): 36.4 (24 Mar 2023 10:46), Max: 36.7 (24 Mar 2023 05:00)  T(F): 97.5 (24 Mar 2023 10:46), Max: 98 (24 Mar 2023 05:00)  HR: 64 (24 Mar 2023 10:46) (38 - 64)  BP: 144/83 (24 Mar 2023 10:46) (143/61 - 146/76)  BP(mean): --  RR: 18 (24 Mar 2023 10:46) (17 - 18)  SpO2: 96% (24 Mar 2023 10:46) (87% - 98%)    Parameters below as of 24 Mar 2023 05:00  Patient On (Oxygen Delivery Method): nasal cannula  O2 Flow (L/min): 2      I&O's Summary        Physical Exam:   GENERAL: NAD, well-groomed, well-developed  HEENT: ALYSSA/   Atraumatic, Normocephalic  ENMT: No tonsillar erythema, exudates, or enlargement; Moist mucous membranes, Good dentition, No lesions  NECK: Supple, No JVD, Normal thyroid  CHEST/LUNG: clear: no wheezing  CVS: Regular rate and rhythm; No murmurs, rubs, or gallops  GI: : Soft, Nontender, Nondistended; Bowel sounds present  NERVOUS SYSTEM:  Alert & Oriented X3  EXTREMITIES:  - edema  LYMPH: No lymphadenopathy noted  SKIN: No rashes or lesions  ENDOCRINOLOGY: No Thyromegaly  PSYCH: Appropriate    Labs:  27  CARDIAC MARKERS ( 22 Mar 2023 17:15 )  x     / x     / 83 U/L / x     / 5.4 ng/mL                            11.3   19.34 )-----------( 442      ( 24 Mar 2023 05:45 )             36.4                         10.8   15.93 )-----------( 400      ( 23 Mar 2023 08:08 )             34.3                         10.7   19.95 )-----------( 393      ( 22 Mar 2023 10:15 )             35.3     03-24    135  |  98  |  15  ----------------------------<  96  3.2<L>   |  24  |  0.85  03-23    133<L>  |  97<L>  |  11  ----------------------------<  201<H>  3.9   |  23  |  0.74  03-22    134<L>  |  98  |  12  ----------------------------<  186<H>  3.8   |  23  |  0.71  03-22    130<L>  |  97<L>  |  12  ----------------------------<  122<H>  6.2<HH>   |  24  |  0.75    Ca    9.3      24 Mar 2023 05:45  Ca    9.2      23 Mar 2023 08:08  Phos  2.9     03-24  Phos  3.2     03-23  Mg     2.00     03-24  Mg     1.90     03-23    TPro  7.2  /  Alb  3.6  /  TBili  0.5  /  DBili  x   /  AST  66<H>  /  ALT  13  /  AlkPhos  58  03-22    CAPILLARY BLOOD GLUCOSE      POCT Blood Glucose.: 199 mg/dL (24 Mar 2023 12:16)  POCT Blood Glucose.: 178 mg/dL (24 Mar 2023 08:02)  POCT Blood Glucose.: 159 mg/dL (23 Mar 2023 22:19)  POCT Blood Glucose.: 227 mg/dL (23 Mar 2023 17:35)          rad< from: CT Chest No Cont (02.02.23 @ 11:08) >  pericardial effusion is noted.    Small hiatal hernia is noted.    No endobronchial lesions are noted. Emphysematous changes are present in   both lungs. Bronchiectasis is noted in the right lower lobe. Some of the   dilated bronchi are filled with mucus. Linear opacities representing   atelectasis are noted in the right lower lobe. Few branching tubular   opacities in the right lower lobe likely representing mucoid impacted   distal small airways are noted. Additional small branching tubular   opacities are noted within both lungs. They also likely representmucoid   impacted distal small airways. Few small nodules are noted involving the   major fissures bilaterally. They represent fissural lymph nodes. No   pleural effusions are noted.    Below the diaphragm, visualized portions of the abdomen demonstrate   thickening of both adrenal glands.    Degenerative changes of the spine are noted.    IMPRESSION: Bronchiectasis in the right lower lobe.    --- End of Report ---    < end of copied text >          RECENT CULTURES:  03-23 @ 16:44 .Sputum Sputum       No polymorphonuclear leukocytes seen per low power field  No Squamous epithelial cells seen per low power field  No organisms seen per oil power field             03-22 @ 10:30 .Blood Blood-Venous         ra< from: Xray Chest 1 View- PORTABLE-Urgent (Xray Chest 1 View- PORTABLE-Urgent .) (03.22.23 @ 12:54) >  The heart is not accurately assessed in this AP projection.  Mild increase in the changes since the last exam There is no pneumothorax   or pleural effusion.    IMPRESSION:  Bibasilar reticulonodular opacities, greater on the right, which may be   infectious in etiology. These have progressed compared to prior chest CT.    --- End of Report ---          DAIN TINEO MD; Resident Radiologist  This document has been electronically signed.  MARLINE CRUZ MD; Attending Radiologist  This document has been electronically signed. Mar 22 2023  1:50PM    < end of copied text >         No growth to date.    03-22 @ 10:15 .Blood Blood-Peripheral                No growth to date.          RESPIRATORY CULTURES:          Studies  Chest X-RAY  CT SCAN Chest   Venous Dopplers: LE:   CT Abdomen  Others              
Date of Service: 03-27-23 @ 11:20    Patient is a 72y old  Male who presents with a chief complaint of COPD exacerbation (26 Mar 2023 19:05)      Any change in ROS:  doing  ok : feels 85% better:     MEDICATIONS  (STANDING):  benzocaine/menthol Lozenge 1 Lozenge Oral once  budesonide 160 MICROgram(s)/formoterol 4.5 MICROgram(s) Inhaler 2 Puff(s) Inhalation two times a day  cefTRIAXone   IVPB 1000 milliGRAM(s) IV Intermittent every 24 hours  dextrose 5%. 1000 milliLiter(s) (50 mL/Hr) IV Continuous <Continuous>  dextrose 5%. 1000 milliLiter(s) (100 mL/Hr) IV Continuous <Continuous>  dextrose 50% Injectable 25 Gram(s) IV Push once  dextrose 50% Injectable 12.5 Gram(s) IV Push once  dextrose 50% Injectable 25 Gram(s) IV Push once  enoxaparin Injectable 40 milliGRAM(s) SubCutaneous every 24 hours  famotidine    Tablet 20 milliGRAM(s) Oral two times a day  fluticasone propionate 50 MICROgram(s)/spray Nasal Spray 2 Spray(s) Both Nostrils two times a day  gabapentin 300 milliGRAM(s) Oral three times a day  glucagon  Injectable 1 milliGRAM(s) IntraMuscular once  insulin lispro (ADMELOG) corrective regimen sliding scale   SubCutaneous at bedtime  insulin lispro (ADMELOG) corrective regimen sliding scale   SubCutaneous three times a day before meals  losartan 25 milliGRAM(s) Oral daily  mesalamine DR Capsule 800 milliGRAM(s) Oral three times a day  montelukast 10 milliGRAM(s) Oral daily  pancrelipase  (CREON 36,000 Lipase Units) 2 Capsule(s) Oral three times a day with meals  pantoprazole    Tablet 40 milliGRAM(s) Oral before breakfast  predniSONE   Tablet 40 milliGRAM(s) Oral daily    MEDICATIONS  (PRN):  acetaminophen     Tablet .. 650 milliGRAM(s) Oral every 6 hours PRN Temp greater or equal to 38C (100.4F), Mild Pain (1 - 3)  artificial tears (preservative free) Ophthalmic Solution 1 Drop(s) Both EYES three times a day PRN Dry Eyes  benzocaine/menthol Lozenge 1 Lozenge Oral three times a day PRN Sore Throat  dextrose Oral Gel 15 Gram(s) Oral once PRN Blood Glucose LESS THAN 70 milliGRAM(s)/deciliter  melatonin 3 milliGRAM(s) Oral at bedtime PRN Insomnia    Vital Signs Last 24 Hrs  T(C): 36.5 (27 Mar 2023 10:18), Max: 36.7 (26 Mar 2023 12:12)  T(F): 97.7 (27 Mar 2023 10:18), Max: 98 (26 Mar 2023 12:12)  HR: 57 (27 Mar 2023 10:18) (57 - 72)  BP: 148/83 (27 Mar 2023 10:18) (136/66 - 148/83)  BP(mean): --  RR: 16 (27 Mar 2023 10:18) (16 - 16)  SpO2: 98% (27 Mar 2023 10:18) (98% - 100%)    Parameters below as of 27 Mar 2023 10:18  Patient On (Oxygen Delivery Method): room air        I&O's Summary    26 Mar 2023 07:01  -  27 Mar 2023 07:00  --------------------------------------------------------  IN: 400 mL / OUT: 0 mL / NET: 400 mL          Physical Exam:   GENERAL: NAD, well-groomed, well-developed  HEENT: ALYSSA/   Atraumatic, Normocephalic  ENMT: No tonsillar erythema, exudates, or enlargement; Moist mucous membranes, Good dentition, No lesions  NECK: Supple, No JVD, Normal thyroid  CHEST/LUNG: Clear to auscultaion- no wheezig  CVS: Regular rate and rhythm; No murmurs, rubs, or gallops  GI: : Soft, Nontender, Nondistended; Bowel sounds present  NERVOUS SYSTEM:  Alert & Oriented X3  EXTREMITIES:-edema  LYMPH: No lymphadenopathy noted  SKIN: No rashes or lesions  ENDOCRINOLOGY: No Thyromegaly  PSYCH: Appropriate    Labs:  27                            10.8   14.72 )-----------( 456      ( 27 Mar 2023 04:56 )             34.8                         10.8   14.06 )-----------( 416      ( 26 Mar 2023 07:46 )             34.8                         9.8    13.04 )-----------( 401      ( 25 Mar 2023 05:50 )             32.4                         11.3   19.34 )-----------( 442      ( 24 Mar 2023 05:45 )             36.4     03-27    135  |  101  |  12  ----------------------------<  105<H>  3.5   |  25  |  0.77  03-26    137  |  102  |  10  ----------------------------<  104<H>  3.6   |  22  |  0.77  03-25    138  |  102  |  12  ----------------------------<  95  3.4<L>   |  24  |  0.82  03-24    135  |  98  |  15  ----------------------------<  96  3.2<L>   |  24  |  0.85    Ca    8.8      27 Mar 2023 04:56  Ca    9.0      26 Mar 2023 07:46  Phos  4.3     03-27  Phos  3.3     03-26  Mg     1.80     03-27  Mg     1.90     03-26      CAPILLARY BLOOD GLUCOSE      POCT Blood Glucose.: 106 mg/dL (27 Mar 2023 08:02)  POCT Blood Glucose.: 175 mg/dL (26 Mar 2023 21:59)  POCT Blood Glucose.: 151 mg/dL (26 Mar 2023 17:23)  POCT Blood Glucose.: 201 mg/dL (26 Mar 2023 15:33)  POCT Blood Glucose.: 268 mg/dL (26 Mar 2023 13:05)          rad< from: Xray Chest 1 View- PORTABLE-Urgent (Xray Chest 1 View- PORTABLE-Urgent .) (03.22.23 @ 12:54) >  TECHNIQUE: Single frontal, portable view of the chestwas obtained.    COMPARISON: Chest CT  2/2/2023    FINDINGS:    The heart is not accurately assessed in this AP projection.  Mild increase in the changes since the last exam There is no pneumothorax   or pleural effusion.    IMPRESSION:  Bibasilar reticulonodular opacities, greater on the right, which may be   infectious in etiology. These have progressed compared to prior chest CT.    --- End of Report ---          DAIN TINEO MD; Resident Radiologist  This document has been electronically signed.  MARLINE CRUZ MD; Attending Radiologist  This document has been electronically signed. Mar 22 2023  1:50PM    < end of copied text >  < from: CT Chest No Cont (02.02.23 @ 11:08) >  opacities in the right lower lobe likely representing mucoid impacted   distal small airways are noted. Additional small branching tubular   opacities are noted within both lungs. They also likely representmucoid   impacted distal small airways. Few small nodules are noted involving the   major fissures bilaterally. They represent fissural lymph nodes. No   pleural effusions are noted.    Below the diaphragm, visualized portions of the abdomen demonstrate   thickening of both adrenal glands.    Degenerative changes of the spine are noted.    IMPRESSION: Bronchiectasis in the right lower lobe.    --- End of Report ---               SONU CADET MD; Attending Radiologist   This document has been electronically signed. Feb 6 2023  2:06PM    < end of copied text >          RECENT CULTURES:  03-25 @ 20:22 .Stool Feces                No enteric pathogens to date: Final culture pending  Moderate Yeast like cells  No enteric gram negative rods isolated    03-23 @ 16:44 .Sputum Sputum       No polymorphonuclear leukocytes seen per low power field  No Squamous epithelial cells seen per low power field  No organisms seen per oil power field           Normal Respiratory Jeannie present    03-22 @ 10:30 .Blood Blood-Venous                No growth to date.    03-22 @ 10:15 .Blood Blood-Peripheral                No growth to date.          RESPIRATORY CULTURES:          Studies  Chest X-RAY  CT SCAN Chest   Venous Dopplers: LE:   CT Abdomen  Others              
BALA SARGENT  72y  Male      Patient is a 72y old  Male who presents with a chief complaint of COPD exacerbation (23 Mar 2023 11:31)  Patient was seen and examined.chart reviewed.feels better,no sob,no cp    REVIEW OF SYSTEMS:  CONSTITUTIONAL: No fever  RESPIRATORY: No cough, hemoptysis or shortness of breath  CARDIOVASCULAR: No chest pain, palpitations, dizziness, or leg swelling  GASTROINTESTINAL: No abdominal pain. nausea, vomiting, hematemesis  GENITOURINARY: No dysuria, frequency, hematuria   NEUROLOGICAL: No headaches, no dizziness  MUSCULOSKELETAL: No joint pain or swelling;     INTERVAL HPI/OVERNIGHT EVENTS:  T(C): 36.6 (03-23-23 @ 21:00), Max: 36.6 (03-23-23 @ 12:31)  HR: 61 (03-23-23 @ 21:00) (61 - 77)  BP: 146/76 (03-23-23 @ 21:00) (132/84 - 152/87)  RR: 17 (03-23-23 @ 21:00) (17 - 18)  SpO2: 95% (03-23-23 @ 21:00) (87% - 98%)  Wt(kg): --  I&O's Summary    T(C): 36.6 (03-23-23 @ 21:00), Max: 36.6 (03-23-23 @ 12:31)  HR: 61 (03-23-23 @ 21:00) (61 - 77)  BP: 146/76 (03-23-23 @ 21:00) (132/84 - 152/87)  RR: 17 (03-23-23 @ 21:00) (17 - 18)  SpO2: 95% (03-23-23 @ 21:00) (87% - 98%)  Wt(kg): --Vital Signs Last 24 Hrs  T(C): 36.6 (23 Mar 2023 21:00), Max: 36.6 (23 Mar 2023 12:31)  T(F): 97.9 (23 Mar 2023 21:00), Max: 97.9 (23 Mar 2023 21:00)  HR: 61 (23 Mar 2023 21:00) (61 - 77)  BP: 146/76 (23 Mar 2023 21:00) (132/84 - 152/87)  BP(mean): --  RR: 17 (23 Mar 2023 21:00) (17 - 18)  SpO2: 95% (23 Mar 2023 21:00) (87% - 98%)    Parameters below as of 23 Mar 2023 21:00  Patient On (Oxygen Delivery Method): room air        LABS:                        10.8   15.93 )-----------( 400      ( 23 Mar 2023 08:08 )             34.3     03-23    133<L>  |  97<L>  |  11  ----------------------------<  201<H>  3.9   |  23  |  0.74    Ca    9.2      23 Mar 2023 08:08  Phos  3.2     03-23  Mg     1.90     03-23    TPro  7.2  /  Alb  3.6  /  TBili  0.5  /  DBili  x   /  AST  66<H>  /  ALT  13  /  AlkPhos  58  03-22    PT/INR - ( 22 Mar 2023 10:15 )   PT: 13.2 sec;   INR: 1.14 ratio         PTT - ( 22 Mar 2023 10:15 )  PTT:23.2 sec    CAPILLARY BLOOD GLUCOSE      POCT Blood Glucose.: 159 mg/dL (23 Mar 2023 22:19)  POCT Blood Glucose.: 227 mg/dL (23 Mar 2023 17:35)  POCT Blood Glucose.: 216 mg/dL (23 Mar 2023 12:07)  POCT Blood Glucose.: 158 mg/dL (23 Mar 2023 08:09)            PAST MEDICAL & SURGICAL HISTORY:  Gastroparesis  with pylorus dysfunction      DM (diabetes mellitus)  type II      Chronic obstructive pulmonary disease      Hypertension      Ulcerative colitis      Inguinal hernia      Bronchiectasis      Arthropathy  left knee replacement 2010      History of repair of hiatal hernia  childhood      S/P endoscopy  POEM 10/2021, 11/2021          MEDICATIONS  (STANDING):  azithromycin  IVPB      azithromycin  IVPB 500 milliGRAM(s) IV Intermittent every 24 hours  budesonide 160 MICROgram(s)/formoterol 4.5 MICROgram(s) Inhaler 2 Puff(s) Inhalation two times a day  dextrose 5%. 1000 milliLiter(s) (50 mL/Hr) IV Continuous <Continuous>  dextrose 5%. 1000 milliLiter(s) (100 mL/Hr) IV Continuous <Continuous>  dextrose 50% Injectable 25 Gram(s) IV Push once  dextrose 50% Injectable 12.5 Gram(s) IV Push once  dextrose 50% Injectable 25 Gram(s) IV Push once  enoxaparin Injectable 40 milliGRAM(s) SubCutaneous every 24 hours  famotidine    Tablet 20 milliGRAM(s) Oral two times a day  fluticasone propionate 50 MICROgram(s)/spray Nasal Spray 2 Spray(s) Both Nostrils two times a day  gabapentin 300 milliGRAM(s) Oral three times a day  glucagon  Injectable 1 milliGRAM(s) IntraMuscular once  insulin lispro (ADMELOG) corrective regimen sliding scale   SubCutaneous three times a day before meals  insulin lispro (ADMELOG) corrective regimen sliding scale   SubCutaneous at bedtime  losartan 25 milliGRAM(s) Oral daily  mesalamine DR Capsule 800 milliGRAM(s) Oral three times a day  montelukast 10 milliGRAM(s) Oral daily  pancrelipase  (CREON 36,000 Lipase Units) 2 Capsule(s) Oral three times a day with meals  pantoprazole    Tablet 40 milliGRAM(s) Oral before breakfast  piperacillin/tazobactam IVPB.. 3.375 Gram(s) IV Intermittent every 8 hours  predniSONE   Tablet 40 milliGRAM(s) Oral daily    MEDICATIONS  (PRN):  acetaminophen     Tablet .. 650 milliGRAM(s) Oral every 6 hours PRN Temp greater or equal to 38C (100.4F), Mild Pain (1 - 3)  dextrose Oral Gel 15 Gram(s) Oral once PRN Blood Glucose LESS THAN 70 milliGRAM(s)/deciliter  melatonin 3 milliGRAM(s) Oral at bedtime PRN Insomnia        RADIOLOGY & ADDITIONAL TESTS:    Imaging Personally Reviewed:  [ ] YES  [ ] NO    Consultant(s) Notes Reviewed:  [x ] YES  [ ] NO    PHYSICAL EXAM:  GENERAL: Alert and awake lying in bed in no distress  HEAD:  Atraumatic, Normocephalic  EYES: EOMI, ALYSSA, conjunctiva and sclera clear  NECK: Supple, No JVD, Normal thyroid  NERVOUS SYSTEM:  Alert & Oriented X3, Motor and sensory systems are intact,   CHEST/LUNG: Bilateral clear breath sounds, no rhochi, no wheezing, no crepitations,  HEART: Regular rate and rhythm; No murmurs, rubs, or gallops  ABDOMEN: Soft, Nontender, Nondistended; Bowel sounds present  EXTREMITIES:   Peripheral Pulses are palpable, no  edema        Care Discussed with Consultants/Other Providers [ ] YES  [ ] NO      Code Status: [] Full Code [] DNR [] DNI [] Goals of Care:   Disposition: [] ICU [] Stroke Unit [] RCU []PCU []Floor [] Discharge Home         [facility]ARS Banda.[facility]    
MARGOALBALA  72y  Male      Patient is a 72y old  Male who presents with a chief complaint of COPD exacerbation (26 Mar 2023 11:07)  feels better,no sob,no cp,no cough    REVIEW OF SYSTEMS:  CONSTITUTIONAL: No fever  RESPIRATORY: No cough, hemoptysis or shortness of breath  CARDIOVASCULAR: No chest pain, palpitations, dizziness, or leg swelling  GASTROINTESTINAL: No abdominal pain. nausea, vomiting, hematemesis  GENITOURINARY: No dysuria, frequency, hematuria   NEUROLOGICAL: No headaches, no dizziness  MUSCULOSKELETAL: No joint pain or swelling;     INTERVAL HPI/OVERNIGHT EVENTS:  T(C): 36.6 (03-26-23 @ 17:12), Max: 36.8 (03-26-23 @ 06:35)  HR: 67 (03-26-23 @ 17:12) (56 - 67)  BP: 143/78 (03-26-23 @ 17:12) (139/73 - 149/84)  RR: 16 (03-26-23 @ 17:12) (16 - 18)  SpO2: 100% (03-26-23 @ 17:12) (100% - 100%)  Wt(kg): --  I&O's Summary    26 Mar 2023 07:01  -  26 Mar 2023 21:06  --------------------------------------------------------  IN: 200 mL / OUT: 0 mL / NET: 200 mL      T(C): 36.6 (03-26-23 @ 17:12), Max: 36.8 (03-26-23 @ 06:35)  HR: 67 (03-26-23 @ 17:12) (56 - 67)  BP: 143/78 (03-26-23 @ 17:12) (139/73 - 149/84)  RR: 16 (03-26-23 @ 17:12) (16 - 18)  SpO2: 100% (03-26-23 @ 17:12) (100% - 100%)  Wt(kg): --Vital Signs Last 24 Hrs  T(C): 36.6 (26 Mar 2023 17:12), Max: 36.8 (26 Mar 2023 06:35)  T(F): 97.9 (26 Mar 2023 17:12), Max: 98.2 (26 Mar 2023 06:35)  HR: 67 (26 Mar 2023 17:12) (56 - 67)  BP: 143/78 (26 Mar 2023 17:12) (139/73 - 149/84)  BP(mean): --  RR: 16 (26 Mar 2023 17:12) (16 - 18)  SpO2: 100% (26 Mar 2023 17:12) (100% - 100%)    Parameters below as of 26 Mar 2023 12:12  Patient On (Oxygen Delivery Method): room air        LABS:                        10.8   14.06 )-----------( 416      ( 26 Mar 2023 07:46 )             34.8     03-26    137  |  102  |  10  ----------------------------<  104<H>  3.6   |  22  |  0.77    Ca    9.0      26 Mar 2023 07:46  Phos  3.3     03-26  Mg     1.90     03-26          CAPILLARY BLOOD GLUCOSE      POCT Blood Glucose.: 151 mg/dL (26 Mar 2023 17:23)  POCT Blood Glucose.: 201 mg/dL (26 Mar 2023 15:33)  POCT Blood Glucose.: 268 mg/dL (26 Mar 2023 13:05)  POCT Blood Glucose.: 110 mg/dL (26 Mar 2023 08:45)  POCT Blood Glucose.: 119 mg/dL (26 Mar 2023 01:22)  POCT Blood Glucose.: 86 mg/dL (25 Mar 2023 21:53)            PAST MEDICAL & SURGICAL HISTORY:  Gastroparesis  with pylorus dysfunction      DM (diabetes mellitus)  type II      Chronic obstructive pulmonary disease      Hypertension      Ulcerative colitis      Inguinal hernia      Bronchiectasis      Arthropathy  left knee replacement 2010      History of repair of hiatal hernia  childhood      S/P endoscopy  POEM 10/2021, 11/2021          MEDICATIONS  (STANDING):  benzocaine/menthol Lozenge 1 Lozenge Oral once  budesonide 160 MICROgram(s)/formoterol 4.5 MICROgram(s) Inhaler 2 Puff(s) Inhalation two times a day  cefTRIAXone   IVPB 1000 milliGRAM(s) IV Intermittent every 24 hours  dextrose 5%. 1000 milliLiter(s) (50 mL/Hr) IV Continuous <Continuous>  dextrose 5%. 1000 milliLiter(s) (100 mL/Hr) IV Continuous <Continuous>  dextrose 50% Injectable 25 Gram(s) IV Push once  dextrose 50% Injectable 12.5 Gram(s) IV Push once  dextrose 50% Injectable 25 Gram(s) IV Push once  enoxaparin Injectable 40 milliGRAM(s) SubCutaneous every 24 hours  famotidine    Tablet 20 milliGRAM(s) Oral two times a day  fluticasone propionate 50 MICROgram(s)/spray Nasal Spray 2 Spray(s) Both Nostrils two times a day  gabapentin 300 milliGRAM(s) Oral three times a day  glucagon  Injectable 1 milliGRAM(s) IntraMuscular once  insulin lispro (ADMELOG) corrective regimen sliding scale   SubCutaneous three times a day before meals  insulin lispro (ADMELOG) corrective regimen sliding scale   SubCutaneous at bedtime  losartan 25 milliGRAM(s) Oral daily  mesalamine DR Capsule 800 milliGRAM(s) Oral three times a day  montelukast 10 milliGRAM(s) Oral daily  pancrelipase  (CREON 36,000 Lipase Units) 2 Capsule(s) Oral three times a day with meals  pantoprazole    Tablet 40 milliGRAM(s) Oral before breakfast  predniSONE   Tablet 40 milliGRAM(s) Oral daily    MEDICATIONS  (PRN):  acetaminophen     Tablet .. 650 milliGRAM(s) Oral every 6 hours PRN Temp greater or equal to 38C (100.4F), Mild Pain (1 - 3)  artificial tears (preservative free) Ophthalmic Solution 1 Drop(s) Both EYES three times a day PRN Dry Eyes  benzocaine/menthol Lozenge 1 Lozenge Oral three times a day PRN Sore Throat  dextrose Oral Gel 15 Gram(s) Oral once PRN Blood Glucose LESS THAN 70 milliGRAM(s)/deciliter  melatonin 3 milliGRAM(s) Oral at bedtime PRN Insomnia        RADIOLOGY & ADDITIONAL TESTS:    Imaging Personally Reviewed:  [ ] YES  [ ] NO    Consultant(s) Notes Reviewed:  [x ] YES  [ ] NO    PHYSICAL EXAM:  GENERAL: Alert and awake lying in bed in no distress  HEAD:  Atraumatic, Normocephalic  EYES: EOMI, ALYSSA, conjunctiva and sclera clear  NECK: Supple, No JVD, Normal thyroid  NERVOUS SYSTEM:  Alert & Oriented X3, Motor and sensory systems are intact,   CHEST/LUNG: Bilateral clear breath sounds, no rhochi, no wheezing, no crepitations,  HEART: Regular rate and rhythm; No murmurs, rubs, or gallops  ABDOMEN: Soft, Nontender, Nondistended; Bowel sounds present  EXTREMITIES:   Peripheral Pulses are palpable, no  edema        Care Discussed with Consultants/Other Providers [x ] YES  [ ] NO      Code Status: [] Full Code [] DNR [] DNI [] Goals of Care:   Disposition: [] ICU [] Stroke Unit [] RCU []PCU []Floor [] Discharge Home         LARS Banda.FACP

## 2023-03-28 ENCOUNTER — TRANSCRIPTION ENCOUNTER (OUTPATIENT)
Age: 73
End: 2023-03-28

## 2023-03-28 NOTE — ASU PATIENT PROFILE, ADULT - NSCAFFEINETYPE_GEN_ALL_CORE_SD
Additional Notes: Patient to have recommended total body exams and to call the office sooner if there are any new areas of concern. Patient consent was obtained to proceed with the visit and recommended plan of care after discussion of all risks and benefits including the risk of Covid-19 exposure Render Risk Assessment In Note?: no Detail Level: Simple tea

## 2023-03-29 ENCOUNTER — TRANSCRIPTION ENCOUNTER (OUTPATIENT)
Age: 73
End: 2023-03-29

## 2023-03-30 ENCOUNTER — TRANSCRIPTION ENCOUNTER (OUTPATIENT)
Age: 73
End: 2023-03-30

## 2023-03-31 ENCOUNTER — TRANSCRIPTION ENCOUNTER (OUTPATIENT)
Age: 73
End: 2023-03-31

## 2023-04-07 ENCOUNTER — TRANSCRIPTION ENCOUNTER (OUTPATIENT)
Age: 73
End: 2023-04-07

## 2023-04-14 ENCOUNTER — TRANSCRIPTION ENCOUNTER (OUTPATIENT)
Age: 73
End: 2023-04-14

## 2023-04-21 ENCOUNTER — TRANSCRIPTION ENCOUNTER (OUTPATIENT)
Age: 73
End: 2023-04-21

## 2023-04-26 ENCOUNTER — TRANSCRIPTION ENCOUNTER (OUTPATIENT)
Age: 73
End: 2023-04-26

## 2023-06-25 ENCOUNTER — EMERGENCY (EMERGENCY)
Facility: HOSPITAL | Age: 73
LOS: 1 days | Discharge: HOME | End: 2023-06-25
Attending: STUDENT IN AN ORGANIZED HEALTH CARE EDUCATION/TRAINING PROGRAM | Admitting: STUDENT IN AN ORGANIZED HEALTH CARE EDUCATION/TRAINING PROGRAM
Payer: MEDICARE

## 2023-06-25 VITALS
TEMPERATURE: 98 F | HEART RATE: 66 BPM | DIASTOLIC BLOOD PRESSURE: 67 MMHG | SYSTOLIC BLOOD PRESSURE: 113 MMHG | RESPIRATION RATE: 18 BRPM | OXYGEN SATURATION: 99 %

## 2023-06-25 VITALS
RESPIRATION RATE: 18 BRPM | HEART RATE: 68 BPM | TEMPERATURE: 99 F | SYSTOLIC BLOOD PRESSURE: 112 MMHG | OXYGEN SATURATION: 97 % | DIASTOLIC BLOOD PRESSURE: 63 MMHG

## 2023-06-25 DIAGNOSIS — M12.9 ARTHROPATHY, UNSPECIFIED: Chronic | ICD-10-CM

## 2023-06-25 DIAGNOSIS — Z98.890 OTHER SPECIFIED POSTPROCEDURAL STATES: Chronic | ICD-10-CM

## 2023-06-25 PROBLEM — J47.9 BRONCHIECTASIS, UNCOMPLICATED: Chronic | Status: ACTIVE | Noted: 2023-03-22

## 2023-06-25 LAB
ALBUMIN SERPL ELPH-MCNC: 3.8 G/DL — SIGNIFICANT CHANGE UP (ref 3.3–5)
ALP SERPL-CCNC: 86 U/L — SIGNIFICANT CHANGE UP (ref 40–120)
ALT FLD-CCNC: 10 U/L — SIGNIFICANT CHANGE UP (ref 4–41)
ANION GAP SERPL CALC-SCNC: 13 MMOL/L — SIGNIFICANT CHANGE UP (ref 7–14)
AST SERPL-CCNC: 19 U/L — SIGNIFICANT CHANGE UP (ref 4–40)
B PERT DNA SPEC QL NAA+PROBE: SIGNIFICANT CHANGE UP
B PERT+PARAPERT DNA PNL SPEC NAA+PROBE: SIGNIFICANT CHANGE UP
BASE EXCESS BLDV CALC-SCNC: 0.2 MMOL/L — SIGNIFICANT CHANGE UP (ref -2–3)
BASOPHILS # BLD AUTO: 0.08 K/UL — SIGNIFICANT CHANGE UP (ref 0–0.2)
BASOPHILS NFR BLD AUTO: 0.6 % — SIGNIFICANT CHANGE UP (ref 0–2)
BILIRUB SERPL-MCNC: 0.3 MG/DL — SIGNIFICANT CHANGE UP (ref 0.2–1.2)
BLOOD GAS VENOUS COMPREHENSIVE RESULT: SIGNIFICANT CHANGE UP
BORDETELLA PARAPERTUSSIS (RAPRVP): SIGNIFICANT CHANGE UP
BUN SERPL-MCNC: 12 MG/DL — SIGNIFICANT CHANGE UP (ref 7–23)
C PNEUM DNA SPEC QL NAA+PROBE: SIGNIFICANT CHANGE UP
CALCIUM SERPL-MCNC: 9.3 MG/DL — SIGNIFICANT CHANGE UP (ref 8.4–10.5)
CHLORIDE BLDV-SCNC: 99 MMOL/L — SIGNIFICANT CHANGE UP (ref 96–108)
CHLORIDE SERPL-SCNC: 96 MMOL/L — LOW (ref 98–107)
CO2 BLDV-SCNC: 29.5 MMOL/L — HIGH (ref 22–26)
CO2 SERPL-SCNC: 22 MMOL/L — SIGNIFICANT CHANGE UP (ref 22–31)
CREAT SERPL-MCNC: 1.07 MG/DL — SIGNIFICANT CHANGE UP (ref 0.5–1.3)
EGFR: 74 ML/MIN/1.73M2 — SIGNIFICANT CHANGE UP
EOSINOPHIL # BLD AUTO: 0.32 K/UL — SIGNIFICANT CHANGE UP (ref 0–0.5)
EOSINOPHIL NFR BLD AUTO: 2.6 % — SIGNIFICANT CHANGE UP (ref 0–6)
FLUAV SUBTYP SPEC NAA+PROBE: SIGNIFICANT CHANGE UP
FLUBV RNA SPEC QL NAA+PROBE: SIGNIFICANT CHANGE UP
GAS PNL BLDV: 130 MMOL/L — LOW (ref 136–145)
GAS PNL BLDV: SIGNIFICANT CHANGE UP
GLUCOSE BLDV-MCNC: 83 MG/DL — SIGNIFICANT CHANGE UP (ref 70–99)
GLUCOSE SERPL-MCNC: 87 MG/DL — SIGNIFICANT CHANGE UP (ref 70–99)
HADV DNA SPEC QL NAA+PROBE: SIGNIFICANT CHANGE UP
HCO3 BLDV-SCNC: 28 MMOL/L — SIGNIFICANT CHANGE UP (ref 22–29)
HCOV 229E RNA SPEC QL NAA+PROBE: SIGNIFICANT CHANGE UP
HCOV HKU1 RNA SPEC QL NAA+PROBE: SIGNIFICANT CHANGE UP
HCOV NL63 RNA SPEC QL NAA+PROBE: SIGNIFICANT CHANGE UP
HCOV OC43 RNA SPEC QL NAA+PROBE: SIGNIFICANT CHANGE UP
HCT VFR BLD CALC: 36.8 % — LOW (ref 39–50)
HCT VFR BLDA CALC: 31 % — LOW (ref 39–51)
HGB BLD CALC-MCNC: 10.3 G/DL — LOW (ref 12.6–17.4)
HGB BLD-MCNC: 11.3 G/DL — LOW (ref 13–17)
HMPV RNA SPEC QL NAA+PROBE: SIGNIFICANT CHANGE UP
HPIV1 RNA SPEC QL NAA+PROBE: SIGNIFICANT CHANGE UP
HPIV2 RNA SPEC QL NAA+PROBE: SIGNIFICANT CHANGE UP
HPIV3 RNA SPEC QL NAA+PROBE: SIGNIFICANT CHANGE UP
HPIV4 RNA SPEC QL NAA+PROBE: SIGNIFICANT CHANGE UP
IANC: 9.74 K/UL — HIGH (ref 1.8–7.4)
IMM GRANULOCYTES NFR BLD AUTO: 0.6 % — SIGNIFICANT CHANGE UP (ref 0–0.9)
LACTATE BLDV-MCNC: 1.9 MMOL/L — SIGNIFICANT CHANGE UP (ref 0.5–2)
LYMPHOCYTES # BLD AUTO: 0.98 K/UL — LOW (ref 1–3.3)
LYMPHOCYTES # BLD AUTO: 7.9 % — LOW (ref 13–44)
M PNEUMO DNA SPEC QL NAA+PROBE: SIGNIFICANT CHANGE UP
MCHC RBC-ENTMCNC: 25.1 PG — LOW (ref 27–34)
MCHC RBC-ENTMCNC: 30.7 GM/DL — LOW (ref 32–36)
MCV RBC AUTO: 81.8 FL — SIGNIFICANT CHANGE UP (ref 80–100)
MONOCYTES # BLD AUTO: 1.26 K/UL — HIGH (ref 0–0.9)
MONOCYTES NFR BLD AUTO: 10.1 % — SIGNIFICANT CHANGE UP (ref 2–14)
NEUTROPHILS # BLD AUTO: 9.74 K/UL — HIGH (ref 1.8–7.4)
NEUTROPHILS NFR BLD AUTO: 78.2 % — HIGH (ref 43–77)
NRBC # BLD: 0 /100 WBCS — SIGNIFICANT CHANGE UP (ref 0–0)
NRBC # FLD: 0 K/UL — SIGNIFICANT CHANGE UP (ref 0–0)
NT-PROBNP SERPL-SCNC: 286 PG/ML — SIGNIFICANT CHANGE UP
PCO2 BLDV: 59 MMHG — HIGH (ref 42–55)
PH BLDV: 7.28 — LOW (ref 7.32–7.43)
PLATELET # BLD AUTO: 400 K/UL — SIGNIFICANT CHANGE UP (ref 150–400)
PO2 BLDV: 26 MMHG — SIGNIFICANT CHANGE UP (ref 25–45)
POTASSIUM BLDV-SCNC: 4.6 MMOL/L — SIGNIFICANT CHANGE UP (ref 3.5–5.1)
POTASSIUM SERPL-MCNC: 4.4 MMOL/L — SIGNIFICANT CHANGE UP (ref 3.5–5.3)
POTASSIUM SERPL-SCNC: 4.4 MMOL/L — SIGNIFICANT CHANGE UP (ref 3.5–5.3)
PROT SERPL-MCNC: 7.8 G/DL — SIGNIFICANT CHANGE UP (ref 6–8.3)
RAPID RVP RESULT: SIGNIFICANT CHANGE UP
RBC # BLD: 4.5 M/UL — SIGNIFICANT CHANGE UP (ref 4.2–5.8)
RBC # FLD: 13.6 % — SIGNIFICANT CHANGE UP (ref 10.3–14.5)
RSV RNA SPEC QL NAA+PROBE: SIGNIFICANT CHANGE UP
RV+EV RNA SPEC QL NAA+PROBE: SIGNIFICANT CHANGE UP
SAO2 % BLDV: 28.3 % — LOW (ref 67–88)
SARS-COV-2 RNA SPEC QL NAA+PROBE: SIGNIFICANT CHANGE UP
SODIUM SERPL-SCNC: 131 MMOL/L — LOW (ref 135–145)
TROPONIN T, HIGH SENSITIVITY RESULT: 14 NG/L — SIGNIFICANT CHANGE UP
TROPONIN T, HIGH SENSITIVITY RESULT: 15 NG/L — SIGNIFICANT CHANGE UP
WBC # BLD: 12.46 K/UL — HIGH (ref 3.8–10.5)
WBC # FLD AUTO: 12.46 K/UL — HIGH (ref 3.8–10.5)

## 2023-06-25 PROCEDURE — 93010 ELECTROCARDIOGRAM REPORT: CPT

## 2023-06-25 PROCEDURE — 71046 X-RAY EXAM CHEST 2 VIEWS: CPT | Mod: 26

## 2023-06-25 PROCEDURE — 99284 EMERGENCY DEPT VISIT MOD MDM: CPT | Mod: FS

## 2023-06-25 RX ORDER — IPRATROPIUM/ALBUTEROL SULFATE 18-103MCG
3 AEROSOL WITH ADAPTER (GRAM) INHALATION ONCE
Refills: 0 | Status: COMPLETED | OUTPATIENT
Start: 2023-06-25 | End: 2023-06-25

## 2023-06-25 RX ADMIN — Medication 3 MILLILITER(S): at 12:05

## 2023-06-25 NOTE — ED PROVIDER NOTE - ATTENDING APP SHARED VISIT CONTRIBUTION OF CARE
I, Jevon Narayanan, DO personally saw the patient with JUAN FRANCISCO.  I have personally performed a face to face diagnostic evaluation on this patient.  I have reviewed the JUAN FRANCISCO note and agree with the history, exam, and plan of care, except as noted.  I personally saw the patient and performed a substantive portion of the visit including all aspects of the medical decision making.

## 2023-06-25 NOTE — ED PROVIDER NOTE - NSFOLLOWUPINSTRUCTIONS_ED_ALL_ED_FT
Chronic Obstructive Pulmonary Disease Exacerbation    Chronic obstructive pulmonary disease (COPD) is a long-term (chronic) lung problem. In COPD, the flow of air from the lungs is limited.    COPD exacerbations are times that breathing gets worse and you need more than your normal treatment. Without treatment, they can be life-threatening. If they happen often, your lungs can become more damaged.    What are the causes?    •Having infections that affect your airways and lungs.    •Being exposed to:  •Smoke.  •Air pollution.  •Chemical fumes.  •Dust.  •Things that can cause an allergic reaction (allergens).  •Not taking your usual COPD medicines as told.  •Having medical problems already, such as heart failure or infections not involving the lungs.    In many cases, the cause is not known.    What increases the risk?  •Smoking.  •Being an older adult.  •Having frequent prior COPD exacerbations.    What are the signs or symptoms:  •Increased coughing.  •Increased mucus from your lungs.  •Increased wheezing.  •Increased shortness of breath.  •Fast breathing and finding it hard to breathe.  •Chest tightness.  •Less energy than usual.  •Sleep disruption from symptoms.  •Confusion.  •Increased sleepiness.    Often, these symptoms happen or get worse even with the use of medicines.      How is this treated?    Treatment for this condition depends on how bad it is and the cause of the symptoms. You may need to stay in the hospital for treatment. Treatment may include:  •Taking medicines.  •Using oxygen.  •Being treated with different ways to clear your airway, such as using a mask to deliver oxygen.    Follow these instructions at home:    Medicines   •Take over-the-counter and prescription medicines only as told by your doctor.  •Use all inhaled medicines the correct way.  •If you were prescribed an antibiotic or steroid medicine, take it as told by your doctor. Do not stop taking it even if you start to feel better.    Lifestyle   • Do not smoke or use any products that contain nicotine or tobacco. If you need help quitting, ask your doctor.  •Eat healthy foods.  •Exercise regularly.  •Get enough sleep. Most adults need 7 or more hours per night.  •Avoid tobacco smoke and other things that can bother your lungs.  •Several times a day, wash your hands with soap and water for at least 20 seconds. If you cannot use soap and water, use hand . This may help keep you from getting an infection.  •During flu season, avoid areas that are crowded with people.    General instructions   •Drink enough fluid to keep your pee (urine) pale yellow. Do not do this if your doctor has told you not to.  •Use a cool mist machine (vaporizer).  •If you use oxygen or a machine that turns medicine into a mist (nebulizer), continue to use it as told.  •Keep all follow-up visits.    How is this prevented?  •Keep up with shots (vaccinations) as told by your doctor. Be sure to get a yearly flu (influenza) shot.  •If you smoke, quit smoking. Smoking makes the problem worse.  •Follow all instructions for rehabilitation. These are steps you can take to make your body work better.  •Work with your doctor to develop and follow an action plan. This tells you what steps to take when you experience certain symptoms.    Contact a doctor if:  •Your COPD symptoms get worse than normal.    Get help right away if:  •You are short of breath and it gets worse, even when you are resting.  •You have trouble talking.  •You have chest pain.  •You cough up blood.  •You have a fever.  •You keep vomiting.  •You feel weak or you pass out (faint).  •You feel confused.  •You are not able to sleep because of your symptoms.  •You have trouble doing daily activities.    These symptoms may be an emergency. Get help right away. Call your local emergency services (911 in the [place]   • Do not wait to see if the symptoms will go away.    •  Do not drive yourself to the hospital.     Summary  •COPD exacerbations are times that breathing gets worse and you need more treatment than normal.  •COPD exacerbations can be very serious and may cause your lungs to become more damaged.  • Do not smoke. If you need help quitting, ask your doctor.  •Stay up to date on your shots. Get a flu shot every year.      This information is not intended to replace advice given to you by your health care provider. Make sure you discuss any questions you have with your health care provider.

## 2023-06-25 NOTE — ED ADULT NURSE NOTE - NSFALLRISKFACTORS_ED_ALL_ED
2022        Re: Jere Chinedujaney    : 1978        To Whom It Concern:     This is to certify that the above named patient had an appointment at this office for professional attention on  22. Please excuse Jere from work -7/15.      Thank you,            Office of Tammy L Schladweiler, NP  [address] [id number]     No indicators present

## 2023-06-25 NOTE — ED PROVIDER NOTE - CLINICAL SUMMARY MEDICAL DECISION MAKING FREE TEXT BOX
73 y/o male c/o sob and low spO2 at home/urgent care - currently 98% on room air  -possible copd exacerbation, patient currently well appearing and vital signs stable, will r/o PNA vs viral infection  -labs rvp cxr  -duonebs, reassess

## 2023-06-25 NOTE — ED ADULT TRIAGE NOTE - CHIEF COMPLAINT QUOTE
c/o shortness of breath and hoarseness in voice x 4-5 days. Denies any sore throat or cough. GREEN noted . breathing unlabored at rest. PH: COPD, DM2. Fingerstick 119

## 2023-06-25 NOTE — ED ADULT NURSE NOTE - NSFALLUNIVINTERV_ED_ALL_ED
Bed/Stretcher in lowest position, wheels locked, appropriate side rails in place/Call bell, personal items and telephone in reach/Instruct patient to call for assistance before getting out of bed/chair/stretcher/Non-slip footwear applied when patient is off stretcher/[place] to call system/Physically safe environment - no spills, clutter or unnecessary equipment/Purposeful proactive rounding/Room/bathroom lighting operational, light cord in reach

## 2023-06-25 NOTE — ED PROVIDER NOTE - PROGRESS NOTE DETAILS
JOSE Fisher - spoke briefly with patients pulmonologist Dr. Briones regarding patient - states he is away until wednesday but if patient is stable for DC can discharge and see patient in office this week.   Pt. feeling well without complaints - states feeling better and SOB has resolved. Ambulatory spO2 96/97% on room air. Labs WNL - stable for DC with outpt pulm follow up.

## 2023-06-25 NOTE — ED ADULT NURSE NOTE - OBJECTIVE STATEMENT
pt alert,oriented x3 placed on c monitor  rsr. duoneb at present as ordered, iv access,labs sent as ordered. pt reports increased diff breathing x past 3 days with loose  stools  3 day. denies c/o pain. evaluated by md

## 2023-06-25 NOTE — ED PROVIDER NOTE - OBJECTIVE STATEMENT
73 y/o male hx HTN COPD (not on home O2) and Ulcerative Colitis presents to ER c/o shortness of breath. Pt. states for the past few days has been experiencing worsening shortness of breath - states is relatively constant - states mild exertion does not worsen it. Pt. states subjective fevers at home and took temp of 99.7. Pt. noted this am that his 71 y/o male hx HTN COPD (not on home O2) and Ulcerative Colitis presents to ER c/o shortness of breath. Pt. states for the past few days has been experiencing worsening shortness of breath - states is relatively constant - states mild exertion does not worsen it. Pt. states subjective fevers at home and took temp of 99.7. Pt. noted this am that his spO2 was 88% - patient went to urgent care - was found to be 88% and recommended to come to ER for evaluation. Pt. denies cough weakness dizziness cp.

## 2023-06-25 NOTE — ED PROVIDER NOTE - PATIENT PORTAL LINK FT
You can access the FollowMyHealth Patient Portal offered by [facility] by registering at the following website: [email or web address]. By joining [employer]’s FollowMyHealth portal, you will also be able to view your health information using other applications (apps) compatible with our system.

## 2023-07-11 NOTE — DISCHARGE NOTE PROVIDER - NSDCCONDITION_GEN_ALL_CORE
"[facility] ([facility])  Vaginal Delivery   Operative Note    SUMMARY     Normal spontaneous vaginal delivery of live infant, was placed on mothers abdomen for skin to skin and bulb suctioning performed.  Infant delivered position OA over intact perineum.  Nuchal cord: No.    Spontaneous delivery of placenta and IV pitocin given noting good uterine tone.  No lacerations noted.  Patient tolerated delivery well. Sponge needle and lap counted correctly x2.    Indications:  (spontaneous vaginal delivery)  Pregnancy complicated by:   Patient Active Problem List   Diagnosis    Hyperemesis affecting pregnancy, antepartum    Acute cystitis without hematuria    Anemia in pregnancy, third trimester    Fetal growth restriction antepartum     (spontaneous vaginal delivery)    Delivery outcome of single liveborn infant     Admitting GA: 38w2d    Delivery Information for Evgeny Lewis    Birth information:  YOB: 2022   Time of birth: 9:01 AM   Sex: female   Head Delivery Date/Time: 2023  9:01 AM   Delivery type: Vaginal, Spontaneous   Gestational Age: 38w2d        Delivery Providers    Delivering clinician: Van Chi NP   Provider Role    Jyoti Lincoln RN Registered Nurse    Laurence Salinas RN Registered Nurse    Kayla Smith RN Registered Nurse              Measurements    Weight: 2380 g  Weight (lbs): 5 lb 4 oz  Length: 45.7 cm  Length (in): 18"  Head circumference: 32.5 cm  Chest circumference: 30.5 cm  Abdominal girth: 28.5 cm         Apgars    Living status: Living  Apgars:  1 min.:  5 min.:  10 min.:  15 min.:  20 min.:    Skin color:  1  1       Heart rate:  2  2       Reflex irritability:  2  2       Muscle tone:  2  2       Respiratory effort:  2  2       Total:  9  9       Apgars assigned by: ROSA SMITH RN         Operative Delivery    Forceps attempted?: No  Vacuum extractor attempted?: No         Shoulder Dystocia    Shoulder dystocia present?: No   "         Presentation    Presentation: Vertex  Position: Middle Occiput Anterior           Interventions/Resuscitation    Method: Bulb Suctioning, Tactile Stimulation       Cord    Vessels: 3 vessels  Complications: None  Delayed Cord Clamping?: Yes  Cord Clamped Date/Time: 2023  9:03 AM  Cord Blood Disposition: Discarded  Gases Sent?: No  Stem Cell Collection (by MD): No       Placenta    Placenta delivery date/time: 2023 0905  Placenta removal: Spontaneous  Placenta appearance: Intact  Placenta disposition: Discarded           Labor Events:       labor: No     Labor Onset Date/Time:         Dilation Complete Date/Time:         Start Pushing Date/Time:         Start Pushing Date/Time:       Rupture Date/Time: 23  0558         Rupture type: ARM (Artificial Rupture)         Fluid Amount:       Fluid Color: Clear               steroids: None     Antibiotics given for GBS: Yes     Induction: oxytocin;amniotomy     Indications for induction:  Intrauterine Growth Restriction     Augmentation:       Indications for augmentation:       Labor complications: None     Additional complications:          Cervical ripening:                     Delivery:      Episiotomy: None     Indication for Episiotomy:       Perineal Lacerations: None Repaired:      Periurethral Laceration:   Repaired:     Labial Laceration:   Repaired:     Sulcus Laceration:   Repaired:     Vaginal Laceration:   Repaired:     Cervical Laceration:   Repaired:     Repair suture:       Repair # of packets:       Last Value - EBL - Nursing (mL):       Sum - EBL - Nursing (mL): 0     Last Value - EBL - Anesthesia (mL):      Calculated QBL (mL): 200      Vaginal Sweep Performed: Yes     Surgicount Correct: Yes     Vaginal Packing: No Quantity:       Other providers:       Anesthesia    Method: Epidural          Details (if applicable):  Trial of Labor      Categorization:      Priority:     Indications for  :     Incision Type:       Additional  information:  Forceps:    Vacuum:    Breech:    Observed anomalies    Other (Comments):          Stable

## 2023-08-17 ENCOUNTER — OUTPATIENT (OUTPATIENT)
Dept: OUTPATIENT SERVICES | Facility: HOSPITAL | Age: 73
LOS: 1 days | End: 2023-08-17
Payer: MEDICARE

## 2023-08-17 ENCOUNTER — APPOINTMENT (OUTPATIENT)
Dept: CT IMAGING | Facility: IMAGING CENTER | Age: 73
End: 2023-08-17
Payer: MEDICARE

## 2023-08-17 DIAGNOSIS — Z98.890 OTHER SPECIFIED POSTPROCEDURAL STATES: Chronic | ICD-10-CM

## 2023-08-17 DIAGNOSIS — Z00.8 ENCOUNTER FOR OTHER GENERAL EXAMINATION: ICD-10-CM

## 2023-08-17 DIAGNOSIS — M12.9 ARTHROPATHY, UNSPECIFIED: Chronic | ICD-10-CM

## 2023-08-17 PROCEDURE — 71250 CT THORAX DX C-: CPT | Mod: MH

## 2023-08-17 PROCEDURE — 71250 CT THORAX DX C-: CPT | Mod: 26,MH

## 2023-10-27 ENCOUNTER — APPOINTMENT (OUTPATIENT)
Dept: ORTHOPEDIC SURGERY | Facility: CLINIC | Age: 73
End: 2023-10-27
Payer: MEDICARE

## 2023-10-27 ENCOUNTER — NON-APPOINTMENT (OUTPATIENT)
Age: 73
End: 2023-10-27

## 2023-10-27 VITALS — WEIGHT: 157 LBS | BODY MASS INDEX: 23.79 KG/M2 | HEIGHT: 68 IN

## 2023-10-27 DIAGNOSIS — M47.816 SPONDYLOSIS W/OUT MYELOPATHY OR RADICULOPATHY, LUMBAR REGION: ICD-10-CM

## 2023-10-27 PROCEDURE — 99204 OFFICE O/P NEW MOD 45 MIN: CPT

## 2023-11-22 ENCOUNTER — APPOINTMENT (OUTPATIENT)
Dept: ORTHOPEDIC SURGERY | Facility: CLINIC | Age: 73
End: 2023-11-22
Payer: MEDICARE

## 2023-11-22 PROCEDURE — 20600 DRAIN/INJ JOINT/BURSA W/O US: CPT | Mod: RT

## 2023-11-22 PROCEDURE — 99204 OFFICE O/P NEW MOD 45 MIN: CPT | Mod: 25

## 2023-11-28 ENCOUNTER — RX RENEWAL (OUTPATIENT)
Age: 73
End: 2023-11-28

## 2023-11-28 RX ORDER — DICLOFENAC SODIUM 1% 10 MG/G
1 GEL TOPICAL
Qty: 100 | Refills: 0 | Status: ACTIVE | COMMUNITY
Start: 2023-10-31 | End: 1900-01-01

## 2023-12-18 NOTE — PATIENT PROFILE ADULT - NSPROGENOTHERPROVIDER_GEN_A_NUR
[facility] APPOINTMENT CONFIRMATION      Date: 12/19/23    Time: 1430    Provider name: Dr. Vinicius Hayes    Location: Neurology Locations: [place]: [address] [address]    Zoom link sent (if applicable): N/A    Will patient be in [place] for the virtual visit? N/A    Is patient currently in a facility? No    Alternative contact information: none    Appointment confirmed: Yes    \"Please ensure you bring a medication list, including the dates and times you take each medication.\"    \"We do have free parking available in the main hospital parking lot. However, parking can be difficult to find so we ask that you arrive 40 minutes prior to your appointment.\"      
none

## 2024-02-26 NOTE — PATIENT PROFILE ADULT - NSPRONUTRITIONRISK_GEN_A_NUR
PAT phone call for EGD completed with pt.    Date/time/location (entrance C) of surgery/procedure verified with pt.    NPO after MN status verified with pt.    Need for  (   )  verified with pt.    Instructed pt to take a shower the AM of surgery/procedure and to avoid lotions, creams, jewelry.    Instructed pt to take BP meds with a small sip of water prior to procedure/surgery.  
Unintentional weight loss prior to admission

## 2024-02-28 ENCOUNTER — APPOINTMENT (OUTPATIENT)
Dept: ORTHOPEDIC SURGERY | Facility: CLINIC | Age: 74
End: 2024-02-28
Payer: MEDICARE

## 2024-02-28 DIAGNOSIS — M18.11 UNILATERAL PRIMARY OSTEOARTHRITIS OF FIRST CARPOMETACARPAL JOINT, RIGHT HAND: ICD-10-CM

## 2024-02-28 DIAGNOSIS — M25.561 PAIN IN RIGHT KNEE: ICD-10-CM

## 2024-02-28 PROCEDURE — 99213 OFFICE O/P EST LOW 20 MIN: CPT | Mod: 25

## 2024-02-29 ENCOUNTER — APPOINTMENT (OUTPATIENT)
Dept: ORTHOPEDIC SURGERY | Facility: CLINIC | Age: 74
End: 2024-02-29
Payer: MEDICARE

## 2024-02-29 VITALS
SYSTOLIC BLOOD PRESSURE: 138 MMHG | WEIGHT: 153 LBS | DIASTOLIC BLOOD PRESSURE: 76 MMHG | OXYGEN SATURATION: 98 % | HEIGHT: 68 IN | BODY MASS INDEX: 23.19 KG/M2

## 2024-02-29 PROCEDURE — 99213 OFFICE O/P EST LOW 20 MIN: CPT

## 2024-02-29 PROCEDURE — 72170 X-RAY EXAM OF PELVIS: CPT

## 2024-02-29 PROCEDURE — 73564 X-RAY EXAM KNEE 4 OR MORE: CPT | Mod: RT

## 2024-03-06 NOTE — REVIEW OF SYSTEMS
[Joint Pain] : joint pain [Negative] : Endocrine [Joint Stiffness] : no joint stiffness [Joint Swelling] : no joint swelling [Fever] : no fever

## 2024-03-06 NOTE — DISCUSSION/SUMMARY
[de-identified] : Eric Lozano is a 73-year-old male who presents the office for evaluation of his right knee pain.  Patient has been experiencing right knee pain for the last 2 months.  X-rays showed severe right knee osteoarthritis.  Examination showed good right knee range of motion. Discussed with the patient the examination and imaging findings.  Discussed with the patient the operative and nonoperative management of knee osteoarthritis, including total knee arthroplasty.  Patient would like to continue nonoperative management at this time.  Discussed the nonoperative management of knee osteoarthritis, including physical therapy, anti-inflammatories, and injections.  Patient was given referral for physical therapy.  Patient will take over-the-counter anti-inflammatories as needed for pain control.  Patient will follow-up in 2 months for reevaluation and management.  Patient understanding and in agreement the plan.  All questions answered  Plan: -Physical Therapy -Over-the-counter anti-inflammatories as needed for pain control -Follow up in 2 months for reevaluation and management

## 2024-03-06 NOTE — PHYSICAL EXAM
[de-identified] : Constitutional: 73 year old male, alert and oriented, cooperative, in no acute distress.  HEENT  NC/AT.  Appearance: symmetric  Neck/Back Straight without deformity or instability.  Good ROM.  Chest/Respiratory  Respiratory effort: no intercostal retractions or use of accessory muscles. Nonlabored Breathing  Skin  On inspection, warm and dry without rashes or lesions.  Mental Status:  Judgment, insight: intact Orientation: oriented to time, place, and person  Neurological: Sensory and Motor are grossly intact throughout  Right Knee  Inspection:     Skin intact, no rashes or lesions     No Effusion     Non-tender to palpation over tibial tubercle, patella, medial and lateral joint line, and pes insertion.  Range of Motion: 	Extension - 0 degrees 	Flexion - 120 degrees 	Alignment - Varus 5 degrees 	Extensor lag: None  Stability:      Demonstrates no Varus or Valgus instability      Negative Anterior or Posterior drawer.      Negative Lachman's  Patella: stable, tracks well.   Neurologic Exam     Motor intact including 5/5 Extensor Hallucis Longus, 5/5 Flexor Hallucis Longus, 5/5 Tibialis Anterior and 5/5 Gastrocnemius     Sensation Intact to Light Touch including Saphenous, Sural, Superficial Peroneal, Deep Peroneal, Tibial nerve distributions  Vascular Exam     Foot is warm and well perfused with 2+ Dorsalis Pedis Pulse   No pain with range of motion of the bilateral hips or left knee. No lumbar paraspinal muscle tenderness.  [de-identified] : XRay:  XRays of the Pelvis (1 View) taken in the office today and discussed with the patient. XRays demonstrate no obvious fracture or dislocation. There is no significant evidence of osteoarthritis or osteophyte formation. (my personal interpretation).   XRay: XRays of the Right Knee (4 Views) taken in the office today and reviewed with the patient. XRays demonstrate tricompartmental joint space narrowing, with bone on bone articulations in the medial compartment, with subchondral sclerosis, overlying osteophytes, all consistent with severe osteoarthritis, KL rdGrdrrdarddrderd:rd rd3rd. There is varus alignment. There is a Left Total Knee Arthroplasty. (my personal interpretation)

## 2024-03-06 NOTE — HISTORY OF PRESENT ILLNESS
[de-identified] : Eric Lozano is a 73-year-old male who presented to the office for evaluation of his right knee pain.  Patient's been experiencing right knee pain for the last few months.  Pain is located over the posterior knee.  It is worse with sitting to standing and at night.  No hip or back pain.  Patient has not tried physical therapy or injections.  He is not taking pain medications.  Patient has a history of a left TKA about 10 years ago.  No falls.  No fevers or chills.  History: Diabetes, HTN, Colitis, COPD

## 2024-03-20 ENCOUNTER — APPOINTMENT (OUTPATIENT)
Dept: OTOLARYNGOLOGY | Facility: CLINIC | Age: 74
End: 2024-03-20
Payer: MEDICARE

## 2024-03-20 PROCEDURE — 92524 BEHAVRAL QUALIT ANALYS VOICE: CPT | Mod: GN

## 2024-03-20 NOTE — ASSESSMENT
[FreeTextEntry1] : VOICE EVALUATION   Patient Name: Eric Lozano Date of Evaluation: 3/20/24 : 1950 Primary Diagnosis: Dysphonia R49.0 Treatment Diagnosis: Dysphonia R49.0 Referring Physician: Dr. Browne  Procedure Code: 48644   REASON FOR REFERRAL:  Mr. Eric Lozano is a 73-year-old male who participated in a comprehensive voice evaluation. He was referred by Dr. Browne, ENT. Script states, "Both vocal cords move, dysphonia, speech therapy, see video." Patient stated he was prompted to see Dr. Browne this winter due to ongoing vocal concerns dating back several years ago but was put on hold. 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   HISTORY OF PRESENTING ILLNESS: Patient arrived accompanied by his spouse who assisted in providing case history information. Upon probing, pt offers his medical history consisting of COPD, Colitis, and gastroparesis. He was admitted to [facility] 2023 for pneumonia at which time he lost a lot of weight. He now notes continued vocal difficulties, including loss of voice with use and after eating. He underwent a recent neck CT. 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 He smoked cigarettes up until 8 years ago.  CURRENT NUTRITIONAL INTAKE: Solids: Regular solids  Liquids: Thin Liquids  MEDICATIONS: Were reviewed and can be located in the patient's chart Allergies: No reported food or drug allergies  CLINICAL FINDINGS Oral Peripheral Assessment / Detailed Head and Neck Exam Structures: 	Within Functional Limits Symmetry: 	Within functional limits  Dentition: 	WFL Soft Palate: 	WFL Labial: 	Adequate pursing, retraction and strength   Lingual:	WFL Mandibular: 	WFL  Buccal:	WFL  Secretions: 	WFL Volitional Swallow: 	Adequate Volitional Cough: 	WFL Voice:		harsh, rough and strained  Cognition/Communication: WFL Motor Speech: WFL    CLINICAL FINDINGS: Perceptual Voice Analysis Vocal Quality: strained, hoarse Severity: Moderate Loudness Level: WFLs Pitch: WFLs Musculoskeletal Tension: Present Location: Neck, clavicle, shoulders Respiration: Thoracic Resonance: Appropriate Tone Focus: Back Type of Onset: Hard glottal attack Laryngeal: Sensitivity Maximum Phonation Duration: 5.04 seconds (reduced for age and gender)  Visipitch to obtain objective data regarding vocal output: Not obtained due to technical difficulties. Plan to take measures during first therapy session.  INTERPRETATION: Patient presents with moderate dysphonia marked by a strained vocal quality, hard glottal attack, impaired breathing pattern and laryngeal sensitivity, suggesting hyperfunction. Given such, vocal therapy is recommended to address maladaptive lifestyle and speaking behaviors which are negatively impacting overall communicative function.  PROGNOSIS: Good with therapeutic intervention and patient participation  RECOMMENDATIONS: 1) Voice Therapy (CPT - 56458) is recommended 1x/week for 6 weeks to address improved phonatory functioning. 2) Adhere to vocal hygiene and LPRD lifestyle modifications 3) Follow up with physician as directed  EDUCATION: Verbal and written educational information and instruction were provided to the patient.  Treatment Plan  Long Term Goals: 1. The patient will Demonstrate improved vocal quality/loudness/intonation for sustained vocalization/speech at word/phrase/sentence/conversational level.  Short-Term Goals: 1. Patient will verbalize strategies to reduce vocal hyperfunction with min cues 2. Patient will establish a diaphragmatic breathing pattern at rest and during speech with minimum cues 3. Learn and apply easy onset with words/phrases/sentences/in running speech with min cues  Please contact the center should you have any additional questions or concerns, at [phone].  Annette Ferrer MA, CCC-SLP Senior Speech-Language Pathologist.

## 2024-03-22 ENCOUNTER — APPOINTMENT (OUTPATIENT)
Dept: ORTHOPEDIC SURGERY | Facility: CLINIC | Age: 74
End: 2024-03-22
Payer: MEDICARE

## 2024-03-22 VITALS — BODY MASS INDEX: 23.19 KG/M2 | WEIGHT: 153 LBS | HEIGHT: 68 IN

## 2024-03-22 PROCEDURE — 20610 DRAIN/INJ JOINT/BURSA W/O US: CPT | Mod: RT

## 2024-03-22 PROCEDURE — 99214 OFFICE O/P EST MOD 30 MIN: CPT | Mod: 25

## 2024-03-27 NOTE — DISCUSSION/SUMMARY
[de-identified] : Eric Lozano is a 73-year-old male who presents the office for follow up of his right knee pain.  Patient continues to experience right knee pain. Prior X-rays showed severe right knee osteoarthritis.  Examination showed good right knee range of motion. Discussed with the patient the examination and imaging findings.  Discussed with the patient the operative and nonoperative management of knee osteoarthritis, including total knee arthroplasty.  Patient would like to continue nonoperative management at this time.  Discussed the nonoperative management of knee osteoarthritis, including physical therapy, anti-inflammatories, and injections.  Patient was given referral for physical therapy.  Patient will take over-the-counter anti-inflammatories as needed for pain control.  Patient would like a right knee corticosteroid injection today.  Discussed the risks of corticosteroid injections.  Right knee corticosteroid injection was given using aseptic technique.  Patient tolerated procedure well. Patient will follow-up in 3 months for reevaluation and management.  Patient understanding and in agreement the plan.  All questions answered  Plan: -Right Knee Corticosteroid Injection Given -Physical Therapy -Over-the-counter anti-inflammatories as needed for pain control -Follow up in 3 months for reevaluation and management   Procedure Note: Right knee corticosteroid injection   A Right Knee corticosteroid injection was given today. Risk and benefits of the injection were discussed, including but not limited to infection, fat atrophy, skin discoloration, failure to achieve desired results and elevated blood sugars. The area was prepped in the usual sterile fashion. The injection was given with 1 cc (40 mg) Methylprednisolone and 4 cc 1% Lidocaine and the patient tolerated it well.   Risk of cortisone injection are minimal but present. There is the rare risk of joint infection, skin and soft tissue thinning or whitening of the skin surrounding the injection site, thinning of nearby bone, or the risk of elevated blood glucose in diabetics. Diabetics receiving steroid injection should follow their blood sugars very closely for several days after receiving the injections.  In the event of uncontrolled elevated blood glucose, they should seek medical attention.   There's some concern that repeated use of cortisone shots may cause deterioration of the cartilage within a joint. For this reason, doctors typically limit the number of cortisone shots in a joint. The limit varies depending on the joint and the reason for treatment.   Cortisone shots usually include a corticosteroid medication and a local anesthetic. The local anesthetic helps to numb the joint at the time of the injection and last for several hours. The cortisone acts to relieve pain and inflammation but may take several days to begin to work. Some people do experience a cortisone flare after the injection and may have increased pain for 2 to 3 days after the injection, and then pain can begin to improve.   Patient was instructed to call or return for any signs or symptoms of infection after an injection including increased pain, swelling, redness or fevers.

## 2024-03-27 NOTE — PHYSICAL EXAM
[de-identified] : Constitutional: 73 year old male, alert and oriented, cooperative, in no acute distress.  HEENT  NC/AT.  Appearance: symmetric  Neck/Back Straight without deformity or instability.  Good ROM.  Chest/Respiratory  Respiratory effort: no intercostal retractions or use of accessory muscles. Nonlabored Breathing  Skin  On inspection, warm and dry without rashes or lesions.  Mental Status:  Judgment, insight: intact Orientation: oriented to time, place, and person  Neurological: Sensory and Motor are grossly intact throughout  Right Knee  Inspection:     Skin intact, no rashes or lesions     No Effusion     Non-tender to palpation over tibial tubercle, patella, medial and lateral joint line, and pes insertion.  Range of Motion: 	Extension - 0 degrees 	Flexion - 120 degrees 	Alignment - Varus 5 degrees 	Extensor lag: None  Stability:      Demonstrates no Varus or Valgus instability      Negative Anterior or Posterior drawer.      Negative Lachman's  Patella: stable, tracks well.   Neurologic Exam     Motor intact including 5/5 Extensor Hallucis Longus, 5/5 Flexor Hallucis Longus, 5/5 Tibialis Anterior and 5/5 Gastrocnemius     Sensation Intact to Light Touch including Saphenous, Sural, Superficial Peroneal, Deep Peroneal, Tibial nerve distributions  Vascular Exam     Foot is warm and well perfused with 2+ Dorsalis Pedis Pulse   No pain with range of motion of the bilateral hips or left knee. No lumbar paraspinal muscle tenderness.  [de-identified] : XRay:  XRays of the Pelvis (1 View) taken on 2/29/2024. XRays demonstrate no obvious fracture or dislocation. There is no significant evidence of osteoarthritis or osteophyte formation. (my personal interpretation).   XRay: XRays of the Right Knee (4 Views) taken on 2/29/204. XRays demonstrate tricompartmental joint space narrowing, with bone on bone articulations in the medial compartment, with subchondral sclerosis, overlying osteophytes, all consistent with severe osteoarthritis, KL rdGrdrrdarddrderd:rd rd3rd. There is varus alignment. There is a Left Total Knee Arthroplasty. (my personal interpretation)

## 2024-03-27 NOTE — REVIEW OF SYSTEMS
[Joint Pain] : joint pain [Negative] : Neurological [Fever] : no fever [Joint Swelling] : no joint swelling [Joint Stiffness] : no joint stiffness

## 2024-03-27 NOTE — HISTORY OF PRESENT ILLNESS
[de-identified] : 3/22/2024  Eric Lozano presents to the office for follow-up of his right knee pain.  Patient continues to have right knee pain.  He has difficulty going from a sitting to a standing position and with walking.  He has tried physical therapy, without relief.  No falls.  No fevers or chills.  He has not tried pain medications.  Patient has a history of a left TKA in 2004.  2/29/2024 Eric Lozano is a 73-year-old male who presented to the office for evaluation of his right knee pain.  Patient's been experiencing right knee pain for the last few months.  Pain is located over the posterior knee.  It is worse with sitting to standing and at night.  No hip or back pain.  Patient has not tried physical therapy or injections.  He is not taking pain medications.  Patient has a history of a left TKA about 10 years ago.  No falls.  No fevers or chills.  History: Diabetes, HTN, Colitis, COPD

## 2024-04-11 ENCOUNTER — APPOINTMENT (OUTPATIENT)
Dept: OTOLARYNGOLOGY | Facility: CLINIC | Age: 74
End: 2024-04-11
Payer: MEDICARE

## 2024-04-11 VITALS
RESPIRATION RATE: 17 BRPM | DIASTOLIC BLOOD PRESSURE: 74 MMHG | BODY MASS INDEX: 23.19 KG/M2 | HEIGHT: 68 IN | HEART RATE: 62 BPM | WEIGHT: 153 LBS | SYSTOLIC BLOOD PRESSURE: 117 MMHG | OXYGEN SATURATION: 95 %

## 2024-04-11 DIAGNOSIS — R49.8 OTHER VOICE AND RESONANCE DISORDERS: ICD-10-CM

## 2024-04-11 DIAGNOSIS — J38.3 OTHER DISEASES OF VOCAL CORDS: ICD-10-CM

## 2024-04-11 DIAGNOSIS — R49.0 DYSPHONIA: ICD-10-CM

## 2024-04-11 DIAGNOSIS — Z87.01 PERSONAL HISTORY OF PNEUMONIA (RECURRENT): ICD-10-CM

## 2024-04-11 DIAGNOSIS — Z87.19 PERSONAL HISTORY OF OTHER DISEASES OF THE DIGESTIVE SYSTEM: ICD-10-CM

## 2024-04-11 DIAGNOSIS — Z87.891 PERSONAL HISTORY OF NICOTINE DEPENDENCE: ICD-10-CM

## 2024-04-11 DIAGNOSIS — Z87.898 PERSONAL HISTORY OF OTHER SPECIFIED CONDITIONS: ICD-10-CM

## 2024-04-11 PROCEDURE — 99204 OFFICE O/P NEW MOD 45 MIN: CPT | Mod: 25

## 2024-04-11 PROCEDURE — 31579 LARYNGOSCOPY TELESCOPIC: CPT

## 2024-04-11 RX ORDER — LEVOFLOXACIN 500 MG/1
500 TABLET, FILM COATED ORAL DAILY
Qty: 7 | Refills: 0 | Status: COMPLETED | COMMUNITY
Start: 2021-11-29 | End: 2024-04-11

## 2024-04-11 RX ORDER — LOSARTAN POTASSIUM 100 MG/1
TABLET, FILM COATED ORAL
Refills: 0 | Status: ACTIVE | COMMUNITY

## 2024-04-11 RX ORDER — PREDNISONE 10 MG/1
10 TABLET ORAL DAILY
Refills: 0 | Status: COMPLETED | COMMUNITY
Start: 2021-09-30 | End: 2024-04-11

## 2024-04-11 RX ORDER — FAMOTIDINE 40 MG/1
TABLET, FILM COATED ORAL
Refills: 0 | Status: ACTIVE | COMMUNITY

## 2024-04-11 RX ORDER — SITAGLIPTIN AND METFORMIN HYDROCHLORIDE 50; 1000 MG/1; MG/1
TABLET, FILM COATED ORAL
Refills: 0 | Status: ACTIVE | COMMUNITY

## 2024-04-11 RX ORDER — METFORMIN ER 500 MG 500 MG/1
500 TABLET ORAL
Qty: 60 | Refills: 0 | Status: COMPLETED | COMMUNITY
Start: 2016-12-03 | End: 2024-04-11

## 2024-04-11 NOTE — PROCEDURE
[de-identified] : Stroboscopic Laryngoscopy Procedure Note:  Indication:	Assess laryngeal biomechanics and vocal fold oscillation.  Description of Procedure:	Informed consent was verbally obtained from the patient prior to the procedure. The patient was seated in the clinic chair. Topical anesthesia was achieved by first spraying the nasal cavities with 4% lidocaine and nasal decongestant.   Findings:  Supraglottis: no masses or lesions  Glottis:    Structure:                        Right: leukoplakia throughout with mild exophytic nature                       Left:   leukoplakia throughout                Mobility:                        Right:  normal                        Left:  normal                Amplitude:                        Right:  decreased                       Left:  decreased                Closure: complete                 Wave symmetry:  asymmetric  Subglottis: no masses or lesions within the visualized subglottis Visualized airway is widely patent.

## 2024-04-11 NOTE — HISTORY OF PRESENT ILLNESS
[de-identified] : BALA SARGENT is a 73 year old man who presents to the [facility] with difficulty phonating. He is referred by Annette Ferrer CCC-SLP. History of COPD, Former smoker (47 years), Colitis, DM2, and HTN. Reports dysphonia for 2 years, previously seen Dr. Deandre Lagos (ENT and allergy) at which time recommended SLP.  Also following with pulm- was taking Breztri but stopped due to frequent oral candidates, he has a new inhaler but unsure of name,  He notes periods of normal voicing. States voice is hoarse, straining to speak.  He denies specific difficulties with swallowing. Has not received SLP services yet.  He note frequent classic heartburn symptoms, taking pantoprazole and famotidine daily  Last hospitalization 03/2022 for pneumonia  Patient denies dysphagia, odynophagia, dyspnea, complete loss of voice, pain while speaking, globus sensation, neck swelling, coughing up blood, neck pain and recent throat infections.  Patient has been seeing Dr. Duyen Buchanan who recommended DL/bx.     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    CT Neck with outside report stating 01/30/24: Prominet palatine tonsils with 7mm hypoattenuating lesion in the left palatine tonsil which demonstrates rim enhancement. infection is unlikely in the absence of elevated white blood cell count and fever. Nonspecific 8mm hypoattenuating lesion in the left parotid which demonstrates fluid attenuation which may represent a cyst. several nonspecific prominent cervical

## 2024-04-11 NOTE — CONSULT LETTER
[Dear  ___] : Dear ~APOLONIA, [Consult Letter:] : I had the pleasure of evaluating your patient, [unfilled]. [Please see my note below.] : Please see my note below. [Consult Closing:] : Thank you very much for allowing me to participate in the care of this patient.  If you have any questions, please do not hesitate to contact me. [Sincerely,] : Sincerely, [FreeTextEntry2] : Annette Ferrer CCC-SLP [FreeTextEntry3] : Laci Pham M.D. Division of Laryngology | Department of Otolaryngology  [address] [address]

## 2024-04-11 NOTE — ASSESSMENT
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 I have recommended DL with biopsy. The risks, benefits, and alternatives to care were discussed with the patient and understanding expressed.  The risks, benefits, and alternatives were discussed with the patient and understanding expressed.  Patient would like to discuss this with his wife.    I would want to see him back in 1-2 months (or may see Dr. Buchanan) to monitor this lesion. But again reinforced my recommendation for biopsy.

## 2024-04-16 ENCOUNTER — APPOINTMENT (OUTPATIENT)
Dept: OTOLARYNGOLOGY | Facility: CLINIC | Age: 74
End: 2024-04-16
Payer: MEDICARE

## 2024-04-16 PROCEDURE — 92507 TX SP LANG VOICE COMM INDIV: CPT | Mod: GN

## 2024-05-02 ENCOUNTER — APPOINTMENT (OUTPATIENT)
Dept: ORTHOPEDIC SURGERY | Facility: CLINIC | Age: 74
End: 2024-05-02

## 2024-05-07 ENCOUNTER — APPOINTMENT (OUTPATIENT)
Dept: OTOLARYNGOLOGY | Facility: CLINIC | Age: 74
End: 2024-05-07
Payer: MEDICARE

## 2024-05-07 PROCEDURE — 92507 TX SP LANG VOICE COMM INDIV: CPT | Mod: GN

## 2024-05-14 ENCOUNTER — APPOINTMENT (OUTPATIENT)
Dept: OTOLARYNGOLOGY | Facility: CLINIC | Age: 74
End: 2024-05-14

## 2024-05-16 ENCOUNTER — NON-APPOINTMENT (OUTPATIENT)
Age: 74
End: 2024-05-16

## 2024-05-21 ENCOUNTER — APPOINTMENT (OUTPATIENT)
Dept: OTOLARYNGOLOGY | Facility: CLINIC | Age: 74
End: 2024-05-21
Payer: MEDICARE

## 2024-05-21 PROCEDURE — 92507 TX SP LANG VOICE COMM INDIV: CPT | Mod: GN

## 2024-05-29 ENCOUNTER — APPOINTMENT (OUTPATIENT)
Dept: OTOLARYNGOLOGY | Facility: HOSPITAL | Age: 74
End: 2024-05-29

## 2024-06-06 ENCOUNTER — APPOINTMENT (OUTPATIENT)
Dept: OTOLARYNGOLOGY | Facility: CLINIC | Age: 74
End: 2024-06-06

## 2024-06-07 ENCOUNTER — APPOINTMENT (OUTPATIENT)
Dept: OTOLARYNGOLOGY | Facility: CLINIC | Age: 74
End: 2024-06-07

## 2024-06-13 ENCOUNTER — APPOINTMENT (OUTPATIENT)
Dept: ORTHOPEDIC SURGERY | Facility: CLINIC | Age: 74
End: 2024-06-13
Payer: MEDICARE

## 2024-06-13 VITALS
SYSTOLIC BLOOD PRESSURE: 121 MMHG | HEART RATE: 65 BPM | WEIGHT: 157 LBS | HEIGHT: 68 IN | BODY MASS INDEX: 23.79 KG/M2 | OXYGEN SATURATION: 93 % | DIASTOLIC BLOOD PRESSURE: 73 MMHG

## 2024-06-13 PROCEDURE — 99214 OFFICE O/P EST MOD 30 MIN: CPT

## 2024-06-13 NOTE — PHYSICAL EXAM
[de-identified] : Constitutional: 73 year old male, alert and oriented, cooperative, in no acute distress.  HEENT  NC/AT.  Appearance: symmetric  Neck/Back Straight without deformity or instability.  Good ROM.  Chest/Respiratory  Respiratory effort: no intercostal retractions or use of accessory muscles. Nonlabored Breathing  Skin  On inspection, warm and dry without rashes or lesions.  Mental Status:  Judgment, insight: intact Orientation: oriented to time, place, and person  Neurological: Sensory and Motor are grossly intact throughout  Right Knee  Inspection:     Skin intact, no rashes or lesions     No Effusion     Non-tender to palpation over tibial tubercle, patella, lateral joint line, and pes insertion.    Tenderness over the medial joint line (midcoronal line)  Range of Motion: 	Extension - 0 degrees 	Flexion - 120 degrees 	Alignment - Varus 5 degrees 	Extensor lag: None  Stability:      Demonstrates no Varus or Valgus instability      Negative Anterior or Posterior drawer.      Negative Lachman's  Patella: stable, tracks well.   Neurologic Exam     Motor intact including 5/5 Extensor Hallucis Longus, 5/5 Flexor Hallucis Longus, 5/5 Tibialis Anterior and 5/5 Gastrocnemius     Sensation Intact to Light Touch including Saphenous, Sural, Superficial Peroneal, Deep Peroneal, Tibial nerve distributions  Vascular Exam     Foot is warm and well perfused with 2+ Dorsalis Pedis Pulse   No pain with range of motion of the bilateral hips or left knee. No lumbar paraspinal muscle tenderness.  [de-identified] : XRay:  XRays of the Pelvis (1 View) taken on 2/29/2024. XRays demonstrate no obvious fracture or dislocation. There is no significant evidence of osteoarthritis or osteophyte formation. (my personal interpretation).   XRay: XRays of the Right Knee (4 Views) taken on 2/29/2024. XRays demonstrate tricompartmental joint space narrowing, with bone on bone articulations in the medial compartment, with subchondral sclerosis, overlying osteophytes, all consistent with severe osteoarthritis, KL thGthrthathdtheth:th th5th. There is varus alignment. There is a Left Total Knee Arthroplasty. (my personal interpretation)

## 2024-06-13 NOTE — HISTORY OF PRESENT ILLNESS
[de-identified] : 6/13/2024  Jose Lozano presents to the office for follow-up of his right knee pain.  Patient continues to have right knee pain.  Pain continues to affect his quality of life.  He is not able to do his activities.  No falls.  No fevers or chills.  Prior injection helped for about 2 months.  3/22/2024  Erci Lozano presents to the office for follow-up of his right knee pain.  Patient continues to have right knee pain.  He has difficulty going from a sitting to a standing position and with walking.  He has tried physical therapy, without relief.  No falls.  No fevers or chills.  He has not tried pain medications.  Patient has a history of a left TKA in 2004.  2/29/2024 Eric Lozano is a 73-year-old male who presented to the office for evaluation of his right knee pain.  Patient's been experiencing right knee pain for the last few months.  Pain is located over the posterior knee.  It is worse with sitting to standing and at night.  No hip or back pain.  Patient has not tried physical therapy or injections.  He is not taking pain medications.  Patient has a history of a left TKA about 10 years ago.  No falls.  No fevers or chills.  History: Diabetes (Last A1C: 6.5), HTN, Colitis, COPD

## 2024-06-13 NOTE — DISCUSSION/SUMMARY
[de-identified] : Eric Lozano is a 73-year-old male who presented to the office for follow-up of his right knee pain.  Patient has a longstanding history of right knee pain.  Prior x-rays showed severe right knee osteoarthritis.  Examination showed tenderness over the knee, but otherwise good knee range of motion. Discussed with patient the examination and imaging findings.  Discussed with patient the operative and nonoperative management of knee osteoarthritis, including total knee arthroplasty.  Discussed the nonoperative management of knee osteoarthritis, including physical therapy, anti-inflammatories, and injections.  Patient has tried nonoperative management, but continues to have knee pain.  Discussed total knee arthroplasty at length, including surgical procedure, hospital course, DVT prophylaxis, antibiotics, physical therapy, recovery, and risks. Patient would like to proceed with total knee arthroplasty.  Discussed that patient will require medical clearance prior to surgery.  Discussed obtaining a CT scan prior to surgery.  Patient understanding and in agreement with the plan.  All questions answered.   Discussed the imaging and physical exam findings with the patient consistent with endstage knee degenerative disease. The patient has failed conservative management including physical therapy, pain control and injections. The risks, benefits and alternatives to total knee replacement were discussed with the patient in detail and the patient elected to proceed with surgery. Discussed the surgical plan with the patient including implant options and surgical approach.   Surgical risks including fracture requiring fixation, instability or dislocation, temporary or permanent leg length inequality, infection, bleeding, stiffness, failure to alleviate pain, failure to achieve desired results, need for further surgery, scar tissue formation, hardware failure, chronic pain, injury to nerves resulting in extremity dysfunction, injury to arteries and veins, deep vein thrombosis or pulmonary embolism requiring anticoagulation and medical risk factors including heart attack, stroke, death, neurological injury, pneumonia, kidney or other organ failure were discussed with the patient.   Patient was understanding and in agreement with the treatment plan. All questions answered.  Plan: -Medical Clearance -Pulmonary Clearance -CT Right Lower Extremity -Follow up in 3 weeks for reevaluation and management -Right Total Knee Arthroplasty  Surgical Plan: Diagnosis: Right Knee Osteoarthritis Laterality: Right Operative procedure: Right Total Knee Arthroplasty Location: [facility]  DVT prophylaxis: Aspirin 81mg twice per day TXA: IV Plan for discharge: Home  Pre- & Post Operative Antibiotics: Cefazolin 2g  Clearances:      Medical: Pending      Pulmonary: Pending  Comorbidities:      Metal Allergy: Negative      Chronic Pain: Negative      Diabetes: Negative, Last A1C: 6.5      Use of Anticoagulation: Negative      Atrial Fibrillation: Negative      History of VTE: Negative      History of Cardiac Stents: Negative      Skin Infections/Open Wounds: Negative      MRSA Infection/Colonization: Negative      Current Urinary Symptoms: Negative      Immunocompromise: Negative      Inflammatory Arthritis: Negative      Smoking: Quit about 8-9 years ago      Drug Use: Negative      Alcohol Use: Negative      Obstructive Sleep Apnea: Negative      Neurologic Disease: Negative

## 2024-06-25 ENCOUNTER — OUTPATIENT (OUTPATIENT)
Dept: OUTPATIENT SERVICES | Facility: HOSPITAL | Age: 74
LOS: 1 days | End: 2024-06-25
Payer: MEDICARE

## 2024-06-25 ENCOUNTER — APPOINTMENT (OUTPATIENT)
Dept: CT IMAGING | Facility: IMAGING CENTER | Age: 74
End: 2024-06-25

## 2024-06-25 DIAGNOSIS — M17.11 UNILATERAL PRIMARY OSTEOARTHRITIS, RIGHT KNEE: ICD-10-CM

## 2024-06-25 DIAGNOSIS — M12.9 ARTHROPATHY, UNSPECIFIED: Chronic | ICD-10-CM

## 2024-06-25 DIAGNOSIS — Z98.890 OTHER SPECIFIED POSTPROCEDURAL STATES: Chronic | ICD-10-CM

## 2024-06-25 PROCEDURE — 73700 CT LOWER EXTREMITY W/O DYE: CPT

## 2024-06-25 PROCEDURE — 73700 CT LOWER EXTREMITY W/O DYE: CPT | Mod: 26,RT,MH

## 2024-06-28 ENCOUNTER — OUTPATIENT (OUTPATIENT)
Dept: OUTPATIENT SERVICES | Facility: HOSPITAL | Age: 74
LOS: 1 days | End: 2024-06-28

## 2024-06-28 VITALS
TEMPERATURE: 98 F | WEIGHT: 158.07 LBS | HEIGHT: 67 IN | HEART RATE: 65 BPM | OXYGEN SATURATION: 95 % | RESPIRATION RATE: 16 BRPM | SYSTOLIC BLOOD PRESSURE: 120 MMHG | DIASTOLIC BLOOD PRESSURE: 68 MMHG

## 2024-06-28 DIAGNOSIS — J44.9 CHRONIC OBSTRUCTIVE PULMONARY DISEASE, UNSPECIFIED: ICD-10-CM

## 2024-06-28 DIAGNOSIS — M17.11 UNILATERAL PRIMARY OSTEOARTHRITIS, RIGHT KNEE: ICD-10-CM

## 2024-06-28 DIAGNOSIS — Z96.652 PRESENCE OF LEFT ARTIFICIAL KNEE JOINT: Chronic | ICD-10-CM

## 2024-06-28 DIAGNOSIS — I10 ESSENTIAL (PRIMARY) HYPERTENSION: ICD-10-CM

## 2024-06-28 DIAGNOSIS — Z98.890 OTHER SPECIFIED POSTPROCEDURAL STATES: Chronic | ICD-10-CM

## 2024-06-28 DIAGNOSIS — M12.9 ARTHROPATHY, UNSPECIFIED: Chronic | ICD-10-CM

## 2024-06-28 DIAGNOSIS — Z98.49 CATARACT EXTRACTION STATUS, UNSPECIFIED EYE: Chronic | ICD-10-CM

## 2024-06-28 DIAGNOSIS — E11.9 TYPE 2 DIABETES MELLITUS WITHOUT COMPLICATIONS: ICD-10-CM

## 2024-06-28 DIAGNOSIS — K51.90 ULCERATIVE COLITIS, UNSPECIFIED, WITHOUT COMPLICATIONS: ICD-10-CM

## 2024-06-28 LAB
A1C WITH ESTIMATED AVERAGE GLUCOSE RESULT: 5.8 % — HIGH (ref 4–5.6)
ANION GAP SERPL CALC-SCNC: 14 MMOL/L — SIGNIFICANT CHANGE UP (ref 7–14)
APPEARANCE UR: CLEAR — SIGNIFICANT CHANGE UP
BILIRUB UR-MCNC: NEGATIVE — SIGNIFICANT CHANGE UP
BLD GP AB SCN SERPL QL: NEGATIVE — SIGNIFICANT CHANGE UP
BUN SERPL-MCNC: 18 MG/DL — SIGNIFICANT CHANGE UP (ref 7–23)
CALCIUM SERPL-MCNC: 9 MG/DL — SIGNIFICANT CHANGE UP (ref 8.4–10.5)
CHLORIDE SERPL-SCNC: 100 MMOL/L — SIGNIFICANT CHANGE UP (ref 98–107)
CO2 SERPL-SCNC: 23 MMOL/L — SIGNIFICANT CHANGE UP (ref 22–31)
COLOR SPEC: YELLOW — SIGNIFICANT CHANGE UP
CREAT SERPL-MCNC: 1.16 MG/DL — SIGNIFICANT CHANGE UP (ref 0.5–1.3)
DIFF PNL FLD: NEGATIVE — SIGNIFICANT CHANGE UP
EGFR: 67 ML/MIN/1.73M2 — SIGNIFICANT CHANGE UP
ESTIMATED AVERAGE GLUCOSE: 120 — SIGNIFICANT CHANGE UP
GLUCOSE SERPL-MCNC: 152 MG/DL — HIGH (ref 70–99)
GLUCOSE UR QL: NEGATIVE MG/DL — SIGNIFICANT CHANGE UP
HCT VFR BLD CALC: 38.3 % — LOW (ref 39–50)
HGB BLD-MCNC: 12.3 G/DL — LOW (ref 13–17)
KETONES UR-MCNC: ABNORMAL MG/DL
LEUKOCYTE ESTERASE UR-ACNC: NEGATIVE — SIGNIFICANT CHANGE UP
MCHC RBC-ENTMCNC: 27.4 PG — SIGNIFICANT CHANGE UP (ref 27–34)
MCHC RBC-ENTMCNC: 32.1 GM/DL — SIGNIFICANT CHANGE UP (ref 32–36)
MCV RBC AUTO: 85.3 FL — SIGNIFICANT CHANGE UP (ref 80–100)
NITRITE UR-MCNC: NEGATIVE — SIGNIFICANT CHANGE UP
NRBC # BLD: 0 /100 WBCS — SIGNIFICANT CHANGE UP (ref 0–0)
NRBC # FLD: 0 K/UL — SIGNIFICANT CHANGE UP (ref 0–0)
PH UR: 5.5 — SIGNIFICANT CHANGE UP (ref 5–8)
PLATELET # BLD AUTO: 367 K/UL — SIGNIFICANT CHANGE UP (ref 150–400)
POTASSIUM SERPL-MCNC: 4.4 MMOL/L — SIGNIFICANT CHANGE UP (ref 3.5–5.3)
POTASSIUM SERPL-SCNC: 4.4 MMOL/L — SIGNIFICANT CHANGE UP (ref 3.5–5.3)
PROT UR-MCNC: NEGATIVE MG/DL — SIGNIFICANT CHANGE UP
RBC # BLD: 4.49 M/UL — SIGNIFICANT CHANGE UP (ref 4.2–5.8)
RBC # FLD: 13.8 % — SIGNIFICANT CHANGE UP (ref 10.3–14.5)
RH IG SCN BLD-IMP: POSITIVE — SIGNIFICANT CHANGE UP
RH IG SCN BLD-IMP: POSITIVE — SIGNIFICANT CHANGE UP
SODIUM SERPL-SCNC: 137 MMOL/L — SIGNIFICANT CHANGE UP (ref 135–145)
SP GR SPEC: 1.02 — SIGNIFICANT CHANGE UP (ref 1–1.03)
UROBILINOGEN FLD QL: 0.2 MG/DL — SIGNIFICANT CHANGE UP (ref 0.2–1)
WBC # BLD: 9.67 K/UL — SIGNIFICANT CHANGE UP (ref 3.8–10.5)
WBC # FLD AUTO: 9.67 K/UL — SIGNIFICANT CHANGE UP (ref 3.8–10.5)

## 2024-06-28 RX ORDER — LIPASE/PROTEASE/AMYLASE 16-48-48K
2 CAPSULE,DELAYED RELEASE (ENTERIC COATED) ORAL
Qty: 0 | Refills: 0 | DISCHARGE

## 2024-06-28 RX ORDER — MESALAMINE 400 MG
2 TABLET, DELAYED RELEASE (ENTERIC COATED) ORAL
Qty: 0 | Refills: 0 | DISCHARGE

## 2024-06-28 RX ORDER — BUDESONIDE, GLYCOPYRROLATE, AND FORMOTEROL FUMARATE 160; 9; 4.8 UG/1; UG/1; UG/1
2 AEROSOL, METERED RESPIRATORY (INHALATION)
Qty: 0 | Refills: 0 | DISCHARGE

## 2024-06-28 RX ORDER — LOSARTAN POTASSIUM 100 MG/1
1 TABLET, FILM COATED ORAL
Qty: 0 | Refills: 0 | DISCHARGE

## 2024-06-28 RX ORDER — MONTELUKAST 4 MG/1
1 TABLET, CHEWABLE ORAL
Qty: 0 | Refills: 0 | DISCHARGE

## 2024-06-28 RX ORDER — HYDROXYZINE HCL 10 MG
1 TABLET ORAL
Qty: 0 | Refills: 0 | DISCHARGE

## 2024-06-28 RX ORDER — GABAPENTIN 400 MG/1
1 CAPSULE ORAL
Qty: 0 | Refills: 0 | DISCHARGE

## 2024-06-28 RX ORDER — PANTOPRAZOLE SODIUM 20 MG/1
1 TABLET, DELAYED RELEASE ORAL
Qty: 0 | Refills: 0 | DISCHARGE

## 2024-06-28 RX ORDER — FLUTICASONE PROPIONATE 220 MCG
2 AEROSOL WITH ADAPTER (GRAM) INHALATION
Qty: 0 | Refills: 0 | DISCHARGE

## 2024-06-28 RX ORDER — SITAGLIPTIN AND METFORMIN HYDROCHLORIDE 500; 50 MG/1; MG/1
2 TABLET, FILM COATED ORAL
Qty: 0 | Refills: 0 | DISCHARGE

## 2024-06-28 RX ORDER — FAMOTIDINE 10 MG/ML
1 INJECTION INTRAVENOUS
Qty: 0 | Refills: 0 | DISCHARGE

## 2024-06-28 RX ORDER — LOPERAMIDE HCL 2 MG
2 TABLET ORAL
Qty: 0 | Refills: 0 | DISCHARGE

## 2024-06-29 LAB
CULTURE RESULTS: SIGNIFICANT CHANGE UP
MRSA PCR RESULT.: SIGNIFICANT CHANGE UP
S AUREUS DNA NOSE QL NAA+PROBE: SIGNIFICANT CHANGE UP
SPECIMEN SOURCE: SIGNIFICANT CHANGE UP

## 2024-07-02 ENCOUNTER — APPOINTMENT (OUTPATIENT)
Dept: ORTHOPEDIC SURGERY | Facility: CLINIC | Age: 74
End: 2024-07-02
Payer: MEDICARE

## 2024-07-02 DIAGNOSIS — M17.11 UNILATERAL PRIMARY OSTEOARTHRITIS, RIGHT KNEE: ICD-10-CM

## 2024-07-02 PROBLEM — Z87.891 PERSONAL HISTORY OF NICOTINE DEPENDENCE: Chronic | Status: ACTIVE | Noted: 2024-06-28

## 2024-07-02 PROBLEM — R49.0 DYSPHONIA: Chronic | Status: ACTIVE | Noted: 2024-06-28

## 2024-07-02 PROBLEM — D64.9 ANEMIA, UNSPECIFIED: Chronic | Status: ACTIVE | Noted: 2024-06-28

## 2024-07-02 PROBLEM — K21.9 GASTRO-ESOPHAGEAL REFLUX DISEASE WITHOUT ESOPHAGITIS: Chronic | Status: ACTIVE | Noted: 2024-06-28

## 2024-07-02 PROCEDURE — 99024 POSTOP FOLLOW-UP VISIT: CPT

## 2024-07-03 RX ORDER — MUPIROCIN 20 MG/G
2 OINTMENT TOPICAL TWICE DAILY
Qty: 2 | Refills: 0 | Status: ACTIVE | COMMUNITY
Start: 2024-07-03 | End: 1900-01-01

## 2024-07-05 ENCOUNTER — NON-APPOINTMENT (OUTPATIENT)
Age: 74
End: 2024-07-05

## 2024-07-05 NOTE — ASU PATIENT PROFILE, ADULT - NSICDXPASTMEDICALHX_GEN_ALL_CORE_FT
PAST MEDICAL HISTORY:  Anemia     Bronchiectasis     Chronic obstructive pulmonary disease     DM (diabetes mellitus) type II    Dysphonia     Former smoker     Gastroparesis with pylorus dysfunction    GERD (gastroesophageal reflux disease)     Hypertension     Inguinal hernia     Ulcerative colitis     Unilateral primary osteoarthritis, right knee

## 2024-07-05 NOTE — ASU PATIENT PROFILE, ADULT - NSICDXPASTSURGICALHX_GEN_ALL_CORE_FT
PAST SURGICAL HISTORY:  H/O inguinal hernia repair     History of left knee replacement     History of repair of hiatal hernia childhood    S/P cataract surgery     S/P endoscopy POEM 10/2021, 11/2021

## 2024-07-05 NOTE — ASU PATIENT PROFILE, ADULT - NSALCOHOLTYPE_GEN__A_CORE_SD
End of Shift Note    Bedside shift change report given to [address] [place] [address] (oncoming nurse) by Carmelita Mayberry (offgoing nurse). Report included the following information SBAR, Kardex, Intake/Output and MAR    Shift worked:  0604-1551     Shift summary and any significant changes: Tolerated diet /  weened off of 02  Tolerating ab melly  Tolerated medication   Room air  No Bm  No complaints   Seen by pallative care    Concerns for physician to address:  none      Zone phone for oncoming shift:   none       Activity:  Activity Level: Up ad melly  Number times ambulated in hallways past shift: 0  Number of times OOB to chair past shift: 4    Cardiac:   Cardiac Monitoring: Yes      Cardiac Rhythm: Sinus Rhythm    Access:   Current line(s): PIV     Genitourinary:   Urinary status: voiding    Respiratory:   O2 Device: Nasal cannula  Chronic home O2 use?: NO  Incentive spirometer at bedside: YES     GI:  Last Bowel Movement Date: 05/31/21  Current diet:  DIET DIABETIC CONSISTENT CARB Regular; 2 GM NA (House Low NA); FR 800ML  DIET ONE TIME MESSAGE  Passing flatus: YES  Tolerating current diet: YES       Pain Management:   Patient states pain is manageable on current regimen: YES    Skin:  Sarwat Score: 22  Interventions: increase time out of bed    Patient Safety:  Fall Score:  Total Score: 1  Interventions: gripper socks       Length of Stay:  Expected LOS: 3d 2h  Actual LOS: [address] [facility]  BETO  02/61/58  6346 quit drinking in 2016

## 2024-07-07 ENCOUNTER — TRANSCRIPTION ENCOUNTER (OUTPATIENT)
Age: 74
End: 2024-07-07

## 2024-07-08 ENCOUNTER — INPATIENT (INPATIENT)
Facility: HOSPITAL | Age: 74
LOS: 0 days | Discharge: HOME HEALTH | End: 2024-07-09
Attending: STUDENT IN AN ORGANIZED HEALTH CARE EDUCATION/TRAINING PROGRAM | Admitting: STUDENT IN AN ORGANIZED HEALTH CARE EDUCATION/TRAINING PROGRAM
Payer: MEDICARE

## 2024-07-08 ENCOUNTER — TRANSCRIPTION ENCOUNTER (OUTPATIENT)
Age: 74
End: 2024-07-08

## 2024-07-08 ENCOUNTER — APPOINTMENT (OUTPATIENT)
Dept: ORTHOPEDIC SURGERY | Facility: HOSPITAL | Age: 74
End: 2024-07-08

## 2024-07-08 VITALS
OXYGEN SATURATION: 100 % | RESPIRATION RATE: 16 BRPM | WEIGHT: 158.07 LBS | TEMPERATURE: 98 F | DIASTOLIC BLOOD PRESSURE: 63 MMHG | HEART RATE: 50 BPM | SYSTOLIC BLOOD PRESSURE: 131 MMHG | HEIGHT: 67 IN

## 2024-07-08 DIAGNOSIS — Z96.652 PRESENCE OF LEFT ARTIFICIAL KNEE JOINT: Chronic | ICD-10-CM

## 2024-07-08 DIAGNOSIS — Z98.890 OTHER SPECIFIED POSTPROCEDURAL STATES: Chronic | ICD-10-CM

## 2024-07-08 DIAGNOSIS — Z98.49 CATARACT EXTRACTION STATUS, UNSPECIFIED EYE: Chronic | ICD-10-CM

## 2024-07-08 DIAGNOSIS — M17.11 UNILATERAL PRIMARY OSTEOARTHRITIS, RIGHT KNEE: ICD-10-CM

## 2024-07-08 LAB
GLUCOSE BLDC GLUCOMTR-MCNC: 152 MG/DL — HIGH (ref 70–99)
GLUCOSE BLDC GLUCOMTR-MCNC: 94 MG/DL — SIGNIFICANT CHANGE UP (ref 70–99)
GLUCOSE BLDC GLUCOMTR-MCNC: 96 MG/DL — SIGNIFICANT CHANGE UP (ref 70–99)

## 2024-07-08 PROCEDURE — 0055T BONE SRGRY CMPTR CT/MRI IMAG: CPT

## 2024-07-08 PROCEDURE — 73560 X-RAY EXAM OF KNEE 1 OR 2: CPT | Mod: 26,RT

## 2024-07-08 PROCEDURE — 93010 ELECTROCARDIOGRAM REPORT: CPT

## 2024-07-08 PROCEDURE — 27447 TOTAL KNEE ARTHROPLASTY: CPT | Mod: RT

## 2024-07-08 DEVICE — MAKO BONE PIN 4MM X 140MM: Type: IMPLANTABLE DEVICE | Site: RIGHT | Status: FUNCTIONAL

## 2024-07-08 DEVICE — BASEPLATE TIB UNIV TRIATHLON SZ 4: Type: IMPLANTABLE DEVICE | Site: RIGHT | Status: FUNCTIONAL

## 2024-07-08 DEVICE — COMP PATELLA ASYMMETRIC X3 32X10MM: Type: IMPLANTABLE DEVICE | Site: RIGHT | Status: FUNCTIONAL

## 2024-07-08 DEVICE — INSERT TIB BEARING CS X3 SZ 4 11MM: Type: IMPLANTABLE DEVICE | Site: RIGHT | Status: FUNCTIONAL

## 2024-07-08 DEVICE — CEMENT SIMPLEX P 40GM: Type: IMPLANTABLE DEVICE | Site: RIGHT | Status: FUNCTIONAL

## 2024-07-08 DEVICE — COMP FEM TRIATHLON CR SZ 3 RT: Type: IMPLANTABLE DEVICE | Site: RIGHT | Status: FUNCTIONAL

## 2024-07-08 DEVICE — MAKO BONE PIN 4MM X 110MM: Type: IMPLANTABLE DEVICE | Site: RIGHT | Status: FUNCTIONAL

## 2024-07-08 RX ORDER — OXYCODONE HYDROCHLORIDE 100 MG/5ML
5 SOLUTION ORAL ONCE
Refills: 0 | Status: DISCONTINUED | OUTPATIENT
Start: 2024-07-08 | End: 2024-07-08

## 2024-07-08 RX ORDER — SODIUM CHLORIDE 0.9 % (FLUSH) 0.9 %
500 SYRINGE (ML) INJECTION ONCE
Refills: 0 | Status: COMPLETED | OUTPATIENT
Start: 2024-07-08 | End: 2024-07-08

## 2024-07-08 RX ORDER — DEXTROSE 30 % IN WATER 30 %
12.5 VIAL (ML) INTRAVENOUS ONCE
Refills: 0 | Status: DISCONTINUED | OUTPATIENT
Start: 2024-07-08 | End: 2024-07-09

## 2024-07-08 RX ORDER — ONDANSETRON HYDROCHLORIDE 2 MG/ML
4 INJECTION INTRAMUSCULAR; INTRAVENOUS ONCE
Refills: 0 | Status: DISCONTINUED | OUTPATIENT
Start: 2024-07-08 | End: 2024-07-09

## 2024-07-08 RX ORDER — CEFAZOLIN 10 G/1
2000 INJECTION, POWDER, FOR SOLUTION INTRAVENOUS EVERY 8 HOURS
Refills: 0 | Status: COMPLETED | OUTPATIENT
Start: 2024-07-08 | End: 2024-07-09

## 2024-07-08 RX ORDER — ONDANSETRON HYDROCHLORIDE 2 MG/ML
4 INJECTION INTRAMUSCULAR; INTRAVENOUS ONCE
Refills: 0 | Status: COMPLETED | OUTPATIENT
Start: 2024-07-08 | End: 2024-07-08

## 2024-07-08 RX ORDER — ACETAMINOPHEN 325 MG
1000 TABLET ORAL ONCE
Refills: 0 | Status: COMPLETED | OUTPATIENT
Start: 2024-07-09 | End: 2024-07-09

## 2024-07-08 RX ORDER — FERROUS SULFATE 325(65) MG
1 TABLET ORAL
Refills: 0 | DISCHARGE

## 2024-07-08 RX ORDER — FAMOTIDINE 40 MG
20 TABLET ORAL ONCE
Refills: 0 | Status: COMPLETED | OUTPATIENT
Start: 2024-07-08 | End: 2024-07-08

## 2024-07-08 RX ORDER — PANTOPRAZOLE SODIUM 40 MG/10ML
40 INJECTION, POWDER, FOR SOLUTION INTRAVENOUS
Refills: 0 | Status: DISCONTINUED | OUTPATIENT
Start: 2024-07-08 | End: 2024-07-09

## 2024-07-08 RX ORDER — UPADACITINIB 45 MG/1
1 TABLET, EXTENDED RELEASE ORAL
Refills: 0 | DISCHARGE

## 2024-07-08 RX ORDER — GLUCAGON HYDROCHLORIDE 1 MG/ML
1 INJECTION, POWDER, FOR SOLUTION INTRAMUSCULAR; INTRAVENOUS; SUBCUTANEOUS ONCE
Refills: 0 | Status: DISCONTINUED | OUTPATIENT
Start: 2024-07-08 | End: 2024-07-09

## 2024-07-08 RX ORDER — DEXTROSE MONOHYDRATE AND SODIUM CHLORIDE 5; .3 G/100ML; G/100ML
1000 INJECTION, SOLUTION INTRAVENOUS
Refills: 0 | Status: DISCONTINUED | OUTPATIENT
Start: 2024-07-08 | End: 2024-07-09

## 2024-07-08 RX ORDER — CELECOXIB 100 MG/1
200 CAPSULE ORAL EVERY 12 HOURS
Refills: 0 | Status: DISCONTINUED | OUTPATIENT
Start: 2024-07-10 | End: 2024-07-09

## 2024-07-08 RX ORDER — FLUTICASONE PROPIONATE 44 MCG
0.5 AEROSOL WITH ADAPTER (GRAM) INHALATION
Refills: 0 | DISCHARGE

## 2024-07-08 RX ORDER — UMECLIDINIUM BROMIDE AND VILANTEROL TRIFENATATE 62.5; 25 UG/1; UG/1
1 POWDER RESPIRATORY (INHALATION)
Refills: 0 | DISCHARGE

## 2024-07-08 RX ORDER — HYDROMORPHONE HCL 0.2 MG/ML
0.5 INJECTION, SOLUTION INTRAVENOUS
Refills: 0 | Status: DISCONTINUED | OUTPATIENT
Start: 2024-07-08 | End: 2024-07-08

## 2024-07-08 RX ORDER — DEXTROSE 30 % IN WATER 30 %
15 VIAL (ML) INTRAVENOUS ONCE
Refills: 0 | Status: DISCONTINUED | OUTPATIENT
Start: 2024-07-08 | End: 2024-07-09

## 2024-07-08 RX ORDER — DEXTROSE MONOHYDRATE AND SODIUM CHLORIDE 5; .3 G/100ML; G/100ML
500 INJECTION, SOLUTION INTRAVENOUS ONCE
Refills: 0 | Status: DISCONTINUED | OUTPATIENT
Start: 2024-07-08 | End: 2024-07-08

## 2024-07-08 RX ORDER — FERROUS SULFATE 325(65) MG
325 TABLET ORAL DAILY
Refills: 0 | Status: DISCONTINUED | OUTPATIENT
Start: 2024-07-08 | End: 2024-07-09

## 2024-07-08 RX ORDER — SODIUM CHLORIDE 0.9 % (FLUSH) 0.9 %
500 SYRINGE (ML) INJECTION ONCE
Refills: 0 | Status: COMPLETED | OUTPATIENT
Start: 2024-07-08 | End: 2024-07-09

## 2024-07-08 RX ORDER — MONTELUKAST SODIUM 10 MG/1
1 TABLET, FILM COATED ORAL
Refills: 0 | DISCHARGE

## 2024-07-08 RX ORDER — OXYCODONE HYDROCHLORIDE 100 MG/5ML
5 SOLUTION ORAL EVERY 4 HOURS
Refills: 0 | Status: DISCONTINUED | OUTPATIENT
Start: 2024-07-08 | End: 2024-07-09

## 2024-07-08 RX ORDER — FAMOTIDINE 40 MG
1 TABLET ORAL
Refills: 0 | DISCHARGE

## 2024-07-08 RX ORDER — INSULIN LISPRO 100 [IU]/ML
INJECTION, SOLUTION SUBCUTANEOUS
Refills: 0 | Status: DISCONTINUED | OUTPATIENT
Start: 2024-07-08 | End: 2024-07-09

## 2024-07-08 RX ORDER — LOSARTAN POTASSIUM 100 MG/1
50 TABLET, FILM COATED ORAL DAILY
Refills: 0 | Status: DISCONTINUED | OUTPATIENT
Start: 2024-07-08 | End: 2024-07-09

## 2024-07-08 RX ORDER — PROMETHAZINE HCL 100 %
6.25 POWDER (GRAM) MISCELLANEOUS ONCE
Refills: 0 | Status: COMPLETED | OUTPATIENT
Start: 2024-07-08 | End: 2024-07-08

## 2024-07-08 RX ORDER — HYDRALAZINE HYDROCHLORIDE 50 MG/1
5 TABLET ORAL ONCE
Refills: 0 | Status: COMPLETED | OUTPATIENT
Start: 2024-07-08 | End: 2024-07-08

## 2024-07-08 RX ORDER — TELMISARTAN 80 MG/1
1 TABLET ORAL
Refills: 0 | DISCHARGE

## 2024-07-08 RX ORDER — LOPERAMIDE HCL 2 MG
2 CAPSULE ORAL EVERY 6 HOURS
Refills: 0 | Status: DISCONTINUED | OUTPATIENT
Start: 2024-07-08 | End: 2024-07-09

## 2024-07-08 RX ORDER — HYDRALAZINE HYDROCHLORIDE 50 MG/1
10 TABLET ORAL ONCE
Refills: 0 | Status: COMPLETED | OUTPATIENT
Start: 2024-07-08 | End: 2024-07-08

## 2024-07-08 RX ORDER — FAMOTIDINE 40 MG
20 TABLET ORAL DAILY
Refills: 0 | Status: DISCONTINUED | OUTPATIENT
Start: 2024-07-08 | End: 2024-07-09

## 2024-07-08 RX ORDER — TRAMADOL HYDROCHLORIDE 50 MG/1
50 TABLET, FILM COATED ORAL ONCE
Refills: 0 | Status: DISCONTINUED | OUTPATIENT
Start: 2024-07-08 | End: 2024-07-08

## 2024-07-08 RX ORDER — KETOROLAC TROMETHAMINE 30 MG/ML
15 INJECTION, SOLUTION INTRAMUSCULAR EVERY 6 HOURS
Refills: 0 | Status: DISCONTINUED | OUTPATIENT
Start: 2024-07-08 | End: 2024-07-09

## 2024-07-08 RX ORDER — BENZOCAINE AND MENTHOL 15; 3.6 MG/1; MG/1
1 LOZENGE ORAL ONCE
Refills: 0 | Status: COMPLETED | OUTPATIENT
Start: 2024-07-08 | End: 2024-07-08

## 2024-07-08 RX ORDER — DEXTROSE MONOHYDRATE 100 MG/ML
125 INJECTION, SOLUTION INTRAVENOUS ONCE
Refills: 0 | Status: DISCONTINUED | OUTPATIENT
Start: 2024-07-08 | End: 2024-07-09

## 2024-07-08 RX ORDER — ASPIRIN 325 MG/1
81 TABLET, FILM COATED ORAL
Refills: 0 | Status: DISCONTINUED | OUTPATIENT
Start: 2024-07-08 | End: 2024-07-09

## 2024-07-08 RX ORDER — MONTELUKAST SODIUM 10 MG/1
10 TABLET, FILM COATED ORAL DAILY
Refills: 0 | Status: DISCONTINUED | OUTPATIENT
Start: 2024-07-08 | End: 2024-07-09

## 2024-07-08 RX ORDER — INSULIN LISPRO 100 [IU]/ML
INJECTION, SOLUTION SUBCUTANEOUS AT BEDTIME
Refills: 0 | Status: DISCONTINUED | OUTPATIENT
Start: 2024-07-08 | End: 2024-07-09

## 2024-07-08 RX ORDER — DEXTROSE 30 % IN WATER 30 %
25 VIAL (ML) INTRAVENOUS ONCE
Refills: 0 | Status: DISCONTINUED | OUTPATIENT
Start: 2024-07-08 | End: 2024-07-09

## 2024-07-08 RX ORDER — TIOTROPIUM BROMIDE AND OLODATEROL 3.124; 2.736 UG/1; UG/1
2 SPRAY, METERED RESPIRATORY (INHALATION) DAILY
Refills: 0 | Status: DISCONTINUED | OUTPATIENT
Start: 2024-07-08 | End: 2024-07-09

## 2024-07-08 RX ORDER — SITAGLIPTIN AND METFORMIN HYDROCHLORIDE 50; 500 MG/1; MG/1
1 TABLET, FILM COATED ORAL
Refills: 0 | DISCHARGE

## 2024-07-08 RX ORDER — DIPHENHYDRAMINE HCL 12.5MG/5ML
25 ELIXIR ORAL ONCE
Refills: 0 | Status: COMPLETED | OUTPATIENT
Start: 2024-07-08 | End: 2024-07-08

## 2024-07-08 RX ORDER — OXYCODONE HYDROCHLORIDE 100 MG/5ML
5 SOLUTION ORAL ONCE
Refills: 0 | Status: DISCONTINUED | OUTPATIENT
Start: 2024-07-08 | End: 2024-07-09

## 2024-07-08 RX ORDER — ONDANSETRON HYDROCHLORIDE 2 MG/ML
4 INJECTION INTRAMUSCULAR; INTRAVENOUS EVERY 6 HOURS
Refills: 0 | Status: DISCONTINUED | OUTPATIENT
Start: 2024-07-08 | End: 2024-07-09

## 2024-07-08 RX ORDER — LOPERAMIDE HCL 2 MG
2 CAPSULE ORAL
Refills: 0 | DISCHARGE

## 2024-07-08 RX ORDER — TRAMADOL HYDROCHLORIDE 50 MG/1
50 TABLET, FILM COATED ORAL EVERY 6 HOURS
Refills: 0 | Status: DISCONTINUED | OUTPATIENT
Start: 2024-07-08 | End: 2024-07-09

## 2024-07-08 RX ORDER — HYDROMORPHONE HCL 0.2 MG/ML
0.5 INJECTION, SOLUTION INTRAVENOUS ONCE
Refills: 0 | Status: DISCONTINUED | OUTPATIENT
Start: 2024-07-08 | End: 2024-07-09

## 2024-07-08 RX ORDER — OXYCODONE HYDROCHLORIDE 100 MG/5ML
10 SOLUTION ORAL EVERY 4 HOURS
Refills: 0 | Status: DISCONTINUED | OUTPATIENT
Start: 2024-07-08 | End: 2024-07-09

## 2024-07-08 RX ORDER — ACETAMINOPHEN 325 MG
1000 TABLET ORAL ONCE
Refills: 0 | Status: COMPLETED | OUTPATIENT
Start: 2024-07-08 | End: 2024-07-08

## 2024-07-08 RX ORDER — DEXTROSE MONOHYDRATE AND SODIUM CHLORIDE 5; .3 G/100ML; G/100ML
1000 INJECTION, SOLUTION INTRAVENOUS
Refills: 0 | Status: DISCONTINUED | OUTPATIENT
Start: 2024-07-08 | End: 2024-07-08

## 2024-07-08 RX ORDER — SENNOSIDES 8.6 MG
2 TABLET ORAL AT BEDTIME
Refills: 0 | Status: DISCONTINUED | OUTPATIENT
Start: 2024-07-08 | End: 2024-07-09

## 2024-07-08 RX ORDER — POLYETHYLENE GLYCOL 3350 1 G/G
17 POWDER ORAL AT BEDTIME
Refills: 0 | Status: DISCONTINUED | OUTPATIENT
Start: 2024-07-08 | End: 2024-07-09

## 2024-07-08 RX ORDER — PANTOPRAZOLE SODIUM 40 MG/10ML
1 INJECTION, POWDER, FOR SOLUTION INTRAVENOUS
Refills: 0 | DISCHARGE

## 2024-07-08 RX ORDER — HYDROMORPHONE HCL 0.2 MG/ML
0.5 INJECTION, SOLUTION INTRAVENOUS
Refills: 0 | Status: DISCONTINUED | OUTPATIENT
Start: 2024-07-08 | End: 2024-07-09

## 2024-07-08 RX ORDER — ACETAMINOPHEN 325 MG
975 TABLET ORAL EVERY 8 HOURS
Refills: 0 | Status: DISCONTINUED | OUTPATIENT
Start: 2024-07-09 | End: 2024-07-09

## 2024-07-08 RX ADMIN — HYDRALAZINE HYDROCHLORIDE 10 MILLIGRAM(S): 50 TABLET ORAL at 17:18

## 2024-07-08 RX ADMIN — CEFAZOLIN 100 MILLIGRAM(S): 10 INJECTION, POWDER, FOR SOLUTION INTRAVENOUS at 21:25

## 2024-07-08 RX ADMIN — Medication 20 MILLIGRAM(S): at 18:47

## 2024-07-08 RX ADMIN — ONDANSETRON HYDROCHLORIDE 4 MILLIGRAM(S): 2 INJECTION INTRAMUSCULAR; INTRAVENOUS at 19:27

## 2024-07-08 RX ADMIN — HYDROMORPHONE HCL 0.5 MILLIGRAM(S): 0.2 INJECTION, SOLUTION INTRAVENOUS at 18:45

## 2024-07-08 RX ADMIN — KETOROLAC TROMETHAMINE 15 MILLIGRAM(S): 30 INJECTION, SOLUTION INTRAMUSCULAR at 19:46

## 2024-07-08 RX ADMIN — ONDANSETRON HYDROCHLORIDE 4 MILLIGRAM(S): 2 INJECTION INTRAMUSCULAR; INTRAVENOUS at 16:50

## 2024-07-08 RX ADMIN — KETOROLAC TROMETHAMINE 15 MILLIGRAM(S): 30 INJECTION, SOLUTION INTRAMUSCULAR at 20:15

## 2024-07-08 RX ADMIN — HYDROMORPHONE HCL 0.5 MILLIGRAM(S): 0.2 INJECTION, SOLUTION INTRAVENOUS at 18:23

## 2024-07-08 RX ADMIN — Medication 25 MILLIGRAM(S): at 18:48

## 2024-07-08 RX ADMIN — Medication 1 APPLICATION(S): at 10:58

## 2024-07-08 RX ADMIN — HYDROMORPHONE HCL 0.5 MILLIGRAM(S): 0.2 INJECTION, SOLUTION INTRAVENOUS at 20:02

## 2024-07-08 RX ADMIN — OXYCODONE HYDROCHLORIDE 5 MILLIGRAM(S): 100 SOLUTION ORAL at 18:53

## 2024-07-08 RX ADMIN — HYDROMORPHONE HCL 0.5 MILLIGRAM(S): 0.2 INJECTION, SOLUTION INTRAVENOUS at 20:15

## 2024-07-08 RX ADMIN — OXYCODONE HYDROCHLORIDE 5 MILLIGRAM(S): 100 SOLUTION ORAL at 18:23

## 2024-07-08 RX ADMIN — HYDRALAZINE HYDROCHLORIDE 5 MILLIGRAM(S): 50 TABLET ORAL at 16:37

## 2024-07-08 RX ADMIN — HYDROMORPHONE HCL 0.5 MILLIGRAM(S): 0.2 INJECTION, SOLUTION INTRAVENOUS at 17:50

## 2024-07-08 RX ADMIN — TRAMADOL HYDROCHLORIDE 50 MILLIGRAM(S): 50 TABLET, FILM COATED ORAL at 10:57

## 2024-07-08 RX ADMIN — HYDROMORPHONE HCL 0.5 MILLIGRAM(S): 0.2 INJECTION, SOLUTION INTRAVENOUS at 17:35

## 2024-07-08 RX ADMIN — BENZOCAINE AND MENTHOL 1 LOZENGE: 15; 3.6 LOZENGE ORAL at 23:16

## 2024-07-08 RX ADMIN — Medication 400 MILLIGRAM(S): at 21:01

## 2024-07-08 RX ADMIN — Medication 200 MILLIGRAM(S): at 20:29

## 2024-07-08 RX ADMIN — Medication 500 MILLILITER(S): at 15:49

## 2024-07-08 NOTE — DISCHARGE NOTE PROVIDER - NSDCFUSCHEDAPPT_GEN_ALL_CORE_FT
Ramos Alvarez  [facility] Physician [facility]  ORTHOSUR [address] [facility]  Scheduled Appointment: 07/23/2024

## 2024-07-08 NOTE — DISCHARGE NOTE PROVIDER - NSDCMRMEDTOKEN_GEN_ALL_CORE_FT
Anoro Ellipta 62.5 mcg-25 mcg/inh inhalation powder: 1 puff(s) inhaled 2 times a day  ferrous sulfate 325 mg (65 mg elemental iron) oral tablet: 1 tab(s) orally once a day  Fluticasone: 0.5 milligram(s) 2 times a day  Janumet 50 mg-500 mg oral tablet: 1 tab(s) orally 2 times a day  loperamide 2 mg oral tablet: 2 tab(s) orally prn  montelukast 10 mg oral tablet: 1 tab(s) orally once a day  pantoprazole 40 mg oral delayed release tablet: 1 tab(s) orally  Pepcid 20 mg oral tablet: 1 tab(s) orally once a day  Rinvoq 15 mg oral tablet, extended release: 1 tab(s) orally once a day  telmisartan 40 mg oral tablet: 1 tab(s) orally once a day   acetaminophen 325 mg oral tablet: 3 tab(s) orally every 8 hours  Anoro Ellipta 62.5 mcg-25 mcg/inh inhalation powder: 1 puff(s) inhaled 2 times a day  aspirin 81 mg oral delayed release tablet: 1 tab(s) orally 2 times a day  celecoxib 200 mg oral capsule: 1 cap(s) orally every 12 hours  Fluticasone: 0.5 milligram(s) 2 times a day  Janumet 50 mg-500 mg oral tablet: 1 tab(s) orally 2 times a day  loperamide 2 mg oral tablet: 2 tab(s) orally prn  montelukast 10 mg oral tablet: 1 tab(s) orally once a day  Narcan 4 mg/0.1 mL nasal spray: 0.4 milligram(s) intranasally once MDD: 1  oxyCODONE 5 mg oral tablet: 1 tab(s) orally every 6 hours as needed for  severe pain MDD: 4  pantoprazole 40 mg oral delayed release tablet: 1 tab(s) orally once a day  Pepcid 20 mg oral tablet: 1 tab(s) orally once a day  polyethylene glycol 3350 oral powder for reconstitution: 17 gram(s) orally once a day (at bedtime)  Rinvoq 15 mg oral tablet, extended release: 1 tab(s) orally once a day  senna leaf extract oral tablet: 2 tab(s) orally once a day (at bedtime)  telmisartan 40 mg oral tablet: 1 tab(s) orally once a day  traMADol 50 mg oral tablet: 1 tab(s) orally every 8 hours as needed for  moderate pain MDD: 3

## 2024-07-08 NOTE — DISCHARGE NOTE PROVIDER - CARE PROVIDER_API CALL
Ramos Alvarez  Orthopaedic Surgery  [address] [place] [address], [facility] [address]  [place] [address]  Phone: [phone]  Fax: [phone]  Established Patient  Follow Up Time:

## 2024-07-08 NOTE — DISCHARGE NOTE PROVIDER - HOSPITAL COURSE
This is a 74yo Male with PMH of COPD, DM, anemia, gastroparesis, GERD, HTN UC and PSH of L TKA who presents to [facility] for orthopedic surgery. Patient s/p right TKA with Dr. Alvarez on 7/8/2024. Patient tolerated the procedure well without any intraoperative complications. Patient tolerated physical therapy well, and the pain was controlled. Patient is weight bearing as tolerated with cane/walker as needed. Seen by medical attending for continuity of care and management and cleared for safe discharge. Keep dressing/incision clean, dry and intact. Any suture/staples to be removed on post-op day #14 at your office visit. Patient is on 81mg of ASA for DVT prophylaxis, please take for 6 weeks unless otherwise instructed by your surgeon. Please follow up with Dr. Alvarez in 2 weeks. Please follow up with your PMD for continuity of care and management as medications may have changed.

## 2024-07-08 NOTE — DISCHARGE NOTE PROVIDER - NSDCCPTREATMENT_GEN_ALL_CORE_FT
PRINCIPAL PROCEDURE  Procedure: Right total knee arthroplasty  Findings and Treatment: Pain control:    Standing:         -Acetaminophen 500mg - 2 tabs every 8 hours  As needed:        -Tramadol 50mg - 1 tab every 6-8 hours - Take only if needed for MODERATE pain       -oxycodone 5mg - 1 tab every 4-6 hours - Take only if needed for SEVERE or BREAKTHROUGH pain  Oxycodone and Tramadol have been sent to your pharmacy. Please do not drive, operate machinery, or make important decisions while taking these medications.   Other Medications: (Standing)  -Aspirin (Enteric Coated) 81mg every 12 hours - to prevent blood clots (for 6 weeks post operatively.)  -Protonix 40mg - 1 tab every 24 hours - to prevent stomach irritation/ulcers  -Senna 8.6mg - 2 pills every 24 hours - stool softener  -Miralax 17g - daily - constipation   Follow up: Please follow up at your prescheduled post-operative follow up appointment with Dr. Alvarez for 2 weeks after hospital discharge. Please call with any questions or concerns including fevers, worsening pain, pus from the wounds, redness of the skin and difficulty breathing or heaviness in the chest at [phone].        PRINCIPAL PROCEDURE  Procedure: Right total knee arthroplasty  Findings and Treatment: Pain control:    Standing:         -Acetaminophen 500mg - 2 tabs every 8 hours  As needed:        -Tramadol 50mg - 1 tab every 6-8 hours - Take only if needed for MODERATE pain       -oxycodone 5mg - 1 tab every 4-6 hours - Take only if needed for SEVERE or BREAKTHROUGH pain  Oxycodone and Tramadol have been sent to your pharmacy. Please do not drive, operate machinery, or make important decisions while taking these medications.   Other Medications: (Standing)  -Aspirin (Enteric Coated) 81mg every 12 hours - to prevent blood clots (for 6 weeks post operatively.)  -Continue taking your home Protonix 40mg - 1 tab every 24 hours - to prevent stomach irritation/ulcers  -Senna 8.6mg - 2 pills every 24 hours - stool softener  -Miralax 17g - daily - constipation   Follow up: Please follow up at your prescheduled post-operative follow up appointment with Dr. Alvarez for 2 weeks after hospital discharge. Please call with any questions or concerns including fevers, worsening pain, pus from the wounds, redness of the skin and difficulty breathing or heaviness in the chest at [phone].

## 2024-07-08 NOTE — DISCHARGE NOTE PROVIDER - NSDCCPCAREPLAN_GEN_ALL_CORE_FT
PRINCIPAL DISCHARGE DIAGNOSIS  Diagnosis: Unilateral primary osteoarthritis, right knee  Assessment and Plan of Treatment: Diet: Continue regular diet upon discharge.   Activity: No heavy lifting > 25 lbs for 4 weeks. Avoid straining or excessive activity x 6 weeks.   -Continue to use your walker when ambulating until your postoperative follow up appointment.   Dressings: Keep dressing clean, dry, and intact. Your doctor will remove your bandage at your post-operative follow up appointment.   Other Care:   -You may shower when you get home but DO NOT soak dressing and/or incision. The water may run over your dressing/incision but DO NOT let the water directly hit your dressing/incision (take a shower with your wound away from the direct stream of water). NO hot tubes, NO bath tubs, NO swimming pools.   -Elevate your operative leg 2 feet above heart level for 2 hours in the morning, 2 hours in the afternoon, and 2 hours in the evening.   -Apply ice for 20min every time you elevate.   -Sit for 90 min/day: 45mins x2 or 30min x3  -DO NOT sit for more than the 90min/day. Walk or lay down when not elevating your leg.   -DO NOT place the elevation pillow behind your knees. Only place it under your calf and heel.   -DO NOT bend more than 45 degrees at the waist

## 2024-07-09 ENCOUNTER — TRANSCRIPTION ENCOUNTER (OUTPATIENT)
Age: 74
End: 2024-07-09

## 2024-07-09 VITALS
TEMPERATURE: 99 F | DIASTOLIC BLOOD PRESSURE: 60 MMHG | OXYGEN SATURATION: 97 % | RESPIRATION RATE: 17 BRPM | SYSTOLIC BLOOD PRESSURE: 123 MMHG | HEART RATE: 66 BPM

## 2024-07-09 DIAGNOSIS — D72.829 ELEVATED WHITE BLOOD CELL COUNT, UNSPECIFIED: ICD-10-CM

## 2024-07-09 DIAGNOSIS — D50.0 IRON DEFICIENCY ANEMIA SECONDARY TO BLOOD LOSS (CHRONIC): ICD-10-CM

## 2024-07-09 LAB
ANION GAP SERPL CALC-SCNC: 14 MMOL/L — SIGNIFICANT CHANGE UP (ref 7–14)
BUN SERPL-MCNC: 12 MG/DL — SIGNIFICANT CHANGE UP (ref 7–23)
CALCIUM SERPL-MCNC: 8.5 MG/DL — SIGNIFICANT CHANGE UP (ref 8.4–10.5)
CHLORIDE SERPL-SCNC: 98 MMOL/L — SIGNIFICANT CHANGE UP (ref 98–107)
CO2 SERPL-SCNC: 21 MMOL/L — LOW (ref 22–31)
CREAT SERPL-MCNC: 0.9 MG/DL — SIGNIFICANT CHANGE UP (ref 0.5–1.3)
EGFR: 91 ML/MIN/1.73M2 — SIGNIFICANT CHANGE UP
GLUCOSE BLDC GLUCOMTR-MCNC: 124 MG/DL — HIGH (ref 70–99)
GLUCOSE BLDC GLUCOMTR-MCNC: 149 MG/DL — HIGH (ref 70–99)
GLUCOSE BLDC GLUCOMTR-MCNC: 164 MG/DL — HIGH (ref 70–99)
GLUCOSE SERPL-MCNC: 150 MG/DL — HIGH (ref 70–99)
HCT VFR BLD CALC: 34.4 % — LOW (ref 39–50)
HGB BLD-MCNC: 11 G/DL — LOW (ref 13–17)
MCHC RBC-ENTMCNC: 26.8 PG — LOW (ref 27–34)
MCHC RBC-ENTMCNC: 32 GM/DL — SIGNIFICANT CHANGE UP (ref 32–36)
MCV RBC AUTO: 83.9 FL — SIGNIFICANT CHANGE UP (ref 80–100)
NRBC # BLD: 0 /100 WBCS — SIGNIFICANT CHANGE UP (ref 0–0)
NRBC # FLD: 0 K/UL — SIGNIFICANT CHANGE UP (ref 0–0)
PLATELET # BLD AUTO: 347 K/UL — SIGNIFICANT CHANGE UP (ref 150–400)
POTASSIUM SERPL-MCNC: 4.5 MMOL/L — SIGNIFICANT CHANGE UP (ref 3.5–5.3)
POTASSIUM SERPL-SCNC: 4.5 MMOL/L — SIGNIFICANT CHANGE UP (ref 3.5–5.3)
RBC # BLD: 4.1 M/UL — LOW (ref 4.2–5.8)
RBC # FLD: 13.6 % — SIGNIFICANT CHANGE UP (ref 10.3–14.5)
SODIUM SERPL-SCNC: 133 MMOL/L — LOW (ref 135–145)
WBC # BLD: 14.25 K/UL — HIGH (ref 3.8–10.5)
WBC # FLD AUTO: 14.25 K/UL — HIGH (ref 3.8–10.5)

## 2024-07-09 PROCEDURE — 99223 1ST HOSP IP/OBS HIGH 75: CPT

## 2024-07-09 RX ORDER — FERROUS SULFATE 325(65) MG
1 TABLET ORAL
Refills: 0 | DISCHARGE

## 2024-07-09 RX ORDER — INSULIN LISPRO 100 [IU]/ML
INJECTION, SOLUTION SUBCUTANEOUS AT BEDTIME
Refills: 0 | Status: DISCONTINUED | OUTPATIENT
Start: 2024-07-09 | End: 2024-07-09

## 2024-07-09 RX ORDER — PANTOPRAZOLE SODIUM 40 MG/10ML
1 INJECTION, POWDER, FOR SOLUTION INTRAVENOUS
Qty: 0 | Refills: 0 | DISCHARGE

## 2024-07-09 RX ORDER — CELECOXIB 100 MG/1
1 CAPSULE ORAL
Qty: 28 | Refills: 0
Start: 2024-07-09 | End: 2024-07-22

## 2024-07-09 RX ORDER — ASPIRIN 325 MG/1
1 TABLET, FILM COATED ORAL
Qty: 84 | Refills: 0
Start: 2024-07-09 | End: 2024-08-19

## 2024-07-09 RX ORDER — TRAMADOL HYDROCHLORIDE 50 MG/1
1 TABLET, FILM COATED ORAL
Qty: 18 | Refills: 0
Start: 2024-07-09 | End: 2024-07-14

## 2024-07-09 RX ORDER — BENZOCAINE AND MENTHOL 15; 3.6 MG/1; MG/1
1 LOZENGE ORAL
Refills: 0 | Status: DISCONTINUED | OUTPATIENT
Start: 2024-07-09 | End: 2024-07-09

## 2024-07-09 RX ORDER — POLYETHYLENE GLYCOL 3350 1 G/G
17 POWDER ORAL
Qty: 238 | Refills: 0
Start: 2024-07-09 | End: 2024-07-22

## 2024-07-09 RX ORDER — ACETAMINOPHEN 325 MG
3 TABLET ORAL
Qty: 0 | Refills: 0 | DISCHARGE
Start: 2024-07-09

## 2024-07-09 RX ORDER — SENNOSIDES 8.6 MG
2 TABLET ORAL
Qty: 28 | Refills: 0
Start: 2024-07-09 | End: 2024-07-22

## 2024-07-09 RX ORDER — NALOXONE HYDROCHLORIDE 1 MG/ML
0.4 INJECTION PARENTERAL
Qty: 1 | Refills: 0
Start: 2024-07-09 | End: 2024-07-09

## 2024-07-09 RX ORDER — OXYCODONE HYDROCHLORIDE 100 MG/5ML
1 SOLUTION ORAL
Qty: 28 | Refills: 0
Start: 2024-07-09 | End: 2024-07-15

## 2024-07-09 RX ORDER — INSULIN LISPRO 100 [IU]/ML
INJECTION, SOLUTION SUBCUTANEOUS
Refills: 0 | Status: DISCONTINUED | OUTPATIENT
Start: 2024-07-09 | End: 2024-07-09

## 2024-07-09 RX ADMIN — INSULIN LISPRO 1: 100 INJECTION, SOLUTION SUBCUTANEOUS at 07:31

## 2024-07-09 RX ADMIN — BENZOCAINE AND MENTHOL 1 LOZENGE: 15; 3.6 LOZENGE ORAL at 05:19

## 2024-07-09 RX ADMIN — ASPIRIN 81 MILLIGRAM(S): 325 TABLET, FILM COATED ORAL at 05:03

## 2024-07-09 RX ADMIN — Medication 1000 MILLIGRAM(S): at 05:30

## 2024-07-09 RX ADMIN — Medication 500 MILLILITER(S): at 05:03

## 2024-07-09 RX ADMIN — KETOROLAC TROMETHAMINE 15 MILLIGRAM(S): 30 INJECTION, SOLUTION INTRAMUSCULAR at 12:30

## 2024-07-09 RX ADMIN — OXYCODONE HYDROCHLORIDE 10 MILLIGRAM(S): 100 SOLUTION ORAL at 12:44

## 2024-07-09 RX ADMIN — Medication 500 MILLILITER(S): at 00:55

## 2024-07-09 RX ADMIN — CEFAZOLIN 100 MILLIGRAM(S): 10 INJECTION, POWDER, FOR SOLUTION INTRAVENOUS at 04:15

## 2024-07-09 RX ADMIN — ONDANSETRON HYDROCHLORIDE 4 MILLIGRAM(S): 2 INJECTION INTRAMUSCULAR; INTRAVENOUS at 04:53

## 2024-07-09 RX ADMIN — Medication 1000 MILLIGRAM(S): at 13:30

## 2024-07-09 RX ADMIN — KETOROLAC TROMETHAMINE 15 MILLIGRAM(S): 30 INJECTION, SOLUTION INTRAMUSCULAR at 07:20

## 2024-07-09 RX ADMIN — TIOTROPIUM BROMIDE AND OLODATEROL 2 PUFF(S): 3.124; 2.736 SPRAY, METERED RESPIRATORY (INHALATION) at 09:19

## 2024-07-09 RX ADMIN — LOSARTAN POTASSIUM 50 MILLIGRAM(S): 100 TABLET, FILM COATED ORAL at 05:03

## 2024-07-09 RX ADMIN — Medication 325 MILLIGRAM(S): at 11:52

## 2024-07-09 RX ADMIN — KETOROLAC TROMETHAMINE 15 MILLIGRAM(S): 30 INJECTION, SOLUTION INTRAMUSCULAR at 11:52

## 2024-07-09 RX ADMIN — PANTOPRAZOLE SODIUM 40 MILLIGRAM(S): 40 INJECTION, POWDER, FOR SOLUTION INTRAVENOUS at 05:03

## 2024-07-09 RX ADMIN — Medication 400 MILLIGRAM(S): at 05:03

## 2024-07-09 RX ADMIN — OXYCODONE HYDROCHLORIDE 10 MILLIGRAM(S): 100 SOLUTION ORAL at 13:44

## 2024-07-09 RX ADMIN — KETOROLAC TROMETHAMINE 15 MILLIGRAM(S): 30 INJECTION, SOLUTION INTRAMUSCULAR at 06:59

## 2024-07-09 RX ADMIN — Medication 400 MILLIGRAM(S): at 12:43

## 2024-07-09 NOTE — PROGRESS NOTE ADULT - ATTENDING COMMENTS
Patient seen and examined. Eric Lozano is a 73 year old male who is now status post Right Total Knee Arthroplasty. No acute events overnight.      Physical Exam:  Dressing: Clean, Dry, Intact  Motor: Intact EHL/FHL/Tibialis Anterior/Gastrocnemius  Sensory: Intact Superficial Peroneal/Deep Peroneal/Saphenous/Sural/Tibial Nerves  Vascular: 2+ DP Pulse      Assessment/Plan:  Eric Lozano is a 73 year old male who is now status post Right Total Knee Arthroplasty.    Plan:  -Right Lower Extremity: Weight Bearing as Tolerated  -PT/OT, Out of Bed  -Pain Control  -DVT Prophylaxis: Aspirin 81mg twice per day  -Antibiotics: Ancef 2g  -Disposition: Home

## 2024-07-09 NOTE — PHYSICAL THERAPY INITIAL EVALUATION ADULT - GAIT DEVIATIONS NOTED, PT EVAL
decreased tomeka/increased time in double stance/decreased step length/decreased stride length/decreased weight-shifting ability

## 2024-07-09 NOTE — DISCHARGE NOTE NURSING/CASE MANAGEMENT/SOCIAL WORK - MODE OF TRANSPORTATION
Physical Therapy Daily Treatment      Visit Count: 2  Plan of Care/Certification Dates: Initial: 4/27/2017 Through: 6/8/2017  Insurance Information: [facility]19 [facility]  No auth needed  BOMN  Recheck eligibility June 1  Next Referring Provider Visit: 10/27/2017     Referred by: Dr. Carmela Lopez MD  Medical Diagnosis (from order):   Falls frequently [R29.6]       Vertigo [R42]             Treatment Diagnosis: Benign Paroxymal Positional Vertigo (BPPV)  Insurance: 1. [facility] [employer] 2. N/A     Date of Onset: ; syncope issues for > 1 year - this is due to the Brugada Syndrome. Recently had defibrillator. Dizziness continues since defibillator  Diagnosis Precautions: defibrillator, Brugada Syndrome  Relevant co-morbidities and medications: see above   Relevant Tests: Relevant diagnostic tests: CT scan       SUBJECTIVE   On Thursday was walking the dog and had an episode of dizziness with looking down, lasted for 45 sec.  Still feels like the left ear is getting plugged.  But notices that the dizziness is less frequent  Current Pain: 0/10.    Functional Change: reports occasional R sided neck pain     OBJECTIVE   Positional Exam: (for nystagmus)     With Frenzel goggles on:    Hallpike-Ninety Six Test (anterior and posterior canal) Result Nystagmus Duration (sec) Nystagmus Direction   Left Negative 0     Right negative      Roll Test (horizontal canal)         Left Negative       Right Negative       Comments:     Smooth Pursuits:   Motion Normal Abnormal Comments   Up/Down [x]  []  Mild dizziness   Side/Side [x]  []  \"   Diagonals [x]  []        Saccades:   Motion Normal Abnormal Comments   Up/Down [x]  []  No dizziness   Side/Side [x]  []  Mild nausea    Diagonals [x]  []            Vestibular Exam:   Vestibular Ocular Reflex: Negative bilaterally      PROM repeated cervical rotation and flex/ext: increased nausea    Treatment   Neuromuscular Reeducation:  For desensatization exercises:  Seated:   Cervical AROM Rotation B x 10  reps    AROM flexion/extension B x 10 reps   Smooth pursuits horizontal 2 x 10 reps    Manual Therapy:   Supine: C0-C1 flexion in neutral and R and L rotation   Manual Cervical Rotation and flexion/ext in supine x 10 reps each       Current Home Program (not performed this date except as noted above):   Seated:   Cervical AROM Rotation B x 10 reps   Cervical AROM flexion/extension B x 10 reps   Smooth pursuits horizontal 2 x 10 reps       ASSESSMENT   Patient did not report any dizziness with Hallpike maneuver on this date, and with frenzel goggles did not observe any nystagmus.  Dizziness appears to be coming more from cervicogenic dizziness as well as smooth pursuits and saccades, causing motion sensitivity.  She would benefit from above exercises to work on this    Pain after treatment: 0/10  Result of above outlined education: Verbalizes understanding and Demonstrates understanding    Goals:       To be obtained in a single visit:  1. Patient will subjectively report no symptoms of dizziness, nausea, dysequilibrium with head motions and position changes.  Patient will maintain narrow stance with eyes open for > 15 seconds from baseline seconds to facilitate safety with: showering.     PLAN   Will follow up in 2 weeks, recheck oculomotor testing, progress HEP as tolerated     THERAPY DAILY BILLING   Primary Insurance: [facility] COMMUNITY AND STATE  Secondary Insurance: N/A    Evaluation Procedures:  No evaluation codes were used on this date of service    Timed Procedures:  Manual Therapy, 10 minutes  Neuromuscular Re-Education, 22 minutes    Untimed Procedures:  No untimed codes were used on this date of service    Total Treatment Time: 32 minutes        The referring provider's electronic or written signature on the evaluation authorizes the therapy plan of care and certifies the need for these services, furnished under this plan of care while under their care.  Physician Signature on file.     Routine safety  standards followed per [facility] system and site policies       Wheelchair/Stroller

## 2024-07-09 NOTE — DISCHARGE NOTE NURSING/CASE MANAGEMENT/SOCIAL WORK - NSDCPEFALRISK_GEN_ALL_CORE
For information on Fall & Injury Prevention, visit: [email or web address] OR  [email or web address] OR  https://www.cdc.gov/steadi/patient.html

## 2024-07-09 NOTE — PROGRESS NOTE ADULT - ASSESSMENT
73y y/o Male s/p R total knee arthroplasty 7/8    Plan:  - WBAT RLE  - Pain control  - matt op ancef  - Incentive Spirometry  - DVT prophylaxis: Venodynes and Aspirin 81mg BID  - PT/OT        Susie Cotnreras, PGY-2  Orthopedic Surgery  [id number]
A/P: 73y y/o Male s/p R total knee arthroplasty, POD #0  - Pain control  - Antibiotic - Ancef postop  - Incentive Spirometry  - DVT prophylaxis: Venodynes and Aspirin 81mg BID  - F/U AM Labs  - PT/OT/WBAT  - Notify Orthopedics with any questions

## 2024-07-09 NOTE — PHYSICAL THERAPY INITIAL EVALUATION ADULT - RANGE OF MOTION EXAMINATION, REHAB EVAL
Right knee active ROM 0-90 degrees of flexion/bilateral upper extremity ROM was WFL (within functional limits)/bilateral lower extremity ROM was WFL (within functional limits)

## 2024-07-09 NOTE — CONSULT NOTE ADULT - PROBLEM SELECTOR RECOMMENDATION 7
Rinvoq held post-op  - no GI disturbance at this time.  - patient will f/u with Primary GI on discharge.

## 2024-07-09 NOTE — PHYSICAL THERAPY INITIAL EVALUATION ADULT - GENERAL OBSERVATIONS, REHAB EVAL
Pt received semi-supine in bed, +Right knee dressing clean, dry, and intact, all lines intact, in NAD. Wife at bedside. Pt agreeable to PT evaluation

## 2024-07-09 NOTE — PHYSICAL THERAPY INITIAL EVALUATION ADULT - ADDITIONAL COMMENTS
Pt lives in a private house with +6 small steps to enter, resides on the main level once inside, was independent with all functional mobility and ADL performance with occasional use of Single Axis Cane.     Pt left seated in chair at bedside, all lines intact, all needs in reach, in NAD. TAHIR artis. Heart rate 61 beats per minute.

## 2024-07-09 NOTE — PHYSICAL THERAPY INITIAL EVALUATION ADULT - PHYSICAL ASSIST/NONPHYSICAL ASSIST: GAIT, REHAB EVAL
- ngel Stevens MD,FACC.  [address] [place], [place] [id number].  [phone] - ngel Stevens MD,FACC.  [address] [place], [place] [phone].  [phone] ngel Stevens MD,FACC.  [address] [place], [place] [phone].  [phone] ngel Stevens MD,FACC.  [address] [place], [place] [phone].  [phone] - ngel Stevens MD,FACC.  [address] [place], [place] [phone].  [phone] ngel Stevens MD,FACC.  [address] [place], [place] [phone].  [phone] - ngel Stevens MD,FACC.  [address] [place], [place] [phone].  [phone] - ngel Stevens MD,FACC.  [address] [place], [place] [phone].  [phone] - ngel Stevens MD,FACC.  [address] [place], [place] [phone].  [phone] supervision/verbal cues - ngel Stevens MD,FACC.  [address] [place], [place] [phone].  [phone] Patient/primary team/Family

## 2024-07-09 NOTE — DISCHARGE NOTE NURSING/CASE MANAGEMENT/SOCIAL WORK - NSSCTYPOFSERV_GEN_ALL_CORE
A home physical therapist is anticipated to visit you the day after hospital discharge; the above home health agency will contact you to arrange the time of their initial visit.

## 2024-07-09 NOTE — DISCHARGE NOTE NURSING/CASE MANAGEMENT/SOCIAL WORK - PATIENT PORTAL LINK FT
You can access the FollowMyHealth Patient Portal offered by [facility] by registering at the following website: [email or web address]. By joining [employer]’s FollowMyHealth portal, you will also be able to view your health information using other applications (apps) compatible with our system.

## 2024-07-09 NOTE — OCCUPATIONAL THERAPY INITIAL EVALUATION ADULT - GENERAL OBSERVATIONS, REHAB EVAL
Patient found semi-reclined in bed, NAD, and able to follow directions. Vitals: HR 63 BPM and O2 saturation 96%. Patient agreeable to participate in skilled OT evaluation.

## 2024-07-09 NOTE — CONSULT NOTE ADULT - PROBLEM SELECTOR RECOMMENDATION 9
- s/p Rt TKR on 7/8  - Pain control with oxyIR PRN, ultram PRN  - Bowel regiment - Senna/Miralax  - Surgical site management as per ortho  - PT/OT eval for safe dispo  - VTE ppx - ASA

## 2024-07-09 NOTE — CONSULT NOTE ADULT - ASSESSMENT
73M HTN, DM2, COPD, GERD, Gastroparesis, UC, GERD, p/w OA s/p Rt TKR on 7/8 c/b post-op leukocytosis, anemia.

## 2024-07-09 NOTE — PHYSICAL THERAPY INITIAL EVALUATION ADULT - NSPTDISCHREC_GEN_A_CORE
Discharge to home with home PT services to address current functional limitations to optimize safety within the home environment with the use of a rolling walker for MRADLs/Home PT

## 2024-07-09 NOTE — DISCHARGE NOTE NURSING/CASE MANAGEMENT/SOCIAL WORK - NSDCPNINST_GEN_ALL_CORE
You have a post-op Appointment scheduled with Dr. Alvarez on July 23rd, 2024 at 10:30 AM.  Maintain Knee incision and dressing clean dry and intact. Call MD with any signs of infection ie fever, redness, drainage, or with signs of bleeding, unrelieved increased pain, or persistent nausea. Continue to drink plenty of fluids. Avoid strenuous activity and constipation which may be a side effect from taking certain medications and narcotics.    Continue Total Knee replacement precautions as well as Safety and fall prevention measures to prevent injury.

## 2024-07-09 NOTE — OCCUPATIONAL THERAPY INITIAL EVALUATION ADULT - PERTINENT HX OF CURRENT PROBLEM, REHAB EVAL
Patient is a 73 yr old male with PMH of HTN, T2DM, COPD, colitis and GERD, s/p Left knee replacement admitted for elective right knee surgery and is now s/p right total knee arthroplasty on 7/8.

## 2024-07-09 NOTE — CONSULT NOTE ADULT - SUBJECTIVE AND OBJECTIVE BOX
Called pt to confirm 6/19. [facility] Division of Hospital Medicine  Yaya Reddy MD  Contact via [employer] Teams (Mon-Fri 8AM-4PM)  Otherwise: contact Hospitalist in Charge at [phone]    Patient is a 73y old  Male who presents with a chief complaint of s/p right TKA (08 Jul 2024 16:24)      HPI:  73M HTN, DM2, COPD, GERD, Gastroparesis, UC, GERD, p/w OA s/p Rt TKR on 7/8 c/b post-op leukocytosis, anemia. Pain is rated 6/10; localized at surgical site, achy in nature, non-radiating, worse with movement, but improved with pain meds.       No F/C, N/V, CP, SOB, Cough, lightheadedness, dizziness, abdominal pain, diarrhea, dysuria.    Pain Symptoms if applicable:             	                         none	   mild         moderate         severe  Pain:	           6                 0	    1-3	     4-6	         7-10  Location:	Surgical site  Modifying factors:	Worse with movement  Associated symptoms:	    Allergies    angiotensin converting enzyme inhibitors (Other; Swelling)  Seafood (Hives)    Intolerances        HOME MEDICATIONS: Reviewed    MEDICATIONS  (STANDING):  acetaminophen     Tablet .. 975 milliGRAM(s) Oral every 8 hours  acetaminophen   IVPB .. 1000 milliGRAM(s) IV Intermittent once  aspirin enteric coated 81 milliGRAM(s) Oral two times a day  dextrose 10% Bolus 125 milliLiter(s) IV Bolus once  dextrose 5%. 1000 milliLiter(s) (100 mL/Hr) IV Continuous <Continuous>  dextrose 5%. 1000 milliLiter(s) (50 mL/Hr) IV Continuous <Continuous>  dextrose 50% Injectable 25 Gram(s) IV Push once  dextrose 50% Injectable 12.5 Gram(s) IV Push once  famotidine    Tablet 20 milliGRAM(s) Oral daily  ferrous    sulfate 325 milliGRAM(s) Oral daily  glucagon  Injectable 1 milliGRAM(s) IntraMuscular once  HYDROmorphone  Injectable 0.5 milliGRAM(s) IV Push once  insulin lispro (ADMELOG) corrective regimen sliding scale   SubCutaneous three times a day before meals  insulin lispro (ADMELOG) corrective regimen sliding scale   SubCutaneous at bedtime  ketorolac   Injectable 15 milliGRAM(s) IV Push every 6 hours  losartan 50 milliGRAM(s) Oral daily  montelukast 10 milliGRAM(s) Oral daily  pantoprazole    Tablet 40 milliGRAM(s) Oral before breakfast  polyethylene glycol 3350 17 Gram(s) Oral at bedtime  senna 2 Tablet(s) Oral at bedtime  tiotropium 2.5 MICROgram(s)/olodaterol 2.5 MICROgram(s) (STIOLTO) Inhaler 2 Puff(s) Inhalation daily    MEDICATIONS  (PRN):  benzocaine/menthol Lozenge 1 Lozenge Oral four times a day PRN Sore Throat  dextrose Oral Gel 15 Gram(s) Oral once PRN Blood Glucose LESS THAN 70 milliGRAM(s)/deciliter  loperamide 2 milliGRAM(s) Oral every 6 hours PRN Diarrhea  magnesium hydroxide Suspension 30 milliLiter(s) Oral daily PRN Constipation  ondansetron Injectable 4 milliGRAM(s) IV Push every 6 hours PRN Nausea and/or Vomiting  oxyCODONE    IR 10 milliGRAM(s) Oral every 4 hours PRN Severe Pain (7 - 10)  oxyCODONE    IR 5 milliGRAM(s) Oral every 4 hours PRN Moderate Pain (4 - 6)  traMADol 50 milliGRAM(s) Oral every 6 hours PRN Mild Pain (1 - 3)      PAST MEDICAL & SURGICAL HISTORY:  Gastroparesis  with pylorus dysfunction      DM (diabetes mellitus)  type II      Chronic obstructive pulmonary disease      Hypertension      Ulcerative colitis      Inguinal hernia      Bronchiectasis      GERD (gastroesophageal reflux disease)      Unilateral primary osteoarthritis, right knee      Dysphonia      Former smoker      Anemia      History of repair of hiatal hernia  childhood      S/P endoscopy  POEM 10/2021, 11/2021      History of left knee replacement      H/O inguinal hernia repair      S/P cataract surgery          SOCIAL HISTORY:  No tobacco/alcohol use/abuse.    FAMILY HISTORY:  FH: diabetes mellitus (Mother)        REVIEW OF SYSTEMS:    CONSTITUTIONAL: No fever, weight loss, or fatigue  EYES: No eye pain, visual disturbances, or discharge  ENMT:  No difficulty hearing, tinnitus, vertigo; No sinus or throat pain  NECK: No pain or stiffness  RESPIRATORY: No cough, wheezing, chills or hemoptysis; No shortness of breath  CARDIOVASCULAR: No chest pain, palpitations, dizziness, or leg swelling  GASTROINTESTINAL: No abdominal or epigastric pain. No nausea, vomiting, or hematemesis; No diarrhea or constipation. No melena or hematochezia.  GENITOURINARY: No dysuria, frequency, hematuria, or incontinence  NEUROLOGICAL: No headaches, memory loss, loss of strength, numbness, or tremors  SKIN: No itching, burning, rashes, or lesions   LYMPH NODES: No enlarged glands  ENDOCRINE: No heat or cold intolerance; No hair loss  MUSCULOSKELETAL: right knee pain  PSYCHIATRIC: No depression, anxiety, mood swings, or difficulty sleeping  HEME/LYMPH: No easy bruising, or bleeding gums  ALLERGY AND IMMUNOLOGIC: No hives or eczema    Vital Signs Last 24 Hrs  T(C): 37.1 (09 Jul 2024 09:10), Max: 37.1 (09 Jul 2024 09:10)  T(F): 98.7 (09 Jul 2024 09:10), Max: 98.7 (09 Jul 2024 09:10)  HR: 64 (09 Jul 2024 09:10) (43 - 115)  BP: 117/57 (09 Jul 2024 09:10) (107/78 - 178/76)  BP(mean): 90 (08 Jul 2024 23:00) (79 - 108)  RR: 17 (09 Jul 2024 09:10) (11 - 24)  SpO2: 100% (09 Jul 2024 09:10) (95% - 100%)    Parameters below as of 09 Jul 2024 09:10  Patient On (Oxygen Delivery Method): room air      CAPILLARY BLOOD GLUCOSE      POCT Blood Glucose.: 124 mg/dL (09 Jul 2024 11:30)  POCT Blood Glucose.: 164 mg/dL (09 Jul 2024 07:25)  POCT Blood Glucose.: 149 mg/dL (09 Jul 2024 00:59)  POCT Blood Glucose.: 152 mg/dL (08 Jul 2024 22:07)  POCT Blood Glucose.: 96 mg/dL (08 Jul 2024 15:14)      PHYSICAL EXAM:    CONSTITUTIONAL: NAD, well-developed, well-groomed  EYES: PERRLA; conjunctiva and sclera clear  ENMT: Moist oral mucosa, no pharyngeal injection or exudates; normal dentition  NECK: Supple, no palpable masses; no thyromegaly  RESPIRATORY: Normal respiratory effort; lungs are clear to auscultation bilaterally  CARDIOVASCULAR: Regular rate and rhythm, normal S1 and S2, no murmur/rub/gallop; No lower extremity edema; Peripheral pulses are 2+ bilaterally  ABDOMEN: Nontender to palpation, normoactive bowel sounds, no rebound/guarding; No hepatosplenomegaly  MUSCULOSKELETAL:  Did not assess gait; no clubbing or cyanosis of digits; no joint swelling or tenderness to palpation; Right knee in brace  PSYCH: A+O to person, place, and time; affect appropriate  NEUROLOGY: CN 2-12 are intact and symmetric; no gross sensory deficits   SKIN: No rashes; no palpable lesions, surgical dressing C/D/I    LABS:                        11.0   14.25 )-----------( 347      ( 09 Jul 2024 05:33 )             34.4     07-09    133<L>  |  98  |  12  ----------------------------<  150<H>  4.5   |  21<L>  |  0.90    Ca    8.5      09 Jul 2024 05:33        Urinalysis Basic - ( 09 Jul 2024 05:33 )    Color: x / Appearance: x / SG: x / pH: x  Gluc: 150 mg/dL / Ketone: x  / Bili: x / Urobili: x   Blood: x / Protein: x / Nitrite: x   Leuk Esterase: x / RBC: x / WBC x   Sq Epi: x / Non Sq Epi: x / Bacteria: x      CAPILLARY BLOOD GLUCOSE      POCT Blood Glucose.: 124 mg/dL (09 Jul 2024 11:30)      RADIOLOGY & ADDITIONAL STUDIES:    Imaging:   Personally Reviewed:  [ ] YES               EKG:   Personally Reviewed:  [ ] YES       Care Discussed with Consultant(s)/Other Providers:  Care Discussed with Primary Team.      [ ] Increased delirium risk  [ ] Delirium and other risks can be reduced by:          -early ambulation          -minimizing "tethers" - IV, oxygen, catheters, etc          -avoiding hypnotics and sedatives          -maintaining hydration/nutrition          -avoid anticholinergics - diphenhydramine, etc          -pain control          -supportive environment

## 2024-07-09 NOTE — CHART NOTE - NSCHARTNOTEFT_GEN_A_CORE
RD informed by team that patient not satisfied with breakfast today, c/o receiving scrambled eggs, requesting hard boiled eggs. RD contacted diet office regarding patient request and patient provided with meal preference.  Reviewed meal / menu selection process with patient, RD remains available.    Ashwini Chicas MS, RDN, CDN  Pager [phone]  Also available on MS Teams

## 2024-07-09 NOTE — PROGRESS NOTE ADULT - SUBJECTIVE AND OBJECTIVE BOX
Orthopedic Surgery Progress Note     S: Patient seen and examined today. Was having nausea and sore throat overnight. RLE pain well controlled. Denies f/c, chest pain, shortness of breath, dizziness.    MEDICATIONS  (STANDING):  acetaminophen     Tablet .. 975 milliGRAM(s) Oral every 8 hours  acetaminophen   IVPB .. 1000 milliGRAM(s) IV Intermittent once  aspirin enteric coated 81 milliGRAM(s) Oral two times a day  dextrose 10% Bolus 125 milliLiter(s) IV Bolus once  dextrose 5%. 1000 milliLiter(s) (50 mL/Hr) IV Continuous <Continuous>  dextrose 5%. 1000 milliLiter(s) (100 mL/Hr) IV Continuous <Continuous>  dextrose 50% Injectable 25 Gram(s) IV Push once  dextrose 50% Injectable 12.5 Gram(s) IV Push once  famotidine    Tablet 20 milliGRAM(s) Oral daily  ferrous    sulfate 325 milliGRAM(s) Oral daily  glucagon  Injectable 1 milliGRAM(s) IntraMuscular once  HYDROmorphone  Injectable 0.5 milliGRAM(s) IV Push once  insulin lispro (ADMELOG) corrective regimen sliding scale   SubCutaneous three times a day before meals  insulin lispro (ADMELOG) corrective regimen sliding scale   SubCutaneous at bedtime  insulin lispro (ADMELOG) corrective regimen sliding scale   SubCutaneous three times a day before meals  insulin lispro (ADMELOG) corrective regimen sliding scale   SubCutaneous at bedtime  ketorolac   Injectable 15 milliGRAM(s) IV Push every 6 hours  losartan 50 milliGRAM(s) Oral daily  montelukast 10 milliGRAM(s) Oral daily  ondansetron Injectable 4 milliGRAM(s) IV Push once  pantoprazole    Tablet 40 milliGRAM(s) Oral before breakfast  polyethylene glycol 3350 17 Gram(s) Oral at bedtime  senna 2 Tablet(s) Oral at bedtime  tiotropium 2.5 MICROgram(s)/olodaterol 2.5 MICROgram(s) (STIOLTO) Inhaler 2 Puff(s) Inhalation daily    MEDICATIONS  (PRN):  benzocaine/menthol Lozenge 1 Lozenge Oral four times a day PRN Sore Throat  dextrose Oral Gel 15 Gram(s) Oral once PRN Blood Glucose LESS THAN 70 milliGRAM(s)/deciliter  HYDROmorphone  Injectable 0.5 milliGRAM(s) IV Push every 15 minutes PRN Severe Pain (7 - 10)  loperamide 2 milliGRAM(s) Oral every 6 hours PRN Diarrhea  magnesium hydroxide Suspension 30 milliLiter(s) Oral daily PRN Constipation  ondansetron Injectable 4 milliGRAM(s) IV Push every 6 hours PRN Nausea and/or Vomiting  oxyCODONE    IR 10 milliGRAM(s) Oral every 4 hours PRN Severe Pain (7 - 10)  oxyCODONE    IR 5 milliGRAM(s) Oral every 4 hours PRN Moderate Pain (4 - 6)  traMADol 50 milliGRAM(s) Oral every 6 hours PRN Mild Pain (1 - 3)      Vital Signs Last 24 Hrs  T(C): 36.9 (09 Jul 2024 05:09), Max: 36.9 (09 Jul 2024 05:09)  T(F): 98.4 (09 Jul 2024 05:09), Max: 98.4 (09 Jul 2024 05:09)  HR: 68 (09 Jul 2024 05:09) (43 - 115)  BP: 150/65 (09 Jul 2024 05:09) (107/78 - 178/76)  BP(mean): 90 (08 Jul 2024 23:00) (79 - 108)  RR: 17 (09 Jul 2024 05:09) (11 - 24)  SpO2: 100% (09 Jul 2024 05:09) (95% - 100%)    Parameters below as of 09 Jul 2024 05:09  Patient On (Oxygen Delivery Method): room air        07-08-24 @ 07:01  -  07-09-24 @ 07:00  --------------------------------------------------------  IN: 2300 mL / OUT: 1225 mL / NET: 1075 mL        Physical Exam:  Gen: NAD  RLE:  Dressing CDI  SILT S/S/DP/SP/T  +EHL/FHL/TA/GSC  Compartments soft/non-tender  Toes warm, Cap refill brisk/warm and perfused, +DP/PT pulse         LABS:                        11.0   14.25 )-----------( 347      ( 09 Jul 2024 05:33 )             34.4           
Orthopedics Post-Op Check:  Patient was seen and examined at bedside. Denies CP/SOB/Dizziness/N/V/D/HA. Pain is well controlled at the moment.    Vital Signs Last 24 Hrs  T(C): 36.6 (08 Jul 2024 16:00), Max: 36.7 (08 Jul 2024 10:02)  T(F): 97.9 (08 Jul 2024 16:00), Max: 98.1 (08 Jul 2024 10:02)  HR: 49 (08 Jul 2024 16:15) (43 - 50)  BP: 163/71 (08 Jul 2024 16:15) (107/78 - 163/71)  BP(mean): 99 (08 Jul 2024 16:15) (79 - 99)  RR: 13 (08 Jul 2024 16:15) (11 - 16)  SpO2: 100% (08 Jul 2024 16:15) (95% - 100%)    Parameters below as of 08 Jul 2024 16:15  Patient On (Oxygen Delivery Method): room air      Labs: to be collected in AM      Physical Exam:  Gen: NAD  RLE:   Aquacel Dressing C/D/I  Motor intact + EHL/FHL/TA/GS. Sensation is grossly intact.   Compartments are soft, extremities are warm, DP 2+

## 2024-07-09 NOTE — DISCHARGE NOTE NURSING/CASE MANAGEMENT/SOCIAL WORK - NSDCDMENAME_GEN_ALL_CORE_FT
You received home delivery of a rolling walker, prior to this admission, from [facility] [phone].

## 2024-07-09 NOTE — PATIENT PROFILE ADULT - FALL HARM RISK - HARM RISK INTERVENTIONS
Assistance with ambulation/Assistance OOB with selected safe patient handling equipment/Communicate Risk of Fall with Harm to all staff/Discuss with provider need for PT consult/Monitor for mental status changes/Monitor gait and stability/Provide patient with walking aids - walker, cane, crutches/Reinforce activity limits and safety measures with patient and family/Sit up slowly, dangle for a short time, stand at bedside before walking/Tailored Fall Risk Interventions/Use of alarms - bed, chair and/or voice tab/Visual Cue: Yellow wristband and red socks/Bed in lowest position, wheels locked, appropriate side rails in place/Call bell, personal items and telephone in reach/Instruct patient to call for assistance before getting out of bed or chair/Non-slip footwear when patient is out of bed/[place] to call system/Physically safe environment - no spills, clutter or unnecessary equipment/Purposeful Proactive Rounding/Room/bathroom lighting operational, light cord in reach

## 2024-07-09 NOTE — OCCUPATIONAL THERAPY INITIAL EVALUATION ADULT - ADDITIONAL COMMENTS
Patient reports being independent with all ADL and ambulation prior to surgery. Pt report having a walk-in shower and high toilet at home. Patient reports rolling walker was delivered to his house.

## 2024-07-10 ENCOUNTER — TRANSCRIPTION ENCOUNTER (OUTPATIENT)
Age: 74
End: 2024-07-10

## 2024-07-15 ENCOUNTER — TRANSCRIPTION ENCOUNTER (OUTPATIENT)
Age: 74
End: 2024-07-15

## 2024-07-17 ENCOUNTER — NON-APPOINTMENT (OUTPATIENT)
Age: 74
End: 2024-07-17

## 2024-07-18 ENCOUNTER — NON-APPOINTMENT (OUTPATIENT)
Age: 74
End: 2024-07-18

## 2024-07-24 PROBLEM — Z96.659 S/P TOTAL KNEE ARTHROPLASTY: Status: ACTIVE | Noted: 2024-07-24

## 2024-07-25 ENCOUNTER — APPOINTMENT (OUTPATIENT)
Dept: ORTHOPEDIC SURGERY | Facility: CLINIC | Age: 74
End: 2024-07-25
Payer: MEDICARE

## 2024-07-25 DIAGNOSIS — Z96.659 PRESENCE OF UNSPECIFIED ARTIFICIAL KNEE JOINT: ICD-10-CM

## 2024-07-25 PROCEDURE — 99024 POSTOP FOLLOW-UP VISIT: CPT

## 2024-07-25 PROCEDURE — 73562 X-RAY EXAM OF KNEE 3: CPT | Mod: RT

## 2024-07-27 NOTE — PHYSICAL EXAM
[de-identified] : Constitutional:  74 year old male, alert and oriented, cooperative, in no acute distress.  HEENT  NC/AT.  Appearance: symmetric  Neck/Back Straight without deformity or instability.  Good ROM.  Chest/Respiratory  Respiratory effort: no intercostal retractions or use of accessory muscles. Nonlabored Breathing  Skin  On inspection, warm and dry without rashes or lesions.  Mental Status:  Judgment, insight: intact Orientation: oriented to time, place, and person  Neurological: Sensory and Motor are grossly intact throughout  Right Knee  Inspection:     Incision well healing. No erythema or drainage     No Effusion     Non-tender to palpation over tibial tubercle, patella, medial and lateral joint line, and pes insertion.  Range of Motion: 	Extension - 0 degrees 	Flexion - 110 degrees 	Extensor lag: None  Stability:      Demonstrates no Varus or Valgus instability      Negative Anterior or Posterior drawer.      No mid flexion instability  Patella: stable, tracks well.   Neurologic Exam     Motor intact including 5/5 Extensor Hallucis Longus, 5/5 Flexor Hallucis Longus, 5/5 Tibialis Anterior and 5/5 Gastrocnemius     Sensation Intact to Light Touch including Saphenous, Sural, Superficial Peroneal, Deep Peroneal, Tibial nerve distributions  Vascular Exam     Foot is warm and well perfused with 2+ Dorsalis Pedis Pulse   No pain with range of motion of the bilateral hips or left knee. No lumbar paraspinal muscle tenderness. [de-identified] : XRay:  XRays of the Right Knee (3 Views) taken in the office today and reviewed with the patient. XRays demonstrate a Right Total Knee Arthroplasty in good position and alignment. There is no obvious evidence of fracture, dislocation, osteolysis or loosening. (my personal interpretation) Components: [employer] CS Cemented

## 2024-07-27 NOTE — DISCUSSION/SUMMARY
[de-identified] : Eric Lozano is a 74-year-old male who presents the office for follow-up of his right total knee arthroplasty.  Patient is currently doing well overall. XRays showed right total knee arthroplasty in good position and alignment. Examination showed range of motion 0 to 110. Discussed with the patient the examination and imaging findings. Discussed the management of total knee arthroplasty at this time, including physical therapy and DVT prophylaxis. Patient was given a referral to physical therapy. Patient will continued to take Aspirin twice daily for a total of 6 weeks for DVT prophylaxis. Dressing was removed. Discussed that patient may shower, but should not soak the wound. Patient will follow up in 4 weeks for reevaluation and management. Patient understanding and in agreement with the plan. All questions answered.   Plan: -Physical Therapy -DVT Prophylaxis: Aspirin twice daily for a total of 6 weeks -Patient may shower, but should not soak the wound -Follow up in 4 weeks for reevaluation and management

## 2024-07-27 NOTE — PHYSICAL EXAM
[de-identified] : Constitutional:  74 year old male, alert and oriented, cooperative, in no acute distress.  HEENT  NC/AT.  Appearance: symmetric  Neck/Back Straight without deformity or instability.  Good ROM.  Chest/Respiratory  Respiratory effort: no intercostal retractions or use of accessory muscles. Nonlabored Breathing  Skin  On inspection, warm and dry without rashes or lesions.  Mental Status:  Judgment, insight: intact Orientation: oriented to time, place, and person  Neurological: Sensory and Motor are grossly intact throughout  Right Knee  Inspection:     Incision well healing. No erythema or drainage     No Effusion     Non-tender to palpation over tibial tubercle, patella, medial and lateral joint line, and pes insertion.  Range of Motion: 	Extension - 0 degrees 	Flexion - 110 degrees 	Extensor lag: None  Stability:      Demonstrates no Varus or Valgus instability      Negative Anterior or Posterior drawer.      No mid flexion instability  Patella: stable, tracks well.   Neurologic Exam     Motor intact including 5/5 Extensor Hallucis Longus, 5/5 Flexor Hallucis Longus, 5/5 Tibialis Anterior and 5/5 Gastrocnemius     Sensation Intact to Light Touch including Saphenous, Sural, Superficial Peroneal, Deep Peroneal, Tibial nerve distributions  Vascular Exam     Foot is warm and well perfused with 2+ Dorsalis Pedis Pulse   No pain with range of motion of the bilateral hips or left knee. No lumbar paraspinal muscle tenderness. [de-identified] : XRay:  XRays of the Right Knee (3 Views) taken in the office today and reviewed with the patient. XRays demonstrate a Right Total Knee Arthroplasty in good position and alignment. There is no obvious evidence of fracture, dislocation, osteolysis or loosening. (my personal interpretation) Components: [employer] CS Cemented

## 2024-07-27 NOTE — PHYSICAL EXAM
[de-identified] : Constitutional:  74 year old male, alert and oriented, cooperative, in no acute distress.  HEENT  NC/AT.  Appearance: symmetric  Neck/Back Straight without deformity or instability.  Good ROM.  Chest/Respiratory  Respiratory effort: no intercostal retractions or use of accessory muscles. Nonlabored Breathing  Skin  On inspection, warm and dry without rashes or lesions.  Mental Status:  Judgment, insight: intact Orientation: oriented to time, place, and person  Neurological: Sensory and Motor are grossly intact throughout  Right Knee  Inspection:     Incision well healing. No erythema or drainage     No Effusion     Non-tender to palpation over tibial tubercle, patella, medial and lateral joint line, and pes insertion.  Range of Motion: 	Extension - 0 degrees 	Flexion - 110 degrees 	Extensor lag: None  Stability:      Demonstrates no Varus or Valgus instability      Negative Anterior or Posterior drawer.      No mid flexion instability  Patella: stable, tracks well.   Neurologic Exam     Motor intact including 5/5 Extensor Hallucis Longus, 5/5 Flexor Hallucis Longus, 5/5 Tibialis Anterior and 5/5 Gastrocnemius     Sensation Intact to Light Touch including Saphenous, Sural, Superficial Peroneal, Deep Peroneal, Tibial nerve distributions  Vascular Exam     Foot is warm and well perfused with 2+ Dorsalis Pedis Pulse   No pain with range of motion of the bilateral hips or left knee. No lumbar paraspinal muscle tenderness. [de-identified] : XRay:  XRays of the Right Knee (3 Views) taken in the office today and reviewed with the patient. XRays demonstrate a Right Total Knee Arthroplasty in good position and alignment. There is no obvious evidence of fracture, dislocation, osteolysis or loosening. (my personal interpretation) Components: [employer] CS Cemented

## 2024-07-27 NOTE — HISTORY OF PRESENT ILLNESS
[de-identified] : 7/25/2024  Eric Lozano presented to the office for follow-up of his right total knee arthroplasty. Patient is doing well overall.  He can have some medial and posterior knee pain.  No falls.  No fevers or chills.  He is currently in physical therapy.  Patient is taking his aspirin.  7/2/2024  Eric Lozano presents to the office for follow-up of his right knee pain.  Patient continues to have right knee pain.  Pain continues to affect his quality of life.  He is stopping his Rinvoq tomorrow.  He is seeing pulmonary tomorrow.  Patient has been cleared by his PCP.  No falls.  No fevers or chills.  6/13/2024  Jose Lozano presents to the office for follow-up of his right knee pain.  Patient continues to have right knee pain.  Pain continues to affect his quality of life.  He is not able to do his activities.  No falls.  No fevers or chills.  Prior injection helped for about 2 months.  3/22/2024  Eric Lozano presents to the office for follow-up of his right knee pain.  Patient continues to have right knee pain.  He has difficulty going from a sitting to a standing position and with walking.  He has tried physical therapy, without relief.  No falls.  No fevers or chills.  He has not tried pain medications.  Patient has a history of a left TKA in 2004.  2/29/2024 Eric Lozano is a 73-year-old male who presented to the office for evaluation of his right knee pain.  Patient's been experiencing right knee pain for the last few months.  Pain is located over the posterior knee.  It is worse with sitting to standing and at night.  No hip or back pain.  Patient has not tried physical therapy or injections.  He is not taking pain medications.  Patient has a history of a left TKA about 10 years ago.  No falls.  No fevers or chills.  History: Diabetes (Last A1C: 6.5), HTN, Colitis, COPD

## 2024-07-27 NOTE — HISTORY OF PRESENT ILLNESS
[de-identified] : 7/25/2024  Eric Lozano presented to the office for follow-up of his right total knee arthroplasty. Patient is doing well overall.  He can have some medial and posterior knee pain.  No falls.  No fevers or chills.  He is currently in physical therapy.  Patient is taking his aspirin.  7/2/2024  Eric Lozano presents to the office for follow-up of his right knee pain.  Patient continues to have right knee pain.  Pain continues to affect his quality of life.  He is stopping his Rinvoq tomorrow.  He is seeing pulmonary tomorrow.  Patient has been cleared by his PCP.  No falls.  No fevers or chills.  6/13/2024  Jose Lozano presents to the office for follow-up of his right knee pain.  Patient continues to have right knee pain.  Pain continues to affect his quality of life.  He is not able to do his activities.  No falls.  No fevers or chills.  Prior injection helped for about 2 months.  3/22/2024  Eric Lozano presents to the office for follow-up of his right knee pain.  Patient continues to have right knee pain.  He has difficulty going from a sitting to a standing position and with walking.  He has tried physical therapy, without relief.  No falls.  No fevers or chills.  He has not tried pain medications.  Patient has a history of a left TKA in 2004.  2/29/2024 Eric Lozano is a 73-year-old male who presented to the office for evaluation of his right knee pain.  Patient's been experiencing right knee pain for the last few months.  Pain is located over the posterior knee.  It is worse with sitting to standing and at night.  No hip or back pain.  Patient has not tried physical therapy or injections.  He is not taking pain medications.  Patient has a history of a left TKA about 10 years ago.  No falls.  No fevers or chills.  History: Diabetes (Last A1C: 6.5), HTN, Colitis, COPD

## 2024-07-27 NOTE — DISCUSSION/SUMMARY
[de-identified] : Eric Lozano is a 74-year-old male who presents the office for follow-up of his right total knee arthroplasty.  Patient is currently doing well overall. XRays showed right total knee arthroplasty in good position and alignment. Examination showed range of motion 0 to 110. Discussed with the patient the examination and imaging findings. Discussed the management of total knee arthroplasty at this time, including physical therapy and DVT prophylaxis. Patient was given a referral to physical therapy. Patient will continued to take Aspirin twice daily for a total of 6 weeks for DVT prophylaxis. Dressing was removed. Discussed that patient may shower, but should not soak the wound. Patient will follow up in 4 weeks for reevaluation and management. Patient understanding and in agreement with the plan. All questions answered.   Plan: -Physical Therapy -DVT Prophylaxis: Aspirin twice daily for a total of 6 weeks -Patient may shower, but should not soak the wound -Follow up in 4 weeks for reevaluation and management

## 2024-07-29 ENCOUNTER — TRANSCRIPTION ENCOUNTER (OUTPATIENT)
Age: 74
End: 2024-07-29

## 2024-08-08 ENCOUNTER — TRANSCRIPTION ENCOUNTER (OUTPATIENT)
Age: 74
End: 2024-08-08

## 2024-08-19 ENCOUNTER — NON-APPOINTMENT (OUTPATIENT)
Age: 74
End: 2024-08-19

## 2024-08-27 ENCOUNTER — APPOINTMENT (OUTPATIENT)
Dept: ORTHOPEDIC SURGERY | Facility: CLINIC | Age: 74
End: 2024-08-27
Payer: MEDICARE

## 2024-08-27 DIAGNOSIS — Z96.659 PRESENCE OF UNSPECIFIED ARTIFICIAL KNEE JOINT: ICD-10-CM

## 2024-08-27 PROCEDURE — 99024 POSTOP FOLLOW-UP VISIT: CPT

## 2024-08-29 NOTE — PHYSICAL EXAM
[de-identified] : Constitutional:  74 year old male, alert and oriented, cooperative, in no acute distress.  HEENT  NC/AT.  Appearance: symmetric  Neck/Back Straight without deformity or instability.  Good ROM.  Chest/Respiratory  Respiratory effort: no intercostal retractions or use of accessory muscles. Nonlabored Breathing  Skin  On inspection, warm and dry without rashes or lesions.  Mental Status:  Judgment, insight: intact Orientation: oriented to time, place, and person  Neurological: Sensory and Motor are grossly intact throughout  Right Knee  Inspection:     Incision well healing. No erythema or drainage     No Effusion     Non-tender to palpation over tibial tubercle, patella, medial and lateral joint line, and pes insertion.  Range of Motion: 	Extension - 0 degrees 	Flexion - 110 degrees 	Extensor lag: None  Stability:      Demonstrates no Varus or Valgus instability      Negative Anterior or Posterior drawer.      No mid flexion instability  Patella: stable, tracks well.   Neurologic Exam     Motor intact including 5/5 Extensor Hallucis Longus, 5/5 Flexor Hallucis Longus, 5/5 Tibialis Anterior and 5/5 Gastrocnemius     Sensation Intact to Light Touch including Saphenous, Sural, Superficial Peroneal, Deep Peroneal, Tibial nerve distributions  Vascular Exam     Foot is warm and well perfused with 2+ Dorsalis Pedis Pulse   No pain with range of motion of the bilateral hips or left knee. No lumbar paraspinal muscle tenderness. [de-identified] : XRay:  XRays of the Right Knee (3 Views) taken on 7/25/2024. XRays demonstrate a Right Total Knee Arthroplasty in good position and alignment. There is no obvious evidence of fracture, dislocation, osteolysis or loosening. (my personal interpretation) Components: [place] [employer] CS Cemented

## 2024-08-29 NOTE — DISCUSSION/SUMMARY
[de-identified] : Eric Lozano is a 74-year-old male who presents the office for follow-up of his right total knee arthroplasty.  Patient is currently doing well overall. Prior XRays showed right total knee arthroplasty in good position and alignment. Examination showed range of motion 0 to 110. Discussed with the patient the examination and imaging findings. Discussed the management of total knee arthroplasty at this time, including physical therapy. Patient will continue physical therapy. Patient has completed DVT prophylaxis. Patient will follow up in 6 weeks for reevaluation and management. Patient understanding and in agreement with the plan. All questions answered.     Plan: -Physical Therapy -DVT Prophylaxis: Completed -Follow up in 6 weeks for reevaluation and management

## 2024-08-29 NOTE — PHYSICAL EXAM
[de-identified] : Constitutional:  74 year old male, alert and oriented, cooperative, in no acute distress.  HEENT  NC/AT.  Appearance: symmetric  Neck/Back Straight without deformity or instability.  Good ROM.  Chest/Respiratory  Respiratory effort: no intercostal retractions or use of accessory muscles. Nonlabored Breathing  Skin  On inspection, warm and dry without rashes or lesions.  Mental Status:  Judgment, insight: intact Orientation: oriented to time, place, and person  Neurological: Sensory and Motor are grossly intact throughout  Right Knee  Inspection:     Incision well healing. No erythema or drainage     No Effusion     Non-tender to palpation over tibial tubercle, patella, medial and lateral joint line, and pes insertion.  Range of Motion: 	Extension - 0 degrees 	Flexion - 110 degrees 	Extensor lag: None  Stability:      Demonstrates no Varus or Valgus instability      Negative Anterior or Posterior drawer.      No mid flexion instability  Patella: stable, tracks well.   Neurologic Exam     Motor intact including 5/5 Extensor Hallucis Longus, 5/5 Flexor Hallucis Longus, 5/5 Tibialis Anterior and 5/5 Gastrocnemius     Sensation Intact to Light Touch including Saphenous, Sural, Superficial Peroneal, Deep Peroneal, Tibial nerve distributions  Vascular Exam     Foot is warm and well perfused with 2+ Dorsalis Pedis Pulse   No pain with range of motion of the bilateral hips or left knee. No lumbar paraspinal muscle tenderness. [de-identified] : XRay:  XRays of the Right Knee (3 Views) taken on 7/25/2024. XRays demonstrate a Right Total Knee Arthroplasty in good position and alignment. There is no obvious evidence of fracture, dislocation, osteolysis or loosening. (my personal interpretation) Components: [place] [employer] CS C[employer]ed

## 2024-08-29 NOTE — HISTORY OF PRESENT ILLNESS
[de-identified] : 8/27/2024  Eric Lozano presents to the office for follow-up of his right total knee arthroplasty.  Patient is currently doing well overall.  His pain is improving.  He is walking without assistive devices.  Patient is currently in physical therapy.  He has completed his aspirin.  7/25/2024  Eric Lozano presented to the office for follow-up of his right total knee arthroplasty. Patient is doing well overall.  He can have some medial and posterior knee pain.  No falls.  No fevers or chills.  He is currently in physical therapy.  Patient is taking his aspirin.  7/2/2024  Eric Lozano presents to the office for follow-up of his right knee pain.  Patient continues to have right knee pain.  Pain continues to affect his quality of life.  He is stopping his Rinvoq tomorrow.  He is seeing pulmonary tomorrow.  Patient has been cleared by his PCP.  No falls.  No fevers or chills.  6/13/2024  Jose Lozano presents to the office for follow-up of his right knee pain.  Patient continues to have right knee pain.  Pain continues to affect his quality of life.  He is not able to do his activities.  No falls.  No fevers or chills.  Prior injection helped for about 2 months.  3/22/2024  Eric Lozano presents to the office for follow-up of his right knee pain.  Patient continues to have right knee pain.  He has difficulty going from a sitting to a standing position and with walking.  He has tried physical therapy, without relief.  No falls.  No fevers or chills.  He has not tried pain medications.  Patient has a history of a left TKA in 2004.  2/29/2024 Eric Lozano is a 73-year-old male who presented to the office for evaluation of his right knee pain.  Patient's been experiencing right knee pain for the last few months.  Pain is located over the posterior knee.  It is worse with sitting to standing and at night.  No hip or back pain.  Patient has not tried physical therapy or injections.  He is not taking pain medications.  Patient has a history of a left TKA about 10 years ago.  No falls.  No fevers or chills.  History: Diabetes (Last A1C: 6.5), HTN, Colitis, COPD

## 2024-08-29 NOTE — HISTORY OF PRESENT ILLNESS
[de-identified] : 8/27/2024  Eric Lozano presents to the office for follow-up of his right total knee arthroplasty.  Patient is currently doing well overall.  His pain is improving.  He is walking without assistive devices.  Patient is currently in physical therapy.  He has completed his aspirin.  7/25/2024  Eric Lozano presented to the office for follow-up of his right total knee arthroplasty. Patient is doing well overall.  He can have some medial and posterior knee pain.  No falls.  No fevers or chills.  He is currently in physical therapy.  Patient is taking his aspirin.  7/2/2024  Eric Lozano presents to the office for follow-up of his right knee pain.  Patient continues to have right knee pain.  Pain continues to affect his quality of life.  He is stopping his Rinvoq tomorrow.  He is seeing pulmonary tomorrow.  Patient has been cleared by his PCP.  No falls.  No fevers or chills.  6/13/2024  Jose Lozano presents to the office for follow-up of his right knee pain.  Patient continues to have right knee pain.  Pain continues to affect his quality of life.  He is not able to do his activities.  No falls.  No fevers or chills.  Prior injection helped for about 2 months.  3/22/2024  Eric Lozano presents to the office for follow-up of his right knee pain.  Patient continues to have right knee pain.  He has difficulty going from a sitting to a standing position and with walking.  He has tried physical therapy, without relief.  No falls.  No fevers or chills.  He has not tried pain medications.  Patient has a history of a left TKA in 2004.  2/29/2024 Eric Lozano is a 73-year-old male who presented to the office for evaluation of his right knee pain.  Patient's been experiencing right knee pain for the last few months.  Pain is located over the posterior knee.  It is worse with sitting to standing and at night.  No hip or back pain.  Patient has not tried physical therapy or injections.  He is not taking pain medications.  Patient has a history of a left TKA about 10 years ago.  No falls.  No fevers or chills.  History: Diabetes (Last A1C: 6.5), HTN, Colitis, COPD

## 2024-08-29 NOTE — DISCUSSION/SUMMARY
[de-identified] : Eric Lozano is a 74-year-old male who presents the office for follow-up of his right total knee arthroplasty.  Patient is currently doing well overall. Prior XRays showed right total knee arthroplasty in good position and alignment. Examination showed range of motion 0 to 110. Discussed with the patient the examination and imaging findings. Discussed the management of total knee arthroplasty at this time, including physical therapy. Patient will continue physical therapy. Patient has completed DVT prophylaxis. Patient will follow up in 6 weeks for reevaluation and management. Patient understanding and in agreement with the plan. All questions answered.     Plan: -Physical Therapy -DVT Prophylaxis: Completed -Follow up in 6 weeks for reevaluation and management

## 2024-09-17 ENCOUNTER — OUTPATIENT (OUTPATIENT)
Dept: OUTPATIENT SERVICES | Facility: HOSPITAL | Age: 74
LOS: 1 days | End: 2024-09-17
Payer: MEDICARE

## 2024-09-17 ENCOUNTER — APPOINTMENT (OUTPATIENT)
Dept: CT IMAGING | Facility: IMAGING CENTER | Age: 74
End: 2024-09-17
Payer: MEDICARE

## 2024-09-17 DIAGNOSIS — Z98.890 OTHER SPECIFIED POSTPROCEDURAL STATES: Chronic | ICD-10-CM

## 2024-09-17 DIAGNOSIS — Z00.8 ENCOUNTER FOR OTHER GENERAL EXAMINATION: ICD-10-CM

## 2024-09-17 DIAGNOSIS — Z98.49 CATARACT EXTRACTION STATUS, UNSPECIFIED EYE: Chronic | ICD-10-CM

## 2024-09-17 DIAGNOSIS — Z96.652 PRESENCE OF LEFT ARTIFICIAL KNEE JOINT: Chronic | ICD-10-CM

## 2024-09-17 PROCEDURE — 71250 CT THORAX DX C-: CPT | Mod: MH

## 2024-09-17 PROCEDURE — 71250 CT THORAX DX C-: CPT | Mod: 26,MH

## 2024-09-21 ENCOUNTER — NON-APPOINTMENT (OUTPATIENT)
Age: 74
End: 2024-09-21

## 2024-10-08 ENCOUNTER — APPOINTMENT (OUTPATIENT)
Dept: ORTHOPEDIC SURGERY | Facility: CLINIC | Age: 74
End: 2024-10-08

## 2024-10-08 DIAGNOSIS — Z96.659 PRESENCE OF UNSPECIFIED ARTIFICIAL KNEE JOINT: ICD-10-CM

## 2024-10-08 PROCEDURE — 99213 OFFICE O/P EST LOW 20 MIN: CPT

## 2025-01-14 ENCOUNTER — APPOINTMENT (OUTPATIENT)
Dept: ORTHOPEDIC SURGERY | Facility: CLINIC | Age: 75
End: 2025-01-14
Payer: MEDICARE

## 2025-01-14 DIAGNOSIS — M17.11 UNILATERAL PRIMARY OSTEOARTHRITIS, RIGHT KNEE: ICD-10-CM

## 2025-01-14 PROCEDURE — 99213 OFFICE O/P EST LOW 20 MIN: CPT

## 2025-02-16 ENCOUNTER — INPATIENT (INPATIENT)
Facility: HOSPITAL | Age: 75
LOS: 3 days | Discharge: HOME HEALTH | End: 2025-02-20
Attending: INTERNAL MEDICINE | Admitting: INTERNAL MEDICINE
Payer: MEDICARE

## 2025-02-16 VITALS
TEMPERATURE: 101 F | HEIGHT: 68 IN | SYSTOLIC BLOOD PRESSURE: 144 MMHG | RESPIRATION RATE: 16 BRPM | DIASTOLIC BLOOD PRESSURE: 84 MMHG | WEIGHT: 156.97 LBS | HEART RATE: 77 BPM | OXYGEN SATURATION: 95 %

## 2025-02-16 DIAGNOSIS — E11.9 TYPE 2 DIABETES MELLITUS WITHOUT COMPLICATIONS: ICD-10-CM

## 2025-02-16 DIAGNOSIS — D64.9 ANEMIA, UNSPECIFIED: ICD-10-CM

## 2025-02-16 DIAGNOSIS — Z29.9 ENCOUNTER FOR PROPHYLACTIC MEASURES, UNSPECIFIED: ICD-10-CM

## 2025-02-16 DIAGNOSIS — Z98.890 OTHER SPECIFIED POSTPROCEDURAL STATES: Chronic | ICD-10-CM

## 2025-02-16 DIAGNOSIS — J18.9 PNEUMONIA, UNSPECIFIED ORGANISM: ICD-10-CM

## 2025-02-16 DIAGNOSIS — K51.90 ULCERATIVE COLITIS, UNSPECIFIED, WITHOUT COMPLICATIONS: ICD-10-CM

## 2025-02-16 DIAGNOSIS — E87.1 HYPO-OSMOLALITY AND HYPONATREMIA: ICD-10-CM

## 2025-02-16 DIAGNOSIS — I10 ESSENTIAL (PRIMARY) HYPERTENSION: ICD-10-CM

## 2025-02-16 DIAGNOSIS — Z96.652 PRESENCE OF LEFT ARTIFICIAL KNEE JOINT: Chronic | ICD-10-CM

## 2025-02-16 DIAGNOSIS — Z98.49 CATARACT EXTRACTION STATUS, UNSPECIFIED EYE: Chronic | ICD-10-CM

## 2025-02-16 DIAGNOSIS — K31.84 GASTROPARESIS: ICD-10-CM

## 2025-02-16 DIAGNOSIS — U07.1 COVID-19: ICD-10-CM

## 2025-02-16 DIAGNOSIS — J44.1 CHRONIC OBSTRUCTIVE PULMONARY DISEASE WITH (ACUTE) EXACERBATION: ICD-10-CM

## 2025-02-16 LAB
ALBUMIN SERPL ELPH-MCNC: 4 G/DL — SIGNIFICANT CHANGE UP (ref 3.3–5)
ALP SERPL-CCNC: 46 U/L — SIGNIFICANT CHANGE UP (ref 40–120)
ALT FLD-CCNC: <5 U/L — SIGNIFICANT CHANGE UP (ref 4–41)
ANION GAP SERPL CALC-SCNC: 17 MMOL/L — HIGH (ref 7–14)
ANISOCYTOSIS BLD QL: SIGNIFICANT CHANGE UP
APPEARANCE UR: CLEAR — SIGNIFICANT CHANGE UP
AST SERPL-CCNC: 17 U/L — SIGNIFICANT CHANGE UP (ref 4–40)
BASOPHILS # BLD AUTO: 0 K/UL — SIGNIFICANT CHANGE UP (ref 0–0.2)
BASOPHILS NFR BLD AUTO: 0 % — SIGNIFICANT CHANGE UP (ref 0–2)
BILIRUB SERPL-MCNC: 0.4 MG/DL — SIGNIFICANT CHANGE UP (ref 0.2–1.2)
BILIRUB UR-MCNC: NEGATIVE — SIGNIFICANT CHANGE UP
BLOOD GAS VENOUS COMPREHENSIVE RESULT: SIGNIFICANT CHANGE UP
BUN SERPL-MCNC: 14 MG/DL — SIGNIFICANT CHANGE UP (ref 7–23)
CALCIUM SERPL-MCNC: 9.3 MG/DL — SIGNIFICANT CHANGE UP (ref 8.4–10.5)
CHLORIDE SERPL-SCNC: 87 MMOL/L — LOW (ref 98–107)
CO2 SERPL-SCNC: 20 MMOL/L — LOW (ref 22–31)
COLOR SPEC: YELLOW — SIGNIFICANT CHANGE UP
CREAT SERPL-MCNC: 1.14 MG/DL — SIGNIFICANT CHANGE UP (ref 0.5–1.3)
DIFF PNL FLD: NEGATIVE — SIGNIFICANT CHANGE UP
EGFR: 68 ML/MIN/1.73M2 — SIGNIFICANT CHANGE UP
EOSINOPHIL # BLD AUTO: 0.21 K/UL — SIGNIFICANT CHANGE UP (ref 0–0.5)
EOSINOPHIL NFR BLD AUTO: 1.8 % — SIGNIFICANT CHANGE UP (ref 0–6)
FLUAV AG NPH QL: SIGNIFICANT CHANGE UP
FLUBV AG NPH QL: SIGNIFICANT CHANGE UP
GLUCOSE BLDC GLUCOMTR-MCNC: 173 MG/DL — HIGH (ref 70–99)
GLUCOSE BLDC GLUCOMTR-MCNC: 266 MG/DL — HIGH (ref 70–99)
GLUCOSE SERPL-MCNC: 133 MG/DL — HIGH (ref 70–99)
GLUCOSE UR QL: NEGATIVE MG/DL — SIGNIFICANT CHANGE UP
HCT VFR BLD CALC: 35.1 % — LOW (ref 39–50)
HGB BLD-MCNC: 11.4 G/DL — LOW (ref 13–17)
IANC: 9.22 K/UL — HIGH (ref 1.8–7.4)
KETONES UR-MCNC: NEGATIVE MG/DL — SIGNIFICANT CHANGE UP
LEUKOCYTE ESTERASE UR-ACNC: NEGATIVE — SIGNIFICANT CHANGE UP
LYMPHOCYTES # BLD AUTO: 0.3 K/UL — LOW (ref 1–3.3)
LYMPHOCYTES # BLD AUTO: 2.6 % — LOW (ref 13–44)
MACROCYTES BLD QL: SLIGHT — SIGNIFICANT CHANGE UP
MANUAL SMEAR VERIFICATION: SIGNIFICANT CHANGE UP
MCHC RBC-ENTMCNC: 26.5 PG — LOW (ref 27–34)
MCHC RBC-ENTMCNC: 32.5 G/DL — SIGNIFICANT CHANGE UP (ref 32–36)
MCV RBC AUTO: 81.4 FL — SIGNIFICANT CHANGE UP (ref 80–100)
MONOCYTES # BLD AUTO: 1.21 K/UL — HIGH (ref 0–0.9)
MONOCYTES NFR BLD AUTO: 10.5 % — SIGNIFICANT CHANGE UP (ref 2–14)
NEUTROPHILS # BLD AUTO: 9.59 K/UL — HIGH (ref 1.8–7.4)
NEUTROPHILS NFR BLD AUTO: 71.9 % — SIGNIFICANT CHANGE UP (ref 43–77)
NEUTS BAND # BLD: 11.4 % — CRITICAL HIGH (ref 0–6)
NEUTS BAND NFR BLD: 11.4 % — CRITICAL HIGH (ref 0–6)
NITRITE UR-MCNC: NEGATIVE — SIGNIFICANT CHANGE UP
NT-PROBNP SERPL-SCNC: 772 PG/ML — HIGH
PH UR: 6.5 — SIGNIFICANT CHANGE UP (ref 5–8)
PLAT MORPH BLD: ABNORMAL
PLATELET # BLD AUTO: 400 K/UL — SIGNIFICANT CHANGE UP (ref 150–400)
PLATELET COUNT - ESTIMATE: NORMAL — SIGNIFICANT CHANGE UP
POLYCHROMASIA BLD QL SMEAR: SIGNIFICANT CHANGE UP
POTASSIUM SERPL-MCNC: 4.3 MMOL/L — SIGNIFICANT CHANGE UP (ref 3.5–5.3)
POTASSIUM SERPL-SCNC: 4.3 MMOL/L — SIGNIFICANT CHANGE UP (ref 3.5–5.3)
PROT SERPL-MCNC: 7.5 G/DL — SIGNIFICANT CHANGE UP (ref 6–8.3)
PROT UR-MCNC: NEGATIVE MG/DL — SIGNIFICANT CHANGE UP
RBC # BLD: 4.31 M/UL — SIGNIFICANT CHANGE UP (ref 4.2–5.8)
RBC # FLD: 13.5 % — SIGNIFICANT CHANGE UP (ref 10.3–14.5)
RBC BLD AUTO: ABNORMAL
RSV RNA NPH QL NAA+NON-PROBE: SIGNIFICANT CHANGE UP
SARS-COV-2 RNA SPEC QL NAA+PROBE: DETECTED
SODIUM SERPL-SCNC: 124 MMOL/L — LOW (ref 135–145)
SP GR SPEC: 1.01 — SIGNIFICANT CHANGE UP (ref 1–1.03)
TROPONIN T, HIGH SENSITIVITY RESULT: 18 NG/L — SIGNIFICANT CHANGE UP
TROPONIN T, HIGH SENSITIVITY RESULT: 20 NG/L — SIGNIFICANT CHANGE UP
UROBILINOGEN FLD QL: 1 MG/DL — SIGNIFICANT CHANGE UP (ref 0.2–1)
VARIANT LYMPHS # BLD: 1.8 % — SIGNIFICANT CHANGE UP (ref 0–6)
VARIANT LYMPHS NFR BLD MANUAL: 1.8 % — SIGNIFICANT CHANGE UP (ref 0–6)
WBC # BLD: 11.51 K/UL — HIGH (ref 3.8–10.5)
WBC # FLD AUTO: 11.51 K/UL — HIGH (ref 3.8–10.5)

## 2025-02-16 PROCEDURE — 71046 X-RAY EXAM CHEST 2 VIEWS: CPT | Mod: 26

## 2025-02-16 PROCEDURE — 71250 CT THORAX DX C-: CPT | Mod: 26

## 2025-02-16 PROCEDURE — 99285 EMERGENCY DEPT VISIT HI MDM: CPT | Mod: GC

## 2025-02-16 PROCEDURE — 99223 1ST HOSP IP/OBS HIGH 75: CPT

## 2025-02-16 RX ORDER — DEXTROSE 50 % IN WATER 50 %
15 SYRINGE (ML) INTRAVENOUS ONCE
Refills: 0 | Status: DISCONTINUED | OUTPATIENT
Start: 2025-02-16 | End: 2025-02-20

## 2025-02-16 RX ORDER — FLUTICASONE PROPIONATE 50 UG/1
1 SPRAY, METERED NASAL
Refills: 0 | Status: DISCONTINUED | OUTPATIENT
Start: 2025-02-16 | End: 2025-02-20

## 2025-02-16 RX ORDER — VANCOMYCIN HCL IN 5 % DEXTROSE 1.5G/250ML
1000 PLASTIC BAG, INJECTION (ML) INTRAVENOUS ONCE
Refills: 0 | Status: COMPLETED | OUTPATIENT
Start: 2025-02-16 | End: 2025-02-16

## 2025-02-16 RX ORDER — SODIUM CHLORIDE 9 G/1000ML
1000 INJECTION, SOLUTION INTRAVENOUS
Refills: 0 | Status: DISCONTINUED | OUTPATIENT
Start: 2025-02-16 | End: 2025-02-20

## 2025-02-16 RX ORDER — MONTELUKAST SODIUM 10 MG/1
10 TABLET ORAL DAILY
Refills: 0 | Status: DISCONTINUED | OUTPATIENT
Start: 2025-02-16 | End: 2025-02-20

## 2025-02-16 RX ORDER — LOPERAMIDE HCL 1 MG/7.5ML
4 SOLUTION ORAL EVERY 6 HOURS
Refills: 0 | Status: DISCONTINUED | OUTPATIENT
Start: 2025-02-16 | End: 2025-02-20

## 2025-02-16 RX ORDER — DEXTROSE 50 % IN WATER 50 %
25 SYRINGE (ML) INTRAVENOUS ONCE
Refills: 0 | Status: DISCONTINUED | OUTPATIENT
Start: 2025-02-16 | End: 2025-02-20

## 2025-02-16 RX ORDER — IPRATROPIUM BROMIDE AND ALBUTEROL SULFATE .5; 2.5 MG/3ML; MG/3ML
3 SOLUTION RESPIRATORY (INHALATION) EVERY 6 HOURS
Refills: 0 | Status: DISCONTINUED | OUTPATIENT
Start: 2025-02-16 | End: 2025-02-20

## 2025-02-16 RX ORDER — ACETAMINOPHEN 500 MG/5ML
650 LIQUID (ML) ORAL EVERY 6 HOURS
Refills: 0 | Status: DISCONTINUED | OUTPATIENT
Start: 2025-02-16 | End: 2025-02-20

## 2025-02-16 RX ORDER — GLUCAGON 3 MG/1
1 POWDER NASAL ONCE
Refills: 0 | Status: DISCONTINUED | OUTPATIENT
Start: 2025-02-16 | End: 2025-02-20

## 2025-02-16 RX ORDER — CEFTRIAXONE 500 MG/1
1000 INJECTION, POWDER, FOR SOLUTION INTRAMUSCULAR; INTRAVENOUS EVERY 24 HOURS
Refills: 0 | Status: DISCONTINUED | OUTPATIENT
Start: 2025-02-17 | End: 2025-02-20

## 2025-02-16 RX ORDER — INSULIN LISPRO 100 U/ML
INJECTION, SOLUTION INTRAVENOUS; SUBCUTANEOUS
Refills: 0 | Status: DISCONTINUED | OUTPATIENT
Start: 2025-02-16 | End: 2025-02-20

## 2025-02-16 RX ORDER — ONDANSETRON HCL/PF 4 MG/2 ML
4 VIAL (ML) INJECTION EVERY 8 HOURS
Refills: 0 | Status: DISCONTINUED | OUTPATIENT
Start: 2025-02-16 | End: 2025-02-20

## 2025-02-16 RX ORDER — DEXTROSE 50 % IN WATER 50 %
12.5 SYRINGE (ML) INTRAVENOUS ONCE
Refills: 0 | Status: DISCONTINUED | OUTPATIENT
Start: 2025-02-16 | End: 2025-02-20

## 2025-02-16 RX ORDER — SODIUM CHLORIDE 9 G/1000ML
1000 INJECTION, SOLUTION INTRAVENOUS ONCE
Refills: 0 | Status: COMPLETED | OUTPATIENT
Start: 2025-02-16 | End: 2025-02-16

## 2025-02-16 RX ORDER — CEFTRIAXONE 500 MG/1
1000 INJECTION, POWDER, FOR SOLUTION INTRAMUSCULAR; INTRAVENOUS ONCE
Refills: 0 | Status: COMPLETED | OUTPATIENT
Start: 2025-02-16 | End: 2025-02-16

## 2025-02-16 RX ORDER — MAGNESIUM, ALUMINUM HYDROXIDE 200-200 MG
30 TABLET,CHEWABLE ORAL EVERY 4 HOURS
Refills: 0 | Status: DISCONTINUED | OUTPATIENT
Start: 2025-02-16 | End: 2025-02-20

## 2025-02-16 RX ORDER — ENOXAPARIN SODIUM 100 MG/ML
40 INJECTION SUBCUTANEOUS EVERY 24 HOURS
Refills: 0 | Status: DISCONTINUED | OUTPATIENT
Start: 2025-02-16 | End: 2025-02-20

## 2025-02-16 RX ORDER — AZITHROMYCIN 250 MG
500 CAPSULE ORAL ONCE
Refills: 0 | Status: COMPLETED | OUTPATIENT
Start: 2025-02-16 | End: 2025-02-16

## 2025-02-16 RX ORDER — AZITHROMYCIN 250 MG
250 CAPSULE ORAL DAILY
Refills: 0 | Status: DISCONTINUED | OUTPATIENT
Start: 2025-02-17 | End: 2025-02-18

## 2025-02-16 RX ORDER — PREDNISONE 20 MG/1
40 TABLET ORAL DAILY
Refills: 0 | Status: DISCONTINUED | OUTPATIENT
Start: 2025-02-16 | End: 2025-02-17

## 2025-02-16 RX ORDER — ACETAMINOPHEN 500 MG/5ML
1000 LIQUID (ML) ORAL ONCE
Refills: 0 | Status: COMPLETED | OUTPATIENT
Start: 2025-02-16 | End: 2025-02-16

## 2025-02-16 RX ORDER — MELATONIN 5 MG
3 TABLET ORAL AT BEDTIME
Refills: 0 | Status: DISCONTINUED | OUTPATIENT
Start: 2025-02-16 | End: 2025-02-20

## 2025-02-16 RX ORDER — TRAZODONE HCL 100 MG
50 TABLET ORAL AT BEDTIME
Refills: 0 | Status: DISCONTINUED | OUTPATIENT
Start: 2025-02-16 | End: 2025-02-20

## 2025-02-16 RX ADMIN — Medication 250 MILLIGRAM(S): at 11:56

## 2025-02-16 RX ADMIN — Medication 400 MILLIGRAM(S): at 09:19

## 2025-02-16 RX ADMIN — Medication 250 MILLIGRAM(S): at 13:04

## 2025-02-16 RX ADMIN — Medication 3 MILLIGRAM(S): at 22:12

## 2025-02-16 RX ADMIN — ENOXAPARIN SODIUM 40 MILLIGRAM(S): 100 INJECTION SUBCUTANEOUS at 14:28

## 2025-02-16 RX ADMIN — Medication 75 MILLILITER(S): at 14:29

## 2025-02-16 RX ADMIN — FLUTICASONE PROPIONATE 1 SPRAY(S): 50 SPRAY, METERED NASAL at 17:46

## 2025-02-16 RX ADMIN — CEFTRIAXONE 100 MILLIGRAM(S): 500 INJECTION, POWDER, FOR SOLUTION INTRAMUSCULAR; INTRAVENOUS at 11:35

## 2025-02-16 RX ADMIN — INSULIN LISPRO 1: 100 INJECTION, SOLUTION INTRAVENOUS; SUBCUTANEOUS at 17:44

## 2025-02-16 RX ADMIN — SODIUM CHLORIDE 1000 MILLILITER(S): 9 INJECTION, SOLUTION INTRAVENOUS at 09:19

## 2025-02-16 NOTE — PATIENT PROFILE ADULT - FALL HARM RISK - HARM RISK INTERVENTIONS
Assistance with ambulation/Assistance OOB with selected safe patient handling equipment/Communicate Risk of Fall with Harm to all staff/Reinforce activity limits and safety measures with patient and family/Tailored Fall Risk Interventions/Toileting schedule using arm’s reach rule for commode and bathroom/Use of alarms - bed, chair and/or voice tab/Visual Cue: Yellow wristband and red socks/Bed in lowest position, wheels locked, appropriate side rails in place/Call bell, personal items and telephone in reach/Instruct patient to call for assistance before getting out of bed or chair/Non-slip footwear when patient is out of bed/Mouthcard to call system/Physically safe environment - no spills, clutter or unnecessary equipment/Purposeful Proactive Rounding/Room/bathroom lighting operational, light cord in reach

## 2025-02-16 NOTE — ED PROVIDER NOTE - NS ED ATTENDING STATEMENT MOD
Attending with How Severe Is It?: moderate Is This A New Presentation, Or A Follow-Up?: Follow Up Isotretinoin

## 2025-02-16 NOTE — ED PROVIDER NOTE - CLINICAL SUMMARY MEDICAL DECISION MAKING FREE TEXT BOX
Patient is a 74 year old male & former smoker with a past medical history of COPD, UC, HTN, non insulin dependant T2DM, GERD, anemia, gastroparesis with 5 days of worsening SOB, malaise. Covid+ 3 weeks ago. Endorses SOB, cough, urinary frequency. Patient hemodynamically stable, febrile to 101.2F on arrival. Lungs with questionable right base crackles otherwise clear to auscultation. Clear oropharynx without exudates. Abdomen soft, non distended, non tender. No peripheral edema. Differentials include, not limited to, flu, superimposed pneumonia, UTI. Will order chemistries and xray to evaluate.

## 2025-02-16 NOTE — ED PROVIDER NOTE - OBJECTIVE STATEMENT
Patient is a 74 year old male & former smoker with a past medical history of COPD, UC, HTN, non insulin dependant T2DM, GERD, anemia, gastroparesis presenting to the emergency department with complaints of shortness of breath. Patient stated three weeks ago he was Covid+ with intermittent fevers, chills, non productive cough, and weakness. Reports symptoms did not fully resolve and five days ago began experiencing global headache, generalized body aches, worsening malaise, decreased appetite, and shortness of breath. Wife at bedside reports increased urinary frequency and mechanical fall without head strike or LOC onto right hand last week- evaluated at  with negative xray neck + hand. Denies changes in vision/hearing, numbness/tingling, chest pain, nausea/vomiting, diarrhea, abdominal pain, constipation, dysuria, recent travel/sick contacts.

## 2025-02-16 NOTE — H&P ADULT - HISTORY OF PRESENT ILLNESS
74 year old male & former smoker with a past medical history of COPD [ not on home oxygen], UC, HTN, non insulin dependant T2DM, GERD, anemia, gastroparesis.    He  presented to the emergency department with complaints of shortness of breath. Patient stated three weeks ago he was Covid+ with intermittent fevers, chills, non productive cough, and weakness. Reports symptoms did not fully resolve and five days ago began experiencing global headache, generalized body aches, worsening malaise, decreased appetite, and shortness of breath. Wife at bedside reports increased urinary frequency and mechanical fall without head strike or LOC onto right hand last week- evaluated at  with negative xray neck + hand. Denies changes in vision/hearing, numbness/tingling, chest pain, nausea/vomiting, diarrhea, abdominal pain, constipation, dysuria, recent travel/sick contacts.    Patient hemodynamically stable, febrile to 101.2F on arrival.   Labs- Mild WBC count elevation-   Hb 11.4  Na 124, Hco3 20, Cl 87, pro bnp 772.  Trop, 20, 18.   EKG no ischemic changes  UA negative.   SARS-COVID-19+  CT chest:     IMPRESSION: CT chest w.o contrast  Ill-defined subtle/mild bilateral groundglass opacities appear new since   September 17, 2024 is a very nonspecific finding.  Mildly enlarged mediastinal lymph node with mild increase in size since   September 17, 2024 also nonspecific finding.  5 mm left lower lobe nodule slightly increased in size since September 17, 2024.  A 3 month follow-up noncontrast chest CT is recommended for complete   evaluation of the above findings.  Impaction of a few of the right lower lobe segmental/subsegmental airways   with overall interval improvement since September 17, 2024 likely due to   internal secretions.      He was given CTX, Azithro, Tylenol IV and LR bolus in ER.     74 year old male & former smoker with a past medical history of COPD [ not on home oxygen], UC, HTN, non insulin dependant T2DM, GERD, anemia, gastroparesis.    He  presented to the emergency department with complaints of shortness of breath. Patient stated 2 weeks ago he tested Positive for COVID-19 at his PCP office [ had chills, fevers, body aches at that time]. He was given Paxlovid for 5 days.   He felt better afterwards.   On Last Friday he had a mechanical fall while moving a water carate. He went to urgent care and Xray of C spine and R hand were done- no acute pathology.     Now for last 2-3 days he is having fevers, chills, loss of Apetite weakness and mild cough. He has not ambulated much d/t gen weakness.   Denies changes in vision/hearing, numbness/tingling, chest pain, nausea/vomiting, diarrhea, abdominal pain, constipation, dysuria, recent travel/sick contacts.    Patient hemodynamically stable, febrile to 101.2F on arrival.   Labs- Mild WBC count elevation-   Hb 11.4  Na 124, Hco3 20, Cl 87, pro bnp 772.  Trop, 20, 18.   EKG no ischemic changes  UA negative.   SARS-COVID-19+  CT chest:     IMPRESSION: CT chest w.o contrast  Ill-defined subtle/mild bilateral groundglass opacities appear new since   September 17, 2024 is a very nonspecific finding.  Mildly enlarged mediastinal lymph node with mild increase in size since   September 17, 2024 also nonspecific finding.  5 mm left lower lobe nodule slightly increased in size since September 17, 2024.  A 3 month follow-up noncontrast chest CT is recommended for complete   evaluation of the above findings.  Impaction of a few of the right lower lobe segmental/subsegmental airways   with overall interval improvement since September 17, 2024 likely due to   internal secretions.      He was given CTX, Azithro, Tylenol IV and LR bolus in ER.

## 2025-02-16 NOTE — CONSULT NOTE ADULT - TIME BILLING
- Review of records, telemetry, vital signs and daily labs.   - General and cardiovascular physical examination.  - Generation of cardiovascular treatment plan and completion of note .  - Coordination of care.      Patient was seen and examined by me on  02/16/2025 ,interim events noted,labs and radiology studies reviewed.  Victor M Greene MD,FACC.  [address] [place], [place] [id number].  [phone]  Availabe to call or text on [employer] TE[employer].

## 2025-02-16 NOTE — H&P ADULT - NSHPLABSRESULTS_GEN_ALL_CORE
LABS: Personally reviewed labs, imaging, and ECG                          11.4   11.51 )-----------( 400      ( 2025 09:07 )             35.1       02-16    124[L]  |  87[L]  |  14  ----------------------------<  133[H]  4.3   |  20[L]  |  1.14    Ca    9.3      2025 09:07    TPro  7.5  /  Alb  4.0  /  TBili  0.4  /  DBili  x   /  AST  17  /  ALT  <5  /  AlkPhos  46  02-16       LIVER FUNCTIONS - ( 2025 09:07 )  Alb: 4.0 g/dL / Pro: 7.5 g/dL / ALK PHOS: 46 U/L / ALT: <5 U/L / AST: 17 U/L / GGT: x                    Urinalysis Basic - ( 2025 11:37 )    Color: Yellow / Appearance: Clear / S.010 / pH: x  Gluc: x / Ketone: Negative mg/dL  / Bili: Negative / Urobili: 1.0 mg/dL   Blood: x / Protein: Negative mg/dL / Nitrite: Negative   Leuk Esterase: Negative / RBC: x / WBC x   Sq Epi: x / Non Sq Epi: x / Bacteria: x

## 2025-02-16 NOTE — ED ADULT NURSE NOTE - NS ED NURSE LEVEL OF CONSCIOUSNESS MENTAL STATUS
Chief Complaint   Patient presents with    Anal Itching     x 4 days    Arm Pain     Left arm pain x 3 weeks      Blood pressure 128/71, pulse 68, temperature 98.1 °F (36.7 °C), temperature source Oral, resp. rate 17, height 5' 8\" (1.727 m), weight 262 lb (118.8 kg), SpO2 97 %. 1. Have you been to the ER, urgent care clinic since your last visit? Hospitalized since your last visit? No    2. Have you seen or consulted any other health care providers outside of the [address] [place] [address] since your last visit? Include any pap smears or colon screening. No     Patient denies having any left arm pain currently at this time. Awake/Alert

## 2025-02-16 NOTE — H&P ADULT - PROBLEM SELECTOR PLAN 3
- Mild exacerbation d/t PNA.  - Will Rx w Duo nebs, Steroids.   - On annoro Ellipta, fluticasone and Montelukast OP.   - Supp. Oxygen

## 2025-02-16 NOTE — H&P ADULT - PROBLEM SELECTOR PLAN 1
A1C 5.8 in 06/2024. - Recent Rx for COVID-19 w 5D of Paxlovid.   - Possible superimposed bacterial PNA.  - CTX and Azithromycin to continue.  - Check Sputum screen, U studies.   - Will discuss w ID if patient will benefit from COVID-19 Rx again. - Recent Rx for COVID-19 w 5D of Paxlovid.   - Possible superimposed bacterial PNA.  - CTX and Azithromycin to continue.  - Check Sputum screen, U studies.   - I discussed with ID today. No Rx for COVID-19 recommended at this time. - P/w Fevers, chills, Weakness and Loss of Apetite.   - Recent Rx for COVID-19 w 5D of Paxlovid OP by PCP.   - Possible superimposed bacterial PNA.  - CTX and Azithromycin to continue.  - Check Sputum screen, U studies. FU on Blood cultures.   - I discussed with ID today. No Rx for COVID-19 recommended at this time. PCR likely Positive from recent Infection.

## 2025-02-16 NOTE — ED ADULT TRIAGE NOTE - CHIEF COMPLAINT QUOTE
Pt ambulatory c/o sob, body aches and intermittent fevers and chills x 2 weeks. States to have been tested COVID + 3 weeks ago. reports body aches and intermittent cough. denies chest pain. Noted to be febrile in triage. No drooling. speaking in clear full sentences.  Hx HTN, COPD, DM2, Ulcerative Colitis.

## 2025-02-16 NOTE — H&P ADULT - PROBLEM SELECTOR PLAN 6
- Possibly dehydration  - Will c/w MIVF and re-check BMP. - Rinvoq not available IP.  - c/w PPI, Loperamide PRN - Rinvoq not available IP per Pharmacy [ can give if  family brings from home].   - c/w PPI, Loperamide PRN

## 2025-02-16 NOTE — H&P ADULT - NSHPPHYSICALEXAM_GEN_ALL_CORE
General:  NAD  Respiratory: normal respiratory effort, BL rales.  CV: Extremities warm and well perfused, rrr no mrg  Abdominal: Soft, non-distended, non-tender   Extremities: No edema  Skin: No rashes  Neurology: A&Ox3, nonfocal. General:  NAD, appears tired.   Respiratory: normal respiratory effort, BL rales.  CV: Extremities warm and well perfused, rrr no mrg  Abdominal: Soft, non-distended, non-tender   Extremities: No edema  Skin: No rashes  Neurology: A&Ox3, nonfocal.

## 2025-02-16 NOTE — ED ADULT NURSE NOTE - OBJECTIVE STATEMENT
Patient presents to ED for flu-like symptoms, bodyaches, chills, SOB x2 weeks. History of HTN, COPD, DM2, Ulcerative Colitis, He states he was Covid+ 3 weeks ago and improved after 5 days of being sick, but began to feel unwell again with flu-like symptoms. States he has been having difficulty walking since then. Respirations even, unlabored on room air. No pallor, no cyanosis. Skin warm to touch. No focal neuro deficits. On cardiac monitor showing NSR in 70s bpm. 18G IV placed right AC. VS stable. Pending orders and MD evaluation.

## 2025-02-16 NOTE — ED PROVIDER NOTE - PHYSICAL EXAMINATION
VITAL SIGNS: I have reviewed nursing notes and confirm.  CONSTITUTIONAL:  in no acute distress.  SKIN: Skin exam is warm and dry, no acute rash.  HEAD: Normocephalic; atraumatic.  EYES: PERRL, EOM intact; conjunctiva and sclera clear.  ENT: airway clear.   NECK: Supple;   CARD: Regular rate and rhythm.  RESP: Crackles at right lung base  ABD:  soft; non-distended; non-tender;   EXT: Normal ROM. No peripheral edema

## 2025-02-16 NOTE — ED PROVIDER NOTE - ATTENDING CONTRIBUTION TO CARE
Dr. Mcfarland, Attending Physician-  I performed a face to face bedside interview with patient regarding history of present illness, review of symptoms and past medical history. I completed an independent physical exam.  I have discussed patient's plan of care with the resident.    74-year-old male with history of COPD, UC, HTN, NIDDM, GERD, anemia, gastroparesis, presenting with 5 days of shortness of breath, general malaise, 1 day of fever Tmax 101F at home yesterday.   No sick contacts, of note patient was COVID-positive 3 weeks ago.  Also reports dry cough and urinary frequency; otherwise denies HA, CP, ABD pain, N/V/D/D, numbness/tingling, focal weakness, fall/trauma.  ROS otherwise negative.  No recent antibiotic use.    VS +fever  General: WN/WD NAD  Head: Atraumatic  Eyes: EOM grossly in tact, no scleral icterus  ENT: dry mucous membranes  Neurology: A&Ox3, nonfocal, PATEL x 4  Respiratory: normal respiratory effort, crackles b/l bases, clear apices, satting 100% on RA  CV: Extremities warm and well perfused, rrr no mrg  Abdominal: Soft, non-distended, non-tender no cva ttp  Extremities: No edema  Skin: No rashes    DDx incl. URI vs pneumonia vs UTI vs atypical ACS/MI vs CHF vs COPD exacerbation; pt febrile in ED, Hx of COPD with poss pneumonia requiring hospitalization in March 2023; pt well-appearing here. Will eval w/labs, ekg, trop, bnp, CXR, consider escalation to CT if CXR negative, IVF, tylenol, dispo pending workup. Pt agreeable to trial of oral abx and DC if clinical workup c/w uncomplicated pna. - Saqib Mcfarland MD. EM Attending

## 2025-02-16 NOTE — H&P ADULT - ASSESSMENT
74 year old male & former smoker with a past medical history of COPD [ not on home oxygen], UC, HTN, non insulin dependant T2DM, GERD, anemia, gastroparesis.    He  presented to the emergency department with complaints of shortness of breath. Patient stated 2 weeks ago he tested Positive for COVID-19 at his PCP office [ had chills, fevers, body aches at that time]. He was given Paxlovid for 5 days.   He felt better afterwards.   On Last Friday he had a mechanical fall while moving a water carate. He went to urgent care and Xray of C spine and R hand were done- no acute pathology.     Now for last 2-3 days he is having fevers, chills, loss of Apetite weakness and mild cough. He has not ambulated much d/t gen weakness.   Patient hemodynamically stable, febrile to 101.2F on arrival.   Labs- Mild WBC count elevation-   Hb 11.4  Na 124, Hco3 20, Cl 87, pro bnp 772.  Trop, 20, 18.   EKG no ischemic changes  UA negative.   SARS-COVID-19+    IMPRESSION: CT chest w.o contrast  Ill-defined subtle/mild bilateral groundglass opacities appear new since   September 17, 2024 is a very nonspecific finding.  Mildly enlarged mediastinal lymph node with mild increase in size since   September 17, 2024 also nonspecific finding.  5 mm left lower lobe nodule slightly increased in size since September 17, 2024.  A 3 month follow-up noncontrast chest CT is recommended for complete   evaluation of the above findings.  Impaction of a few of the right lower lobe segmental/subsegmental airways   with overall interval improvement since September 17, 2024 likely due to   internal secretions.    He was given CTX, Azithro, Tylenol IV and LR bolus in ER.

## 2025-02-16 NOTE — ED ADULT NURSE NOTE - NSFALLOOBATTEMPT_ED_ALL_ED
[place] website --- [email or web address] website --- [email or web address]
No

## 2025-02-17 LAB
A1C WITH ESTIMATED AVERAGE GLUCOSE RESULT: 6 % — HIGH (ref 4–5.6)
ADD ON TEST-SPECIMEN IN LAB: SIGNIFICANT CHANGE UP
ALBUMIN SERPL ELPH-MCNC: 3.7 G/DL — SIGNIFICANT CHANGE UP (ref 3.3–5)
ALP SERPL-CCNC: 42 U/L — SIGNIFICANT CHANGE UP (ref 40–120)
ALT FLD-CCNC: 6 U/L — SIGNIFICANT CHANGE UP (ref 4–41)
ANION GAP SERPL CALC-SCNC: 15 MMOL/L — HIGH (ref 7–14)
AST SERPL-CCNC: 17 U/L — SIGNIFICANT CHANGE UP (ref 4–40)
B PERT DNA SPEC QL NAA+PROBE: SIGNIFICANT CHANGE UP
B PERT+PARAPERT DNA PNL SPEC NAA+PROBE: SIGNIFICANT CHANGE UP
BILIRUB SERPL-MCNC: 0.4 MG/DL — SIGNIFICANT CHANGE UP (ref 0.2–1.2)
BUN SERPL-MCNC: 12 MG/DL — SIGNIFICANT CHANGE UP (ref 7–23)
C PNEUM DNA SPEC QL NAA+PROBE: SIGNIFICANT CHANGE UP
CALCIUM SERPL-MCNC: 9.3 MG/DL — SIGNIFICANT CHANGE UP (ref 8.4–10.5)
CHLORIDE SERPL-SCNC: 92 MMOL/L — LOW (ref 98–107)
CO2 SERPL-SCNC: 21 MMOL/L — LOW (ref 22–31)
CREAT SERPL-MCNC: 1 MG/DL — SIGNIFICANT CHANGE UP (ref 0.5–1.3)
EGFR: 79 ML/MIN/1.73M2 — SIGNIFICANT CHANGE UP
ESTIMATED AVERAGE GLUCOSE: 126 — SIGNIFICANT CHANGE UP
FLUAV SUBTYP SPEC NAA+PROBE: SIGNIFICANT CHANGE UP
FLUBV RNA SPEC QL NAA+PROBE: SIGNIFICANT CHANGE UP
GLUCOSE BLDC GLUCOMTR-MCNC: 129 MG/DL — HIGH (ref 70–99)
GLUCOSE BLDC GLUCOMTR-MCNC: 240 MG/DL — HIGH (ref 70–99)
GLUCOSE BLDC GLUCOMTR-MCNC: 285 MG/DL — HIGH (ref 70–99)
GLUCOSE BLDC GLUCOMTR-MCNC: 318 MG/DL — HIGH (ref 70–99)
GLUCOSE SERPL-MCNC: 91 MG/DL — SIGNIFICANT CHANGE UP (ref 70–99)
HADV DNA SPEC QL NAA+PROBE: SIGNIFICANT CHANGE UP
HCOV 229E RNA SPEC QL NAA+PROBE: SIGNIFICANT CHANGE UP
HCOV HKU1 RNA SPEC QL NAA+PROBE: SIGNIFICANT CHANGE UP
HCOV NL63 RNA SPEC QL NAA+PROBE: SIGNIFICANT CHANGE UP
HCOV OC43 RNA SPEC QL NAA+PROBE: SIGNIFICANT CHANGE UP
HMPV RNA SPEC QL NAA+PROBE: SIGNIFICANT CHANGE UP
HPIV1 RNA SPEC QL NAA+PROBE: SIGNIFICANT CHANGE UP
HPIV2 RNA SPEC QL NAA+PROBE: SIGNIFICANT CHANGE UP
HPIV3 RNA SPEC QL NAA+PROBE: SIGNIFICANT CHANGE UP
HPIV4 RNA SPEC QL NAA+PROBE: SIGNIFICANT CHANGE UP
LEGIONELLA AG UR QL: NEGATIVE — SIGNIFICANT CHANGE UP
M PNEUMO DNA SPEC QL NAA+PROBE: SIGNIFICANT CHANGE UP
OSMOLALITY SERPL: 277 MOSM/KG — SIGNIFICANT CHANGE UP (ref 275–295)
POTASSIUM SERPL-MCNC: 4.7 MMOL/L — SIGNIFICANT CHANGE UP (ref 3.5–5.3)
POTASSIUM SERPL-SCNC: 4.7 MMOL/L — SIGNIFICANT CHANGE UP (ref 3.5–5.3)
PROT SERPL-MCNC: 7 G/DL — SIGNIFICANT CHANGE UP (ref 6–8.3)
RAPID RVP RESULT: DETECTED
RSV RNA SPEC QL NAA+PROBE: SIGNIFICANT CHANGE UP
RV+EV RNA SPEC QL NAA+PROBE: SIGNIFICANT CHANGE UP
SARS-COV-2 RNA SPEC QL NAA+PROBE: DETECTED
SODIUM SERPL-SCNC: 128 MMOL/L — LOW (ref 135–145)

## 2025-02-17 RX ORDER — FERROUS SULFATE 137(45) MG
325 TABLET, EXTENDED RELEASE ORAL DAILY
Refills: 0 | Status: DISCONTINUED | OUTPATIENT
Start: 2025-02-17 | End: 2025-02-20

## 2025-02-17 RX ORDER — METHYLPREDNISOLONE ACETATE 80 MG/ML
20 INJECTION, SUSPENSION INTRA-ARTICULAR; INTRALESIONAL; INTRAMUSCULAR; SOFT TISSUE EVERY 8 HOURS
Refills: 0 | Status: COMPLETED | OUTPATIENT
Start: 2025-02-17 | End: 2025-02-19

## 2025-02-17 RX ORDER — GABAPENTIN 400 MG/1
300 CAPSULE ORAL THREE TIMES A DAY
Refills: 0 | Status: DISCONTINUED | OUTPATIENT
Start: 2025-02-17 | End: 2025-02-20

## 2025-02-17 RX ORDER — METHYLPREDNISOLONE ACETATE 80 MG/ML
20 INJECTION, SUSPENSION INTRA-ARTICULAR; INTRALESIONAL; INTRAMUSCULAR; SOFT TISSUE EVERY 12 HOURS
Refills: 0 | Status: DISCONTINUED | OUTPATIENT
Start: 2025-02-19 | End: 2025-02-20

## 2025-02-17 RX ORDER — INSULIN LISPRO 100 U/ML
2 INJECTION, SOLUTION INTRAVENOUS; SUBCUTANEOUS
Refills: 0 | Status: DISCONTINUED | OUTPATIENT
Start: 2025-02-17 | End: 2025-02-20

## 2025-02-17 RX ORDER — METHYLPREDNISOLONE ACETATE 80 MG/ML
INJECTION, SUSPENSION INTRA-ARTICULAR; INTRALESIONAL; INTRAMUSCULAR; SOFT TISSUE
Refills: 0 | Status: DISCONTINUED | OUTPATIENT
Start: 2025-02-17 | End: 2025-02-20

## 2025-02-17 RX ORDER — DEXTROMETHORPHAN HBR, GUAIFENESIN 200 MG/10ML
1200 LIQUID ORAL EVERY 12 HOURS
Refills: 0 | Status: DISCONTINUED | OUTPATIENT
Start: 2025-02-17 | End: 2025-02-20

## 2025-02-17 RX ORDER — INSULIN GLARGINE-YFGN 100 [IU]/ML
14 INJECTION, SOLUTION SUBCUTANEOUS AT BEDTIME
Refills: 0 | Status: DISCONTINUED | OUTPATIENT
Start: 2025-02-17 | End: 2025-02-20

## 2025-02-17 RX ADMIN — Medication 250 MILLIGRAM(S): at 12:03

## 2025-02-17 RX ADMIN — GABAPENTIN 300 MILLIGRAM(S): 400 CAPSULE ORAL at 21:39

## 2025-02-17 RX ADMIN — CEFTRIAXONE 100 MILLIGRAM(S): 500 INJECTION, POWDER, FOR SOLUTION INTRAMUSCULAR; INTRAVENOUS at 12:04

## 2025-02-17 RX ADMIN — PREDNISONE 40 MILLIGRAM(S): 20 TABLET ORAL at 05:44

## 2025-02-17 RX ADMIN — Medication 325 MILLIGRAM(S): at 18:04

## 2025-02-17 RX ADMIN — ENOXAPARIN SODIUM 40 MILLIGRAM(S): 100 INJECTION SUBCUTANEOUS at 13:49

## 2025-02-17 RX ADMIN — FLUTICASONE PROPIONATE 1 SPRAY(S): 50 SPRAY, METERED NASAL at 05:46

## 2025-02-17 RX ADMIN — METHYLPREDNISOLONE ACETATE 20 MILLIGRAM(S): 80 INJECTION, SUSPENSION INTRA-ARTICULAR; INTRALESIONAL; INTRAMUSCULAR; SOFT TISSUE at 21:49

## 2025-02-17 RX ADMIN — GABAPENTIN 300 MILLIGRAM(S): 400 CAPSULE ORAL at 13:49

## 2025-02-17 RX ADMIN — Medication 40 MILLIGRAM(S): at 05:43

## 2025-02-17 RX ADMIN — Medication 1000 UNIT(S): at 18:04

## 2025-02-17 RX ADMIN — MONTELUKAST SODIUM 10 MILLIGRAM(S): 10 TABLET ORAL at 12:04

## 2025-02-17 RX ADMIN — IPRATROPIUM BROMIDE AND ALBUTEROL SULFATE 3 MILLILITER(S): .5; 2.5 SOLUTION RESPIRATORY (INHALATION) at 08:51

## 2025-02-17 RX ADMIN — INSULIN LISPRO 3: 100 INJECTION, SOLUTION INTRAVENOUS; SUBCUTANEOUS at 17:43

## 2025-02-17 RX ADMIN — FLUTICASONE PROPIONATE 1 SPRAY(S): 50 SPRAY, METERED NASAL at 17:41

## 2025-02-17 RX ADMIN — IPRATROPIUM BROMIDE AND ALBUTEROL SULFATE 3 MILLILITER(S): .5; 2.5 SOLUTION RESPIRATORY (INHALATION) at 15:27

## 2025-02-17 RX ADMIN — METHYLPREDNISOLONE ACETATE 20 MILLIGRAM(S): 80 INJECTION, SUSPENSION INTRA-ARTICULAR; INTRALESIONAL; INTRAMUSCULAR; SOFT TISSUE at 13:49

## 2025-02-17 RX ADMIN — Medication 3 MILLIGRAM(S): at 21:43

## 2025-02-17 RX ADMIN — IPRATROPIUM BROMIDE AND ALBUTEROL SULFATE 3 MILLILITER(S): .5; 2.5 SOLUTION RESPIRATORY (INHALATION) at 22:04

## 2025-02-17 RX ADMIN — INSULIN GLARGINE-YFGN 14 UNIT(S): 100 INJECTION, SOLUTION SUBCUTANEOUS at 21:44

## 2025-02-17 RX ADMIN — INSULIN LISPRO 2 UNIT(S): 100 INJECTION, SOLUTION INTRAVENOUS; SUBCUTANEOUS at 17:44

## 2025-02-17 RX ADMIN — DEXTROMETHORPHAN HBR, GUAIFENESIN 1200 MILLIGRAM(S): 200 LIQUID ORAL at 17:39

## 2025-02-17 RX ADMIN — INSULIN LISPRO 4: 100 INJECTION, SOLUTION INTRAVENOUS; SUBCUTANEOUS at 12:11

## 2025-02-17 NOTE — DIETITIAN INITIAL EVALUATION ADULT - FACTORS AFF FOOD INTAKE
persistent lack of appetite persistent lack of appetite/Christianity/ethnic/cultural/personal food preferences

## 2025-02-17 NOTE — DIETITIAN INITIAL EVALUATION ADULT - ENTER FROM (G/KG)
Problem Relation Age of Onset    Diabetes Mother     Other Mother         SARCOIDOSIS    Cancer Father      Social History     Socioeconomic History    Marital status:      Spouse name: Not on file    Number of children: Not on file    Years of education: Not on file    Highest education level: Not on file   Occupational History    Not on file   Tobacco Use    Smoking status: Never    Smokeless tobacco: Never   Vaping Use    Vaping Use: Never used   Substance and Sexual Activity    Alcohol use: Not Currently     Alcohol/week: 3.0 standard drinks of alcohol     Types: 3 Glasses of wine per week    Drug use: Never    Sexual activity: Not Currently     Partners: Male   Other Topics Concern    Not on file   Social History Narrative    Not on file     Social Determinants of Health     Financial Resource Strain: Low Risk  (2024)    Overall Financial Resource Strain (CARDIA)     Difficulty of Paying Living Expenses: Not hard at all   Food Insecurity: No Food Insecurity (2024)    Hunger Vital Sign     Worried About Running Out of Food in the Last Year: Never true     Ran Out of Food in the Last Year: Never true   Transportation Needs: Unknown (2024)    PRAPARE - Transportation     Lack of Transportation (Medical): Not on file     Lack of Transportation (Non-Medical): No   Physical Activity: Not on file   Stress: Not on file   Social Connections: Not on file   Intimate Partner Violence: Not on file   Housing Stability: Unknown (2024)    Housing Stability Vital Sign     Unable to Pay for Housing in the Last Year: Not on file     Number of Places Lived in the Last Year: Not on file     Unstable Housing in the Last Year: No     Patient Active Problem List   Diagnosis    Fall (on) (from) other stairs and steps, sequela    Multiparity     labor in third trimester without delivery        Vitals:    24 1126   BP: 116/70   Site: Left Upper Arm   Position: Sitting   Cuff Size: Medium Adult  1.2

## 2025-02-17 NOTE — PHYSICAL THERAPY INITIAL EVALUATION ADULT - ADDITIONAL COMMENTS
Pt left seated at edge of bed in NAD, all lines intact, call bell in reach, SPO2 100%, bed alarm on, and RN aware.

## 2025-02-17 NOTE — DIETITIAN INITIAL EVALUATION ADULT - PROBLEM SELECTOR PLAN 1
- P/w Fevers, chills, Weakness and Loss of Apetite.   - Recent Rx for COVID-19 w 5D of Paxlovid OP by PCP.   - Possible superimposed bacterial PNA.  - CTX and Azithromycin to continue.  - Check Sputum screen, U studies. FU on Blood cultures.   - I discussed with ID today. No Rx for COVID-19 recommended at this time. PCR likely Positive from recent Infection.

## 2025-02-17 NOTE — DIETITIAN INITIAL EVALUATION ADULT - PROBLEM SELECTOR PLAN 6
- Rinvoq not available IP per Pharmacy [ can give if  family brings from home].   - c/w PPI, Loperamide PRN

## 2025-02-17 NOTE — CONSULT NOTE ADULT - PROVIDER SPECIALTY LIST ADULT
Infectious Disease
Nephrology
  [address] [place] [address]  [phone]              Tim Barrett DO  07/08/24 [id number]    
Pulmonology
Cardiology

## 2025-02-17 NOTE — DIETITIAN INITIAL EVALUATION ADULT - NS FNS DIET ORDER
Diet, Regular:   Halal  No Beef  No Dairy  No Egg  No Fish  No Lactose  No Pork (02-16-25 @ 17:54)

## 2025-02-17 NOTE — DIETITIAN INITIAL EVALUATION ADULT - PERTINENT LABORATORY DATA
02-17    128[L]  |  92[L]  |  12  ----------------------------<  91  4.7   |  21[L]  |  1.00    Ca    9.3      17 Feb 2025 04:35    TPro  7.0  /  Alb  3.7  /  TBili  0.4  /  DBili  x   /  AST  17  /  ALT  6   /  AlkPhos  42  02-17    A1C with Estimated Average Glucose Result: 6.0 % (02-17-25 @ 04:35)  A1C with Estimated Average Glucose Result: 5.8 % (06-28-24 @ 11:40)    CAPILLARY BLOOD GLUCOSE  POCT Blood Glucose.: 129 mg/dL (17 Feb 2025 08:37)  POCT Blood Glucose.: 173 mg/dL (16 Feb 2025 17:15)  POCT Blood Glucose.: 266 mg/dL (16 Feb 2025 13:54)     02-17    128[L]  |  92[L]  |  12  ----------------------------<  91  4.7   |  21[L]  |  1.00    Ca    9.3      17 Feb 2025 04:35    TPro  7.0  /  Alb  3.7  /  TBili  0.4  /  DBili  x   /  AST  17  /  ALT  6   /  AlkPhos  42  02-17    A1C with Estimated Average Glucose Result: 6.0 % (02-17-25 @ 04:35)  A1C with Estimated Average Glucose Result: 5.8 % (06-28-24 @ 11:40)    CAPILLARY BLOOD GLUCOSE  POCT Blood Glucose.: 318 mg/dL (17 Feb 2025 12:02)  POCT Blood Glucose.: 129 mg/dL (17 Feb 2025 08:37)  POCT Blood Glucose.: 173 mg/dL (16 Feb 2025 17:15)

## 2025-02-17 NOTE — PHYSICAL THERAPY INITIAL EVALUATION ADULT - PERTINENT HX OF CURRENT PROBLEM, REHAB EVAL
Patient is a 74 year old male, PMH stated below, presented to the emergency department with complaints of shortness of breath. Patient stated 2 weeks ago he tested Positive for COVID-19 at his PCP office. On Last Friday he had a mechanical fall while moving a water carate. He went to urgent care and Xray of C spine and R hand were done- no acute pathology. Now for last 2-3 days he is having fevers, chills, loss of Apetite weakness and mild cough. He has not ambulated much d/t gen weakness. SARS-COVID-19+ and possible superimposed bacterial PNA.

## 2025-02-17 NOTE — DIETITIAN INITIAL EVALUATION ADULT - DIET TYPE
Add CSTCHO with evening snack in setting of elevated POCT glucose trend./consistent carbohydrate (evening snack)

## 2025-02-17 NOTE — PHYSICAL THERAPY INITIAL EVALUATION ADULT - ACTIVE RANGE OF MOTION EXAMINATION, REHAB EVAL
fadi. upper extremity Active ROM was WNL (within normal limits)/bilateral lower extremity Active ROM was WNL (within normal limits)

## 2025-02-17 NOTE — CONSULT NOTE ADULT - ASSESSMENT
74 year old male & former smoker with a past medical history of COPD [ not on home oxygen], UC, HTN, non insulin dependant T2DM, GERD, anemia, gastroparesis.      # DYSPNEA  Presents with cough dyspnea weakness   Recent Rx for COVID-19 w 5D of Paxlovid OP by PCP.   COVID 19 still detected  On IV Abx for suspected PNA  No evidence for fluid overload  Prior TTE normal LV Function and EF    #  COPD exacerbation.   ·  Plan: - Mild exacerbation d/t PNA.  - Will Rx w Duo nebs, Steroids.   - On annoro Ellipta, fluticasone and Montelukast OP.   - Supp. Oxygen.      #  HTN (hypertension).   ·  Plan: - Hold telmisartan 40 mg daily.    #  Diabetes mellitus type II, non insulin dependent.   ·  Plan: A1C 5.8 in 06/2024.   Hold PO meds.   ISS.
75 y/o M former smoker with a past medical history of COPD [ not on home oxygen], UC, HTN, non insulin dependant T2DM, GERD, anemia, gastroparesis.    sepsis vs SIRS- leukocytosis w/ 11.4% bands and fever  febrile to 101.2 on admission  urine legionella Ag negative  CT chest- Ill-defined subtle/mild bilateral groundglass opacities appear new. Mildly enlarged mediastinal lymph node with mild increase in size. 5 mm left lower lobe nodule slightly increased in size. Impaction of a few of the right lower lobe segmental/subsegmental airways   with overall interval improvement since September 17, 2024 likely due to internal secretions.    COVID PCR positive  pt reports having COVID 3-4 weeks ago   he completed paxlovid w/ resolution of his symptoms at that time    hyponatremia  possibly 2/2 SIADH    Recommendations  c/w ceftriaxone 1g daily for now  c/w azithromycin 250mg daily  f/u blood cultures  full RVP added on   trend temps, wbc  no need for isolation as pt was positive for COVID 3-4 weeks ago    Trent Palacios M.D.  [facility]  [phone]  After 5pm on weekdays and all day on weekends - please call [phone]  Available on [employer] teams    Thank you for consulting us and involving us in the management of this patients case. In addition to reviewing history, imaging, documents, labs, microbiology, took into account antibiotic stewardship, local antibiogram and infection control strategies and potential transmission issues. 
74 year old male & former smoker with a past medical history of COPD [ not on home oxygen], UC, HTN, non insulin dependant T2DM, GERD, anemia, gastroparesis. He  presented to the emergency department with complaints of shortness of breath. Patient stated 2 weeks ago he tested Positive for COVID-19 at his PCP office [ had chills, fevers, body aches at that time]. He was given Paxlovid for 5 days. He felt better afterwards. On Last Friday he had a mechanical fall while moving a water carate. He went to urgent care and Xray of C spine and R hand were done- no acute pathology.  Now for last 2-3 days he is having fevers, chills, loss of Apetite weakness and mild cough. He has not ambulated much d/t gen weakness.  Denies changes in vision/hearing, numbness/tingling, chest pain, nausea/vomiting, diarrhea, abdominal pain, constipation, dysuria, recent travel/sick contacts. Patient hemodynamically stable, febrile to 101.2F on arrival.   Labs- Mild WBC count elevation-   Hb 11.4  Na 124, Hco3 20, Cl 87, pro bnp 772.  Trop, 20, 18.   EKG no ischemic changes  UA negative.   SARS-COVID-19+  he says he got sick after he saw me in the office:   his hands and feet became cold and had cough ; he had covid few weeks ago  :   He was given CTX, Azithro, Tylenol IV and LR bolus in ER.       Pneumonia/ COPD   - P/w Fevers, chills, Weakness and Loss of Apetite.   - Recent Rx for COVID-19 w 5D of Paxlovid OP by PCP.   - Possible superimposed bacterial PNA though ct scan chest showed:  Ill-defined subtle/mild bilateral groundglass opacities appear new since September 17, 2024 is a very nonspecific finding. Mildly enlarged mediastinal lymph node with mild increase in size since September 17, 2024 also nonspecific finding. 5 mm left lower lobe nodule slightly increased in size since September 17, 2024. A 3 month follow-up noncontrast chest CT is recommended for complete evaluation of the above findings. Impaction of a few of the right lower lobe segmental/subsegmental airways with overall interval improvement since September 17, 2024 likely due to internal secretions.  - legionella is negative :  on zithromax   -id following;   add steroids:  add mucinex:  cont BD   he had covid 4 weeks ago ; ? dc islation:  he was treated for covid with paxlovid   -cont advvair here     Acute hyponatremia.   - Possibly dehydration d/t Poor PO intake.  -na has improved     HTN (hypertension).   : - Hold telmisartan 40 mg daily.  -blood pressure is OK for now      Ulcerative colitis.   - Rinvoq not available IP per Pharmacy [ can give if  family brings from home].   - c/w PPI, Loperamide PRN.    Diabetes mellitus type II, non insulin dependent.   A1C 5.8 in 06/2024.   Hold PO meds.   ISS.    dvt prophylaxis    dw acp 
74 year old male & former smoker with a past medical history of COPD [ not on home oxygen], UC, HTN, non insulin dependant T2DM, GERD, anemia, gastroparesis presented to the hospital with increased shortness of breath. Lab work is concerning for the presence of hyponatremia, so the Nephrology service has been consulted for further recommendations.    1. Hyponatremia: Na+ on admission at 124 mEq/L. Specific gravity at 1.010, which can possibly be characteristic of dilute urine. Please check urine osmolality, urine sodium, urine potassium. 2/17 Serum osmolality 277, which is mildly different from 2/17 calculated osmolality of 265; may have mild pseudohyponatremia. s/p 1 L LR and 75 cc/hr NS, which lead to an increase in Na+; makes a hypovolemic process more likely. The patient states that he barely ate or drank in the 3-4 days before admission. Avoid medications in hypotonic solutions. If antibiotics are to be continued, please place them in normal saline instead of D5.    2. HTN: Continue with current anti-hypertensive medications. Monitor BP.    3. Anemia: Mild. Check CBC daily. Defer on Iron saturation and Ferritin measurements; Ferritin will be elevated in the setting of COVID-19 infection.    4. Metabolic Acidosis: Elevated anion gap acidosis on CMP, check CMP daily. Elevated lactate in the setting of COVIF 19 is the likely cause. Continue management of infection to reduce lactic acid level.    [facility] NEPHROLOGY  Zac Meyers M.D.  Nish Ramos D.O.  Melvina Oliveira M.D.  MD Brigida De Jesus, MSN, ANP-C    Telephone: [phone]  Facsimile: [phone]    [facility] [place] [address], [id number]  [place] [address]

## 2025-02-17 NOTE — CONSULT NOTE ADULT - SUBJECTIVE AND OBJECTIVE BOX
02-17-25 @ 12:00    Patient is a 74y old  Male who presents with a chief complaint of Pneumonia due to infectious organism     (17 Feb 2025 11:16)      HPI:  74 year old male & former smoker with a past medical history of COPD [ not on home oxygen], UC, HTN, non insulin dependant T2DM, GERD, anemia, gastroparesis. He  presented to the emergency department with complaints of shortness of breath. Patient stated 2 weeks ago he tested Positive for COVID-19 at his PCP office [ had chills, fevers, body aches at that time]. He was given Paxlovid for 5 days. He felt better afterwards. On Last Friday he had a mechanical fall while moving a water carate. He went to urgent care and Xray of C spine and R hand were done- no acute pathology.  Now for last 2-3 days he is having fevers, chills, loss of Apetite weakness and mild cough. He has not ambulated much d/t gen weakness.  Denies changes in vision/hearing, numbness/tingling, chest pain, nausea/vomiting, diarrhea, abdominal pain, constipation, dysuria, recent travel/sick contacts. Patient hemodynamically stable, febrile to 101.2F on arrival.   Labs- Mild WBC count elevation-   Hb 11.4  Na 124, Hco3 20, Cl 87, pro bnp 772.  Trop, 20, 18.   EKG no ischemic changes  UA negative.   SARS-COVID-19+  he says he got sick after he saw me in the office:   his hands and feet became cold and had cough ; he had covid few weeks ago  :   He was given CTX, Azithro, Tylenol IV and LR bolus in ER.       ?FOLLOWING PRESENT  [x ] Hx of PE/DVT, [y ] Hx COPD, [x ] Hx of Asthma, y[ ] Hx of Hospitalization, [x ]  Hx of BiPAP/CPAP use, [x ] Hx of MELVI    Allergies    angiotensin converting enzyme inhibitors (Other; Swelling)  Seafood (Hives)    Intolerances        PAST MEDICAL & SURGICAL HISTORY:  Gastroparesis  with pylorus dysfunction      DM (diabetes mellitus)  type II      Chronic obstructive pulmonary disease      Hypertension      Ulcerative colitis      Inguinal hernia      Bronchiectasis      GERD (gastroesophageal reflux disease)      Unilateral primary osteoarthritis, right knee      Dysphonia      Former smoker      Anemia      History of repair of hiatal hernia  childhood      S/P endoscopy  POEM 10/2021, 11/2021      History of left knee replacement      H/O inguinal hernia repair      S/P cataract surgery          FAMILY HISTORY:  FH: diabetes mellitus (Mother)        Social History: [ former smoker  ] TOBACCO                  [ x ] ETOH                                 [x ] IVDA/DRUGS    REVIEW OF SYSTEMS      General:	x    Skin/Breast:x  	  Ophthalmologic:x  	  ENMT:	x    Respiratory and Thorax:  sob,  lewis  , cough , phlegm    	  Cardiovascular:	x    Gastrointestinal:	x    Genitourinary:	x    Musculoskeletal:	x    Neurological:	x    Psychiatric:	x    Hematology/Lymphatics:	x    Endocrine:	x    Allergic/Immunologic:	x    MEDICATIONS  (STANDING):  albuterol/ipratropium for Nebulization 3 milliLiter(s) Nebulizer every 6 hours  azithromycin   Tablet 250 milliGRAM(s) Oral daily  cefTRIAXone   IVPB 1000 milliGRAM(s) IV Intermittent every 24 hours  dextrose 5%. 1000 milliLiter(s) (50 mL/Hr) IV Continuous <Continuous>  dextrose 5%. 1000 milliLiter(s) (100 mL/Hr) IV Continuous <Continuous>  dextrose 50% Injectable 25 Gram(s) IV Push once  dextrose 50% Injectable 12.5 Gram(s) IV Push once  dextrose 50% Injectable 25 Gram(s) IV Push once  enoxaparin Injectable 40 milliGRAM(s) SubCutaneous every 24 hours  fluticasone propionate 50 MICROgram(s)/spray Nasal Spray 1 Spray(s) Both Nostrils two times a day  glucagon  Injectable 1 milliGRAM(s) IntraMuscular once  insulin lispro (ADMELOG) corrective regimen sliding scale   SubCutaneous three times a day before meals  montelukast 10 milliGRAM(s) Oral daily  pantoprazole    Tablet 40 milliGRAM(s) Oral before breakfast  predniSONE   Tablet 40 milliGRAM(s) Oral daily  sodium chloride 0.9%. 1000 milliLiter(s) (75 mL/Hr) IV Continuous <Continuous>  traZODone 50 milliGRAM(s) Oral at bedtime    MEDICATIONS  (PRN):  acetaminophen     Tablet .. 650 milliGRAM(s) Oral every 6 hours PRN Temp greater or equal to 38C (100.4F), Mild Pain (1 - 3)  aluminum hydroxide/magnesium hydroxide/simethicone Suspension 30 milliLiter(s) Oral every 4 hours PRN Dyspepsia  dextrose Oral Gel 15 Gram(s) Oral once PRN Blood Glucose LESS THAN 70 milliGRAM(s)/deciliter  hydrALAZINE 10 milliGRAM(s) Oral every 6 hours PRN Systolic blood pressure > 160 mmHg  loperamide 4 milliGRAM(s) Oral every 6 hours PRN Diarrhea  melatonin 3 milliGRAM(s) Oral at bedtime PRN Insomnia  ondansetron Injectable 4 milliGRAM(s) IV Push every 8 hours PRN Nausea and/or Vomiting       Vital Signs Last 24 Hrs  T(C): 37.1 (17 Feb 2025 05:00), Max: 37.2 (16 Feb 2025 21:00)  T(F): 98.8 (17 Feb 2025 05:00), Max: 98.9 (16 Feb 2025 21:00)  HR: 70 (17 Feb 2025 08:51) (70 - 97)  BP: 137/63 (17 Feb 2025 05:00) (137/63 - 154/78)  BP(mean): 92 (16 Feb 2025 14:20) (92 - 92)  RR: 19 (17 Feb 2025 05:00) (16 - 19)  SpO2: 98% (17 Feb 2025 08:51) (98% - 100%)    Parameters below as of 17 Feb 2025 08:51  Patient On (Oxygen Delivery Method): room air    Orthostatic VS          I&O's Summary      Physical Exam:   GENERAL: NAD, well-groomed, well-developed  HEENT: ALYSSA/   Atraumatic, Normocephalic  ENMT: No tonsillar erythema, exudates, or enlargement; Moist mucous membranes, Good dentition, No lesions  NECK: Supple, No JVD, Normal thyroid  CHEST/LUNG: fadi cracekls+  CVS: Regular rate and rhythm; No murmurs, rubs, or gallops  GI: : Soft, Nontender, Nondistended; Bowel sounds present  NERVOUS SYSTEM:  Alert & Oriented X3  EXTREMITIES:  -r edema  LYMPH: No lymphadenopathy noted  SKIN: No rashes or lesions  ENDOCRINOLOGY: No Thyromegaly  PSYCH: Appropriate    Labs:  Venous<41<4>>45<<7.345>>Venous<<3><<4><<5<<459>>                            11.4   11.51 )-----------( 400      ( 16 Feb 2025 09:07 )             35.1     02-17    128[L]  |  92[L]  |  12  ----------------------------<  91  4.7   |  21[L]  |  1.00  02-16    124[L]  |  87[L]  |  14  ----------------------------<  133[H]  4.3   |  20[L]  |  1.14    Ca    9.3      17 Feb 2025 04:35  Ca    9.3      16 Feb 2025 09:07    TPro  7.0  /  Alb  3.7  /  TBili  0.4  /  DBili  x   /  AST  17  /  ALT  6   /  AlkPhos  42  02-17  TPro  7.5  /  Alb  4.0  /  TBili  0.4  /  DBili  x   /  AST  17  /  ALT  <5  /  AlkPhos  46  02-16    CAPILLARY BLOOD GLUCOSE      POCT Blood Glucose.: 129 mg/dL (17 Feb 2025 08:37)  POCT Blood Glucose.: 173 mg/dL (16 Feb 2025 17:15)  POCT Blood Glucose.: 266 mg/dL (16 Feb 2025 13:54)    LIVER FUNCTIONS - ( 17 Feb 2025 04:35 )  Alb: 3.7 g/dL / Pro: 7.0 g/dL / ALK PHOS: 42 U/L / ALT: 6 U/L / AST: 17 U/L / GGT: x             Urinalysis Basic - ( 17 Feb 2025 04:35 )    Color: x / Appearance: x / SG: x / pH: x  Gluc: 91 mg/dL / Ketone: x  / Bili: x / Urobili: x   Blood: x / Protein: x / Nitrite: x   Leuk Esterase: x / RBC: x / WBC x   Sq Epi: x / Non Sq Epi: x / Bacteria: x      Urinalysis with Rflx Culture (collected 16 Feb 2025 11:37)      D DImerLactate, Blood: 3.4 mmol/L (02-16 @ 11:56)    rad< from: CT Chest No Cont (02.16.25 @ 11:18) >    Ill-defined subtle/mild bilateral groundglass opacities appear new since September 17, 2024 is a very nonspecific finding. Mildly enlarged mediastinal lymph node with mild increase in size since September 17, 2024 also nonspecific finding. 5 mm left lower lobe nodule slightly increased in size since September 17, 2024. A 3 month follow-up noncontrast chest CT is recommended for complete evaluation of the above findings. Impaction of a few of the right lower lobe segmental/subsegmental airways with overall interval improvement since September 17, 2024 likely due to internal secretions.          Studies  Chest X-RAY  CT SCAN Chest   CT Abdomen  Venous Dopplers: LE:   Others      < from: Transthoracic Echocardiogram (03.24.23 @ 09:00) >  ventricular internal dimensions and wall thicknesses.  Normal left ventricular diastolic function.  Right Heart: Normal right atrium. Normal right ventricular  size andfunction. Normal tricuspid valve. Minimal  tricuspid regurgitation. Pulmonic valve not well  visualized.  Pericardium/PleuraNormal pericardium with no pericardial  effusion.  Hemodynamic: Estimated right ventricular systolic pressure  equals 37 mm Hg, assuming right atrial pressure equals 10  mm Hg, consistent with borderline pulmonary hypertension.  ------------------------------------------------------------------------  CONCLUSIONS:  1. Endocardium not well visualized; grossly normal left  ventricular systolic function.  2. Normal right ventricular size and function.  ------------------------------------------------------------------------  Confirmed on  3/24/2023 - 12:26:01 by Nery Darby M.D. RPVI    < end of copied text >              
[facility] NEPHROLOGY- CONSULTATION NOTE    74 year old male & former smoker with a past medical history of COPD [ not on home oxygen], UC, HTN, non insulin dependant T2DM, GERD, anemia, gastroparesis presented to the hospital with increased shortness of breath. The patient was found to test positive for COVID-19. Lab work is concerning for the presence of hyponatremia, so the Nephrology service has been consulted for further recommendations.    PAST MEDICAL & SURGICAL HISTORY:  Gastroparesis  with pylorus dysfunction    DM (diabetes mellitus)  type II    Chronic obstructive pulmonary disease  Hypertension  Ulcerative colitis  Inguinal hernia  Bronchiectasis  GERD (gastroesophageal reflux disease)  Unilateral primary osteoarthritis, right knee  Dysphonia    Former smoker  Anemia    History of repair of hiatal hernia  childhood    S/P endoscopy  POEM 10/2021, 11/2021    History of left knee replacement  H/O inguinal hernia repair  S/P cataract surgery      Allergies:  angiotensin converting enzyme inhibitors (Other; Swelling)  Seafood (Hives)    Home Medications Reviewed  Hospital Medications:   MEDICATIONS  (STANDING):  albuterol/ipratropium for Nebulization 3 milliLiter(s) Nebulizer every 6 hours  azithromycin   Tablet 250 milliGRAM(s) Oral daily  cefTRIAXone   IVPB 1000 milliGRAM(s) IV Intermittent every 24 hours  cholecalciferol 1000 Unit(s) Oral daily  dextrose 5%. 1000 milliLiter(s) (50 mL/Hr) IV Continuous <Continuous>  dextrose 5%. 1000 milliLiter(s) (100 mL/Hr) IV Continuous <Continuous>  dextrose 50% Injectable 25 Gram(s) IV Push once  dextrose 50% Injectable 12.5 Gram(s) IV Push once  dextrose 50% Injectable 25 Gram(s) IV Push once  enoxaparin Injectable 40 milliGRAM(s) SubCutaneous every 24 hours  ferrous    sulfate 325 milliGRAM(s) Oral daily  fluticasone propionate 50 MICROgram(s)/spray Nasal Spray 1 Spray(s) Both Nostrils two times a day  gabapentin 300 milliGRAM(s) Oral three times a day  glucagon  Injectable 1 milliGRAM(s) IntraMuscular once  guaiFENesin ER 1200 milliGRAM(s) Oral every 12 hours  insulin glargine Injectable (LANTUS) 14 Unit(s) SubCutaneous at bedtime  insulin lispro (ADMELOG) corrective regimen sliding scale   SubCutaneous three times a day before meals  insulin lispro Injectable (ADMELOG) 2 Unit(s) SubCutaneous three times a day before meals  methylPREDNISolone sodium succinate Injectable   IV Push   methylPREDNISolone sodium succinate Injectable 20 milliGRAM(s) IV Push every 8 hours  montelukast 10 milliGRAM(s) Oral daily  pantoprazole    Tablet 40 milliGRAM(s) Oral before breakfast  sodium chloride 0.9%. 1000 milliLiter(s) (75 mL/Hr) IV Continuous <Continuous>  traZODone 50 milliGRAM(s) Oral at bedtime  upadacitinib (Rinvoq) 15 milliGRAM(s)   Oral daily    SOCIAL HISTORY: Former cigarette smoking and alcohol usage.    FAMILY HISTORY:  FH: diabetes mellitus (Mother)      REVIEW OF SYSTEMS:  CONSTITUTIONAL: Mild weakness, but has improved since admission.  EYES/ENT: No visual changes;  No vertigo or throat pain   NECK: No pain or stiffness  RESPIRATORY: Shortness of breath with productive cough.  CARDIOVASCULAR: No chest pain or palpitations.  GASTROINTESTINAL: No abdominal or epigastric pain. No nausea, vomiting, or hematemesis; No diarrhea or constipation. No melena or hematochezia.  GENITOURINARY: No dysuria, frequency, foamy urine, urinary urgency, incontinence or hematuria  NEUROLOGICAL: No numbness or weakness  SKIN: No itching, burning, rashes, or lesions   VASCULAR: No bilateral lower extremity edema.   All other review of systems is negative unless indicated above.    VITALS:  T(F): 98.5 (02-17-25 @ 12:57), Max: 98.9 (02-16-25 @ 21:00)  HR: 74 (02-17-25 @ 15:27)  BP: 138/68 (02-17-25 @ 12:57)  RR: 18 (02-17-25 @ 12:57)  SpO2: 98% (02-17-25 @ 15:27)  Wt(kg): --        PHYSICAL EXAM:  Constitutional: NAD  HEENT: anicteric sclera, oropharynx clear, MMM  Neck: No JVD  Respiratory: Congestion more prominent in upper lung fields.  Cardiovascular: S1, S2, RRR  Gastrointestinal: BS+, soft, NT/ND  Extremities: No cyanosis or clubbing. No peripheral edema  Neurological: A/O x 3, no focal deficits  Psychiatric: Normal mood, normal affect  : No CVA tenderness.     LABS:  02-17    Na+ trend: 128 <--, 124 <--    128[L]  |  92[L]  |  12  ----------------------------<  91  4.7   |  21[L]  |  1.00    Ca    9.3      17 Feb 2025 04:35    TPro  7.0  /  Alb  3.7  /  TBili  0.4  /  DBili      /  AST  17  /  ALT  6   /  AlkPhos  42  02-17    Creatinine Trend: 1.00 <--, 1.14 <--                        11.4   11.51 )-----------( 400      ( 16 Feb 2025 09:07 )             35.1     Urine Studies:  Urinalysis Basic - ( 17 Feb 2025 04:35 )    Color:  / Appearance:  / SG:  / pH:   Gluc: 91 mg/dL / Ketone:   / Bili:  / Urobili:    Blood:  / Protein:  / Nitrite:    Leuk Esterase:  / RBC:  / WBC    Sq Epi:  / Non Sq Epi:  / Bacteria:         RADIOLOGY & ADDITIONAL STUDIES:      < from: CT Chest No Cont (02.16.25 @ 11:18) >  IMPRESSION:    Ill-defined subtle/mild bilateral groundglass opacities appear new since   September 17, 2024 is a very nonspecific finding.    Mildly enlarged mediastinal lymph node with mild increase in size since   September 17, 2024 also nonspecific finding.    5 mm left lower lobe nodule slightly increased in size since September 17, 2024.    A 3 month follow-up noncontrast chest CT is recommended for complete   evaluation of the above findings.    Impaction of a few of the right lower lobe segmental/subsegmental airways   with overall interval improvement since September 17, 2024 likely due to   internal secretions.    < end of copied text >        < from: Xray Chest 2 Views PA/Lat (02.16.25 @ 09:18) >  IMPRESSION:  Bulged anterior right hemidiaphragm contour. Otherwise clear lungs. No   pleural effusions or pneumothorax.    Cardiac and mediastinal silhouettes within normal limits.    Rightward lower tracheal deviation due to pressure effect from adjacent   aortic arch. Midline upper trachea.    Generalized osteopenia. Spinal degenerative changes.    < end of copied text >      
MR:- [id number] :  NAME:-BALA SARGENT:-    DATE OF SERVICE:02-16-25 @ 18:57    Patient was seen,examined and evaluated  by Victor M Greene MD on 02-16-25 @ 18:57 .  ER evaluation, Labs and Hospital course was reviewed,    CHIEF COMPLAINT:Dyspnea    HPI-   74 year old male & former smoker with a past medical history of COPD [ not on home oxygen], UC, HTN, non insulin dependant T2DM, GERD, anemia, gastroparesis.    He  presented to the emergency department with complaints of shortness of breath. Patient stated 2 weeks ago he tested Positive for COVID-19 at his PCP office [ had chills, fevers, body aches at that time]. He was given Paxlovid for 5 days.   He felt better afterwards.   On Last Friday he had a mechanical fall while moving a water carate. He went to urgent care and Xray of C spine and R hand were done- no acute pathology.     Now for last 2-3 days he is having fevers, chills, loss of Apetite weakness and mild cough. He has not ambulated much d/t gen weakness.   Denies changes in vision/hearing, numbness/tingling, chest pain, nausea/vomiting, diarrhea, abdominal pain, constipation, dysuria, recent travel/sick contacts.    Patient hemodynamically stable, febrile to 101.2F on arrival.   Labs- Mild WBC count elevation-   Hb 11.4  Na 124, Hco3 20, Cl 87, pro bnp 772.  Trop, 20, 18.   EKG no ischemic changes  UA negative.   SARS-COVID-19+      He was given CTX, Azithro, Tylenol IV and LR bolus in ER.    (16 Feb 2025 11:52)        CARDIAC HISTORY:  [ ] CAD [ [PCI [ ] CABG [ ] Prior Cath  [ ] Atrial Fibrillation  Devices[ ] PPM [ ] ICD [ ]ILR  Heart Failure [ ] HFrEF [ ] HFpEF    PAST MEDICAL & SURGICAL HISTORY:  Gastroparesis  with pylorus dysfunction      DM (diabetes mellitus)  type II      Chronic obstructive pulmonary disease      Hypertension      Ulcerative colitis      Inguinal hernia      Bronchiectasis      GERD (gastroesophageal reflux disease)      Unilateral primary osteoarthritis, right knee      Dysphonia      Former smoker      Anemia      History of repair of hiatal hernia  childhood      S/P endoscopy  POEM 10/2021, 11/2021      History of left knee replacement      H/O inguinal hernia repair      S/P cataract surgery          MEDICATIONS  (STANDING):  albuterol/ipratropium for Nebulization 3 milliLiter(s) Nebulizer every 6 hours  azithromycin   Tablet 250 milliGRAM(s) Oral daily  cefTRIAXone   IVPB 1000 milliGRAM(s) IV Intermittent every 24 hours  enoxaparin Injectable 40 milliGRAM(s) SubCutaneous every 24 hours  fluticasone propionate 50 MICROgram(s)/spray Nasal Spray 1 Spray(s) Both Nostrils two times a day  glucagon  Injectable 1 milliGRAM(s) IntraMuscular once  insulin lispro (ADMELOG) corrective regimen sliding scale   SubCutaneous three times a day before meals  montelukast 10 milliGRAM(s) Oral daily  pantoprazole    Tablet 40 milliGRAM(s) Oral before breakfast  predniSONE   Tablet 40 milliGRAM(s) Oral daily  sodium chloride 0.9%. 1000 milliLiter(s) (75 mL/Hr) IV Continuous <Continuous>  traZODone 50 milliGRAM(s) Oral at bedtime    MEDICATIONS  (PRN):  acetaminophen     Tablet .. 650 milliGRAM(s) Oral every 6 hours PRN Temp greater or equal to 38C (100.4F), Mild Pain (1 - 3)  aluminum hydroxide/magnesium hydroxide/simethicone Suspension 30 milliLiter(s) Oral every 4 hours PRN Dyspepsia  dextrose Oral Gel 15 Gram(s) Oral once PRN Blood Glucose LESS THAN 70 milliGRAM(s)/deciliter  hydrALAZINE 10 milliGRAM(s) Oral every 6 hours PRN Systolic blood pressure > 160 mmHg  loperamide 4 milliGRAM(s) Oral every 6 hours PRN Diarrhea  melatonin 3 milliGRAM(s) Oral at bedtime PRN Insomnia  ondansetron Injectable 4 milliGRAM(s) IV Push every 8 hours PRN Nausea and/or Vomiting      FAMILY HISTORY:  FH: diabetes mellitus (Mother)      No family history of premature coronary artery disease or sudden cardiac death    SOCIAL HISTORY:  Smoking-[ ] Active  [x ] Former [ ] Non Smoker  Alcohol-[x ] Denies [ ] Social [ ] Daily  Ilicit Drug use-[x] Denies [ ] Active user    REVIEW OF SYSTEMS:  Constitutional: [ ] fever, [ ]weight loss, [x ]fatigue   Activity [ ] Bedbound,[ x] Ambulates [x ] Unassisted[ ] Cane/Walker [ ] Assistence.  Effort tolerance:[ ] Excellent [ ] Good [ ] Fair [x] Poor [ ]  Eyes: [ ] visual changes  Respiratory: [x ]shortness of breath;  [x] cough, [ ]wheezing, [ ]chills, [ ]hemoptysis  Cardiovascular: [ ] chest pain, [ ]palpitations, [ ]dizziness,  [ ]leg swelling[ ]orthopnea [ ]PND  Gastrointestinal: [ ] abdominal pain, [ ]nausea, [ ]vomiting,  [ ]diarrhea,[ ]constipation  Genitourinary: [ ] dysuria, [ ] hematuria  Neurologic: [ ] headaches [ ] tremors[ ] weakness  Skin: [ ] itching, [ ]burning, [ ] rashes  Endocrine: [ ] heat or cold intolerance  Musculoskeletal: [ ] joint pain or swelling; [ ] muscle, back, or extremity pain  Psychiatric: [ ] depression, [ ]anxiety, [ ]mood swings, or [ ]difficulty sleeping  Hematologic: [ ] easy bruising, [ ] bleeding gums       [ x] All others negative	  [ ] Unable to obtain          PHYSICAL EXAM:  General: No acute distress BMI-24  HEENT: EOMI, PERRL[ ] Icteric  Neck: Supple, [ ] JVD  Lungs: Equal air entry bilaterally; [ ] Rales [ ] Rhonchi [ ] Wheezing  Heart: Regular rate and rhythm;[x ] Murmurs-  2 /6 [x ] Systolic [ ] Diastolic [ ] Radiation,No rubs, or gallops  Abdomen: Nontender, bowel sounds present  Extremities: No clubbing, cyanosis, [ ] edema[ ] Calf tenderness  Nervous system:  Alert & Oriented X3, no focal deficits  Psychiatric: Normal affect  Skin: No rashes or lesions      LABS:  02-17    128[L]  |  92[L]  |  12  ----------------------------<  91  4.7   |  21[L]  |  1.00    Ca    9.3      17 Feb 2025 04:35    TPro  7.0  /  Alb  3.7  /  TBili  0.4  /  DBili  x   /  AST  17  /  ALT  6   /  AlkPhos  42  02-17    Creatinine Trend: 1.00<--, 1.14<--                        11.4   11.51 )-----------( 400      ( 16 Feb 2025 09:07 )             35.1         FluA/FluB/RSV/COVID PCR (02.16.25 @ 09:09)   SARS-CoV-2 Result: Detected  Influenza A Result: [employer]  Influenza B Result: NotDeteDocDep  Resp Syn Virus Result: NotDete  CT chest:   IMPRESSION: CT chest w.o contrast  Ill-defined subtle/mild bilateral groundglass opacities appear new since  September 17, 2024 is a very nonspecific finding.  Mildly enlarged mediastinal lymph node with mild increase in size since  September 17, 2024 also nonspecific finding.  5 mm left lower lobe nodule slightly increased in size since September 17, 2024.  A 3 month follow-up noncontrast chest CT is recommended for complete   evaluation of the above findings.  Impaction of a few of the right lower lobe segmental/subsegmental airways  with overall interval improvement since September 17, 2024 likely due to   internal secretions.    · EKG Date/Time: 16-Feb-2025 08:37  · Rate: 82  · Interpretation: normal sinus rhythm  · Axis: Normal  · UT: 292  · QRS: 130  · QTC: 422  · ST/T Wave: no MENDEZ/D  · Other Findings: RBBB; 1st deg AV block  · Prior EKG Status: the EKG is unchanged from prior EKG  unchanged from 08-JUL-2024    Transthoracic Echocardiogram (03.24.23 @ 09:00) >  CONCLUSIONS:  1. Endocardium not well visualized; grossly normal left ventricular systolic function.  2. Normal right ventricular size and function.      
[facility]  SHAMAR Jeronimo S. Shah, Y. Patel, G. Casimir  [phone]  ([phone] - weekdays after 5pm and weekends)    BALA SARGENT  74y, Male  [id number]    HPI--  HPI:  74 year old male & former smoker with a past medical history of COPD [ not on home oxygen], UC, HTN, non insulin dependant T2DM, GERD, anemia, gastroparesis.    He  presented to the emergency department with complaints of shortness of breath. Patient stated 3-4 weeks ago he tested Positive for COVID-19 at his PCP office [ had chills, fevers, body aches at that time]. He was given Paxlovid for 5 days.   He felt better afterwards.   On Last Friday he had a mechanical fall while moving a water carate. He went to urgent care and Xray of C spine and R hand were done- no acute pathology.     Now for last 2-3 days he is having fevers, chills, loss of Apetite weakness and mild cough. He has not ambulated much d/t gen weakness.   Denies changes in vision/hearing, numbness/tingling, chest pain, nausea/vomiting, diarrhea, abdominal pain, constipation, dysuria, recent travel/sick contacts.    Patient hemodynamically stable, febrile to 101.2F on arrival.   Labs- Mild WBC count elevation-   Hb 11.4  Na 124, Hco3 20, Cl 87, pro bnp 772.  Trop, 20, 18.   EKG no ischemic changes  UA negative.   SARS-COVID-19+    He was given CTX, Azithro, Tylenol IV and LR bolus in ER.     ROS: 14 point review of systems completed, pertinent positives and negatives as per HPI.    Allergies: angiotensin converting enzyme inhibitors (Other; Swelling)  Seafood (Hives)    PMH -- Gastroparesis    DM (diabetes mellitus)    Chronic obstructive pulmonary disease    Hypertension    Alcohol abuse    Ulcerative colitis    Inguinal hernia    Bronchiectasis    GERD (gastroesophageal reflux disease)    Unilateral primary osteoarthritis, right knee    Dysphonia    Former smoker    Anemia      PSH -- Arthropathy    History of repair of hiatal hernia    S/P endoscopy    History of left knee replacement    H/O inguinal hernia repair    S/P cataract surgery      FH -- No pertinent family history in first degree relatives    FH: diabetes mellitus (Mother)      Social History -- former smoker    Physical Exam--  Vital Signs Last 24 Hrs  T(F): 98.8 (17 Feb 2025 05:00), Max: 98.9 (16 Feb 2025 21:00)  HR: 70 (17 Feb 2025 08:51) (70 - 97)  BP: 137/63 (17 Feb 2025 05:00) (137/63 - 154/78)  RR: 19 (17 Feb 2025 05:00) (16 - 19)  SpO2: 98% (17 Feb 2025 08:51) (98% - 100%)  General: nontoxic-appearing, no acute distress  HEENT: anicteric  Lungs: Clear bilaterally without rales  Heart: S1, S2, normal rate.   Abdomen: Soft. Nondistended. Nontender.   Neuro: no obvious focal deficits   Back: No costovertebral angle tenderness.  Extremities: No LE edema.   Skin: Warm. Dry. No rash.  Psychiatric: Appropriate affect and mood for situation.     Laboratory & Imaging Data--  CBC:                       11.4   11.51 )-----------( 400      ( 16 Feb 2025 09:07 )             35.1     WBC Count: 11.51 K/uL (02-16-25 @ 09:07)    CMP: 02-17    128[L]  |  92[L]  |  12  ----------------------------<  91  4.7   |  21[L]  |  1.00    Ca    9.3      17 Feb 2025 04:35    TPro  7.0  /  Alb  3.7  /  TBili  0.4  /  DBili  x   /  AST  17  /  ALT  6   /  AlkPhos  42  02-17    LIVER FUNCTIONS - ( 17 Feb 2025 04:35 )  Alb: 3.7 g/dL / Pro: 7.0 g/dL / ALK PHOS: 42 U/L / ALT: 6 U/L / AST: 17 U/L / GGT: x           Urinalysis Basic - ( 17 Feb 2025 04:35 )    Color: x / Appearance: x / SG: x / pH: x  Gluc: 91 mg/dL / Ketone: x  / Bili: x / Urobili: x   Blood: x / Protein: x / Nitrite: x   Leuk Esterase: x / RBC: x / WBC x   Sq Epi: x / Non Sq Epi: x / Bacteria: x      Microbiology:     Urinalysis with Rflx Culture (collected 02-16-25 @ 11:37)        Radiology--  ***  Active Medications--  acetaminophen     Tablet .. 650 milliGRAM(s) Oral every 6 hours PRN  albuterol/ipratropium for Nebulization 3 milliLiter(s) Nebulizer every 6 hours  aluminum hydroxide/magnesium hydroxide/simethicone Suspension 30 milliLiter(s) Oral every 4 hours PRN  azithromycin   Tablet 250 milliGRAM(s) Oral daily  cefTRIAXone   IVPB 1000 milliGRAM(s) IV Intermittent every 24 hours  dextrose 5%. 1000 milliLiter(s) IV Continuous <Continuous>  dextrose 5%. 1000 milliLiter(s) IV Continuous <Continuous>  dextrose 50% Injectable 25 Gram(s) IV Push once  dextrose 50% Injectable 12.5 Gram(s) IV Push once  dextrose 50% Injectable 25 Gram(s) IV Push once  dextrose Oral Gel 15 Gram(s) Oral once PRN  enoxaparin Injectable 40 milliGRAM(s) SubCutaneous every 24 hours  fluticasone propionate 50 MICROgram(s)/spray Nasal Spray 1 Spray(s) Both Nostrils two times a day  glucagon  Injectable 1 milliGRAM(s) IntraMuscular once  hydrALAZINE 10 milliGRAM(s) Oral every 6 hours PRN  insulin lispro (ADMELOG) corrective regimen sliding scale   SubCutaneous three times a day before meals  loperamide 4 milliGRAM(s) Oral every 6 hours PRN  melatonin 3 milliGRAM(s) Oral at bedtime PRN  montelukast 10 milliGRAM(s) Oral daily  ondansetron Injectable 4 milliGRAM(s) IV Push every 8 hours PRN  pantoprazole    Tablet 40 milliGRAM(s) Oral before breakfast  predniSONE   Tablet 40 milliGRAM(s) Oral daily  sodium chloride 0.9%. 1000 milliLiter(s) IV Continuous <Continuous>  traZODone 50 milliGRAM(s) Oral at bedtime    Antimicrobials:   azithromycin   Tablet 250 milliGRAM(s) Oral daily  cefTRIAXone   IVPB 1000 milliGRAM(s) IV Intermittent every 24 hours    Immunologic:

## 2025-02-17 NOTE — DIETITIAN INITIAL EVALUATION ADULT - PERTINENT MEDS FT
MEDICATIONS  (STANDING):  albuterol/ipratropium for Nebulization 3 milliLiter(s) Nebulizer every 6 hours  azithromycin   Tablet 250 milliGRAM(s) Oral daily  cefTRIAXone   IVPB 1000 milliGRAM(s) IV Intermittent every 24 hours  dextrose 5%. 1000 milliLiter(s) (50 mL/Hr) IV Continuous <Continuous>  dextrose 5%. 1000 milliLiter(s) (100 mL/Hr) IV Continuous <Continuous>  dextrose 50% Injectable 25 Gram(s) IV Push once  dextrose 50% Injectable 12.5 Gram(s) IV Push once  dextrose 50% Injectable 25 Gram(s) IV Push once  enoxaparin Injectable 40 milliGRAM(s) SubCutaneous every 24 hours  fluticasone propionate 50 MICROgram(s)/spray Nasal Spray 1 Spray(s) Both Nostrils two times a day  glucagon  Injectable 1 milliGRAM(s) IntraMuscular once  insulin lispro (ADMELOG) corrective regimen sliding scale   SubCutaneous three times a day before meals  montelukast 10 milliGRAM(s) Oral daily  pantoprazole    Tablet 40 milliGRAM(s) Oral before breakfast  predniSONE   Tablet 40 milliGRAM(s) Oral daily  sodium chloride 0.9%. 1000 milliLiter(s) (75 mL/Hr) IV Continuous <Continuous>  traZODone 50 milliGRAM(s) Oral at bedtime    MEDICATIONS  (PRN):  acetaminophen     Tablet .. 650 milliGRAM(s) Oral every 6 hours PRN Temp greater or equal to 38C (100.4F), Mild Pain (1 - 3)  aluminum hydroxide/magnesium hydroxide/simethicone Suspension 30 milliLiter(s) Oral every 4 hours PRN Dyspepsia  dextrose Oral Gel 15 Gram(s) Oral once PRN Blood Glucose LESS THAN 70 milliGRAM(s)/deciliter  hydrALAZINE 10 milliGRAM(s) Oral every 6 hours PRN Systolic blood pressure > 160 mmHg  loperamide 4 milliGRAM(s) Oral every 6 hours PRN Diarrhea  melatonin 3 milliGRAM(s) Oral at bedtime PRN Insomnia  ondansetron Injectable 4 milliGRAM(s) IV Push every 8 hours PRN Nausea and/or Vomiting

## 2025-02-17 NOTE — DIETITIAN INITIAL EVALUATION ADULT - SIGNS/SYMPTOMS
noted with acute hyponatremia - Na 128; also with elevated POCT sugars  Patient with COPD exacerbation

## 2025-02-17 NOTE — DIETITIAN INITIAL EVALUATION ADULT - OTHER INFO
Nutrition assessment for consult as above.    2/16 H&P: 74 year old male & former smoker with a past medical history of COPD [ not on home oxygen], UC, HTN, non insulin dependant T2DM, GERD, anemia, gastroparesis.  Patient with PNA, recent Covid-19, COPD exacerbation, acute hyponatremia, UC.      +Seafood allergy noted  No GI distress reported i.e. nausea, vomiting, diarrhea.   No chewing or swallowing difficulties reported.  Nutrition assessment for consult as above.    2/16 H&P: 74 year old male & former smoker with a past medical history of COPD [ not on home oxygen], UC, HTN, non insulin dependant T2DM, GERD, anemia, gastroparesis.  Patient with PNA, recent Covid-19, COPD exacerbation, acute hyponatremia, UC.      Patient endorses good appetite / good PO intake.  Tolerating diet without difficulty.  +Seafood allergy noted. Dislikes beef.  Halal diet being provided per patient preference.  No GI distress reported i.e. nausea, vomiting, diarrhea.   No chewing or swallowing difficulties reported.   Denies weight changes, reported usual body weight in 156-157 pounds.

## 2025-02-17 NOTE — DIETITIAN INITIAL EVALUATION ADULT - ADD RECOMMEND
1) Monitor weights, PO intake/diet tolerance, skin integrity, pertinent labs.   2) Please consistently document % PO intake in nursing flowsheets to assess adequacy of nutritional intake/monitor need for further nutritional intervention(s).     Ashwini Chicas MS, RDN, CDN  Pager [phone]  Also available on MS Teams

## 2025-02-18 LAB
ANION GAP SERPL CALC-SCNC: 14 MMOL/L — SIGNIFICANT CHANGE UP (ref 7–14)
BUN SERPL-MCNC: 17 MG/DL — SIGNIFICANT CHANGE UP (ref 7–23)
CALCIUM SERPL-MCNC: 8.9 MG/DL — SIGNIFICANT CHANGE UP (ref 8.4–10.5)
CHLORIDE SERPL-SCNC: 95 MMOL/L — LOW (ref 98–107)
CO2 SERPL-SCNC: 20 MMOL/L — LOW (ref 22–31)
CREAT ?TM UR-MCNC: 55 MG/DL — SIGNIFICANT CHANGE UP
CREAT SERPL-MCNC: 0.89 MG/DL — SIGNIFICANT CHANGE UP (ref 0.5–1.3)
EGFR: 90 ML/MIN/1.73M2 — SIGNIFICANT CHANGE UP
GLUCOSE BLDC GLUCOMTR-MCNC: 121 MG/DL — HIGH (ref 70–99)
GLUCOSE BLDC GLUCOMTR-MCNC: 194 MG/DL — HIGH (ref 70–99)
GLUCOSE BLDC GLUCOMTR-MCNC: 248 MG/DL — HIGH (ref 70–99)
GLUCOSE BLDC GLUCOMTR-MCNC: 309 MG/DL — HIGH (ref 70–99)
GLUCOSE SERPL-MCNC: 171 MG/DL — HIGH (ref 70–99)
HCT VFR BLD CALC: 31.5 % — LOW (ref 39–50)
HGB BLD-MCNC: 9.9 G/DL — LOW (ref 13–17)
MAGNESIUM SERPL-MCNC: 1.9 MG/DL — SIGNIFICANT CHANGE UP (ref 1.6–2.6)
MCHC RBC-ENTMCNC: 25.7 PG — LOW (ref 27–34)
MCHC RBC-ENTMCNC: 31.4 G/DL — LOW (ref 32–36)
MCV RBC AUTO: 81.8 FL — SIGNIFICANT CHANGE UP (ref 80–100)
NRBC # BLD AUTO: 0 K/UL — SIGNIFICANT CHANGE UP (ref 0–0)
NRBC # FLD: 0 K/UL — SIGNIFICANT CHANGE UP (ref 0–0)
NRBC BLD AUTO-RTO: 0 /100 WBCS — SIGNIFICANT CHANGE UP (ref 0–0)
OSMOLALITY UR: 407 MOSM/KG — SIGNIFICANT CHANGE UP (ref 50–1200)
PHOSPHATE SERPL-MCNC: 4.1 MG/DL — SIGNIFICANT CHANGE UP (ref 2.5–4.5)
PLATELET # BLD AUTO: 427 K/UL — HIGH (ref 150–400)
POTASSIUM SERPL-MCNC: 4.5 MMOL/L — SIGNIFICANT CHANGE UP (ref 3.5–5.3)
POTASSIUM SERPL-SCNC: 4.5 MMOL/L — SIGNIFICANT CHANGE UP (ref 3.5–5.3)
POTASSIUM UR-SCNC: 40.1 MMOL/L — SIGNIFICANT CHANGE UP
PROT ?TM UR-MCNC: 11 MG/DL — SIGNIFICANT CHANGE UP
PROT/CREAT UR-RTO: 0.2 RATIO — SIGNIFICANT CHANGE UP (ref 0–0.2)
RBC # BLD: 3.85 M/UL — LOW (ref 4.2–5.8)
RBC # FLD: 13.7 % — SIGNIFICANT CHANGE UP (ref 10.3–14.5)
S PNEUM AG UR QL: NEGATIVE — SIGNIFICANT CHANGE UP
SODIUM SERPL-SCNC: 129 MMOL/L — LOW (ref 135–145)
SODIUM UR-SCNC: 33 MMOL/L — SIGNIFICANT CHANGE UP
UUN UR-MCNC: 490 MG/DL — SIGNIFICANT CHANGE UP
WBC # BLD: 9.33 K/UL — SIGNIFICANT CHANGE UP (ref 3.8–10.5)
WBC # FLD AUTO: 9.33 K/UL — SIGNIFICANT CHANGE UP (ref 3.8–10.5)

## 2025-02-18 RX ORDER — ALBUTEROL SULFATE 2.5 MG/3ML
2 VIAL, NEBULIZER (ML) INHALATION EVERY 6 HOURS
Refills: 0 | Status: DISCONTINUED | OUTPATIENT
Start: 2025-02-18 | End: 2025-02-20

## 2025-02-18 RX ADMIN — INSULIN LISPRO 4: 100 INJECTION, SOLUTION INTRAVENOUS; SUBCUTANEOUS at 12:33

## 2025-02-18 RX ADMIN — METHYLPREDNISOLONE ACETATE 20 MILLIGRAM(S): 80 INJECTION, SUSPENSION INTRA-ARTICULAR; INTRALESIONAL; INTRAMUSCULAR; SOFT TISSUE at 05:22

## 2025-02-18 RX ADMIN — Medication 40 MILLIGRAM(S): at 05:20

## 2025-02-18 RX ADMIN — GABAPENTIN 300 MILLIGRAM(S): 400 CAPSULE ORAL at 05:21

## 2025-02-18 RX ADMIN — INSULIN LISPRO 2 UNIT(S): 100 INJECTION, SOLUTION INTRAVENOUS; SUBCUTANEOUS at 17:59

## 2025-02-18 RX ADMIN — METHYLPREDNISOLONE ACETATE 20 MILLIGRAM(S): 80 INJECTION, SUSPENSION INTRA-ARTICULAR; INTRALESIONAL; INTRAMUSCULAR; SOFT TISSUE at 14:08

## 2025-02-18 RX ADMIN — Medication 3 MILLIGRAM(S): at 21:47

## 2025-02-18 RX ADMIN — INSULIN GLARGINE-YFGN 14 UNIT(S): 100 INJECTION, SOLUTION SUBCUTANEOUS at 21:26

## 2025-02-18 RX ADMIN — INSULIN LISPRO 1: 100 INJECTION, SOLUTION INTRAVENOUS; SUBCUTANEOUS at 08:25

## 2025-02-18 RX ADMIN — Medication 1 DOSE(S): at 21:46

## 2025-02-18 RX ADMIN — MONTELUKAST SODIUM 10 MILLIGRAM(S): 10 TABLET ORAL at 12:10

## 2025-02-18 RX ADMIN — IPRATROPIUM BROMIDE AND ALBUTEROL SULFATE 3 MILLILITER(S): .5; 2.5 SOLUTION RESPIRATORY (INHALATION) at 08:20

## 2025-02-18 RX ADMIN — Medication 1000 UNIT(S): at 12:10

## 2025-02-18 RX ADMIN — IPRATROPIUM BROMIDE AND ALBUTEROL SULFATE 3 MILLILITER(S): .5; 2.5 SOLUTION RESPIRATORY (INHALATION) at 14:51

## 2025-02-18 RX ADMIN — Medication 325 MILLIGRAM(S): at 12:10

## 2025-02-18 RX ADMIN — INSULIN LISPRO 2 UNIT(S): 100 INJECTION, SOLUTION INTRAVENOUS; SUBCUTANEOUS at 12:30

## 2025-02-18 RX ADMIN — GABAPENTIN 300 MILLIGRAM(S): 400 CAPSULE ORAL at 14:07

## 2025-02-18 RX ADMIN — DEXTROMETHORPHAN HBR, GUAIFENESIN 1200 MILLIGRAM(S): 200 LIQUID ORAL at 05:21

## 2025-02-18 RX ADMIN — GABAPENTIN 300 MILLIGRAM(S): 400 CAPSULE ORAL at 21:48

## 2025-02-18 RX ADMIN — DEXTROMETHORPHAN HBR, GUAIFENESIN 1200 MILLIGRAM(S): 200 LIQUID ORAL at 17:59

## 2025-02-18 RX ADMIN — METHYLPREDNISOLONE ACETATE 20 MILLIGRAM(S): 80 INJECTION, SUSPENSION INTRA-ARTICULAR; INTRALESIONAL; INTRAMUSCULAR; SOFT TISSUE at 21:48

## 2025-02-18 RX ADMIN — ENOXAPARIN SODIUM 40 MILLIGRAM(S): 100 INJECTION SUBCUTANEOUS at 14:07

## 2025-02-18 RX ADMIN — INSULIN LISPRO 2 UNIT(S): 100 INJECTION, SOLUTION INTRAVENOUS; SUBCUTANEOUS at 08:30

## 2025-02-18 RX ADMIN — IPRATROPIUM BROMIDE AND ALBUTEROL SULFATE 3 MILLILITER(S): .5; 2.5 SOLUTION RESPIRATORY (INHALATION) at 21:12

## 2025-02-18 RX ADMIN — FLUTICASONE PROPIONATE 1 SPRAY(S): 50 SPRAY, METERED NASAL at 05:24

## 2025-02-18 RX ADMIN — FLUTICASONE PROPIONATE 1 SPRAY(S): 50 SPRAY, METERED NASAL at 17:58

## 2025-02-18 RX ADMIN — CEFTRIAXONE 100 MILLIGRAM(S): 500 INJECTION, POWDER, FOR SOLUTION INTRAMUSCULAR; INTRAVENOUS at 12:08

## 2025-02-18 RX ADMIN — INSULIN LISPRO 2: 100 INJECTION, SOLUTION INTRAVENOUS; SUBCUTANEOUS at 17:59

## 2025-02-18 NOTE — PROVIDER CONTACT NOTE (OTHER) - ASSESSMENT
A&Ox4. Patient said trazodone makes him sleepy all day, but melatonin works shorter so he wants that instead. No distress noted.

## 2025-02-19 ENCOUNTER — TRANSCRIPTION ENCOUNTER (OUTPATIENT)
Age: 75
End: 2025-02-19

## 2025-02-19 LAB
ANION GAP SERPL CALC-SCNC: 15 MMOL/L — HIGH (ref 7–14)
BASOPHILS # BLD AUTO: 0.01 K/UL — SIGNIFICANT CHANGE UP (ref 0–0.2)
BASOPHILS NFR BLD AUTO: 0.1 % — SIGNIFICANT CHANGE UP (ref 0–2)
BUN SERPL-MCNC: 20 MG/DL — SIGNIFICANT CHANGE UP (ref 7–23)
CALCIUM SERPL-MCNC: 8.9 MG/DL — SIGNIFICANT CHANGE UP (ref 8.4–10.5)
CHLORIDE SERPL-SCNC: 98 MMOL/L — SIGNIFICANT CHANGE UP (ref 98–107)
CO2 SERPL-SCNC: 20 MMOL/L — LOW (ref 22–31)
CORTIS AM PEAK SERPL-MCNC: 6.2 UG/DL — SIGNIFICANT CHANGE UP (ref 6–18.4)
CREAT SERPL-MCNC: 0.89 MG/DL — SIGNIFICANT CHANGE UP (ref 0.5–1.3)
EGFR: 90 ML/MIN/1.73M2 — SIGNIFICANT CHANGE UP
EOSINOPHIL # BLD AUTO: 0 K/UL — SIGNIFICANT CHANGE UP (ref 0–0.5)
EOSINOPHIL NFR BLD AUTO: 0 % — SIGNIFICANT CHANGE UP (ref 0–6)
GLUCOSE BLDC GLUCOMTR-MCNC: 122 MG/DL — HIGH (ref 70–99)
GLUCOSE BLDC GLUCOMTR-MCNC: 162 MG/DL — HIGH (ref 70–99)
GLUCOSE BLDC GLUCOMTR-MCNC: 187 MG/DL — HIGH (ref 70–99)
GLUCOSE BLDC GLUCOMTR-MCNC: 199 MG/DL — HIGH (ref 70–99)
GLUCOSE SERPL-MCNC: 152 MG/DL — HIGH (ref 70–99)
HCT VFR BLD CALC: 28.3 % — LOW (ref 39–50)
HGB BLD-MCNC: 9.3 G/DL — LOW (ref 13–17)
IANC: 12.06 K/UL — HIGH (ref 1.8–7.4)
IMM GRANULOCYTES NFR BLD AUTO: 0.7 % — SIGNIFICANT CHANGE UP (ref 0–0.9)
LACTATE SERPL-SCNC: 3.8 MMOL/L — HIGH (ref 0.5–2)
LYMPHOCYTES # BLD AUTO: 0.24 K/UL — LOW (ref 1–3.3)
LYMPHOCYTES # BLD AUTO: 1.8 % — LOW (ref 13–44)
MAGNESIUM SERPL-MCNC: 2 MG/DL — SIGNIFICANT CHANGE UP (ref 1.6–2.6)
MCHC RBC-ENTMCNC: 26.4 PG — LOW (ref 27–34)
MCHC RBC-ENTMCNC: 32.9 G/DL — SIGNIFICANT CHANGE UP (ref 32–36)
MCV RBC AUTO: 80.4 FL — SIGNIFICANT CHANGE UP (ref 80–100)
MONOCYTES # BLD AUTO: 1.16 K/UL — HIGH (ref 0–0.9)
MONOCYTES NFR BLD AUTO: 8.5 % — SIGNIFICANT CHANGE UP (ref 2–14)
NEUTROPHILS # BLD AUTO: 12.06 K/UL — HIGH (ref 1.8–7.4)
NEUTROPHILS NFR BLD AUTO: 88.9 % — HIGH (ref 43–77)
NRBC # BLD AUTO: 0 K/UL — SIGNIFICANT CHANGE UP (ref 0–0)
NRBC # FLD: 0 K/UL — SIGNIFICANT CHANGE UP (ref 0–0)
NRBC BLD AUTO-RTO: 0 /100 WBCS — SIGNIFICANT CHANGE UP (ref 0–0)
PHOSPHATE SERPL-MCNC: 3.2 MG/DL — SIGNIFICANT CHANGE UP (ref 2.5–4.5)
PLATELET # BLD AUTO: 465 K/UL — HIGH (ref 150–400)
POTASSIUM SERPL-MCNC: 5 MMOL/L — SIGNIFICANT CHANGE UP (ref 3.5–5.3)
POTASSIUM SERPL-SCNC: 5 MMOL/L — SIGNIFICANT CHANGE UP (ref 3.5–5.3)
RBC # BLD: 3.52 M/UL — LOW (ref 4.2–5.8)
RBC # FLD: 13.7 % — SIGNIFICANT CHANGE UP (ref 10.3–14.5)
SODIUM SERPL-SCNC: 133 MMOL/L — LOW (ref 135–145)
TSH SERPL-MCNC: 1.59 UIU/ML — SIGNIFICANT CHANGE UP (ref 0.27–4.2)
WBC # BLD: 13.57 K/UL — HIGH (ref 3.8–10.5)
WBC # FLD AUTO: 13.57 K/UL — HIGH (ref 3.8–10.5)

## 2025-02-19 RX ORDER — MELATONIN 5 MG
1 TABLET ORAL
Refills: 0 | DISCHARGE

## 2025-02-19 RX ORDER — METRONIDAZOLE 7.5 MG/G
1 GEL VAGINAL
Refills: 0 | DISCHARGE

## 2025-02-19 RX ORDER — ONDANSETRON HCL/PF 4 MG/2 ML
1 VIAL (ML) INJECTION
Refills: 0 | DISCHARGE

## 2025-02-19 RX ORDER — FERROUS SULFATE 137(45) MG
0 TABLET, EXTENDED RELEASE ORAL
Refills: 0 | DISCHARGE

## 2025-02-19 RX ORDER — FLUTICASONE PROPIONATE 50 UG/1
1 SPRAY, METERED NASAL
Refills: 0 | DISCHARGE

## 2025-02-19 RX ORDER — ROSUVASTATIN CALCIUM 20 MG/1
1 TABLET, FILM COATED ORAL
Refills: 0 | DISCHARGE

## 2025-02-19 RX ORDER — POLYETHYLENE GLYCOL 3350 17 G/17G
17 POWDER, FOR SOLUTION ORAL DAILY
Refills: 0 | Status: DISCONTINUED | OUTPATIENT
Start: 2025-02-19 | End: 2025-02-20

## 2025-02-19 RX ORDER — FERROUS SULFATE 137(45) MG
1 TABLET, EXTENDED RELEASE ORAL
Refills: 0 | DISCHARGE

## 2025-02-19 RX ADMIN — POLYETHYLENE GLYCOL 3350 17 GRAM(S): 17 POWDER, FOR SOLUTION ORAL at 18:58

## 2025-02-19 RX ADMIN — IPRATROPIUM BROMIDE AND ALBUTEROL SULFATE 3 MILLILITER(S): .5; 2.5 SOLUTION RESPIRATORY (INHALATION) at 20:50

## 2025-02-19 RX ADMIN — INSULIN LISPRO 1: 100 INJECTION, SOLUTION INTRAVENOUS; SUBCUTANEOUS at 12:13

## 2025-02-19 RX ADMIN — Medication 40 MILLIGRAM(S): at 05:42

## 2025-02-19 RX ADMIN — INSULIN LISPRO 2 UNIT(S): 100 INJECTION, SOLUTION INTRAVENOUS; SUBCUTANEOUS at 17:38

## 2025-02-19 RX ADMIN — MONTELUKAST SODIUM 10 MILLIGRAM(S): 10 TABLET ORAL at 11:09

## 2025-02-19 RX ADMIN — DEXTROMETHORPHAN HBR, GUAIFENESIN 1200 MILLIGRAM(S): 200 LIQUID ORAL at 17:42

## 2025-02-19 RX ADMIN — INSULIN LISPRO 2 UNIT(S): 100 INJECTION, SOLUTION INTRAVENOUS; SUBCUTANEOUS at 08:20

## 2025-02-19 RX ADMIN — GABAPENTIN 300 MILLIGRAM(S): 400 CAPSULE ORAL at 22:04

## 2025-02-19 RX ADMIN — INSULIN LISPRO 1: 100 INJECTION, SOLUTION INTRAVENOUS; SUBCUTANEOUS at 08:19

## 2025-02-19 RX ADMIN — GABAPENTIN 300 MILLIGRAM(S): 400 CAPSULE ORAL at 05:42

## 2025-02-19 RX ADMIN — IPRATROPIUM BROMIDE AND ALBUTEROL SULFATE 3 MILLILITER(S): .5; 2.5 SOLUTION RESPIRATORY (INHALATION) at 15:32

## 2025-02-19 RX ADMIN — IPRATROPIUM BROMIDE AND ALBUTEROL SULFATE 3 MILLILITER(S): .5; 2.5 SOLUTION RESPIRATORY (INHALATION) at 09:05

## 2025-02-19 RX ADMIN — Medication 325 MILLIGRAM(S): at 11:09

## 2025-02-19 RX ADMIN — METHYLPREDNISOLONE ACETATE 20 MILLIGRAM(S): 80 INJECTION, SUSPENSION INTRA-ARTICULAR; INTRALESIONAL; INTRAMUSCULAR; SOFT TISSUE at 17:53

## 2025-02-19 RX ADMIN — FLUTICASONE PROPIONATE 1 SPRAY(S): 50 SPRAY, METERED NASAL at 05:42

## 2025-02-19 RX ADMIN — ENOXAPARIN SODIUM 40 MILLIGRAM(S): 100 INJECTION SUBCUTANEOUS at 13:41

## 2025-02-19 RX ADMIN — CEFTRIAXONE 100 MILLIGRAM(S): 500 INJECTION, POWDER, FOR SOLUTION INTRAMUSCULAR; INTRAVENOUS at 11:09

## 2025-02-19 RX ADMIN — Medication 1 DOSE(S): at 08:07

## 2025-02-19 RX ADMIN — Medication 40 MILLIGRAM(S): at 22:04

## 2025-02-19 RX ADMIN — METHYLPREDNISOLONE ACETATE 20 MILLIGRAM(S): 80 INJECTION, SUSPENSION INTRA-ARTICULAR; INTRALESIONAL; INTRAMUSCULAR; SOFT TISSUE at 05:42

## 2025-02-19 RX ADMIN — Medication 3 MILLIGRAM(S): at 22:05

## 2025-02-19 RX ADMIN — Medication 1 DOSE(S): at 21:58

## 2025-02-19 RX ADMIN — Medication 1000 UNIT(S): at 11:09

## 2025-02-19 RX ADMIN — INSULIN GLARGINE-YFGN 14 UNIT(S): 100 INJECTION, SOLUTION SUBCUTANEOUS at 22:05

## 2025-02-19 RX ADMIN — DEXTROMETHORPHAN HBR, GUAIFENESIN 1200 MILLIGRAM(S): 200 LIQUID ORAL at 05:42

## 2025-02-19 RX ADMIN — GABAPENTIN 300 MILLIGRAM(S): 400 CAPSULE ORAL at 13:08

## 2025-02-19 RX ADMIN — FLUTICASONE PROPIONATE 1 SPRAY(S): 50 SPRAY, METERED NASAL at 17:43

## 2025-02-19 RX ADMIN — INSULIN LISPRO 2 UNIT(S): 100 INJECTION, SOLUTION INTRAVENOUS; SUBCUTANEOUS at 12:13

## 2025-02-19 NOTE — PROGRESS NOTE ADULT - PROBLEM SELECTOR PLAN 2
- as above.   - Isolation precautions.

## 2025-02-19 NOTE — PROGRESS NOTE ADULT - PROBLEM SELECTOR PLAN 3
- Mild exacerbation d/t PNA.  - Will Rx w Duo nebs, Steroids.   - On annoro Ellipta, fluticasone and Montelukast OP.   - Supp. Oxygen  - pulm f/u

## 2025-02-19 NOTE — PROGRESS NOTE ADULT - PROBLEM SELECTOR PLAN 5
titrate bp meds for optimal bp control   -

## 2025-02-19 NOTE — DISCHARGE NOTE NURSING/CASE MANAGEMENT/SOCIAL WORK - FINANCIAL ASSISTANCE
[facility] provides services at a reduced cost to those who are determined to be eligible through [facility]’s financial assistance program. Information regarding [facility]’s financial assistance program can be found by going to [email or web address] or by calling [phone].

## 2025-02-19 NOTE — PROGRESS NOTE ADULT - PROBLEM SELECTOR PLAN 8
HH stable.      Insomnia- Takes Trazodone 50 mg at bedtime.

## 2025-02-19 NOTE — DISCHARGE NOTE PROVIDER - CARE PROVIDERS DIRECT ADDRESSES
[email or web address]

## 2025-02-19 NOTE — PROGRESS NOTE ADULT - PROBLEM SELECTOR PROBLEM 7
Diabetes mellitus type II, non insulin dependent

## 2025-02-19 NOTE — DISCHARGE NOTE NURSING/CASE MANAGEMENT/SOCIAL WORK - PATIENT PORTAL LINK FT
You can access the FollowMyHealth Patient Portal offered by [facility] by registering at the following website: [email or web address]. By joining [employer]’s FollowMyHealth portal, you will also be able to view your health information using other applications (apps) compatible with our system.

## 2025-02-19 NOTE — DISCHARGE NOTE PROVIDER - PROVIDER TOKENS
PROVIDER:[TOKEN:[[id number]],FOLLOWUP:[1 week]] PROVIDER:[TOKEN:[[id number]],FOLLOWUP:[1 week]],PROVIDER:[TOKEN:[[id number]],SCHEDULEDAPPT:[02/21/2025],SCHEDULEDAPPTTIME:[02:00 PM]]

## 2025-02-19 NOTE — DISCHARGE NOTE NURSING/CASE MANAGEMENT/SOCIAL WORK - NSDCFUADDAPPT_GEN_ALL_CORE_FT
APPTS ARE READY TO BE MADE: [X] YES    Best Family or Patient Contact (if needed):    Additional Information about above appointments (if needed):    1: Pulmonary Home   2:   3:     Other comments or requests:

## 2025-02-19 NOTE — DISCHARGE NOTE PROVIDER - NSDCCPTREATMENT_GEN_ALL_CORE_FT
PRINCIPAL PROCEDURE  Procedure: CT chest wo con  Findings and Treatment: IMPRESSION:  Ill-defined subtle/mild bilateral groundglass opacities appear new since   September 17, 2024 is a very nonspecific finding.  Mildly enlarged mediastinal lymph node with mild increase in size since   September 17, 2024 also nonspecific finding.  5 mm left lower lobe nodule slightly increased in size since September 17, 2024.  Impaction of a few of the right lower lobe segmental/subsegmental airways  with overall interval improvement since September 17, 2024 likely due to  internal secretions.  A 3 month follow-up noncontrast chest CT is recommended for complete evaluation of the above findings.

## 2025-02-19 NOTE — DISCHARGE NOTE PROVIDER - NSDCCPCAREPLAN_GEN_ALL_CORE_FT
PRINCIPAL DISCHARGE DIAGNOSIS  Diagnosis: Pneumonia  Assessment and Plan of Treatment: You presented with pneumonia and were treated with IV antibiotic.  Please continue with Cefuroxime 500mg BID orally, first and last day on 2/20/2025.   Please continue with your inhalers as directed. Follow up with your primary care provider and pulmonologist upon discharge.      SECONDARY DISCHARGE DIAGNOSES  Diagnosis: Acute hyponatremia  Assessment and Plan of Treatment: Your sodium levels were found to be low. You were followed by the nephrology team (kidney doctors) who believed this was in setting of SIADH (syndrome of inappropriate anti-diuretic hormone).  Continue with a 1.2L fluid restriction. Follow up with your primary care provider in 1 week for repeat sodium level monitoring.    Diagnosis: COPD exacerbation  Assessment and Plan of Treatment: You presented with COPD exacerbation likely in setting of pneumonia.  Please continue with your last day of antibiotic on 2/20/2025.  Continue with oral prednisone 40mg x5 more days. Continue with your inhalers as directed. Follow up with your primary care provider/ pulmonologist upon discharge.   Your CT chest revealed a left lower lobe nodule 5mm slightly increased from prior. Please have a repeat CT chest non cont outpatient in 3 months for follow up.    Diagnosis: HTN (hypertension)  Assessment and Plan of Treatment: Your blood pressure medication regimen has been adjusted. Please continue with your current regimen. Follow up with your PCP in 1 week for repeat BP monitoring and for further recommendations.     PRINCIPAL DISCHARGE DIAGNOSIS  Diagnosis: Pneumonia  Assessment and Plan of Treatment: You presented with pneumonia and were treated with IV antibiotic.  Please continue with Cefuroxime 500mg BID orally, first and last day on 2/20/2025.   Please continue with your inhalers as directed. Follow up with your primary care provider and pulmonologist upon discharge.   A telehealth appointment was made for you on 2/21/25 2PM with Dr. Greenfield.    TELEHEALTH INSTRUCTIONS: AT THE TIME OF YOUR APPOINTMENT, YOU WILL RECEIVE A TEXT/EMAIL INVITE FOR YOUR TELEHEALTH SESSION. PLEASE CLICK ON LINK, ENTER THE PATIENT'S NAME. YOU WILL THEN BE REDIRECTED TO A VIRTUAL WAITING ROOM WHERE YOU WILL WAIT UNTIL THE DOCTOR HAS CONNECTED WITH YOU.        SECONDARY DISCHARGE DIAGNOSES  Diagnosis: Acute hyponatremia  Assessment and Plan of Treatment: Your sodium levels were found to be low. You were followed by the nephrology team (kidney doctors) who believed this was in setting of SIADH (syndrome of inappropriate anti-diuretic hormone) or dehydration.  Continue with a 1.5L fluid restriction. Follow up with your primary care provider or nephrologist in 1 week for repeat sodium level monitoring.    Diagnosis: COPD exacerbation  Assessment and Plan of Treatment: You presented with COPD exacerbation likely in setting of pneumonia.  Please continue with your last day of antibiotic on 2/20/2025.  Continue with oral prednisone 40mg x5 more days. Continue with your inhalers as directed. Follow up with your primary care provider/ pulmonologist upon discharge.   Your CT chest revealed a left lower lobe nodule 5mm slightly increased from prior. Please have a repeat CT chest non cont outpatient in 3 months for follow up.    Diagnosis: HTN (hypertension)  Assessment and Plan of Treatment: Your blood pressure medication regimen has been adjusted. Please continue with your current regimen. Follow up with your PCP in 1 week for repeat BP monitoring and for further recommendations.     PRINCIPAL DISCHARGE DIAGNOSIS  Diagnosis: Pneumonia  Assessment and Plan of Treatment: You presented with pneumonia and were treated with IV antibiotic. Please continue with your inhalers as directed. Follow up with your primary care provider and pulmonologist upon discharge.   A telehealth appointment was made for you on 2/21/25 2PM with Dr. Greenfield.    TELEHEALTH INSTRUCTIONS: AT THE TIME OF YOUR APPOINTMENT, YOU WILL RECEIVE A TEXT/EMAIL INVITE FOR YOUR TELEHEALTH SESSION. PLEASE CLICK ON LINK, ENTER THE PATIENT'S NAME. YOU WILL THEN BE REDIRECTED TO A VIRTUAL WAITING ROOM WHERE YOU WILL WAIT UNTIL THE DOCTOR HAS CONNECTED WITH YOU.      SECONDARY DISCHARGE DIAGNOSES  Diagnosis: Acute hyponatremia  Assessment and Plan of Treatment: Your sodium levels were found to be low. You were followed by the nephrology team (kidney doctors) who believed this was in setting of SIADH (syndrome of inappropriate anti-diuretic hormone) or dehydration.  Continue with a 1.5L fluid restriction. Follow up with your primary care provider or nephrologist in 1 week for repeat sodium level monitoring.    Diagnosis: Diabetes mellitus type II, non insulin dependent  Assessment and Plan of Treatment: PLEASE STOP JANUMET due to risk for lactic acidosis while lactate is high (2.8). Greater than 2.0 is considered elevated. Please see your pcp within a week to repeat lactate level for clearance to restart the medication. You will start januvia instead for now. Continue a consistent carbohydrate diet (Meaning eating the same amount of carbohydrates at the same time each day). Monitor blood glucose levels four times a day before meals and at bedtime. Record blood sugars and bring to outpatient providers appointment in order to be reviewed by your doctor for management modifications. If your sugars are more than 400 or less than 70 you should contact your Primary Care Physician immediately. Monitor for signs/symptoms of low blood glucose, such as, dizziness, altered mental status, or cool/clammy skin. In addition, monitor for signs/symptoms of high blood glucose, such as, feeling hot, dry, fatigued, or with increased thirst/urination.  Exercise daily for at least 30 minutes and weight loss.  Follow up with your primary care physician and endocrinologist for routine Hemoglobin A1C checks and management.  Follow up with your ophthalmologist for routine yearly vision exams. Make regular podiatry appointments in order to have feet checked for wounds.      Diagnosis: COPD exacerbation  Assessment and Plan of Treatment: You presented with COPD exacerbation likely in setting of pneumonia.  Please continue with your last day of antibiotic on 2/20/2025.  Continue with oral prednisone 40mg x5 more days. Continue with your inhalers as directed. Follow up with your primary care provider/ pulmonologist upon discharge.   Your CT chest revealed a left lower lobe nodule 5mm slightly increased from prior. Please have a repeat CT chest non cont outpatient in 3 months for follow up.    Diagnosis: HTN (hypertension)  Assessment and Plan of Treatment: Your blood pressure medication regimen has been adjusted. Telmisartan was stopped for low blood pressure. Please continue with your current regimen. Follow up with your PCP in 1 week for repeat BP monitoring and for further recommendations.

## 2025-02-19 NOTE — PROGRESS NOTE ADULT - PROBLEM SELECTOR PLAN 1
- P/w Fevers, chills, Weakness and Loss of Apetite.   - Recent Rx for COVID-19 w 5D of Paxlovid OP by PCP.   - Possible superimposed bacterial PNA.  id F/U  ABX AS PER id  pt clinically improving   poss dc today after pulm / Id clearance
- P/w Fevers, chills, Weakness and Loss of Apetite.   - Recent Rx for COVID-19 w 5D of Paxlovid OP by PCP.   - Possible superimposed bacterial PNA.  id F/U  ABX AS PER id  pt clinically improving   poss dc after pulm / Id clearance
- P/w Fevers, chills, Weakness and Loss of Apetite.   - Recent Rx for COVID-19 w 5D of Paxlovid OP by PCP.   - Possible superimposed bacterial PNA.  id F/U  ABX AS PER id

## 2025-02-19 NOTE — PHARMACOTHERAPY INTERVENTION NOTE - COMMENTS
Medications history is complete. Medication list has been obtained from the patient's outpatient pharmacy and verified with the patient. Medication list has been updated on EN.    Of note, per outpatient pharmacy records patient was prescribed Trelegy on 2025. Per patient, he hasn't started the medication and only takes Anoro on a daily basis.    Home Medications:  albuterol-budesonide 90 mcg-80 mcg/inh inhalation aerosol: 2 puff(s) inhaled every 6 hours as needed for  shortness of breath and/or wheezing   Anoro Ellipta 62.5 mcg-25 mcg/inh inhalation powder: 1 puff(s) inhaled once a day   cholecalciferol 125 mcg (5000 intl units) oral tablet: 1 tab(s) orally once a day   famotidine 40 mg oral tablet: 1 tab(s) orally once a day (at bedtime)   ferrous sulfate 325 mg (65 mg elemental iron) oral tablet: 1 tab(s) orally once a day   fluticasone 50 mcg/inh nasal spray: 1 spray(s) in each nostril 2 times a day   gabapentin 300 mg oral capsule: 1 cap(s) orally 2 times a day   Janumet 50 mg-500 mg oral tablet: 1 tab(s) orally 2 times a day  Melatonin 5 mg oral tablet: 1 tab(s) orally once a day (at bedtime)  metroNIDAZOLE 1% topical gel: Apply topically to affected area once a day as needed for  itching   montelukast 10 mg oral tablet: 1 tab(s) orally once a day   ondansetron 8 mg oral tablet, disintegratin tab(s) orally 3 times a day as needed for  nausea  pantoprazole 40 mg oral delayed release tablet: 1 tab(s) orally once a day  Rinvoq 15 mg oral tablet, extended release: 1 tab(s) orally once a day   rosuvastatin 10 mg oral tablet: 1 tab(s) orally once a day (at bedtime)   telmisartan 40 mg oral tablet: 1 tab(s) orally once a day     Rosario Kruse, PharmD, BCPS  Clinical Pharmacy Specialist  Spectra [phone]

## 2025-02-19 NOTE — DISCHARGE NOTE PROVIDER - NSDCFUADDAPPT_GEN_ALL_CORE_FT
APPTS ARE READY TO BE MADE: [X] YES    Best Family or Patient Contact (if needed):    Additional Information about above appointments (if needed):    1: Pulmonary Home   2:   3:     Other comments or requests:    PULMONOLOGY DR FERREIRA ON 2/21 @ 2PM    TELEHEALTH INSTRUCTIONS: AT THE TIME OF YOUR APPOINTMENT, YOU WILL RECEIVE A TEXT/EMAIL INVITE FOR YOUR TELEHEALTH SESSION. PLEASE CLICK ON LINK, ENTER THE PATIENT'S NAME. YOU WILL THEN BE REDIRECTED TO A VIRTUAL WAITING ROOM WHERE YOU WILL WAIT UNTIL THE DOCTOR HAS CONNECTED WITH YOU.     PULMONOLOGY DR FERREIRA ON 2/21 @ 2PM    TELEHEALTH INSTRUCTIONS: AT THE TIME OF YOUR APPOINTMENT, YOU WILL RECEIVE A TEXT/EMAIL INVITE FOR YOUR TELEHEALTH SESSION. PLEASE CLICK ON LINK, ENTER THE PATIENT'S NAME. YOU WILL THEN BE REDIRECTED TO A VIRTUAL WAITING ROOM WHERE YOU WILL WAIT UNTIL THE DOCTOR HAS CONNECTED WITH YOU.    Prior to outreaching the patient, it was visible that the patient has secured a follow up appointment which was not scheduled by our team. Patient is scheduled with Dr. Ferreira 2/21/25 2:00pm via telehealth APPTS ARE READY TO BE MADE: [X] YES    Best Family or Patient Contact (if needed):    Additional Information about above appointments (if needed):    1: PCP within 1 week to repeat lactate level/bmp for sodium level   2:   3:     Other comments or requests:   PULMONOLOGY DR FERREIRA ON 2/21 @ 2PM    TELEHEALTH INSTRUCTIONS: AT THE TIME OF YOUR APPOINTMENT, YOU WILL RECEIVE A TEXT/EMAIL INVITE FOR YOUR TELEHEALTH SESSION. PLEASE CLICK ON LINK, ENTER THE PATIENT'S NAME. YOU WILL THEN BE REDIRECTED TO A VIRTUAL WAITING ROOM WHERE YOU WILL WAIT UNTIL THE DOCTOR HAS CONNECTED WITH YOU.    Prior to outreaching the patient, it was visible that the patient has secured a follow up appointment which was not scheduled by our team. Patient is scheduled with Dr. Ferreira 2/21/25 2:00pm via telehealth

## 2025-02-19 NOTE — DISCHARGE NOTE PROVIDER - CARE PROVIDER_API CALL
Mikey Greenelapatrick  Internal Medicine  [address] [place] [id number]  Phone: [phone]  Fax: [phone]  Follow Up Time: 1 week   [facility]key Greene [facility]  Internal Medicine  [address] [place] [address]  Phone: [phone]  Fax: [phone]  Follow Up Time: 1 week    Luz Elena Greenfield  Pulmonary Disease  [address] [place] [id number]  Phone: [phone]  Fax: [phone]  Scheduled Appointment: 02/21/2025 02:00 PM

## 2025-02-19 NOTE — DISCHARGE NOTE PROVIDER - NSDCQMPCI_CARD_ALL_CORE
Petros Shannon is a 55 y.o. female and presents with Cough and Headache  . Subjective: For PE  S/p cyst removal from forehead    Anxiety d/o-pt lost her adult son to [facility] and has severe anxiety in the past. Was previously on sertraline w improved affect.   -pt is under family counseling currently, but cannot afford to see psychiatry at this time    Upper respiratory infection Review:  Petros Shannon is a 55 y.o. female who complains of nasal congestion,sore throat,chills,fever, productive cough, myalgias and headache for the past 2 weeks, gradually worsening since that time. She denies a history of shortness of breath. Evaluation to date: none. Treatment to date:cough suppressants, OTC products. Patient does not smoke cigarettes.  + h/o asthma    mammo-UTD  Colonoscopy? ??        Review of Systems  Constitutional: negative for fevers, chills, anorexia and weight loss  Eyes:   negative for visual disturbance and irritation  ENT:   positive for tinnitus,sore throat,nasal congestion,ear pains. hoarseness  Respiratory:  negative for cough, hemoptysis, dyspnea,wheezing  CV:   negative for chest pain, palpitations, lower extremity edema  GI:   negative for nausea, vomiting, diarrhea, abdominal pain,melena  Musculoskel: positive for myalgias, arthralgias, back pain, muscle weakness, joint pain  Neurological:  positive for headaches, dizziness, vertigo, memory problems and gait   Behavl/Psych: positive for feelings of anxiety, depression, mood changes    Past Medical History:   Diagnosis Date    Asthma     GERD (gastroesophageal reflux disease)     Headache(784.0)     migraine     History reviewed. No pertinent surgical history.   Social History     Social History    Marital status: UNKNOWN     Spouse name: N/A    Number of children: N/A    Years of education: N/A     Social History Main Topics    Smoking status: Never Smoker    Smokeless tobacco: Never Used    Alcohol use No    Drug use: No    Sexual No activity: Yes     Other Topics Concern    None     Social History Narrative     Family History   Problem Relation Age of Onset    Diabetes Mother     Hypertension Mother     Heart Disease Father     Cancer Father     No Known Problems Sister     No Known Problems Brother     No Known Problems Maternal Grandmother     No Known Problems Maternal Grandfather     No Known Problems Paternal Grandmother     No Known Problems Paternal Grandfather     No Known Problems Brother     No Known Problems Brother      Current Outpatient Prescriptions   Medication Sig Dispense Refill    sertraline (ZOLOFT) 50 mg tablet Take 1 Tab by mouth daily. 30 Tab 5    azithromycin (ZITHROMAX) 250 mg tablet Take 2 tablets today, then take 1 tablet daily 6 Tab 0    multivitamin (ONE A DAY) tablet Take 1 Tab by mouth daily. 30 Tab 6    SUMAtriptan (IMITREX) 100 mg tablet Take 1 Tab by mouth once as needed for Migraine for up to 1 dose. May repeat in 2 hours prn, max 200 mg in 24 hours. 27 Tab 3    albuterol (PROVENTIL HFA, VENTOLIN HFA, PROAIR HFA) 90 mcg/actuation inhaler   = 2 PUFF each dose, Inhalation, every 4 hours, PRN: as needed for wheezing or cough, # 1 EA, 3 Refills, Pharmacy: [facility]      mometasone (NASONEX) 50 mcg/actuation nasal spray by Nasal route.        Allergies   Allergen Reactions    Amoxicillin Cough       Objective:  Visit Vitals    /59 (BP 1 Location: Left arm, BP Patient Position: Sitting)    Pulse 86    Temp 98.4 °F (36.9 °C) (Oral)    Resp 20    Ht 5' 4\" (1.626 m)    Wt 229 lb (103.9 kg)    LMP 08/01/2008    SpO2 96%    BMI 39.31 kg/m2     Physical Exam:   General appearance - alert, obese, pleasant intermit tearful  Mental status - alert, oriented to person, place, and time  EYE-EOMI  ENT-ENT exam normal, no neck nodes or sinus tenderness  Mouth - mucous membranes moist, pharynx normal without lesions  Neck - supple, no significant adenopathy   Chest - clear to auscultation, no wheezes, rales or rhonchi, symmetric air entry   Heart - normal rate, regular rhythm, normal S1, S2, no murmurs, rubs, clicks or gallops   Abdomen - soft, obese  Ext-peripheral pulses normal, no pedal edema, no clubbing or cyanosis  Skin-Warm and dry. no hyperpigmentation, vitiligo, or suspicious lesions  Neuro -alert, oriented, normal speech, no focal findings or movement disorder noted      Results for orders placed or performed in visit on 03/23/17   AMB POC GLUCOSE BLOOD, BY GLUCOSE MONITORING DEVICE   Result Value Ref Range    Glucose POC 93 mg/dL   AMB POC HEMOGLOBIN A1C   Result Value Ref Range    Hemoglobin A1c (POC) 6.5 %       Assessment/Plan:    ICD-10-CM ICD-9-CM    1. Well adult exam Z00.00 V70.0    2. Diabetes mellitus type 2, diet-controlled (HCC) E11.9 250.00    3. Obesity (BMI 35.0-39.9 without comorbidity) E66.9 278.00    4. Generalized anxiety disorder F41.1 300.02 sertraline (ZOLOFT) 50 mg tablet   5. Abnormal chest x-ray R93.8 793.2    6. Bronchitis J40 490 azithromycin (ZITHROMAX) 250 mg tablet   7. Elbow pain, right M25.521 719.42 REFERRAL TO ORTHOPEDICS   8. AGNIESZKA (obstructive sleep apnea) G47.33 327.23      Orders Placed This Encounter   [facility]osalina Aldridge [facility]     Referral Priority:   Routine     Referral Type:   Consultation     Referral Reason:   Specialty Services Required    sertraline (ZOLOFT) 50 mg tablet     Sig: Take 1 Tab by mouth daily. Dispense:  30 Tab     Refill:  5    azithromycin (ZITHROMAX) 250 mg tablet     Sig: Take 2 tablets today, then take 1 tablet daily     Dispense:  6 Tab     Refill:  0     1. Well adult exam  ?? colonoscopy    2. Diabetes mellitus type 2, diet-controlled (HCC)  Check a1c    3. Obesity (BMI 35.0-39.9 without comorbidity)  D/w pt daily walking and healthy diet    4.  Generalized anxiety disorder  Pt cannot afford to see a psychiatrist @ this time  She has [address] and they have no avaolabilities for psychiatry  - sertraline (ZOLOFT) 50 mg tablet; Take 1 Tab by mouth daily. Dispense: 30 Tab; Refill: 5    5. Abnormal chest x-ray  Will rpt [place]    6. Bronchitis  Will tx w abx given prolonged sxs  T/c steroids if no improvement  - azithromycin (ZITHROMAX) 250 mg tablet; Take 2 tablets today, then take 1 tablet daily  Dispense: 6 Tab; Refill: 0    7. Elbow pain, right  Cont otc NSAIDS and see ortho prn  epicondylitis  - [facility] orth[facility]    8. AGNIESZKA (obstructive sleep apnea)  Cannot afford CPAP and [facility] does not provide    There are no Patient Instructions on file for this visit. Follow-up Disposition:  Return in about 6 weeks (around 5/16/2018). I have reviewed with the patient details of the assessment and plan and all questions were answered. Relevent patient education was performed. The most recent lab findings were reviewed with the patient. An After Visit Summary was printed and given to the patient.

## 2025-02-19 NOTE — DISCHARGE NOTE PROVIDER - NSDCFUSCHEDAPPT_GEN_ALL_CORE_FT
[facility]  VASCULAR [address]   Scheduled Appointment: 03/24/2025     Luz Elena Greenfield  [facility]  PULMMED [address]  Scheduled Appointment: 02/21/2025    [facility]  [facility]  VASCULAR [address]  Scheduled Appointment: 03/24/2025

## 2025-02-19 NOTE — PROGRESS NOTE ADULT - PROBLEM SELECTOR PROBLEM 1
Progress Note - [address] [facility] 51 y o  female MRN: [id number]  Unit/Bed#: [facility] 347-02 Encounter: [id number]    Assessment/Plan   Principal Problem:    Bipolar I disorder, current or most recent episode manic, with psychotic features ([facility] [address] )  Active Problems:    Medical clearance for psychiatric admission    Recommended Treatment:   Increase Seroquel to 400 mg qhs  Continue Zyprexa 10 mg after dinner, Depakote 500 mg after dinner  Pending CBC, BMP, lipid panel, TSH, RPR, Ammonia and Depakote level  Continue with group therapy, milieu therapy and occupational therapy  Risks, benefits and possible side effects of Medications: Risks, benefits, and possible side effects of medications have been explained to the patient, who verbalizes understanding  Progress Toward Goals: slow improvement    ------------------------------------------------------------    Subjective: Felicie Galeazzi has been non-compliant with medications  She took her Seroquel last night, but refused the Zyprexa citing a 20 lb weight gain in 1 5 months while on it  She also refused her Depakote repeating that "she feels in a fog" while on it  She reports poor sleep overnight  She continues to be hyper-verbal, with delusions of grandiose (thinking she is an undercover psychiatrist)  She has no insight to her illness, with very disorganized and bizarre behavior and thoughts  She denies any suicidal or homicidal ideation, intent or plan  Patient is consenting for safety on the unit  Psychiatric Review of Systems:  Behavior over the last 24 hours: unchanged  Sleep: insomnia  Appetite: good  Medication side effects: none  ROS: Complete review of systems is negative except as noted above      Vital signs in last 24 hours:  Temp:  [97 9 °F (36 6 °C)] 97 9 °F (36 6 °C)  HR:  [88] 88  Resp:  [16] 16  BP: (128)/(58) 128/58    Mental Status Evaluation:  Appearance:  alert, casually dressed, appears stated age and appropriate grooming and hygiene
Behavior:  pleasant, cooperative, sitting comfortably, good eye contact, no abnormal movements and normal gait and balance   Speech:  spontaneous, clear, increased rate, pressured, normal volume and incoherent   Mood:  anxious   Affect:  mood-congruent and anxious   Thought Process:  disorganized, illogical, incoherent, perseverative, loose associations, increased rate of thoughts, flight of ideas   Thought Content: delusions of grandiose, mild paranoia, obsessive thoughts, intrusive thoughts   Perceptual disturbances: auditory hallucinations (pt states they discuss things with her, but denies any commands), no reported visual hallucinations and appears to be responding to internal stimuli   Risk Potential: No active or passive suicidal or homicidal ideation   Cognition: disoriented, memory impaired due to manic/psychotic episode, appears to be of average intelligence, impaired abstract reasoning and attention span appeared shorter than expected for age   Insight:  Poor   Judgment: Poor       Current Medications:    Current Facility-Administered Medications:  acetaminophen 650 mg Oral Q4H PRN Alicia Person MD   aluminum-magnesium hydroxide-simethicone 30 mL Oral Q4H PRN Alicia Person MD   benztropine 1 mg Intramuscular Q6H PRN Alicia Person MD   benztropine 1 mg Oral Q6H PRN Alicia Person MD   divalproex sodium 500 mg Oral After Josey Cardenas MD   haloperidol 5 mg Oral Q8H PRN Alicia Person MD   LORazepam 1 mg Oral Q4H PRN Alicia Person MD   magnesium hydroxide 30 mL Oral Daily PRN Alicia Person MD   nicotine polacrilex 2 mg Oral Q2H PRN Alicia Person MD   OLANZapine 10 mg Intramuscular BID PRN Alicia Person MD   OLANZapine 10 mg Oral After Josey Cardenas MD   QUEtiapine 500 mg Oral HS Chasity Lara MD   risperiDONE 1 mg Oral Q8H PRN Alicia Person MD   traZODone 25 mg Oral HS PRN Alicia Person MD       Behavioral Health
Medications: all current active meds have been reviewed  Changes as above  Laboratory results:  I have personally reviewed all pertinent laboratory/tests results  Recent Results (from the past 48 hour(s))   POCT alcohol breath test    Collection Time: 10/22/19  3:15 PM   Result Value Ref Range    EXTBreath Alcohol 0 000    Rapid drug screen, urine    Collection Time: 10/22/19  3:50 PM   Result Value Ref Range    Amph/Meth UR Negative Negative    Barbiturate Ur Negative Negative    Benzodiazepine Urine Negative Negative    Cocaine Urine Negative Negative    Methadone Urine Negative Negative    Opiate Urine Negative Negative    PCP Ur Negative Negative    THC Urine Positive (A) Negative   POCT pregnancy, urine    Collection Time: 10/22/19  7:02 PM   Result Value Ref Range    EXT PREG TEST UR (Ref: Negative) negative     Control valid         This note has been constructed using a voice recognition system  There may be translation, syntax, or grammatical errors  If you have any questions, please contact the dictating provider 
Pneumonia

## 2025-02-19 NOTE — DISCHARGE NOTE PROVIDER - NSDCMRMEDTOKEN_GEN_ALL_CORE_FT
acetaminophen 325 mg oral tablet: 3 tab(s) orally every 8 hours  Anoro Ellipta 62.5 mcg-25 mcg/inh inhalation powder: 1 puff(s) inhaled 2 times a day  aspirin 81 mg oral delayed release tablet: 1 tab(s) orally 2 times a day  celecoxib 200 mg oral capsule: 1 cap(s) orally every 12 hours  Fluticasone: 0.5 milligram(s) 2 times a day  Janumet 50 mg-500 mg oral tablet: 1 tab(s) orally 2 times a day  loperamide 2 mg oral tablet: 2 tab(s) orally prn  montelukast 10 mg oral tablet: 1 tab(s) orally once a day  Narcan 4 mg/0.1 mL nasal spray: 0.4 milligram(s) intranasally once MDD: 1  oxyCODONE 5 mg oral tablet: 1 tab(s) orally every 6 hours as needed for  severe pain MDD: 4  pantoprazole 40 mg oral delayed release tablet: 1 tab(s) orally once a day  Pepcid 20 mg oral tablet: 1 tab(s) orally once a day  polyethylene glycol 3350 oral powder for reconstitution: 17 gram(s) orally once a day (at bedtime)  Rinvoq 15 mg oral tablet, extended release: 1 tab(s) orally once a day  senna leaf extract oral tablet: 2 tab(s) orally once a day (at bedtime)  telmisartan 40 mg oral tablet: 1 tab(s) orally once a day  traMADol 50 mg oral tablet: 1 tab(s) orally every 8 hours as needed for  moderate pain MDD: 3   albuterol-budesonide 90 mcg-80 mcg/inh inhalation aerosol: 2 puff(s) inhaled every 6 hours as needed for  shortness of breath and/or wheezing  Anoro Ellipta 62.5 mcg-25 mcg/inh inhalation powder: 1 puff(s) inhaled once a day  cholecalciferol 125 mcg (5000 intl units) oral tablet: 1 tab(s) orally once a day  famotidine 40 mg oral tablet: 1 tab(s) orally once a day (at bedtime)  ferrous sulfate 325 mg (65 mg elemental iron) oral tablet: 1 tab(s) orally once a day  fluticasone 50 mcg/inh nasal spray: 1 spray(s) in each nostril 2 times a day  gabapentin 300 mg oral capsule: 1 cap(s) orally 2 times a day  Janumet 50 mg-500 mg oral tablet: 1 tab(s) orally 2 times a day  Melatonin 5 mg oral tablet: 1 tab(s) orally once a day (at bedtime)  metroNIDAZOLE 1% topical gel: Apply topically to affected area once a day as needed for  itching  montelukast 10 mg oral tablet: 1 tab(s) orally once a day  ondansetron 8 mg oral tablet, disintegratin tab(s) orally 3 times a day as needed for  nausea  pantoprazole 40 mg oral delayed release tablet: 1 tab(s) orally once a day  Rinvoq 15 mg oral tablet, extended release: 1 tab(s) orally once a day  rosuvastatin 10 mg oral tablet: 1 tab(s) orally once a day (at bedtime)  telmisartan 40 mg oral tablet: 1 tab(s) orally once a day   albuterol 90 mcg/inh inhalation aerosol: 2 puff(s) inhaled every 6 hours As needed Shortness of Breath  albuterol-budesonide 90 mcg-80 mcg/inh inhalation aerosol: 2 puff(s) inhaled every 6 hours as needed for  shortness of breath and/or wheezing  Anoro Ellipta 62.5 mcg-25 mcg/inh inhalation powder: 1 puff(s) inhaled once a day  cholecalciferol oral tablet: 1000 unit(s) orally once a day  famotidine 40 mg oral tablet: 1 tab(s) orally once a day (at bedtime)  ferrous sulfate 325 mg (65 mg elemental iron) oral tablet: 1 tab(s) orally once a day  fluticasone 50 mcg/inh nasal spray: 1 spray(s) in each nostril 2 times a day  gabapentin 300 mg oral capsule: 1 cap(s) orally 2 times a day  gabapentin 300 mg oral capsule: 1 cap(s) orally 3 times a day  guaiFENesin 1200 mg oral tablet, extended release: 1 tab(s) orally every 12 hours  Janumet 50 mg-500 mg oral tablet: 1 tab(s) orally 2 times a day  loperamide 2 mg oral capsule: 2 cap(s) orally every 6 hours As needed Diarrhea  Melatonin 5 mg oral tablet: 1 tab(s) orally once a day (at bedtime)  metroNIDAZOLE 1% topical gel: Apply topically to affected area once a day as needed for  itching  montelukast 10 mg oral tablet: 1 tab(s) orally once a day  ondansetron 8 mg oral tablet, disintegratin tab(s) orally 3 times a day as needed for  nausea  pantoprazole 40 mg oral delayed release tablet: 1 tab(s) orally once a day  predniSONE 20 mg oral tablet: 2 tab(s) orally once a day  Rinvoq 15 mg oral tablet, extended release: 1 tab(s) orally once a day  rosuvastatin 10 mg oral tablet: 1 tab(s) orally once a day (at bedtime)  telmisartan 40 mg oral tablet: 1 tab(s) orally once a day  traZODone 50 mg oral tablet: 1 tab(s) orally once a day (at bedtime)   albuterol 90 mcg/inh inhalation aerosol: 2 puff(s) inhaled every 6 hours As needed Shortness of Breath  albuterol-budesonide 90 mcg-80 mcg/inh inhalation aerosol: 2 puff(s) inhaled every 6 hours as needed for  shortness of breath and/or wheezing  Anoro Ellipta 62.5 mcg-25 mcg/inh inhalation powder: 1 puff(s) inhaled once a day  cholecalciferol oral tablet: 1000 unit(s) orally once a day  famotidine 40 mg oral tablet: 1 tab(s) orally once a day (at bedtime)  ferrous sulfate 325 mg (65 mg elemental iron) oral tablet: 1 tab(s) orally once a day  fluticasone 50 mcg/inh nasal spray: 1 spray(s) in each nostril 2 times a day  gabapentin 300 mg oral capsule: 1 cap(s) orally 3 times a day  guaiFENesin 1200 mg oral tablet, extended release: 1 tab(s) orally every 12 hours  Januvia 50 mg oral tablet: 1 tab(s) orally once a day  loperamide 2 mg oral capsule: 2 cap(s) orally every 6 hours As needed Diarrhea  Melatonin 5 mg oral tablet: 1 tab(s) orally once a day (at bedtime)  metroNIDAZOLE 1% topical gel: Apply topically to affected area once a day as needed for  itching  montelukast 10 mg oral tablet: 1 tab(s) orally once a day  ondansetron 8 mg oral tablet, disintegratin tab(s) orally 3 times a day as needed for  nausea  pantoprazole 40 mg oral delayed release tablet: 1 tab(s) orally once a day  predniSONE 20 mg oral tablet: 2 tab(s) orally once a day  Rinvoq 15 mg oral tablet, extended release: 1 tab(s) orally once a day  rosuvastatin 10 mg oral tablet: 1 tab(s) orally once a day (at bedtime)  traZODone 50 mg oral tablet: 1 tab(s) orally once a day (at bedtime)

## 2025-02-19 NOTE — DISCHARGE NOTE PROVIDER - INSTRUCTIONS
1200ml fluid restriction  1 Glucerna Shake Can Daily  1500ml fluid restriction  1 Glucerna Shake Can Daily

## 2025-02-19 NOTE — PROGRESS NOTE ADULT - PROBLEM SELECTOR PLAN 4
IMPROVING   Pt appears euvolemic at present

## 2025-02-19 NOTE — PROGRESS NOTE ADULT - PROBLEM SELECTOR PLAN 7
A1C 5.8 in 06/2024.   Hold PO meds.   ISS.

## 2025-02-20 VITALS — OXYGEN SATURATION: 96 %

## 2025-02-20 LAB
ANION GAP SERPL CALC-SCNC: 14 MMOL/L — SIGNIFICANT CHANGE UP (ref 7–14)
ANISOCYTOSIS BLD QL: SLIGHT — SIGNIFICANT CHANGE UP
BASOPHILS # BLD AUTO: 0 K/UL — SIGNIFICANT CHANGE UP (ref 0–0.2)
BASOPHILS NFR BLD AUTO: 0 % — SIGNIFICANT CHANGE UP (ref 0–2)
BUN SERPL-MCNC: 22 MG/DL — SIGNIFICANT CHANGE UP (ref 7–23)
CALCIUM SERPL-MCNC: 9.1 MG/DL — SIGNIFICANT CHANGE UP (ref 8.4–10.5)
CHLORIDE SERPL-SCNC: 97 MMOL/L — LOW (ref 98–107)
CO2 SERPL-SCNC: 22 MMOL/L — SIGNIFICANT CHANGE UP (ref 22–31)
CREAT SERPL-MCNC: 0.93 MG/DL — SIGNIFICANT CHANGE UP (ref 0.5–1.3)
EGFR: 87 ML/MIN/1.73M2 — SIGNIFICANT CHANGE UP
EOSINOPHIL # BLD AUTO: 0 K/UL — SIGNIFICANT CHANGE UP (ref 0–0.5)
EOSINOPHIL NFR BLD AUTO: 0 % — SIGNIFICANT CHANGE UP (ref 0–6)
GAS PNL BLDV: SIGNIFICANT CHANGE UP
GLUCOSE BLDC GLUCOMTR-MCNC: 107 MG/DL — HIGH (ref 70–99)
GLUCOSE BLDC GLUCOMTR-MCNC: 157 MG/DL — HIGH (ref 70–99)
GLUCOSE SERPL-MCNC: 147 MG/DL — HIGH (ref 70–99)
HCT VFR BLD CALC: 29.8 % — LOW (ref 39–50)
HGB BLD-MCNC: 9.6 G/DL — LOW (ref 13–17)
HYPOCHROMIA BLD QL: SLIGHT — SIGNIFICANT CHANGE UP
IANC: 10.28 K/UL — HIGH (ref 1.8–7.4)
LACTATE SERPL-SCNC: 2.7 MMOL/L — HIGH (ref 0.5–2)
LYMPHOCYTES # BLD AUTO: 0.33 K/UL — LOW (ref 1–3.3)
LYMPHOCYTES # BLD AUTO: 2.6 % — LOW (ref 13–44)
MAGNESIUM SERPL-MCNC: 2.2 MG/DL — SIGNIFICANT CHANGE UP (ref 1.6–2.6)
MCHC RBC-ENTMCNC: 26.5 PG — LOW (ref 27–34)
MCHC RBC-ENTMCNC: 32.2 G/DL — SIGNIFICANT CHANGE UP (ref 32–36)
MCV RBC AUTO: 82.3 FL — SIGNIFICANT CHANGE UP (ref 80–100)
MICROCYTES BLD QL: SLIGHT — SIGNIFICANT CHANGE UP
MONOCYTES # BLD AUTO: 1.22 K/UL — HIGH (ref 0–0.9)
MONOCYTES NFR BLD AUTO: 9.6 % — SIGNIFICANT CHANGE UP (ref 2–14)
NEUTROPHILS # BLD AUTO: 11.04 K/UL — HIGH (ref 1.8–7.4)
NEUTROPHILS NFR BLD AUTO: 86.9 % — HIGH (ref 43–77)
PHOSPHATE SERPL-MCNC: 3.5 MG/DL — SIGNIFICANT CHANGE UP (ref 2.5–4.5)
PLAT MORPH BLD: NORMAL — SIGNIFICANT CHANGE UP
PLATELET # BLD AUTO: 489 K/UL — HIGH (ref 150–400)
PLATELET COUNT - ESTIMATE: NORMAL — SIGNIFICANT CHANGE UP
POLYCHROMASIA BLD QL SMEAR: SLIGHT — SIGNIFICANT CHANGE UP
POTASSIUM SERPL-MCNC: 4.8 MMOL/L — SIGNIFICANT CHANGE UP (ref 3.5–5.3)
POTASSIUM SERPL-SCNC: 4.8 MMOL/L — SIGNIFICANT CHANGE UP (ref 3.5–5.3)
RBC # BLD: 3.62 M/UL — LOW (ref 4.2–5.8)
RBC # FLD: 13.8 % — SIGNIFICANT CHANGE UP (ref 10.3–14.5)
RBC BLD AUTO: ABNORMAL
SODIUM SERPL-SCNC: 133 MMOL/L — LOW (ref 135–145)
VARIANT LYMPHS # BLD: 0.9 % — SIGNIFICANT CHANGE UP (ref 0–6)
VARIANT LYMPHS NFR BLD MANUAL: 0.9 % — SIGNIFICANT CHANGE UP (ref 0–6)
WBC # BLD: 12.71 K/UL — HIGH (ref 3.8–10.5)
WBC # FLD AUTO: 12.71 K/UL — HIGH (ref 3.8–10.5)

## 2025-02-20 RX ORDER — TRAZODONE HCL 100 MG
1 TABLET ORAL
Qty: 30 | Refills: 0
Start: 2025-02-20 | End: 2025-03-21

## 2025-02-20 RX ORDER — ALBUTEROL SULFATE 2.5 MG/3ML
2 VIAL, NEBULIZER (ML) INHALATION
Qty: 0 | Refills: 0 | DISCHARGE
Start: 2025-02-20

## 2025-02-20 RX ORDER — DEXTROMETHORPHAN HBR, GUAIFENESIN 200 MG/10ML
1 LIQUID ORAL
Qty: 0 | Refills: 0 | DISCHARGE
Start: 2025-02-20

## 2025-02-20 RX ORDER — PREDNISONE 20 MG/1
40 TABLET ORAL DAILY
Refills: 0 | Status: DISCONTINUED | OUTPATIENT
Start: 2025-02-20 | End: 2025-02-20

## 2025-02-20 RX ORDER — GABAPENTIN 400 MG/1
1 CAPSULE ORAL
Refills: 0 | DISCHARGE

## 2025-02-20 RX ORDER — ROSUVASTATIN CALCIUM 20 MG/1
10 TABLET, FILM COATED ORAL AT BEDTIME
Refills: 0 | Status: DISCONTINUED | OUTPATIENT
Start: 2025-02-20 | End: 2025-02-20

## 2025-02-20 RX ORDER — LOPERAMIDE HCL 1 MG/7.5ML
2 SOLUTION ORAL
Qty: 0 | Refills: 0 | DISCHARGE
Start: 2025-02-20

## 2025-02-20 RX ORDER — SITAGLIPTIN 100 MG/1
1 TABLET, FILM COATED ORAL
Qty: 30 | Refills: 0
Start: 2025-02-20 | End: 2025-03-21

## 2025-02-20 RX ORDER — GABAPENTIN 400 MG/1
1 CAPSULE ORAL
Qty: 90 | Refills: 0
Start: 2025-02-20 | End: 2025-03-21

## 2025-02-20 RX ORDER — PREDNISONE 20 MG/1
2 TABLET ORAL
Qty: 10 | Refills: 0
Start: 2025-02-20 | End: 2025-02-24

## 2025-02-20 RX ORDER — CEFTRIAXONE 500 MG/1
1000 INJECTION, POWDER, FOR SOLUTION INTRAMUSCULAR; INTRAVENOUS ONCE
Refills: 0 | Status: COMPLETED | OUTPATIENT
Start: 2025-02-20 | End: 2025-02-20

## 2025-02-20 RX ADMIN — INSULIN LISPRO 2 UNIT(S): 100 INJECTION, SOLUTION INTRAVENOUS; SUBCUTANEOUS at 11:55

## 2025-02-20 RX ADMIN — DEXTROMETHORPHAN HBR, GUAIFENESIN 1200 MILLIGRAM(S): 200 LIQUID ORAL at 06:04

## 2025-02-20 RX ADMIN — GABAPENTIN 300 MILLIGRAM(S): 400 CAPSULE ORAL at 13:58

## 2025-02-20 RX ADMIN — Medication 40 MILLIGRAM(S): at 06:04

## 2025-02-20 RX ADMIN — IPRATROPIUM BROMIDE AND ALBUTEROL SULFATE 3 MILLILITER(S): .5; 2.5 SOLUTION RESPIRATORY (INHALATION) at 14:42

## 2025-02-20 RX ADMIN — METHYLPREDNISOLONE ACETATE 20 MILLIGRAM(S): 80 INJECTION, SUSPENSION INTRA-ARTICULAR; INTRALESIONAL; INTRAMUSCULAR; SOFT TISSUE at 06:04

## 2025-02-20 RX ADMIN — Medication 1 APPLICATION(S): at 11:51

## 2025-02-20 RX ADMIN — DEXTROMETHORPHAN HBR, GUAIFENESIN 1200 MILLIGRAM(S): 200 LIQUID ORAL at 17:10

## 2025-02-20 RX ADMIN — INSULIN LISPRO 2 UNIT(S): 100 INJECTION, SOLUTION INTRAVENOUS; SUBCUTANEOUS at 08:48

## 2025-02-20 RX ADMIN — CEFTRIAXONE 100 MILLIGRAM(S): 500 INJECTION, POWDER, FOR SOLUTION INTRAMUSCULAR; INTRAVENOUS at 14:13

## 2025-02-20 RX ADMIN — PREDNISONE 40 MILLIGRAM(S): 20 TABLET ORAL at 17:10

## 2025-02-20 RX ADMIN — GABAPENTIN 300 MILLIGRAM(S): 400 CAPSULE ORAL at 06:04

## 2025-02-20 RX ADMIN — Medication 1000 UNIT(S): at 11:50

## 2025-02-20 RX ADMIN — ENOXAPARIN SODIUM 40 MILLIGRAM(S): 100 INJECTION SUBCUTANEOUS at 13:58

## 2025-02-20 RX ADMIN — Medication 1 DOSE(S): at 10:01

## 2025-02-20 RX ADMIN — IPRATROPIUM BROMIDE AND ALBUTEROL SULFATE 3 MILLILITER(S): .5; 2.5 SOLUTION RESPIRATORY (INHALATION) at 03:54

## 2025-02-20 RX ADMIN — INSULIN LISPRO 1: 100 INJECTION, SOLUTION INTRAVENOUS; SUBCUTANEOUS at 08:48

## 2025-02-20 RX ADMIN — IPRATROPIUM BROMIDE AND ALBUTEROL SULFATE 3 MILLILITER(S): .5; 2.5 SOLUTION RESPIRATORY (INHALATION) at 09:02

## 2025-02-20 RX ADMIN — Medication 325 MILLIGRAM(S): at 11:50

## 2025-02-20 RX ADMIN — MONTELUKAST SODIUM 10 MILLIGRAM(S): 10 TABLET ORAL at 11:51

## 2025-02-20 RX ADMIN — POLYETHYLENE GLYCOL 3350 17 GRAM(S): 17 POWDER, FOR SOLUTION ORAL at 11:56

## 2025-02-20 RX ADMIN — FLUTICASONE PROPIONATE 1 SPRAY(S): 50 SPRAY, METERED NASAL at 06:03

## 2025-02-20 NOTE — PROGRESS NOTE ADULT - REASON FOR ADMISSION
SOB, PNA

## 2025-02-20 NOTE — PHARMACOTHERAPY INTERVENTION NOTE - COMMENTS
Discharge medications reviewed with the patient. Patient's medication schedule was discussed in detail including: medication name, indication, dose, administration times, treatment duration, side effects, and special instructions. Questions and concerns were answered and addressed. Patient provided with prednisone educational handout and patient demonstrated understanding.        Sharon Reeves, PharmD   PGY-1 Pharmacy Resident    Discharge medications reviewed with the patient. Patient's medication schedule was discussed in detail including: medication name, indication, dose, administration times, treatment duration of steroids, side effects, and special instructions. Questions and concerns were answered and addressed. Patient provided with prednisone educational handout and verbalized understanding.    Additional medications: Anoro and albuterol    Sharon Reeves, PharmD   PGY-1 Pharmacy Resident

## 2025-02-20 NOTE — PROGRESS NOTE ADULT - PROVIDER SPECIALTY LIST ADULT
Infectious Disease
Internal Medicine
Nephrology
Nephrology
Infectious Disease
Nephrology
Infectious Disease
Pulmonology
Internal Medicine
Internal Medicine

## 2025-02-20 NOTE — PROGRESS NOTE ADULT - SUBJECTIVE AND OBJECTIVE BOX
Date of Service: 02-20-25 @ 12:27    Patient is a 74y old  Male who presents with a chief complaint of SOB, PNA (20 Feb 2025 12:09)      Any change in ROS: seems to be doing  ok : no sob:    on room air     MEDICATIONS  (STANDING):  albuterol/ipratropium for Nebulization 3 milliLiter(s) Nebulizer every 6 hours  cefTRIAXone   IVPB 1000 milliGRAM(s) IV Intermittent once  chlorhexidine 2% Cloths 1 Application(s) Topical daily  cholecalciferol 1000 Unit(s) Oral daily  dextrose 5%. 1000 milliLiter(s) (50 mL/Hr) IV Continuous <Continuous>  dextrose 5%. 1000 milliLiter(s) (100 mL/Hr) IV Continuous <Continuous>  dextrose 50% Injectable 25 Gram(s) IV Push once  dextrose 50% Injectable 12.5 Gram(s) IV Push once  dextrose 50% Injectable 25 Gram(s) IV Push once  enoxaparin Injectable 40 milliGRAM(s) SubCutaneous every 24 hours  famotidine    Tablet 40 milliGRAM(s) Oral at bedtime  ferrous    sulfate 325 milliGRAM(s) Oral daily  fluticasone propionate 50 MICROgram(s)/spray Nasal Spray 1 Spray(s) Both Nostrils two times a day  fluticasone propionate/ salmeterol 100-50 MICROgram(s) Diskus 1 Dose(s) Inhalation two times a day  gabapentin 300 milliGRAM(s) Oral three times a day  glucagon  Injectable 1 milliGRAM(s) IntraMuscular once  guaiFENesin ER 1200 milliGRAM(s) Oral every 12 hours  insulin glargine Injectable (LANTUS) 14 Unit(s) SubCutaneous at bedtime  insulin lispro (ADMELOG) corrective regimen sliding scale   SubCutaneous three times a day before meals  insulin lispro Injectable (ADMELOG) 2 Unit(s) SubCutaneous three times a day before meals  montelukast 10 milliGRAM(s) Oral daily  pantoprazole    Tablet 40 milliGRAM(s) Oral before breakfast  polyethylene glycol 3350 17 Gram(s) Oral daily  predniSONE   Tablet 40 milliGRAM(s) Oral daily  traZODone 50 milliGRAM(s) Oral at bedtime  upadacitinib (Rinvoq) 15 milliGRAM(s) 1 Tablet(s) Oral daily    MEDICATIONS  (PRN):  acetaminophen     Tablet .. 650 milliGRAM(s) Oral every 6 hours PRN Temp greater or equal to 38C (100.4F), Mild Pain (1 - 3)  albuterol    90 MICROgram(s) HFA Inhaler 2 Puff(s) Inhalation every 6 hours PRN Shortness of Breath  aluminum hydroxide/magnesium hydroxide/simethicone Suspension 30 milliLiter(s) Oral every 4 hours PRN Dyspepsia  dextrose Oral Gel 15 Gram(s) Oral once PRN Blood Glucose LESS THAN 70 milliGRAM(s)/deciliter  hydrALAZINE 10 milliGRAM(s) Oral every 6 hours PRN Systolic blood pressure > 160 mmHg  loperamide 4 milliGRAM(s) Oral every 6 hours PRN Diarrhea  melatonin 3 milliGRAM(s) Oral at bedtime PRN Insomnia  ondansetron Injectable 4 milliGRAM(s) IV Push every 8 hours PRN Nausea and/or Vomiting    Vital Signs Last 24 Hrs  T(C): 36.6 (20 Feb 2025 05:08), Max: 36.9 (19 Feb 2025 13:20)  T(F): 97.8 (20 Feb 2025 05:08), Max: 98.4 (19 Feb 2025 13:20)  HR: 65 (20 Feb 2025 09:02) (59 - 83)  BP: 126/68 (20 Feb 2025 05:08) (118/83 - 134/79)  BP(mean): --  RR: 18 (20 Feb 2025 05:08) (15 - 18)  SpO2: 96% (20 Feb 2025 09:02) (94% - 100%)    Parameters below as of 20 Feb 2025 09:02  Patient On (Oxygen Delivery Method): room air        I&O's Summary        Physical Exam:   GENERAL: NAD, well-groomed, well-developed  HEENT: ALYSSA/   Atraumatic, Normocephalic  ENMT: No tonsillar erythema, exudates, or enlargement; Moist mucous membranes, Good dentition, No lesions  NECK: Supple, No JVD, Normal thyroid  CHEST/LUNG: Clear to auscultaion, ; No rales, rhonchi, wheezing, or rubs  CVS: Regular rate and rhythm; No murmurs, rubs, or gallops  GI: : Soft, Nontender, Nondistended; Bowel sounds present  NERVOUS SYSTEM:  Alert & Oriented X3  EXTREMITIES:  - edema  LYMPH: No lymphadenopathy noted  SKIN: No rashes or lesions  ENDOCRINOLOGY: No Thyromegaly  PSYCH: Appropriate    Labs:  22                            9.6    12.71 )-----------( 489      ( 20 Feb 2025 06:03 )             29.8                         9.3    13.57 )-----------( 465      ( 19 Feb 2025 03:40 )             28.3                         9.9    9.33  )-----------( 427      ( 18 Feb 2025 05:26 )             31.5     02-20    133[L]  |  97[L]  |  22  ----------------------------<  147[H]  4.8   |  22  |  0.93  02-19    133[L]  |  98  |  20  ----------------------------<  152[H]  5.0   |  20[L]  |  0.89  02-18    129[L]  |  95[L]  |  17  ----------------------------<  171[H]  4.5   |  20[L]  |  0.89  02-17    128[L]  |  92[L]  |  12  ----------------------------<  91  4.7   |  21[L]  |  1.00    Ca    9.1      20 Feb 2025 06:03  Ca    8.9      19 Feb 2025 03:40  Phos  3.5     02-20  Phos  3.2     02-19  Mg     2.20     02-20  Mg     2.00     02-19    TPro  7.0  /  Alb  3.7  /  TBili  0.4  /  DBili  x   /  AST  17  /  ALT  6   /  AlkPhos  42  02-17    CAPILLARY BLOOD GLUCOSE      POCT Blood Glucose.: 107 mg/dL (20 Feb 2025 11:46)  POCT Blood Glucose.: 157 mg/dL (20 Feb 2025 08:21)  POCT Blood Glucose.: 199 mg/dL (19 Feb 2025 22:45)  POCT Blood Glucose.: 122 mg/dL (19 Feb 2025 17:30)          Urinalysis Basic - ( 20 Feb 2025 06:03 )    Color: x / Appearance: x / SG: x / pH: x  Gluc: 147 mg/dL / Ketone: x  / Bili: x / Urobili: x   Blood: x / Protein: x / Nitrite: x   Leuk Esterase: x / RBC: x / WBC x   Sq Epi: x / Non Sq Epi: x / Bacteria: x      Lactate, Blood: 2.7 mmol/L (02-20 @ 06:03)  Lactate, Blood: 3.8 mmol/L (02-19 @ 12:22)        RECENT CULTURES:  02-16 @ 09:15 .Blood BLOOD                No growth at 72 Hours    02-16 @ 09:00 .Blood BLOOD         < from: CT Chest No Cont (02.16.25 @ 11:18) >  Ill-defined subtle/mild bilateral groundglass opacities appear new since   September 17, 2024 is a very nonspecific finding.    Mildly enlarged mediastinal lymph node with mild increase in size since   September 17, 2024 also nonspecific finding.    5 mm left lower lobe nodule slightly increased in size since September 17, 2024.    A 3 month follow-up noncontrast chest CT is recommended for complete   evaluation of the above findings.    Impaction of a few of the right lower lobe segmental/subsegmental airways     < end of copied text >         No growth at 72 Hours          RESPIRATORY CULTURES:          Studies  Chest X-RAY  CT SCAN Chest   Venous Dopplers: LE:   CT Abdomen  Others              
[facility]  SHAMAR Jeronimo Y. Patel, S. Shah, G. Casimir  [phone]  ([phone] - weekdays after 5pm and weekends)    Name: BALA SARGENT  Age/Gender: 74y Male  MRN: [id number]    Interval History:  Notes reviewed.   No concerning overnight events.  Afebrile.   denies SOB  feels good today    Allergies: angiotensin converting enzyme inhibitors (Other; Swelling)  Seafood (Hives)      Objective:  Vitals:   T(F): 97.8 (02-19-25 @ 05:30), Max: 98.2 (02-18-25 @ 13:02)  HR: 67 (02-19-25 @ 08:59) (61 - 78)  BP: 106/66 (02-19-25 @ 05:30) (106/66 - 128/64)  RR: 18 (02-19-25 @ 05:30) (18 - 18)  SpO2: 99% (02-19-25 @ 08:59) (99% - 100%)  Physical Examination:  General: no acute distress  HEENT: anicteric  Cardio: S1, S2, normal rate  Resp: mild rhonchi  Abd: soft, NT, ND  Ext: no LE edema  Skin: warm, dry    Laboratory Studies:  CBC:                       9.3    13.57 )-----------( 465      ( 19 Feb 2025 03:40 )             28.3     WBC Trend:  13.57 02-19-25 @ 03:40  9.33 02-18-25 @ 05:26  11.51 02-16-25 @ 09:07    CMP: 02-19    133[L]  |  98  |  20  ----------------------------<  152[H]  5.0   |  20[L]  |  0.89    Ca    8.9      19 Feb 2025 03:40  Phos  3.2     02-19  Mg     2.00     02-19            Urinalysis Basic - ( 19 Feb 2025 03:40 )    Color: x / Appearance: x / SG: x / pH: x  Gluc: 152 mg/dL / Ketone: x  / Bili: x / Urobili: x   Blood: x / Protein: x / Nitrite: x   Leuk Esterase: x / RBC: x / WBC x   Sq Epi: x / Non Sq Epi: x / Bacteria: x      Microbiology: reviewed     Urinalysis with Rflx Culture (collected 02-16-25 @ 11:37)    Culture - Blood (collected 02-16-25 @ 09:15)  Source: .Blood BLOOD  Preliminary Report (02-18-25 @ 13:01):    No growth at 48 Hours    Culture - Blood (collected 02-16-25 @ 09:00)  Source: .Blood BLOOD  Preliminary Report (02-18-25 @ 13:01):    No growth at 48 Hours        Radiology: reviewed     Medications:  acetaminophen     Tablet .. 650 milliGRAM(s) Oral every 6 hours PRN  albuterol    90 MICROgram(s) HFA Inhaler 2 Puff(s) Inhalation every 6 hours PRN  albuterol/ipratropium for Nebulization 3 milliLiter(s) Nebulizer every 6 hours  aluminum hydroxide/magnesium hydroxide/simethicone Suspension 30 milliLiter(s) Oral every 4 hours PRN  cefTRIAXone   IVPB 1000 milliGRAM(s) IV Intermittent every 24 hours  cholecalciferol 1000 Unit(s) Oral daily  dextrose 5%. 1000 milliLiter(s) IV Continuous <Continuous>  dextrose 5%. 1000 milliLiter(s) IV Continuous <Continuous>  dextrose 50% Injectable 25 Gram(s) IV Push once  dextrose 50% Injectable 12.5 Gram(s) IV Push once  dextrose 50% Injectable 25 Gram(s) IV Push once  dextrose Oral Gel 15 Gram(s) Oral once PRN  enoxaparin Injectable 40 milliGRAM(s) SubCutaneous every 24 hours  famotidine    Tablet 40 milliGRAM(s) Oral at bedtime  ferrous    sulfate 325 milliGRAM(s) Oral daily  fluticasone propionate 50 MICROgram(s)/spray Nasal Spray 1 Spray(s) Both Nostrils two times a day  fluticasone propionate/ salmeterol 100-50 MICROgram(s) Diskus 1 Dose(s) Inhalation two times a day  gabapentin 300 milliGRAM(s) Oral three times a day  glucagon  Injectable 1 milliGRAM(s) IntraMuscular once  guaiFENesin ER 1200 milliGRAM(s) Oral every 12 hours  hydrALAZINE 10 milliGRAM(s) Oral every 6 hours PRN  insulin glargine Injectable (LANTUS) 14 Unit(s) SubCutaneous at bedtime  insulin lispro (ADMELOG) corrective regimen sliding scale   SubCutaneous three times a day before meals  insulin lispro Injectable (ADMELOG) 2 Unit(s) SubCutaneous three times a day before meals  loperamide 4 milliGRAM(s) Oral every 6 hours PRN  melatonin 3 milliGRAM(s) Oral at bedtime PRN  methylPREDNISolone sodium succinate Injectable   IV Push   methylPREDNISolone sodium succinate Injectable 20 milliGRAM(s) IV Push every 12 hours  montelukast 10 milliGRAM(s) Oral daily  ondansetron Injectable 4 milliGRAM(s) IV Push every 8 hours PRN  pantoprazole    Tablet 40 milliGRAM(s) Oral before breakfast  traZODone 50 milliGRAM(s) Oral at bedtime  upadacitinib (Rinvoq) 15 milliGRAM(s) 1 Tablet(s) Oral daily    Antimicrobials:  cefTRIAXone   IVPB 1000 milliGRAM(s) IV Intermittent every 24 hours  
[facility] NEPHROLOGY- PROGRESS NOTE    74 year old male & former smoker with a past medical history of COPD [ not on home oxygen], UC, HTN, non insulin dependant T2DM, GERD, anemia, gastroparesis presented to the hospital with increased shortness of breath. Lab work is concerning for the presence of hyponatremia, so the Nephrology service has been consulted for further recommendations.    Hospital Medications: Medications reviewed.    REVIEW OF SYSTEMS:  CONSTITUTIONAL: Mild weakness, but has improved since admission.  EYES/ENT: No visual changes;  No vertigo or throat pain   NECK: No pain or stiffness  RESPIRATORY: Shortness of breath with productive cough.  CARDIOVASCULAR: No chest pain or palpitations.  GASTROINTESTINAL: No abdominal or epigastric pain. No nausea, vomiting, or hematemesis; No diarrhea or constipation. No melena or hematochezia.  GENITOURINARY: No dysuria, frequency, foamy urine, urinary urgency, incontinence or hematuria  NEUROLOGICAL: No numbness or weakness  SKIN: No itching, burning, rashes, or lesions   VASCULAR: No bilateral lower extremity edema.     VITALS:  T(F): 98.2 (02-18-25 @ 13:02), Max: 98.2 (02-18-25 @ 13:02)  HR: 75 (02-18-25 @ 15:11)  BP: 120/77 (02-18-25 @ 13:02)  RR: 18 (02-18-25 @ 13:02)  SpO2: 100% (02-18-25 @ 15:11)  Wt(kg): --  Height (cm): 172.7 (02-16 @ 14:20)  Weight (kg): 71.2 (02-16 @ 14:20)  BMI (kg/m2): 23.9 (02-16 @ 14:20)  BSA (m2): 1.84 (02-16 @ 14:20)    PHYSICAL EXAM:  Constitutional: NAD  HEENT: anicteric sclera, oropharynx clear, MMM  Neck: No JVD  Respiratory: Congestion more prominent in upper lung fields.  Cardiovascular: S1, S2, RRR  Gastrointestinal: BS+, soft, NT/ND  Extremities: No cyanosis or clubbing. No peripheral edema  Neurological: A/O x 3, no focal deficits  Psychiatric: Normal mood, normal affect  : No CVA tenderness.     LABS:  02-18    Na+ trend: 129 <--, 128 <--, 124 <--    129[L]  |  95[L]  |  17  ----------------------------<  171[H]  4.5   |  20[L]  |  0.89    Ca    8.9      18 Feb 2025 05:26  Phos  4.1     02-18  Mg     1.90     02-18    TPro  7.0  /  Alb  3.7  /  TBili  0.4  /  DBili      /  AST  17  /  ALT  6   /  AlkPhos  42  02-17    Creatinine Trend: 0.89 <--, 1.00 <--, 1.14 <--                        9.9    9.33  )-----------( 427      ( 18 Feb 2025 05:26 )             31.5     Urine Studies:  Urinalysis Basic - ( 18 Feb 2025 05:26 )    Color:  / Appearance:  / SG:  / pH:   Gluc: 171 mg/dL / Ketone:   / Bili:  / Urobili:    Blood:  / Protein:  / Nitrite:    Leuk Esterase:  / RBC:  / WBC    Sq Epi:  / Non Sq Epi:  / Bacteria:       Sodium, Random Urine: 33 mmol/L (02-18 @ 12:17)  Creatinine, Random Urine: 55 mg/dL (02-18 @ 12:17)  Protein/Creatinine Ratio Calculation: 0.2 Ratio (02-18 @ 12:17)  Osmolality, Random Urine: 407 mosm/kg (02-18 @ 12:17)  Potassium, Random Urine: 40.1 mmol/L (02-18 @ 12:17)    RADIOLOGY & ADDITIONAL STUDIES:    < from: CT Chest No Cont (02.16.25 @ 11:18) >  IMPRESSION:    Ill-defined subtle/mild bilateral groundglass opacities appear new since   September 17, 2024 is a very nonspecific finding.    Mildly enlarged mediastinal lymph node with mild increase in size since   September 17, 2024 also nonspecific finding.    5 mm left lower lobe nodule slightly increased in size since September 17, 2024.    A 3 month follow-up noncontrast chest CT is recommended for complete   evaluation of the above findings.    Impaction of a few of the right lower lobe segmental/subsegmental airways   with overall interval improvement since September 17, 2024 likely due to   internal secretions.    < end of copied text >        < from: Xray Chest 2 Views PA/Lat (02.16.25 @ 09:18) >  IMPRESSION:  Bulged anterior right hemidiaphragm contour. Otherwise clear lungs. No   pleural effusions or pneumothorax.    Cardiac and mediastinal silhouettes within normal limits.    Rightward lower tracheal deviation due to pressure effect from adjacent   aortic arch. Midline upper trachea.    Generalized osteopenia. Spinal degenerative changes.    < end of copied text >
    SUBJECTIVE / OVERNIGHT EVENTS:pt seen and examined, says he feels better   02-17-25     MEDICATIONS  (STANDING):  albuterol/ipratropium for Nebulization 3 milliLiter(s) Nebulizer every 6 hours  azithromycin   Tablet 250 milliGRAM(s) Oral daily  cefTRIAXone   IVPB 1000 milliGRAM(s) IV Intermittent every 24 hours  cholecalciferol 1000 Unit(s) Oral daily  dextrose 5%. 1000 milliLiter(s) (50 mL/Hr) IV Continuous <Continuous>  dextrose 5%. 1000 milliLiter(s) (100 mL/Hr) IV Continuous <Continuous>  dextrose 50% Injectable 25 Gram(s) IV Push once  dextrose 50% Injectable 12.5 Gram(s) IV Push once  dextrose 50% Injectable 25 Gram(s) IV Push once  enoxaparin Injectable 40 milliGRAM(s) SubCutaneous every 24 hours  ferrous    sulfate 325 milliGRAM(s) Oral daily  fluticasone propionate 50 MICROgram(s)/spray Nasal Spray 1 Spray(s) Both Nostrils two times a day  gabapentin 300 milliGRAM(s) Oral three times a day  glucagon  Injectable 1 milliGRAM(s) IntraMuscular once  guaiFENesin ER 1200 milliGRAM(s) Oral every 12 hours  insulin glargine Injectable (LANTUS) 14 Unit(s) SubCutaneous at bedtime  insulin lispro (ADMELOG) corrective regimen sliding scale   SubCutaneous three times a day before meals  insulin lispro Injectable (ADMELOG) 2 Unit(s) SubCutaneous three times a day before meals  methylPREDNISolone sodium succinate Injectable   IV Push   methylPREDNISolone sodium succinate Injectable 20 milliGRAM(s) IV Push every 8 hours  montelukast 10 milliGRAM(s) Oral daily  pantoprazole    Tablet 40 milliGRAM(s) Oral before breakfast  sodium chloride 0.9%. 1000 milliLiter(s) (75 mL/Hr) IV Continuous <Continuous>  traZODone 50 milliGRAM(s) Oral at bedtime  upadacitinib (Rinvoq) 15 milliGRAM(s)   Oral daily    MEDICATIONS  (PRN):  acetaminophen     Tablet .. 650 milliGRAM(s) Oral every 6 hours PRN Temp greater or equal to 38C (100.4F), Mild Pain (1 - 3)  aluminum hydroxide/magnesium hydroxide/simethicone Suspension 30 milliLiter(s) Oral every 4 hours PRN Dyspepsia  dextrose Oral Gel 15 Gram(s) Oral once PRN Blood Glucose LESS THAN 70 milliGRAM(s)/deciliter  hydrALAZINE 10 milliGRAM(s) Oral every 6 hours PRN Systolic blood pressure > 160 mmHg  loperamide 4 milliGRAM(s) Oral every 6 hours PRN Diarrhea  melatonin 3 milliGRAM(s) Oral at bedtime PRN Insomnia  ondansetron Injectable 4 milliGRAM(s) IV Push every 8 hours PRN Nausea and/or Vomiting    T(C): 36.9 (02-17-25 @ 12:57), Max: 37.2 (02-16-25 @ 21:00)  HR: 74 (02-17-25 @ 15:27) (70 - 97)  BP: 138/68 (02-17-25 @ 12:57) (137/63 - 154/78)  RR: 18 (02-17-25 @ 12:57) (18 - 19)  SpO2: 98% (02-17-25 @ 15:27) (98% - 100%)    CAPILLARY BLOOD GLUCOSE      POCT Blood Glucose.: 285 mg/dL (17 Feb 2025 17:38)  POCT Blood Glucos+e.: 318 mg/dL (17 Feb 2025 12:02)  POCT Blood Glucose.: 129 mg/dL (17 Feb 2025 08:37)    I&O's Summary      Constitutional: No fever, fatigue  Skin: No rash.  Eyes: No recent vision problems or eye pain.  ENT: No congestion, ear pain, or sore throat.  Cardiovascular: No chest pain or palpation.  Respiratory: No cough, shortness of breath, congestion, or wheezing.  Gastrointestinal: No abdominal pain, nausea, vomiting, or diarrhea.  Genitourinary: No dysuria.  Musculoskeletal: No joint swelling.  Neurologic: No headache.    PHYSICAL EXAM:  GENERAL: NAD  EYES: EOMI, PERRLA  NECK: Supple, No JVD  CHEST/LUNG: crackles +  HEART:  S1 , S2 +  ABDOMEN: soft , bs+  EXTREMITIES:  no edema  NEUROLOGY:alert awake      LABS:                        11.4   11.51 )-----------( 400      ( 16 Feb 2025 09:07 )             35.1     02-17    128[L]  |  92[L]  |  12  ----------------------------<  91  4.7   |  21[L]  |  1.00    Ca    9.3      17 Feb 2025 04:35    TPro  7.0  /  Alb  3.7  /  TBili  0.4  /  DBili  x   /  AST  17  /  ALT  6   /  AlkPhos  42  02-17          Urinalysis Basic - ( 17 Feb 2025 04:35 )    Color: x / Appearance: x / SG: x / pH: x  Gluc: 91 mg/dL / Ketone: x  / Bili: x / Urobili: x   Blood: x / Protein: x / Nitrite: x   Leuk Esterase: x / RBC: x / WBC x   Sq Epi: x / Non Sq Epi: x / Bacteria: x        RADIOLOGY & ADDITIONAL TESTS:    Imaging Personally Reviewed:    Consultant(s) Notes Reviewed:      Care Discussed with Consultants/Other Providers:  
[facility] NEPHROLOGY- PROGRESS NOTE    74 year old male & former smoker with a past medical history of COPD [ not on home oxygen], UC, HTN, non insulin dependant T2DM, GERD, anemia, gastroparesis presented to the hospital with increased shortness of breath. Lab work is concerning for the presence of hyponatremia, so the Nephrology service has been consulted for further recommendations.    Hospital Medications: Medications reviewed.    REVIEW OF SYSTEMS:  CONSTITUTIONAL: No weakness, fevers, or chills.  EYES/ENT: No visual changes;  No vertigo or throat pain   NECK: No pain or stiffness  RESPIRATORY: Shortness of breath and cough are improving.  CARDIOVASCULAR: No chest pain or palpitations.  GASTROINTESTINAL: No abdominal or epigastric pain. No nausea, vomiting, or hematemesis; No diarrhea or constipation. No melena or hematochezia.  GENITOURINARY: No dysuria, frequency, foamy urine, urinary urgency, incontinence or hematuria  NEUROLOGICAL: No numbness or weakness  SKIN: No itching, burning, rashes, or lesions   VASCULAR: No bilateral lower extremity edema.     VITALS:  T(F): 97.7 (02-20-25 @ 13:00), Max: 98.4 (02-19-25 @ 20:38)  HR: 94 (02-20-25 @ 13:00)  BP: 127/79 (02-20-25 @ 13:00)  RR: 18 (02-20-25 @ 13:00)  SpO2: 100% (02-20-25 @ 13:00)  Wt(kg): --      PHYSICAL EXAM:  Constitutional: NAD  HEENT: anicteric sclera, oropharynx clear, MMM  Neck: No JVD  Respiratory: Congestion more prominent in upper lung fields.  Cardiovascular: S1, S2, RRR  Gastrointestinal: BS+, soft, NT/ND  Extremities: No cyanosis or clubbing. No peripheral edema  Neurological: A/O x 3, no focal deficits  Psychiatric: Normal mood, normal affect  : No CVA tenderness.     LABS:  02-20    Na+ trend: 133 <--, 133 <--, 129 <--, 128 <--, 124 <--    133[L]  |  97[L]  |  22  ----------------------------<  147[H]  4.8   |  22  |  0.93    Ca    9.1      20 Feb 2025 06:03  Phos  3.5     02-20  Mg     2.20     02-20      Creatinine Trend: 0.93 <--, 0.89 <--, 0.89 <--, 1.00 <--, 1.14 <--                        9.6    12.71 )-----------( 489      ( 20 Feb 2025 06:03 )             29.8     Urine Studies:  Urinalysis Basic - ( 20 Feb 2025 06:03 )    Color:  / Appearance:  / SG:  / pH:   Gluc: 147 mg/dL / Ketone:   / Bili:  / Urobili:    Blood:  / Protein:  / Nitrite:    Leuk Esterase:  / RBC:  / WBC    Sq Epi:  / Non Sq Epi:  / Bacteria:       Sodium, Random Urine: 33 mmol/L (02-18 @ 12:17)  Creatinine, Random Urine: 55 mg/dL (02-18 @ 12:17)  Protein/Creatinine Ratio Calculation: 0.2 Ratio (02-18 @ 12:17)  Osmolality, Random Urine: 407 mosm/kg (02-18 @ 12:17)  Potassium, Random Urine: 40.1 mmol/L (02-18 @ 12:17)          RADIOLOGY & ADDITIONAL STUDIES:    < from: CT Chest No Cont (02.16.25 @ 11:18) >  IMPRESSION:    Ill-defined subtle/mild bilateral groundglass opacities appear new since   September 17, 2024 is a very nonspecific finding.    Mildly enlarged mediastinal lymph node with mild increase in size since   September 17, 2024 also nonspecific finding.    5 mm left lower lobe nodule slightly increased in size since September 17, 2024.    A 3 month follow-up noncontrast chest CT is recommended for complete   evaluation of the above findings.    Impaction of a few of the right lower lobe segmental/subsegmental airways   with overall interval improvement since September 17, 2024 likely due to   internal secretions.    < end of copied text >        < from: Xray Chest 2 Views PA/Lat (02.16.25 @ 09:18) >  IMPRESSION:  Bulged anterior right hemidiaphragm contour. Otherwise clear lungs. No   pleural effusions or pneumothorax.    Cardiac and mediastinal silhouettes within normal limits.    Rightward lower tracheal deviation due to pressure effect from adjacent   aortic arch. Midline upper trachea.    Generalized osteopenia. Spinal degenerative changes.    < end of copied text >
Date of Service: 02-18-25 @ 11:10    Patient is a 74y old  Male who presents with a chief complaint of SOB, PNA (18 Feb 2025 10:20)      Any change in ROS: he seems very good:  not sign sob:  on rooma ir     MEDICATIONS  (STANDING):  albuterol/ipratropium for Nebulization 3 milliLiter(s) Nebulizer every 6 hours  cefTRIAXone   IVPB 1000 milliGRAM(s) IV Intermittent every 24 hours  cholecalciferol 1000 Unit(s) Oral daily  dextrose 5%. 1000 milliLiter(s) (50 mL/Hr) IV Continuous <Continuous>  dextrose 5%. 1000 milliLiter(s) (100 mL/Hr) IV Continuous <Continuous>  dextrose 50% Injectable 25 Gram(s) IV Push once  dextrose 50% Injectable 12.5 Gram(s) IV Push once  dextrose 50% Injectable 25 Gram(s) IV Push once  enoxaparin Injectable 40 milliGRAM(s) SubCutaneous every 24 hours  ferrous    sulfate 325 milliGRAM(s) Oral daily  fluticasone propionate 50 MICROgram(s)/spray Nasal Spray 1 Spray(s) Both Nostrils two times a day  gabapentin 300 milliGRAM(s) Oral three times a day  glucagon  Injectable 1 milliGRAM(s) IntraMuscular once  guaiFENesin ER 1200 milliGRAM(s) Oral every 12 hours  insulin glargine Injectable (LANTUS) 14 Unit(s) SubCutaneous at bedtime  insulin lispro (ADMELOG) corrective regimen sliding scale   SubCutaneous three times a day before meals  insulin lispro Injectable (ADMELOG) 2 Unit(s) SubCutaneous three times a day before meals  methylPREDNISolone sodium succinate Injectable   IV Push   methylPREDNISolone sodium succinate Injectable 20 milliGRAM(s) IV Push every 8 hours  montelukast 10 milliGRAM(s) Oral daily  pantoprazole    Tablet 40 milliGRAM(s) Oral before breakfast  sodium chloride 0.9%. 1000 milliLiter(s) (75 mL/Hr) IV Continuous <Continuous>  traZODone 50 milliGRAM(s) Oral at bedtime  upadacitinib (Rinvoq) 15 milliGRAM(s) 1 Tablet(s) Oral daily    MEDICATIONS  (PRN):  acetaminophen     Tablet .. 650 milliGRAM(s) Oral every 6 hours PRN Temp greater or equal to 38C (100.4F), Mild Pain (1 - 3)  aluminum hydroxide/magnesium hydroxide/simethicone Suspension 30 milliLiter(s) Oral every 4 hours PRN Dyspepsia  dextrose Oral Gel 15 Gram(s) Oral once PRN Blood Glucose LESS THAN 70 milliGRAM(s)/deciliter  hydrALAZINE 10 milliGRAM(s) Oral every 6 hours PRN Systolic blood pressure > 160 mmHg  loperamide 4 milliGRAM(s) Oral every 6 hours PRN Diarrhea  melatonin 3 milliGRAM(s) Oral at bedtime PRN Insomnia  ondansetron Injectable 4 milliGRAM(s) IV Push every 8 hours PRN Nausea and/or Vomiting    Vital Signs Last 24 Hrs  T(C): 36.6 (18 Feb 2025 05:15), Max: 36.9 (17 Feb 2025 12:57)  T(F): 97.8 (18 Feb 2025 05:15), Max: 98.5 (17 Feb 2025 12:57)  HR: 78 (18 Feb 2025 09:28) (66 - 86)  BP: 128/78 (18 Feb 2025 05:15) (128/78 - 138/68)  BP(mean): --  RR: 18 (18 Feb 2025 05:15) (18 - 18)  SpO2: 98% (18 Feb 2025 09:28) (97% - 99%)    Parameters below as of 18 Feb 2025 09:28  Patient On (Oxygen Delivery Method): room air        I&O's Summary        Physical Exam:   GENERAL: NAD, well-groomed, well-developed  HEENT: ALYSSA/   Atraumatic, Normocephalic  ENMT: No tonsillar erythema, exudates, or enlargement; Moist mucous membranes, Good dentition, No lesions  NECK: Supple, No JVD, Normal thyroid  CHEST/LUNG: mild coarse rhonchi    CVS: Regular rate and rhythm; No murmurs, rubs, or gallops  GI: : Soft, Nontender, Nondistended; Bowel sounds present  NERVOUS SYSTEM:  Alert & Oriented X3  EXTREMITIES: -edema  LYMPH: No lymphadenopathy noted  SKIN: No rashes or lesions  ENDOCRINOLOGY: No Thyromegaly  PSYCH: Appropriate    Labs:  22                            9.9    9.33  )-----------( 427      ( 18 Feb 2025 05:26 )             31.5                         11.4   11.51 )-----------( 400      ( 16 Feb 2025 09:07 )             35.1     02-18    129[L]  |  95[L]  |  17  ----------------------------<  171[H]  4.5   |  20[L]  |  0.89  02-17    128[L]  |  92[L]  |  12  ----------------------------<  91  4.7   |  21[L]  |  1.00  02-16    124[L]  |  87[L]  |  14  ----------------------------<  133[H]  4.3   |  20[L]  |  1.14    Ca    8.9      18 Feb 2025 05:26  Ca    9.3      17 Feb 2025 04:35  Phos  4.1     02-18  Mg     1.90     02-18    TPro  7.0  /  Alb  3.7  /  TBili  0.4  /  DBili  x   /  AST  17  /  ALT  6   /  AlkPhos  42  02-17  TPro  7.5  /  Alb  4.0  /  TBili  0.4  /  DBili  x   /  AST  17  /  ALT  <5  /  AlkPhos  46  02-16    CAPILLARY BLOOD GLUCOSE      POCT Blood Glucose.: 194 mg/dL (18 Feb 2025 08:04)  POCT Blood Glucose.: 240 mg/dL (17 Feb 2025 21:37)  POCT Blood Glucose.: 285 mg/dL (17 Feb 2025 17:38)  POCT Blood Glucose.: 318 mg/dL (17 Feb 2025 12:02)      LIVER FUNCTIONS - ( 17 Feb 2025 04:35 )  Alb: 3.7 g/dL / Pro: 7.0 g/dL / ALK PHOS: 42 U/L / ALT: 6 U/L / AST: 17 U/L / GGT: x             Urinalysis Basic - ( 18 Feb 2025 05:26 )    Color: x / Appearance: x / SG: x / pH: x  Gluc: 171 mg/dL / Ketone: x  / Bili: x / Urobili: x   Blood: x / Protein: x / Nitrite: x   Leuk Esterase: x / RBC: x / WBC x   Sq Epi: x / Non Sq Epi: x / Bacteria: x      Lactate, Blood: 3.4 mmol/L (02-16 @ 11:56)        RECENT CULTURES:  02-16 @ 09:15 .Blood BLOOD       radrad< from: CT Chest No Cont (02.16.25 @ 11:18) >    Ill-defined subtle/mild bilateral groundglass opacities appear new since   September 17, 2024 is a very nonspecific finding.    Mildly enlarged mediastinal lymph node with mild increase in size since   September 17, 2024 also nonspecific finding.    5 mm left lower lobe nodule slightly increased in size since September 17, 2024.    A 3 month follow-up noncontrast chest CT is recommended for complete   evaluation of the above findings.    < end of copied text >           No growth at 24 hours    02-16 @ 09:00 .Blood BLOOD                No growth at 24 hours          RESPIRATORY CULTURES:          Studies  Chest X-RAY  CT SCAN Chest   Venous Dopplers: LE:   CT Abdomen  Others              
[facility]  SHAMAR Jeronimo Y. Patel, S. Shah, G. [facility]imir  [phone]  ([phone] - weekdays after 5pm and weekends)    Name: BALA SARGENT  Age/Gender: 74y Male  MRN: [id number]    Interval History:  Notes reviewed.   No concerning overnight events.  Afebrile.   denies SOB  states no more chills    Allergies: angiotensin converting enzyme inhibitors (Other; Swelling)  Seafood (Hives)      Objective:  Vitals:   T(F): 97.8 (02-18-25 @ 05:15), Max: 98.5 (02-17-25 @ 12:57)  HR: 78 (02-18-25 @ 09:28) (66 - 86)  BP: 128/78 (02-18-25 @ 05:15) (128/78 - 138/68)  RR: 18 (02-18-25 @ 05:15) (18 - 18)  SpO2: 98% (02-18-25 @ 09:28) (97% - 99%)  Physical Examination:  General: no acute distress  HEENT: anicteric  Cardio: S1, S2, normal rate  Resp: mild rhonchi  Abd: soft, NT, ND  Ext: no LE edema  Skin: warm, dry    Laboratory Studies:  CBC:                       9.9    9.33  )-----------( 427      ( 18 Feb 2025 05:26 )             31.5     WBC Trend:  9.33 02-18-25 @ 05:26  11.51 02-16-25 @ 09:07    CMP: 02-18    129[L]  |  95[L]  |  17  ----------------------------<  171[H]  4.5   |  20[L]  |  0.89    Ca    8.9      18 Feb 2025 05:26  Phos  4.1     02-18  Mg     1.90     02-18    TPro  7.0  /  Alb  3.7  /  TBili  0.4  /  DBili  x   /  AST  17  /  ALT  6   /  AlkPhos  42  02-17      LIVER FUNCTIONS - ( 17 Feb 2025 04:35 )  Alb: 3.7 g/dL / Pro: 7.0 g/dL / ALK PHOS: 42 U/L / ALT: 6 U/L / AST: 17 U/L / GGT: x             Urinalysis Basic - ( 18 Feb 2025 05:26 )    Color: x / Appearance: x / SG: x / pH: x  Gluc: 171 mg/dL / Ketone: x  / Bili: x / Urobili: x   Blood: x / Protein: x / Nitrite: x   Leuk Esterase: x / RBC: x / WBC x   Sq Epi: x / Non Sq Epi: x / Bacteria: x      Microbiology: reviewed     Urinalysis with Rflx Culture (collected 02-16-25 @ 11:37)    Culture - Blood (collected 02-16-25 @ 09:15)  Source: .Blood BLOOD  Preliminary Report (02-17-25 @ 13:01):    No growth at 24 hours    Culture - Blood (collected 02-16-25 @ 09:00)  Source: .Blood BLOOD  Preliminary Report (02-17-25 @ 13:01):    No growth at 24 hours        Radiology: reviewed     Medications:  acetaminophen     Tablet .. 650 milliGRAM(s) Oral every 6 hours PRN  albuterol/ipratropium for Nebulization 3 milliLiter(s) Nebulizer every 6 hours  aluminum hydroxide/magnesium hydroxide/simethicone Suspension 30 milliLiter(s) Oral every 4 hours PRN  cefTRIAXone   IVPB 1000 milliGRAM(s) IV Intermittent every 24 hours  cholecalciferol 1000 Unit(s) Oral daily  dextrose 5%. 1000 milliLiter(s) IV Continuous <Continuous>  dextrose 5%. 1000 milliLiter(s) IV Continuous <Continuous>  dextrose 50% Injectable 25 Gram(s) IV Push once  dextrose 50% Injectable 12.5 Gram(s) IV Push once  dextrose 50% Injectable 25 Gram(s) IV Push once  dextrose Oral Gel 15 Gram(s) Oral once PRN  enoxaparin Injectable 40 milliGRAM(s) SubCutaneous every 24 hours  ferrous    sulfate 325 milliGRAM(s) Oral daily  fluticasone propionate 50 MICROgram(s)/spray Nasal Spray 1 Spray(s) Both Nostrils two times a day  gabapentin 300 milliGRAM(s) Oral three times a day  glucagon  Injectable 1 milliGRAM(s) IntraMuscular once  guaiFENesin ER 1200 milliGRAM(s) Oral every 12 hours  hydrALAZINE 10 milliGRAM(s) Oral every 6 hours PRN  insulin glargine Injectable (LANTUS) 14 Unit(s) SubCutaneous at bedtime  insulin lispro (ADMELOG) corrective regimen sliding scale   SubCutaneous three times a day before meals  insulin lispro Injectable (ADMELOG) 2 Unit(s) SubCutaneous three times a day before meals  loperamide 4 milliGRAM(s) Oral every 6 hours PRN  melatonin 3 milliGRAM(s) Oral at bedtime PRN  methylPREDNISolone sodium succinate Injectable   IV Push   methylPREDNISolone sodium succinate Injectable 20 milliGRAM(s) IV Push every 8 hours  montelukast 10 milliGRAM(s) Oral daily  ondansetron Injectable 4 milliGRAM(s) IV Push every 8 hours PRN  pantoprazole    Tablet 40 milliGRAM(s) Oral before breakfast  sodium chloride 0.9%. 1000 milliLiter(s) IV Continuous <Continuous>  traZODone 50 milliGRAM(s) Oral at bedtime  upadacitinib (Rinvoq) 15 milliGRAM(s) 1 Tablet(s) Oral daily    Antimicrobials:  cefTRIAXone   IVPB 1000 milliGRAM(s) IV Intermittent every 24 hours  
    SUBJECTIVE / OVERNIGHT EVENTS:pt seen and examined, says he feels better   02-18-25     MEDICATIONS  (STANDING):  albuterol/ipratropium for Nebulization 3 milliLiter(s) Nebulizer every 6 hours  cefTRIAXone   IVPB 1000 milliGRAM(s) IV Intermittent every 24 hours  cholecalciferol 1000 Unit(s) Oral daily  dextrose 5%. 1000 milliLiter(s) (50 mL/Hr) IV Continuous <Continuous>  dextrose 5%. 1000 milliLiter(s) (100 mL/Hr) IV Continuous <Continuous>  dextrose 50% Injectable 25 Gram(s) IV Push once  dextrose 50% Injectable 12.5 Gram(s) IV Push once  dextrose 50% Injectable 25 Gram(s) IV Push once  enoxaparin Injectable 40 milliGRAM(s) SubCutaneous every 24 hours  ferrous    sulfate 325 milliGRAM(s) Oral daily  fluticasone propionate 50 MICROgram(s)/spray Nasal Spray 1 Spray(s) Both Nostrils two times a day  gabapentin 300 milliGRAM(s) Oral three times a day  glucagon  Injectable 1 milliGRAM(s) IntraMuscular once  guaiFENesin ER 1200 milliGRAM(s) Oral every 12 hours  insulin glargine Injectable (LANTUS) 14 Unit(s) SubCutaneous at bedtime  insulin lispro (ADMELOG) corrective regimen sliding scale   SubCutaneous three times a day before meals  insulin lispro Injectable (ADMELOG) 2 Unit(s) SubCutaneous three times a day before meals  methylPREDNISolone sodium succinate Injectable   IV Push   methylPREDNISolone sodium succinate Injectable 20 milliGRAM(s) IV Push every 8 hours  montelukast 10 milliGRAM(s) Oral daily  pantoprazole    Tablet 40 milliGRAM(s) Oral before breakfast  sodium chloride 0.9%. 1000 milliLiter(s) (75 mL/Hr) IV Continuous <Continuous>  traZODone 50 milliGRAM(s) Oral at bedtime  upadacitinib (Rinvoq) 15 milliGRAM(s) 1 Tablet(s) Oral daily    MEDICATIONS  (PRN):  acetaminophen     Tablet .. 650 milliGRAM(s) Oral every 6 hours PRN Temp greater or equal to 38C (100.4F), Mild Pain (1 - 3)  aluminum hydroxide/magnesium hydroxide/simethicone Suspension 30 milliLiter(s) Oral every 4 hours PRN Dyspepsia  dextrose Oral Gel 15 Gram(s) Oral once PRN Blood Glucose LESS THAN 70 milliGRAM(s)/deciliter  hydrALAZINE 10 milliGRAM(s) Oral every 6 hours PRN Systolic blood pressure > 160 mmHg  loperamide 4 milliGRAM(s) Oral every 6 hours PRN Diarrhea  melatonin 3 milliGRAM(s) Oral at bedtime PRN Insomnia  ondansetron Injectable 4 milliGRAM(s) IV Push every 8 hours PRN Nausea and/or Vomiting    Vital Signs Last 24 Hrs  T(C): 36.6 (02-18-25 @ 05:15), Max: 36.9 (02-17-25 @ 12:57)  T(F): 97.8 (02-18-25 @ 05:15), Max: 98.5 (02-17-25 @ 12:57)  HR: 78 (02-18-25 @ 09:28) (66 - 86)  BP: 128/78 (02-18-25 @ 05:15) (128/78 - 138/68)  BP(mean): --  RR: 18 (02-18-25 @ 05:15) (18 - 18)  SpO2: 98% (02-18-25 @ 09:28) (97% - 99%)      Constitutional: No fever, fatigue  Skin: No rash.  Eyes: No recent vision problems or eye pain.  ENT: No congestion, ear pain, or sore throat.  Cardiovascular: No chest pain or palpation.  Respiratory: No cough, shortness of breath, congestion, or wheezing.  Gastrointestinal: No abdominal pain, nausea, vomiting, or diarrhea.  Genitourinary: No dysuria.  Musculoskeletal: No joint swelling.  Neurologic: No headache.    PHYSICAL EXAM:  GENERAL: NAD  EYES: EOMI, PERRLA  NECK: Supple, No JVD  CHEST/LUNG: crackles +  HEART:  S1 , S2 +  ABDOMEN: soft , bs+  EXTREMITIES:  no edema  NEUROLOGY:alert awake    LABS:  02-18    129[L]  |  95[L]  |  17  ----------------------------<  171[H]  4.5   |  20[L]  |  0.89    Ca    8.9      18 Feb 2025 05:26  Phos  4.1     02-18  Mg     1.90     02-18    TPro  7.0  /  Alb  3.7  /  TBili  0.4  /  DBili      /  AST  17  /  ALT  6   /  AlkPhos  42  02-17    Creatinine Trend: 0.89 <--, 1.00 <--, 1.14 <--                        9.9    9.33  )-----------( 427      ( 18 Feb 2025 05:26 )             31.5     Urine Studies:  Urinalysis Basic - ( 18 Feb 2025 05:26 )    Color:  / Appearance:  / SG:  / pH:   Gluc: 171 mg/dL / Ketone:   / Bili:  / Urobili:    Blood:  / Protein:  / Nitrite:    Leuk Esterase:  / RBC:  / WBC    Sq Epi:  / Non Sq Epi:  / Bacteria:               LIVER FUNCTIONS - ( 17 Feb 2025 04:35 )  Alb: 3.7 g/dL / Pro: 7.0 g/dL / ALK PHOS: 42 U/L / ALT: 6 U/L / AST: 17 U/L / GGT: x               RADIOLOGY & ADDITIONAL TESTS:    Imaging Personally Reviewed:    Consultant(s) Notes Reviewed:      Care Discussed with Consultants/Other Providers:  
Date of Service: 02-19-25 @ 13:23    Patient is a 74y old  Male who presents with a chief complaint of SOB, PNA (19 Feb 2025 12:15)      Any change in ROS: she seems to be doing ok: for possible dc today     MEDICATIONS  (STANDING):  albuterol/ipratropium for Nebulization 3 milliLiter(s) Nebulizer every 6 hours  cefTRIAXone   IVPB 1000 milliGRAM(s) IV Intermittent every 24 hours  cholecalciferol 1000 Unit(s) Oral daily  dextrose 5%. 1000 milliLiter(s) (50 mL/Hr) IV Continuous <Continuous>  dextrose 5%. 1000 milliLiter(s) (100 mL/Hr) IV Continuous <Continuous>  dextrose 50% Injectable 25 Gram(s) IV Push once  dextrose 50% Injectable 12.5 Gram(s) IV Push once  dextrose 50% Injectable 25 Gram(s) IV Push once  enoxaparin Injectable 40 milliGRAM(s) SubCutaneous every 24 hours  famotidine    Tablet 40 milliGRAM(s) Oral at bedtime  ferrous    sulfate 325 milliGRAM(s) Oral daily  fluticasone propionate 50 MICROgram(s)/spray Nasal Spray 1 Spray(s) Both Nostrils two times a day  fluticasone propionate/ salmeterol 100-50 MICROgram(s) Diskus 1 Dose(s) Inhalation two times a day  gabapentin 300 milliGRAM(s) Oral three times a day  glucagon  Injectable 1 milliGRAM(s) IntraMuscular once  guaiFENesin ER 1200 milliGRAM(s) Oral every 12 hours  insulin glargine Injectable (LANTUS) 14 Unit(s) SubCutaneous at bedtime  insulin lispro (ADMELOG) corrective regimen sliding scale   SubCutaneous three times a day before meals  insulin lispro Injectable (ADMELOG) 2 Unit(s) SubCutaneous three times a day before meals  methylPREDNISolone sodium succinate Injectable   IV Push   methylPREDNISolone sodium succinate Injectable 20 milliGRAM(s) IV Push every 12 hours  montelukast 10 milliGRAM(s) Oral daily  pantoprazole    Tablet 40 milliGRAM(s) Oral before breakfast  traZODone 50 milliGRAM(s) Oral at bedtime  upadacitinib (Rinvoq) 15 milliGRAM(s) 1 Tablet(s) Oral daily    MEDICATIONS  (PRN):  acetaminophen     Tablet .. 650 milliGRAM(s) Oral every 6 hours PRN Temp greater or equal to 38C (100.4F), Mild Pain (1 - 3)  albuterol    90 MICROgram(s) HFA Inhaler 2 Puff(s) Inhalation every 6 hours PRN Shortness of Breath  aluminum hydroxide/magnesium hydroxide/simethicone Suspension 30 milliLiter(s) Oral every 4 hours PRN Dyspepsia  dextrose Oral Gel 15 Gram(s) Oral once PRN Blood Glucose LESS THAN 70 milliGRAM(s)/deciliter  hydrALAZINE 10 milliGRAM(s) Oral every 6 hours PRN Systolic blood pressure > 160 mmHg  loperamide 4 milliGRAM(s) Oral every 6 hours PRN Diarrhea  melatonin 3 milliGRAM(s) Oral at bedtime PRN Insomnia  ondansetron Injectable 4 milliGRAM(s) IV Push every 8 hours PRN Nausea and/or Vomiting    Vital Signs Last 24 Hrs  T(C): 36.6 (19 Feb 2025 05:30), Max: 36.6 (19 Feb 2025 05:30)  T(F): 97.8 (19 Feb 2025 05:30), Max: 97.8 (19 Feb 2025 05:30)  HR: 67 (19 Feb 2025 08:59) (61 - 78)  BP: 106/66 (19 Feb 2025 05:30) (106/66 - 128/64)  BP(mean): --  RR: 18 (19 Feb 2025 05:30) (18 - 18)  SpO2: 99% (19 Feb 2025 08:59) (99% - 100%)    Parameters below as of 19 Feb 2025 08:59  Patient On (Oxygen Delivery Method): room air        I&O's Summary        Physical Exam:   GENERAL: NAD, well-groomed, well-developed  HEENT: ALYSSA/   Atraumatic, Normocephalic  ENMT: No tonsillar erythema, exudates, or enlargement; Moist mucous membranes, Good dentition, No lesions  NECK: Supple, No JVD, Normal thyroid  CHEST/LUNG: Clear to auscultaion  CVS: Regular rate and rhythm; No murmurs, rubs, or gallops  GI: : Soft, Nontender, Nondistended; Bowel sounds present  NERVOUS SYSTEM:  Alert & Oriented X3  EXTREMITIES:  - edema  LYMPH: No lymphadenopathy noted  SKIN: No rashes or lesions  ENDOCRINOLOGY: No Thyromegaly  PSYCH: Appropriate    Labs:  22                            9.3    13.57 )-----------( 465      ( 19 Feb 2025 03:40 )             28.3                         9.9    9.33  )-----------( 427      ( 18 Feb 2025 05:26 )             31.5                         11.4   11.51 )-----------( 400      ( 16 Feb 2025 09:07 )             35.1     02-19    133[L]  |  98  |  20  ----------------------------<  152[H]  5.0   |  20[L]  |  0.89  02-18    129[L]  |  95[L]  |  17  ----------------------------<  171[H]  4.5   |  20[L]  |  0.89  02-17    128[L]  |  92[L]  |  12  ----------------------------<  91  4.7   |  21[L]  |  1.00  02-16    124[L]  |  87[L]  |  14  ----------------------------<  133[H]  4.3   |  20[L]  |  1.14    Ca    8.9      19 Feb 2025 03:40  Ca    8.9      18 Feb 2025 05:26  Phos  3.2     02-19  Phos  4.1     02-18  Mg     2.00     02-19  Mg     1.90     02-18    TPro  7.0  /  Alb  3.7  /  TBili  0.4  /  DBili  x   /  AST  17  /  ALT  6   /  AlkPhos  42  02-17  TPro  7.5  /  Alb  4.0  /  TBili  0.4  /  DBili  x   /  AST  17  /  ALT  <5  /  AlkPhos  46  02-16    CAPILLARY BLOOD GLUCOSE      POCT Blood Glucose.: 187 mg/dL (19 Feb 2025 12:07)  POCT Blood Glucose.: 162 mg/dL (19 Feb 2025 08:17)  POCT Blood Glucose.: 121 mg/dL (18 Feb 2025 21:25)  POCT Blood Glucose.: 248 mg/dL (18 Feb 2025 17:36)          Urinalysis Basic - ( 19 Feb 2025 03:40 )    Color: x / Appearance: x / SG: x / pH: x  Gluc: 152 mg/dL / Ketone: x  / Bili: x / Urobili: x   Blood: x / Protein: x / Nitrite: x   Leuk Esterase: x / RBC: x / WBC x   Sq Epi: x / Non Sq Epi: x / Bacteria: x      Lactate, Blood: 3.8 mmol/L (02-19 @ 12:22)  Lactate, Blood: 3.4 mmol/L (02-16 @ 11:56)        RECENT CULTURES:  02-16 @ 09:15 .Blood BLOOD         rad< from: CT Chest No Cont (02.16.25 @ 11:18) >  IMPRESSION:    Ill-defined subtle/mild bilateral groundglass opacities appear new since   September 17, 2024 is a very nonspecific finding.    Mildly enlarged mediastinal lymph node with mild increase in size since   September 17, 2024 also nonspecific finding.    5 mm left lower lobe nodule slightly increased in size since September 17, 2024.    A 3 month follow-up noncontrast chest CT is recommended for complete   evaluation of the above findings.    Impaction of a few of the right lower lobe segmental/subsegmental airways     < end of copied text >         No growth at 72 Hours    02-16 @ 09:00 .Blood BLOOD                No growth at 72 Hours          RESPIRATORY CULTURES:          Studies  Chest X-RAY  CT SCAN Chest   Venous Dopplers: LE:   CT Abdomen  Others              
[facility]  SHAMAR Jeronimo Y. Patel, S. Shah, G. Casimir  [phone]  ([phone] - weekdays after 5pm and weekends)    Name: BALA SARGENT  Age/Gender: 74y Male  MRN: [id number]    Interval History:  Notes reviewed.   No concerning overnight events.  Afebrile.   feels good  denies SOB  he is eager to go home soon     Allergies: angiotensin converting enzyme inhibitors (Other; Swelling)  Seafood (Hives)      Objective:  Vitals:   T(F): 97.8 (02-20-25 @ 05:08), Max: 98.4 (02-19-25 @ 13:20)  HR: 65 (02-20-25 @ 09:02) (59 - 83)  BP: 126/68 (02-20-25 @ 05:08) (118/83 - 134/79)  RR: 18 (02-20-25 @ 05:08) (15 - 18)  SpO2: 96% (02-20-25 @ 09:02) (94% - 100%)  Physical Examination:  General: no acute distress  HEENT: anicteric  Cardio: S1, S2, normal rate  Resp: mild rhonchi  Abd: soft, NT, ND  Ext: no LE edema  Skin: warm, dry    Laboratory Studies:  CBC:                       9.6    12.71 )-----------( 489      ( 20 Feb 2025 06:03 )             29.8     WBC Trend:  12.71 02-20-25 @ 06:03  13.57 02-19-25 @ 03:40  9.33 02-18-25 @ 05:26  11.51 02-16-25 @ 09:07    CMP: 02-20    133[L]  |  97[L]  |  22  ----------------------------<  147[H]  4.8   |  22  |  0.93    Ca    9.1      20 Feb 2025 06:03  Phos  3.5     02-20  Mg     2.20     02-20            Urinalysis Basic - ( 20 Feb 2025 06:03 )    Color: x / Appearance: x / SG: x / pH: x  Gluc: 147 mg/dL / Ketone: x  / Bili: x / Urobili: x   Blood: x / Protein: x / Nitrite: x   Leuk Esterase: x / RBC: x / WBC x   Sq Epi: x / Non Sq Epi: x / Bacteria: x      Microbiology: reviewed     Urinalysis with Rflx Culture (collected 02-16-25 @ 11:37)    Culture - Blood (collected 02-16-25 @ 09:15)  Source: .Blood BLOOD  Preliminary Report (02-19-25 @ 13:01):    No growth at 72 Hours    Culture - Blood (collected 02-16-25 @ 09:00)  Source: .Blood BLOOD  Preliminary Report (02-19-25 @ 13:01):    No growth at 72 Hours        Radiology: reviewed     Medications:  acetaminophen     Tablet .. 650 milliGRAM(s) Oral every 6 hours PRN  albuterol    90 MICROgram(s) HFA Inhaler 2 Puff(s) Inhalation every 6 hours PRN  albuterol/ipratropium for Nebulization 3 milliLiter(s) Nebulizer every 6 hours  aluminum hydroxide/magnesium hydroxide/simethicone Suspension 30 milliLiter(s) Oral every 4 hours PRN  cefTRIAXone   IVPB 1000 milliGRAM(s) IV Intermittent once  chlorhexidine 2% Cloths 1 Application(s) Topical daily  cholecalciferol 1000 Unit(s) Oral daily  dextrose 5%. 1000 milliLiter(s) IV Continuous <Continuous>  dextrose 5%. 1000 milliLiter(s) IV Continuous <Continuous>  dextrose 50% Injectable 25 Gram(s) IV Push once  dextrose 50% Injectable 12.5 Gram(s) IV Push once  dextrose 50% Injectable 25 Gram(s) IV Push once  dextrose Oral Gel 15 Gram(s) Oral once PRN  enoxaparin Injectable 40 milliGRAM(s) SubCutaneous every 24 hours  famotidine    Tablet 40 milliGRAM(s) Oral at bedtime  ferrous    sulfate 325 milliGRAM(s) Oral daily  fluticasone propionate 50 MICROgram(s)/spray Nasal Spray 1 Spray(s) Both Nostrils two times a day  fluticasone propionate/ salmeterol 100-50 MICROgram(s) Diskus 1 Dose(s) Inhalation two times a day  gabapentin 300 milliGRAM(s) Oral three times a day  glucagon  Injectable 1 milliGRAM(s) IntraMuscular once  guaiFENesin ER 1200 milliGRAM(s) Oral every 12 hours  hydrALAZINE 10 milliGRAM(s) Oral every 6 hours PRN  insulin glargine Injectable (LANTUS) 14 Unit(s) SubCutaneous at bedtime  insulin lispro (ADMELOG) corrective regimen sliding scale   SubCutaneous three times a day before meals  insulin lispro Injectable (ADMELOG) 2 Unit(s) SubCutaneous three times a day before meals  loperamide 4 milliGRAM(s) Oral every 6 hours PRN  melatonin 3 milliGRAM(s) Oral at bedtime PRN  montelukast 10 milliGRAM(s) Oral daily  ondansetron Injectable 4 milliGRAM(s) IV Push every 8 hours PRN  pantoprazole    Tablet 40 milliGRAM(s) Oral before breakfast  polyethylene glycol 3350 17 Gram(s) Oral daily  predniSONE   Tablet 40 milliGRAM(s) Oral daily  traZODone 50 milliGRAM(s) Oral at bedtime  upadacitinib (Rinvoq) 15 milliGRAM(s) 1 Tablet(s) Oral daily    Antimicrobials:  cefTRIAXone   IVPB 1000 milliGRAM(s) IV Intermittent once  
[facility] NEPHROLOG[facility]- PROGRESS NOTE    74 year old male & former smoker with a past medical history of COPD [ not on home oxygen], UC, HTN, non insulin dependant T2DM, GERD, anemia, gastroparesis presented to the hospital with increased shortness of breath. Lab work is concerning for the presence of hyponatremia, so the Nephrology service has been consulted for further recommendations.    Hospital Medications: Medications reviewed.    REVIEW OF SYSTEMS:  CONSTITUTIONAL: No weakness, fevers, or chills.  EYES/ENT: No visual changes;  No vertigo or throat pain   NECK: No pain or stiffness  RESPIRATORY: Shortness of breath and cough are improving.  CARDIOVASCULAR: No chest pain or palpitations.  GASTROINTESTINAL: No abdominal or epigastric pain. No nausea, vomiting, or hematemesis; No diarrhea or constipation. No melena or hematochezia.  GENITOURINARY: No dysuria, frequency, foamy urine, urinary urgency, incontinence or hematuria  NEUROLOGICAL: No numbness or weakness  SKIN: No itching, burning, rashes, or lesions   VASCULAR: No bilateral lower extremity edema.     VITALS:  T(F): 98.4 (02-19-25 @ 13:20), Max: 98.4 (02-19-25 @ 13:20)  HR: 67 (02-19-25 @ 15:39)  BP: 118/83 (02-19-25 @ 13:20)  RR: 18 (02-19-25 @ 13:20)  SpO2: 99% (02-19-25 @ 15:39)  Wt(kg): --      PHYSICAL EXAM:  Constitutional: NAD  HEENT: anicteric sclera, oropharynx clear, MMM  Neck: No JVD  Respiratory: Congestion more prominent in upper lung fields.  Cardiovascular: S1, S2, RRR  Gastrointestinal: BS+, soft, NT/ND  Extremities: No cyanosis or clubbing. No peripheral edema  Neurological: A/O x 3, no focal deficits  Psychiatric: Normal mood, normal affect  : No CVA tenderness.     LABS:  02-19    Na+ trend: 133 <--, 129 <--, 128 <--, 124 <--    133[L]  |  98  |  20  ----------------------------<  152[H]  5.0   |  20[L]  |  0.89    Ca    8.9      19 Feb 2025 03:40  Phos  3.2     02-19  Mg     2.00     02-19      Creatinine Trend: 0.89 <--, 0.89 <--, 1.00 <--, 1.14 <--                        9.3    13.57 )-----------( 465      ( 19 Feb 2025 03:40 )             28.3     Urine Studies:  Urinalysis Basic - ( 19 Feb 2025 03:40 )    Color:  / Appearance:  / SG:  / pH:   Gluc: 152 mg/dL / Ketone:   / Bili:  / Urobili:    Blood:  / Protein:  / Nitrite:    Leuk Esterase:  / RBC:  / WBC    Sq Epi:  / Non Sq Epi:  / Bacteria:       Sodium, Random Urine: 33 mmol/L (02-18 @ 12:17)  Creatinine, Random Urine: 55 mg/dL (02-18 @ 12:17)  Protein/Creatinine Ratio Calculation: 0.2 Ratio (02-18 @ 12:17)  Osmolality, Random Urine: 407 mosm/kg (02-18 @ 12:17)  Potassium, Random Urine: 40.1 mmol/L (02-18 @ 12:17)        RADIOLOGY & ADDITIONAL STUDIES:    < from: CT Chest No Cont (02.16.25 @ 11:18) >  IMPRESSION:    Ill-defined subtle/mild bilateral groundglass opacities appear new since   September 17, 2024 is a very nonspecific finding.    Mildly enlarged mediastinal lymph node with mild increase in size since   September 17, 2024 also nonspecific finding.    5 mm left lower lobe nodule slightly increased in size since September 17, 2024.    A 3 month follow-up noncontrast chest CT is recommended for complete   evaluation of the above findings.    Impaction of a few of the right lower lobe segmental/subsegmental airways   with overall interval improvement since September 17, 2024 likely due to   internal secretions.    < end of copied text >        < from: Xray Chest 2 Views PA/Lat (02.16.25 @ 09:18) >  IMPRESSION:  Bulged anterior right hemidiaphragm contour. Otherwise clear lungs. No   pleural effusions or pneumothorax.    Cardiac and mediastinal silhouettes within normal limits.    Rightward lower tracheal deviation due to pressure effect from adjacent   aortic arch. Midline upper trachea.    Generalized osteopenia. Spinal degenerative changes.    < end of copied text >
    SUBJECTIVE / OVERNIGHT EVENTS:pt seen and examined, says he feels better   02-19-25     MEDICATIONS  (STANDING):  albuterol/ipratropium for Nebulization 3 milliLiter(s) Nebulizer every 6 hours  cefTRIAXone   IVPB 1000 milliGRAM(s) IV Intermittent every 24 hours  cholecalciferol 1000 Unit(s) Oral daily  dextrose 5%. 1000 milliLiter(s) (50 mL/Hr) IV Continuous <Continuous>  dextrose 5%. 1000 milliLiter(s) (100 mL/Hr) IV Continuous <Continuous>  dextrose 50% Injectable 25 Gram(s) IV Push once  dextrose 50% Injectable 12.5 Gram(s) IV Push once  dextrose 50% Injectable 25 Gram(s) IV Push once  enoxaparin Injectable 40 milliGRAM(s) SubCutaneous every 24 hours  famotidine    Tablet 40 milliGRAM(s) Oral at bedtime  ferrous    sulfate 325 milliGRAM(s) Oral daily  fluticasone propionate 50 MICROgram(s)/spray Nasal Spray 1 Spray(s) Both Nostrils two times a day  fluticasone propionate/ salmeterol 100-50 MICROgram(s) Diskus 1 Dose(s) Inhalation two times a day  gabapentin 300 milliGRAM(s) Oral three times a day  glucagon  Injectable 1 milliGRAM(s) IntraMuscular once  guaiFENesin ER 1200 milliGRAM(s) Oral every 12 hours  insulin glargine Injectable (LANTUS) 14 Unit(s) SubCutaneous at bedtime  insulin lispro (ADMELOG) corrective regimen sliding scale   SubCutaneous three times a day before meals  insulin lispro Injectable (ADMELOG) 2 Unit(s) SubCutaneous three times a day before meals  methylPREDNISolone sodium succinate Injectable   IV Push   methylPREDNISolone sodium succinate Injectable 20 milliGRAM(s) IV Push every 12 hours  montelukast 10 milliGRAM(s) Oral daily  pantoprazole    Tablet 40 milliGRAM(s) Oral before breakfast  traZODone 50 milliGRAM(s) Oral at bedtime  upadacitinib (Rinvoq) 15 milliGRAM(s) 1 Tablet(s) Oral daily    MEDICATIONS  (PRN):  acetaminophen     Tablet .. 650 milliGRAM(s) Oral every 6 hours PRN Temp greater or equal to 38C (100.4F), Mild Pain (1 - 3)  albuterol    90 MICROgram(s) HFA Inhaler 2 Puff(s) Inhalation every 6 hours PRN Shortness of Breath  aluminum hydroxide/magnesium hydroxide/simethicone Suspension 30 milliLiter(s) Oral every 4 hours PRN Dyspepsia  dextrose Oral Gel 15 Gram(s) Oral once PRN Blood Glucose LESS THAN 70 milliGRAM(s)/deciliter  hydrALAZINE 10 milliGRAM(s) Oral every 6 hours PRN Systolic blood pressure > 160 mmHg  loperamide 4 milliGRAM(s) Oral every 6 hours PRN Diarrhea  melatonin 3 milliGRAM(s) Oral at bedtime PRN Insomnia  ondansetron Injectable 4 milliGRAM(s) IV Push every 8 hours PRN Nausea and/or Vomiting    Vital Signs Last 24 Hrs  T(C): 36.6 (02-19-25 @ 05:30), Max: 36.8 (02-18-25 @ 13:02)  T(F): 97.8 (02-19-25 @ 05:30), Max: 98.2 (02-18-25 @ 13:02)  HR: 67 (02-19-25 @ 08:59) (61 - 78)  BP: 106/66 (02-19-25 @ 05:30) (106/66 - 128/64)  BP(mean): --  RR: 18 (02-19-25 @ 05:30) (18 - 18)  SpO2: 99% (02-19-25 @ 08:59) (99% - 100%)        Constitutional: No fever, fatigue  Skin: No rash.  Eyes: No recent vision problems or eye pain.  ENT: No congestion, ear pain, or sore throat.  Cardiovascular: No chest pain or palpation.  Respiratory: No cough, shortness of breath, congestion, or wheezing.  Gastrointestinal: No abdominal pain, nausea, vomiting, or diarrhea.  Genitourinary: No dysuria.  Musculoskeletal: No joint swelling.  Neurologic: No headache.    PHYSICAL EXAM:  GENERAL: NAD  EYES: EOMI, PERRLA  NECK: Supple, No JVD  CHEST/LUNG: crackles +  HEART:  S1 , S2 +  ABDOMEN: soft , bs+  EXTREMITIES:  no edema  NEUROLOGY:alert awake    LABS:  02-19    133[L]  |  98  |  20  ----------------------------<  152[H]  5.0   |  20[L]  |  0.89    Ca    8.9      19 Feb 2025 03:40  Phos  3.2     02-19  Mg     2.00     02-19      Creatinine Trend: 0.89 <--, 0.89 <--, 1.00 <--, 1.14 <--                        9.3    13.57 )-----------( 465      ( 19 Feb 2025 03:40 )             28.3     Urine Studies:  Urinalysis Basic - ( 19 Feb 2025 03:40 )    Color:  / Appearance:  / SG:  / pH:   Gluc: 152 mg/dL / Ketone:   / Bili:  / Urobili:    Blood:  / Protein:  / Nitrite:    Leuk Esterase:  / RBC:  / WBC    Sq Epi:  / Non Sq Epi:  / Bacteria:       Sodium, Random Urine: 33 mmol/L (02-18 @ 12:17)  Creatinine, Random Urine: 55 mg/dL (02-18 @ 12:17)  Protein/Creatinine Ratio Calculation: 0.2 Ratio (02-18 @ 12:17)  Osmolality, Random Urine: 407 mosm/kg (02-18 @ 12:17)  Potassium, Random Urine: 40.1 mmol/L (02-18 @ 12:17)

## 2025-02-21 ENCOUNTER — APPOINTMENT (OUTPATIENT)
Dept: PULMONOLOGY | Facility: CLINIC | Age: 75
End: 2025-02-21
Payer: MEDICARE

## 2025-02-21 DIAGNOSIS — J44.9 CHRONIC OBSTRUCTIVE PULMONARY DISEASE, UNSPECIFIED: ICD-10-CM

## 2025-02-21 DIAGNOSIS — R91.8 OTHER NONSPECIFIC ABNORMAL FINDING OF LUNG FIELD: ICD-10-CM

## 2025-02-21 DIAGNOSIS — R91.1 SOLITARY PULMONARY NODULE: ICD-10-CM

## 2025-02-21 LAB
CULTURE RESULTS: SIGNIFICANT CHANGE UP
CULTURE RESULTS: SIGNIFICANT CHANGE UP
MRSA PCR RESULT.: SIGNIFICANT CHANGE UP
S AUREUS DNA NOSE QL NAA+PROBE: SIGNIFICANT CHANGE UP
SPECIMEN SOURCE: SIGNIFICANT CHANGE UP
SPECIMEN SOURCE: SIGNIFICANT CHANGE UP

## 2025-02-21 PROCEDURE — 99496 TRANSJ CARE MGMT HIGH F2F 7D: CPT | Mod: 2W

## 2025-02-25 PROBLEM — R91.8 ABNORMAL CT SCAN, LUNG: Status: ACTIVE | Noted: 2025-02-25

## 2025-02-25 PROBLEM — R91.1 LUNG NODULE: Status: ACTIVE | Noted: 2025-02-25

## 2025-02-25 PROBLEM — J44.9 CHRONIC OBSTRUCTIVE PULMONARY DISEASE, UNSPECIFIED COPD TYPE: Status: ACTIVE | Noted: 2025-02-25

## 2025-03-05 ENCOUNTER — APPOINTMENT (OUTPATIENT)
Dept: PULMONOLOGY | Facility: CLINIC | Age: 75
End: 2025-03-05
Payer: MEDICARE

## 2025-03-05 VITALS
WEIGHT: 160.38 LBS | HEART RATE: 57 BPM | BODY MASS INDEX: 24.31 KG/M2 | SYSTOLIC BLOOD PRESSURE: 147 MMHG | HEIGHT: 68 IN | OXYGEN SATURATION: 96 % | TEMPERATURE: 98.2 F | RESPIRATION RATE: 15 BRPM | DIASTOLIC BLOOD PRESSURE: 66 MMHG

## 2025-03-05 PROCEDURE — 99214 OFFICE O/P EST MOD 30 MIN: CPT

## 2025-03-05 RX ORDER — ALBUTEROL SULFATE 90 UG/1
108 (90 BASE) INHALANT RESPIRATORY (INHALATION) EVERY 4 HOURS
Qty: 1 | Refills: 5 | Status: ACTIVE | COMMUNITY
Start: 2025-03-05 | End: 1900-01-01

## 2025-03-11 DIAGNOSIS — M54.9 DORSALGIA, UNSPECIFIED: ICD-10-CM

## 2025-03-12 ENCOUNTER — APPOINTMENT (OUTPATIENT)
Dept: ORTHOPEDIC SURGERY | Facility: CLINIC | Age: 75
End: 2025-03-12
Payer: MEDICARE

## 2025-03-12 DIAGNOSIS — M50.90 CERVICAL DISC DISORDER, UNSPECIFIED, UNSPECIFIED CERVICAL REGION: ICD-10-CM

## 2025-03-12 PROCEDURE — 72082 X-RAY EXAM ENTIRE SPI 2/3 VW: CPT

## 2025-03-12 PROCEDURE — 99214 OFFICE O/P EST MOD 30 MIN: CPT

## 2025-03-12 RX ORDER — DICLOFENAC SODIUM 75 MG/1
75 TABLET, DELAYED RELEASE ORAL
Qty: 60 | Refills: 0 | Status: ACTIVE | COMMUNITY
Start: 2025-03-12 | End: 1900-01-01

## 2025-03-17 ENCOUNTER — OUTPATIENT (OUTPATIENT)
Dept: OUTPATIENT SERVICES | Facility: HOSPITAL | Age: 75
LOS: 1 days | End: 2025-03-17
Payer: MEDICARE

## 2025-03-17 ENCOUNTER — APPOINTMENT (OUTPATIENT)
Dept: MRI IMAGING | Facility: IMAGING CENTER | Age: 75
End: 2025-03-17
Payer: MEDICARE

## 2025-03-17 DIAGNOSIS — Z98.890 OTHER SPECIFIED POSTPROCEDURAL STATES: Chronic | ICD-10-CM

## 2025-03-17 DIAGNOSIS — M50.90 CERVICAL DISC DISORDER, UNSPECIFIED, UNSPECIFIED CERVICAL REGION: ICD-10-CM

## 2025-03-17 DIAGNOSIS — Z96.652 PRESENCE OF LEFT ARTIFICIAL KNEE JOINT: Chronic | ICD-10-CM

## 2025-03-17 DIAGNOSIS — Z98.49 CATARACT EXTRACTION STATUS, UNSPECIFIED EYE: Chronic | ICD-10-CM

## 2025-03-17 PROCEDURE — 72148 MRI LUMBAR SPINE W/O DYE: CPT | Mod: 26

## 2025-03-17 PROCEDURE — 72148 MRI LUMBAR SPINE W/O DYE: CPT

## 2025-03-24 ENCOUNTER — APPOINTMENT (OUTPATIENT)
Dept: VASCULAR SURGERY | Facility: CLINIC | Age: 75
End: 2025-03-24
Payer: MEDICARE

## 2025-03-24 ENCOUNTER — NON-APPOINTMENT (OUTPATIENT)
Age: 75
End: 2025-03-24

## 2025-03-24 VITALS
WEIGHT: 162 LBS | HEIGHT: 68 IN | BODY MASS INDEX: 24.55 KG/M2 | DIASTOLIC BLOOD PRESSURE: 75 MMHG | TEMPERATURE: 99 F | SYSTOLIC BLOOD PRESSURE: 146 MMHG | RESPIRATION RATE: 15 BRPM | HEART RATE: 59 BPM

## 2025-03-24 DIAGNOSIS — R25.2 CRAMP AND SPASM: ICD-10-CM

## 2025-03-24 PROCEDURE — 99203 OFFICE O/P NEW LOW 30 MIN: CPT

## 2025-03-24 PROCEDURE — 93923 UPR/LXTR ART STDY 3+ LVLS: CPT

## 2025-03-24 RX ORDER — TELMISARTAN 40 MG/1
40 TABLET ORAL
Refills: 0 | Status: ACTIVE | COMMUNITY

## 2025-03-24 RX ORDER — UMECLIDINIUM BROMIDE AND VILANTEROL TRIFENATATE 62.5; 25 UG/1; UG/1
POWDER RESPIRATORY (INHALATION)
Refills: 0 | Status: ACTIVE | COMMUNITY

## 2025-03-24 RX ORDER — ATORVASTATIN CALCIUM 10 MG/1
10 TABLET, FILM COATED ORAL
Refills: 0 | Status: ACTIVE | COMMUNITY

## 2025-03-24 RX ORDER — LOPERAMIDE HYDROCHLORIDE 2 MG/1
CAPSULE ORAL
Refills: 0 | Status: ACTIVE | COMMUNITY

## 2025-03-24 RX ORDER — FAMOTIDINE 10 MG/1
TABLET, FILM COATED ORAL
Refills: 0 | Status: ACTIVE | COMMUNITY

## 2025-03-24 RX ORDER — TRAZODONE HYDROCHLORIDE 300 MG/1
TABLET ORAL
Refills: 0 | Status: ACTIVE | COMMUNITY

## 2025-03-24 RX ORDER — MONTELUKAST 10 MG/1
TABLET, FILM COATED ORAL
Refills: 0 | Status: ACTIVE | COMMUNITY

## 2025-03-24 RX ORDER — UPADACITINIB 45 MG/1
TABLET, EXTENDED RELEASE ORAL
Refills: 0 | Status: ACTIVE | COMMUNITY

## 2025-03-24 RX ORDER — BUDESONIDE, GLYCOPYRROLATE, AND FORMOTEROL FUMARATE 160; 9; 4.8 UG/1; UG/1; UG/1
160-9-4.8 AEROSOL, METERED RESPIRATORY (INHALATION)
Refills: 0 | Status: ACTIVE | COMMUNITY

## 2025-03-24 RX ORDER — TELMISARTAN 20 MG/1
TABLET ORAL
Refills: 0 | Status: ACTIVE | COMMUNITY

## 2025-03-24 RX ORDER — CHLORHEXIDINE GLUCONATE 4 %
5 LIQUID (ML) TOPICAL
Refills: 0 | Status: ACTIVE | COMMUNITY

## 2025-04-12 ENCOUNTER — NON-APPOINTMENT (OUTPATIENT)
Age: 75
End: 2025-04-12

## 2025-04-14 ENCOUNTER — APPOINTMENT (OUTPATIENT)
Dept: ORTHOPEDIC SURGERY | Facility: CLINIC | Age: 75
End: 2025-04-14
Payer: MEDICARE

## 2025-04-14 DIAGNOSIS — M54.9 DORSALGIA, UNSPECIFIED: ICD-10-CM

## 2025-04-14 PROCEDURE — 99214 OFFICE O/P EST MOD 30 MIN: CPT

## 2025-04-14 RX ORDER — DICLOFENAC SODIUM 75 MG/1
75 TABLET, DELAYED RELEASE ORAL
Qty: 30 | Refills: 0 | Status: ACTIVE | COMMUNITY
Start: 2025-04-14 | End: 1900-01-01

## 2025-05-28 ENCOUNTER — APPOINTMENT (OUTPATIENT)
Dept: ORTHOPEDIC SURGERY | Facility: CLINIC | Age: 75
End: 2025-05-28
Payer: MEDICARE

## 2025-05-28 DIAGNOSIS — M54.9 DORSALGIA, UNSPECIFIED: ICD-10-CM

## 2025-05-28 PROCEDURE — 99214 OFFICE O/P EST MOD 30 MIN: CPT

## 2025-05-28 RX ORDER — DICLOFENAC SODIUM 75 MG/1
75 TABLET, DELAYED RELEASE ORAL
Qty: 30 | Refills: 0 | Status: ACTIVE | COMMUNITY
Start: 2025-05-28 | End: 1900-01-01

## 2025-05-29 ENCOUNTER — APPOINTMENT (OUTPATIENT)
Dept: PLASTIC SURGERY | Facility: CLINIC | Age: 75
End: 2025-05-29

## 2025-05-29 DIAGNOSIS — M18.11 UNILATERAL PRIMARY OSTEOARTHRITIS OF FIRST CARPOMETACARPAL JOINT, RIGHT HAND: ICD-10-CM

## 2025-05-29 PROCEDURE — 20600 DRAIN/INJ JOINT/BURSA W/O US: CPT | Mod: RT

## 2025-06-26 ENCOUNTER — INPATIENT (INPATIENT)
Facility: HOSPITAL | Age: 75
LOS: 2 days | Discharge: HOME | End: 2025-06-29
Attending: INTERNAL MEDICINE | Admitting: INTERNAL MEDICINE
Payer: MEDICARE

## 2025-06-26 VITALS
HEART RATE: 50 BPM | DIASTOLIC BLOOD PRESSURE: 64 MMHG | TEMPERATURE: 99 F | SYSTOLIC BLOOD PRESSURE: 146 MMHG | OXYGEN SATURATION: 97 % | RESPIRATION RATE: 23 BRPM | WEIGHT: 164.91 LBS | HEIGHT: 68 IN

## 2025-06-26 DIAGNOSIS — R06.02 SHORTNESS OF BREATH: ICD-10-CM

## 2025-06-26 DIAGNOSIS — D64.9 ANEMIA, UNSPECIFIED: ICD-10-CM

## 2025-06-26 DIAGNOSIS — E78.5 HYPERLIPIDEMIA, UNSPECIFIED: ICD-10-CM

## 2025-06-26 DIAGNOSIS — Z29.9 ENCOUNTER FOR PROPHYLACTIC MEASURES, UNSPECIFIED: ICD-10-CM

## 2025-06-26 DIAGNOSIS — Z98.890 OTHER SPECIFIED POSTPROCEDURAL STATES: Chronic | ICD-10-CM

## 2025-06-26 DIAGNOSIS — Z96.652 PRESENCE OF LEFT ARTIFICIAL KNEE JOINT: Chronic | ICD-10-CM

## 2025-06-26 DIAGNOSIS — G47.00 INSOMNIA, UNSPECIFIED: ICD-10-CM

## 2025-06-26 DIAGNOSIS — K51.90 ULCERATIVE COLITIS, UNSPECIFIED, WITHOUT COMPLICATIONS: ICD-10-CM

## 2025-06-26 DIAGNOSIS — Z98.49 CATARACT EXTRACTION STATUS, UNSPECIFIED EYE: Chronic | ICD-10-CM

## 2025-06-26 DIAGNOSIS — I49.5 SICK SINUS SYNDROME: ICD-10-CM

## 2025-06-26 DIAGNOSIS — K21.9 GASTRO-ESOPHAGEAL REFLUX DISEASE WITHOUT ESOPHAGITIS: ICD-10-CM

## 2025-06-26 DIAGNOSIS — J44.9 CHRONIC OBSTRUCTIVE PULMONARY DISEASE, UNSPECIFIED: ICD-10-CM

## 2025-06-26 DIAGNOSIS — I10 ESSENTIAL (PRIMARY) HYPERTENSION: ICD-10-CM

## 2025-06-26 DIAGNOSIS — E11.9 TYPE 2 DIABETES MELLITUS WITHOUT COMPLICATIONS: ICD-10-CM

## 2025-06-26 LAB
ADD ON TEST-SPECIMEN IN LAB: SIGNIFICANT CHANGE UP
ADD ON TEST-SPECIMEN IN LAB: SIGNIFICANT CHANGE UP
ALBUMIN SERPL ELPH-MCNC: 4.1 G/DL — SIGNIFICANT CHANGE UP (ref 3.3–5)
ALP SERPL-CCNC: 43 U/L — SIGNIFICANT CHANGE UP (ref 40–120)
ALT FLD-CCNC: 18 U/L — SIGNIFICANT CHANGE UP (ref 4–41)
ANION GAP SERPL CALC-SCNC: 10 MMOL/L — SIGNIFICANT CHANGE UP (ref 7–14)
AST SERPL-CCNC: 29 U/L — SIGNIFICANT CHANGE UP (ref 4–40)
BASOPHILS # BLD AUTO: 0.05 K/UL — SIGNIFICANT CHANGE UP (ref 0–0.2)
BASOPHILS NFR BLD AUTO: 0.6 % — SIGNIFICANT CHANGE UP (ref 0–2)
BILIRUB SERPL-MCNC: 0.3 MG/DL — SIGNIFICANT CHANGE UP (ref 0.2–1.2)
BUN SERPL-MCNC: 16 MG/DL — SIGNIFICANT CHANGE UP (ref 7–23)
CALCIUM SERPL-MCNC: 9.2 MG/DL — SIGNIFICANT CHANGE UP (ref 8.4–10.5)
CHLORIDE SERPL-SCNC: 102 MMOL/L — SIGNIFICANT CHANGE UP (ref 98–107)
CO2 SERPL-SCNC: 23 MMOL/L — SIGNIFICANT CHANGE UP (ref 22–31)
CREAT SERPL-MCNC: 1.16 MG/DL — SIGNIFICANT CHANGE UP (ref 0.5–1.3)
EGFR: 67 ML/MIN/1.73M2 — SIGNIFICANT CHANGE UP
EGFR: 67 ML/MIN/1.73M2 — SIGNIFICANT CHANGE UP
EOSINOPHIL # BLD AUTO: 0.17 K/UL — SIGNIFICANT CHANGE UP (ref 0–0.5)
EOSINOPHIL NFR BLD AUTO: 2 % — SIGNIFICANT CHANGE UP (ref 0–6)
FERRITIN SERPL-MCNC: 96 NG/ML — SIGNIFICANT CHANGE UP (ref 30–400)
GAS PNL BLDV: SIGNIFICANT CHANGE UP
GLUCOSE BLDC GLUCOMTR-MCNC: 155 MG/DL — HIGH (ref 70–99)
GLUCOSE BLDC GLUCOMTR-MCNC: 174 MG/DL — HIGH (ref 70–99)
GLUCOSE BLDC GLUCOMTR-MCNC: 196 MG/DL — HIGH (ref 70–99)
GLUCOSE BLDC GLUCOMTR-MCNC: 75 MG/DL — SIGNIFICANT CHANGE UP (ref 70–99)
GLUCOSE SERPL-MCNC: 99 MG/DL — SIGNIFICANT CHANGE UP (ref 70–99)
HCT VFR BLD CALC: 36.5 % — LOW (ref 39–50)
HGB BLD-MCNC: 11.1 G/DL — LOW (ref 13–17)
IMM GRANULOCYTES # BLD AUTO: 0.02 K/UL — SIGNIFICANT CHANGE UP (ref 0–0.07)
IMM GRANULOCYTES NFR BLD AUTO: 0.2 % — SIGNIFICANT CHANGE UP (ref 0–0.9)
IRON SATN MFR SERPL: 29 % — SIGNIFICANT CHANGE UP (ref 14–50)
IRON SATN MFR SERPL: 82 UG/DL — SIGNIFICANT CHANGE UP (ref 45–165)
LYMPHOCYTES # BLD AUTO: 0.92 K/UL — LOW (ref 1–3.3)
LYMPHOCYTES NFR BLD AUTO: 10.6 % — LOW (ref 13–44)
MCHC RBC-ENTMCNC: 26.3 PG — LOW (ref 27–34)
MCHC RBC-ENTMCNC: 30.4 G/DL — LOW (ref 32–36)
MCV RBC AUTO: 86.5 FL — SIGNIFICANT CHANGE UP (ref 80–100)
MONOCYTES # BLD AUTO: 1.27 K/UL — HIGH (ref 0–0.9)
MONOCYTES NFR BLD AUTO: 14.6 % — HIGH (ref 2–14)
NEUTROPHILS # BLD AUTO: 6.28 K/UL — SIGNIFICANT CHANGE UP (ref 1.8–7.4)
NEUTROPHILS NFR BLD AUTO: 72 % — SIGNIFICANT CHANGE UP (ref 43–77)
NRBC # BLD AUTO: 0 K/UL — SIGNIFICANT CHANGE UP (ref 0–0)
NRBC # FLD: 0 K/UL — SIGNIFICANT CHANGE UP (ref 0–0)
NRBC BLD AUTO-RTO: 0 /100 WBCS — SIGNIFICANT CHANGE UP (ref 0–0)
NT-PROBNP SERPL-SCNC: 269 PG/ML — SIGNIFICANT CHANGE UP
PLATELET # BLD AUTO: 308 K/UL — SIGNIFICANT CHANGE UP (ref 150–400)
PMV BLD: 9.3 FL — SIGNIFICANT CHANGE UP (ref 7–13)
POTASSIUM SERPL-MCNC: 4.7 MMOL/L — SIGNIFICANT CHANGE UP (ref 3.5–5.3)
POTASSIUM SERPL-SCNC: 4.7 MMOL/L — SIGNIFICANT CHANGE UP (ref 3.5–5.3)
PROT SERPL-MCNC: 6.8 G/DL — SIGNIFICANT CHANGE UP (ref 6–8.3)
RBC # BLD: 4.22 M/UL — SIGNIFICANT CHANGE UP (ref 4.2–5.8)
RBC # FLD: 13.5 % — SIGNIFICANT CHANGE UP (ref 10.3–14.5)
SODIUM SERPL-SCNC: 135 MMOL/L — SIGNIFICANT CHANGE UP (ref 135–145)
T4 FREE SERPL-MCNC: 1.3 NG/DL — SIGNIFICANT CHANGE UP (ref 0.9–1.8)
TIBC SERPL-MCNC: 286 UG/DL — SIGNIFICANT CHANGE UP (ref 220–430)
TROPONIN T, HIGH SENSITIVITY RESULT: 17 NG/L — SIGNIFICANT CHANGE UP
TROPONIN T, HIGH SENSITIVITY RESULT: 19 NG/L — SIGNIFICANT CHANGE UP
TSH SERPL-MCNC: 1.13 UIU/ML — SIGNIFICANT CHANGE UP (ref 0.27–4.2)
UIBC SERPL-MCNC: 204 UG/DL — SIGNIFICANT CHANGE UP (ref 110–370)
VIT B12 SERPL-MCNC: 1086 PG/ML — HIGH (ref 200–900)
WBC # BLD: 8.71 K/UL — SIGNIFICANT CHANGE UP (ref 3.8–10.5)
WBC # FLD AUTO: 8.71 K/UL — SIGNIFICANT CHANGE UP (ref 3.8–10.5)

## 2025-06-26 PROCEDURE — 99233 SBSQ HOSP IP/OBS HIGH 50: CPT | Mod: FS

## 2025-06-26 PROCEDURE — 99285 EMERGENCY DEPT VISIT HI MDM: CPT | Mod: GC

## 2025-06-26 PROCEDURE — 99223 1ST HOSP IP/OBS HIGH 75: CPT

## 2025-06-26 PROCEDURE — 71046 X-RAY EXAM CHEST 2 VIEWS: CPT | Mod: 26

## 2025-06-26 RX ORDER — UPADACITINIB 1 MG/ML
1 SOLUTION ORAL
Refills: 0 | DISCHARGE

## 2025-06-26 RX ORDER — LIPASE/PROTEASE/AMYLASE 10K-37.5K
2 CAPSULE,DELAYED RELEASE (ENTERIC COATED) ORAL
Refills: 0 | DISCHARGE

## 2025-06-26 RX ORDER — MONTELUKAST SODIUM 10 MG/1
10 TABLET ORAL DAILY
Refills: 0 | Status: DISCONTINUED | OUTPATIENT
Start: 2025-06-26 | End: 2025-06-29

## 2025-06-26 RX ORDER — IPRATROPIUM BROMIDE AND ALBUTEROL SULFATE .5; 2.5 MG/3ML; MG/3ML
3 SOLUTION RESPIRATORY (INHALATION) ONCE
Refills: 0 | Status: COMPLETED | OUTPATIENT
Start: 2025-06-26 | End: 2025-06-26

## 2025-06-26 RX ORDER — MAGNESIUM, ALUMINUM HYDROXIDE 200-200 MG
30 TABLET,CHEWABLE ORAL EVERY 4 HOURS
Refills: 0 | Status: DISCONTINUED | OUTPATIENT
Start: 2025-06-26 | End: 2025-06-29

## 2025-06-26 RX ORDER — DEXTROSE 50 % IN WATER 50 %
25 SYRINGE (ML) INTRAVENOUS ONCE
Refills: 0 | Status: DISCONTINUED | OUTPATIENT
Start: 2025-06-26 | End: 2025-06-29

## 2025-06-26 RX ORDER — ROSUVASTATIN CALCIUM 5 MG/1
10 TABLET, FILM COATED ORAL AT BEDTIME
Refills: 0 | Status: DISCONTINUED | OUTPATIENT
Start: 2025-06-26 | End: 2025-06-29

## 2025-06-26 RX ORDER — SITAGLIPTIN AND METFORMIN HYDROCHLORIDE 1000; 50 MG/1; MG/1
1 TABLET, FILM COATED ORAL
Refills: 0 | DISCHARGE

## 2025-06-26 RX ORDER — ACETAMINOPHEN 500 MG/5ML
650 LIQUID (ML) ORAL EVERY 6 HOURS
Refills: 0 | Status: DISCONTINUED | OUTPATIENT
Start: 2025-06-26 | End: 2025-06-29

## 2025-06-26 RX ORDER — AMLODIPINE BESYLATE 10 MG/1
1 TABLET ORAL
Refills: 0 | DISCHARGE

## 2025-06-26 RX ORDER — SODIUM CHLORIDE 9 G/1000ML
1000 INJECTION, SOLUTION INTRAVENOUS
Refills: 0 | Status: DISCONTINUED | OUTPATIENT
Start: 2025-06-26 | End: 2025-06-29

## 2025-06-26 RX ORDER — GLUCAGON 3 MG/1
1 POWDER NASAL ONCE
Refills: 0 | Status: DISCONTINUED | OUTPATIENT
Start: 2025-06-26 | End: 2025-06-29

## 2025-06-26 RX ORDER — DEXTROMETHORPHAN HBR, GUAIFENESIN 200 MG/10ML
1200 LIQUID ORAL EVERY 12 HOURS
Refills: 0 | Status: DISCONTINUED | OUTPATIENT
Start: 2025-06-26 | End: 2025-06-29

## 2025-06-26 RX ORDER — TRAZODONE HCL 100 MG
50 TABLET ORAL AT BEDTIME
Refills: 0 | Status: DISCONTINUED | OUTPATIENT
Start: 2025-06-26 | End: 2025-06-29

## 2025-06-26 RX ORDER — GABAPENTIN 400 MG/1
300 CAPSULE ORAL THREE TIMES A DAY
Refills: 0 | Status: DISCONTINUED | OUTPATIENT
Start: 2025-06-26 | End: 2025-06-29

## 2025-06-26 RX ORDER — FERROUS SULFATE 137(45) MG
325 TABLET, EXTENDED RELEASE ORAL DAILY
Refills: 0 | Status: DISCONTINUED | OUTPATIENT
Start: 2025-06-26 | End: 2025-06-29

## 2025-06-26 RX ORDER — ONDANSETRON HCL/PF 4 MG/2 ML
4 VIAL (ML) INJECTION EVERY 8 HOURS
Refills: 0 | Status: DISCONTINUED | OUTPATIENT
Start: 2025-06-26 | End: 2025-06-29

## 2025-06-26 RX ORDER — AZITHROMYCIN 250 MG
250 CAPSULE ORAL
Refills: 0 | Status: DISCONTINUED | OUTPATIENT
Start: 2025-06-26 | End: 2025-06-29

## 2025-06-26 RX ORDER — INSULIN LISPRO 100 U/ML
INJECTION, SOLUTION INTRAVENOUS; SUBCUTANEOUS
Refills: 0 | Status: DISCONTINUED | OUTPATIENT
Start: 2025-06-26 | End: 2025-06-29

## 2025-06-26 RX ORDER — DEXTROSE 50 % IN WATER 50 %
15 SYRINGE (ML) INTRAVENOUS ONCE
Refills: 0 | Status: DISCONTINUED | OUTPATIENT
Start: 2025-06-26 | End: 2025-06-29

## 2025-06-26 RX ORDER — INSULIN LISPRO 100 U/ML
INJECTION, SOLUTION INTRAVENOUS; SUBCUTANEOUS AT BEDTIME
Refills: 0 | Status: DISCONTINUED | OUTPATIENT
Start: 2025-06-26 | End: 2025-06-29

## 2025-06-26 RX ORDER — AZITHROMYCIN 250 MG
1 CAPSULE ORAL
Refills: 0 | DISCHARGE

## 2025-06-26 RX ORDER — TIOTROPIUM BROMIDE INHALATION SPRAY 3.12 UG/1
2 SPRAY, METERED RESPIRATORY (INHALATION) DAILY
Refills: 0 | Status: DISCONTINUED | OUTPATIENT
Start: 2025-06-26 | End: 2025-06-29

## 2025-06-26 RX ORDER — DEXTROSE 50 % IN WATER 50 %
12.5 SYRINGE (ML) INTRAVENOUS ONCE
Refills: 0 | Status: DISCONTINUED | OUTPATIENT
Start: 2025-06-26 | End: 2025-06-29

## 2025-06-26 RX ORDER — HEPARIN SODIUM 1000 [USP'U]/ML
5000 INJECTION INTRAVENOUS; SUBCUTANEOUS EVERY 8 HOURS
Refills: 0 | Status: DISCONTINUED | OUTPATIENT
Start: 2025-06-26 | End: 2025-06-27

## 2025-06-26 RX ORDER — MELATONIN 5 MG
3 TABLET ORAL AT BEDTIME
Refills: 0 | Status: DISCONTINUED | OUTPATIENT
Start: 2025-06-26 | End: 2025-06-29

## 2025-06-26 RX ORDER — LIPASE/PROTEASE/AMYLASE 10K-37.5K
2 CAPSULE,DELAYED RELEASE (ENTERIC COATED) ORAL
Refills: 0 | Status: DISCONTINUED | OUTPATIENT
Start: 2025-06-26 | End: 2025-06-29

## 2025-06-26 RX ORDER — LOPERAMIDE HCL 1 MG/7.5ML
2 SOLUTION ORAL EVERY 6 HOURS
Refills: 0 | Status: DISCONTINUED | OUTPATIENT
Start: 2025-06-26 | End: 2025-06-29

## 2025-06-26 RX ORDER — FLUTICASONE PROPIONATE 50 UG/1
1 SPRAY, METERED NASAL
Refills: 0 | Status: DISCONTINUED | OUTPATIENT
Start: 2025-06-26 | End: 2025-06-29

## 2025-06-26 RX ORDER — AMLODIPINE BESYLATE 10 MG/1
5 TABLET ORAL DAILY
Refills: 0 | Status: DISCONTINUED | OUTPATIENT
Start: 2025-06-26 | End: 2025-06-29

## 2025-06-26 RX ADMIN — Medication 1 DOSE(S): at 21:41

## 2025-06-26 RX ADMIN — Medication 50 MILLIGRAM(S): at 21:39

## 2025-06-26 RX ADMIN — Medication 3 MILLIGRAM(S): at 22:58

## 2025-06-26 RX ADMIN — IPRATROPIUM BROMIDE AND ALBUTEROL SULFATE 3 MILLILITER(S): .5; 2.5 SOLUTION RESPIRATORY (INHALATION) at 10:45

## 2025-06-26 RX ADMIN — ROSUVASTATIN CALCIUM 10 MILLIGRAM(S): 5 TABLET, FILM COATED ORAL at 21:38

## 2025-06-26 RX ADMIN — DEXTROMETHORPHAN HBR, GUAIFENESIN 1200 MILLIGRAM(S): 200 LIQUID ORAL at 19:42

## 2025-06-26 RX ADMIN — GABAPENTIN 300 MILLIGRAM(S): 400 CAPSULE ORAL at 21:39

## 2025-06-26 RX ADMIN — HEPARIN SODIUM 5000 UNIT(S): 1000 INJECTION INTRAVENOUS; SUBCUTANEOUS at 21:38

## 2025-06-26 NOTE — H&P ADULT - HISTORY OF PRESENT ILLNESS
73 y/o M with PMHx of HTN, NIDDM2, COPD, UC, who presents with worsening SOB, generalized weakness, lethargy, GREEN x weeks. Pt states his symptoms were initially attributed to his chronic pulmonary disease, however he had no relief with his home inhalers and nebulizers. He was undergoing further anemia workup with a BMBx at [facility] when he was incidentally found to be bradycardic to 30s. He was then advised by his cardiologist Dr. Aponte to go to the ER for further evaluation. Pt otherwise denies any f/c, cough, URI symptoms, CP, n/v/d/AP, LE edema, syncope, or other associated symptoms.    ED Course:  VS Afebrile, /64, HR 50, RR 23, 97% on RA  Given duonebs  EP consulted  Admitted to Medicine for further management

## 2025-06-26 NOTE — H&P ADULT - NSHPLABSRESULTS_GEN_ALL_CORE
LABS:                         11.1   8.71  )-----------( 308      ( 26 Jun 2025 10:40 )             36.5     06-26    135  |  102  |  16  ----------------------------<  99  4.7   |  23  |  1.16    Ca    9.2      26 Jun 2025 10:40  Mg     2.10     06-26    TPro  6.8  /  Alb  4.1  /  TBili  0.3  /  DBili  x   /  AST  29  /  ALT  18  /  AlkPhos  43  06-26    Urinalysis Basic - ( 26 Jun 2025 10:40 )    Color: x / Appearance: x / SG: x / pH: x  Gluc: 99 mg/dL / Ketone: x  / Bili: x / Urobili: x   Blood: x / Protein: x / Nitrite: x   Leuk Esterase: x / RBC: x / WBC x   Sq Epi: x / Non Sq Epi: x / Bacteria: x    RADIOLOGY, EKG & ADDITIONAL TESTS: Reviewed.

## 2025-06-26 NOTE — CONSULT NOTE ADULT - SUBJECTIVE AND OBJECTIVE BOX
CHIEF COMPLAINT:  Called to evaluate patient with bradycardia for possible PPM placement  HISTORY OF PRESENT ILLNESS:  74 year old man PMH HTN, DM, COPD, UC presented after being advised by his cardiologist for concern for sick sinus syndrome. Patient got bone marrow biopsy on Friday due to anemia and was found to be bradycardic while at the hospital. He went to his cardiologist Dr Osbaldo Aponte on Monday who advised patient to come to the ED for a possible pacemaker as EKG at the cardiologist's office showed sinus rhythm with first degree AV block and junctional beats with HR as low as 41 bpm. Patient waited until today to come to ED. EP is called today to evaluate patient for possible PPM placement. EKG in ED shows sinus rhythm with first degree AV block and HR 52 bpm. Telemetry shows sinus rhythm with first degree AV block and HR 50s-70s. He is not on any AV caryl blockers. Patient has baseline SOB due to COPD which ws progressively getting worse over the last two months. Patient also reports that he has been getting progressively more short of breath for the past few weeks, worst in the past week, thought it was because of his COPD. He denies chest pain, leg swelling, fevers, chills, change in baseline cough. He also denies syncope, or nearsyncope. Prior EKG from 25 shows sinus rhythm with first degree AV block and HR 82 bpm.    PAST MEDICAL & SURGICAL HISTORY:  Gastroparesis  with pylorus dysfunction  DM (diabetes mellitus)  type II  Chronic obstructive pulmonary disease  Hypertension  Ulcerative colitis  Inguinal hernia  Bronchiectasis  GERD (gastroesophageal reflux disease)  Unilateral primary osteoarthritis, right knee  Dysphonia  Former smoker  Anemia  History of repair of hiatal hernia  childhood  S/P endoscopy  POEM 10/2021, 2021  History of left knee replacement  H/O inguinal hernia repair  S/P cataract surgery    PREVIOUS DIAGNOSTIC TESTING:    Echocardiogram:  pending  	    MEDICATIONS:  Januvia 50 mg oral tablet: 1 tab(s) orally once a day  traZODone 50 mg oral tablet: 1 tab(s) orally once a day (at bedtime)  predniSONE 20 mg oral tablet: 2 tab(s) orally once a day  gabapentin 300 mg oral capsule: 1 cap(s) orally 3 times a day  loperamide 2 mg oral capsule: 2 cap(s) orally every 6 hours As needed Diarrhea  albuterol 90 mcg/inh inhalation aerosol: 2 puff(s) inhaled every 6 hours As needed Shortness of Breath  guaiFENesin 1200 mg oral tablet, extended release: 1 tab(s) orally every 12 hours  cholecalciferol oral tablet: 1000 unit(s) orally once a day  montelukast 10 mg oral tablet: 1 tab(s) orally once a day  fluticasone 50 mcg/inh nasal spray: 1 spray(s) in each nostril 2 times a day  Melatonin 5 mg oral tablet: 1 tab(s) orally once a day (at bedtime)  metroNIDAZOLE 1% topical gel: Apply topically to affected area once a day as needed for  itching  rosuvastatin 10 mg oral tablet: 1 tab(s) orally once a day (at bedtime)  ondansetron 8 mg oral tablet, disintegratin tab(s) orally 3 times a day as needed for  nausea  Rinvoq 15 mg oral tablet, extended release: 1 tab(s) orally once a day  albuterol-budesonide 90 mcg-80 mcg/inh inhalation aerosol: 2 puff(s) inhaled every 6 hours as needed for  shortness of breath and/or wheezing  famotidine 40 mg oral tablet: 1 tab(s) orally once a day (at bedtime)  ferrous sulfate 325 mg (65 mg elemental iron) oral tablet: 1 tab(s) orally once a day  pantoprazole 40 mg oral delayed release tablet: 1 tab(s) orally once a day  Anoro Ellipta 62.5 mcg-25 mcg/inh inhalation powder: 1 puff(s) inhaled once a day        FAMILY HISTORY:  FH: diabetes mellitus (Mother)    SOCIAL HISTORY:      Ex Smoker  [-] Alcohol  [-] Drugs    Allergies    angiotensin converting enzyme inhibitors (Other; Swelling)  Seafood (Hives)    Intolerances    	    REVIEW OF SYSTEMS:  CONSTITUTIONAL: No fever, weight loss, + fatigue  EYES: No eye pain, visual disturbances, or discharge  ENMT:  No difficulty hearing, tinnitus, vertigo; No sinus or throat pain  NECK: No pain or stiffness  RESPIRATORY: No cough, wheezing, chills or hemoptysis; No Shortness of Breath  CARDIOVASCULAR: No chest pain, palpitations, passing out, dizziness, or leg swelling  GASTROINTESTINAL: No abdominal or epigastric pain. No nausea, vomiting, or hematemesis; No diarrhea or constipation. No melena or hematochezia.  GENITOURINARY: No dysuria, frequency, hematuria, or incontinence  NEUROLOGICAL: No headaches, memory loss, loss of strength, numbness, or tremors  SKIN: No itching, burning, rashes, or lesions   LYMPH Nodes: No enlarged glands  ENDOCRINE: No heat or cold intolerance; No hair loss  MUSCULOSKELETAL: No joint pain or swelling; No muscle, back, or extremity pain  PSYCHIATRIC: No depression, anxiety, mood swings, or difficulty sleeping  HEME/LYMPH: No easy bruising, or bleeding gums  ALLERY AND IMMUNOLOGIC: No hives or eczema	    [x] All others negative	  [ ] Unable to obtain    PHYSICAL EXAM:  T(C): 37.2 (25 @ 09:42), Max: 37.2 (25 @ 09:42)  HR: 50 (25 @ 10:45) (50 - 50)  BP: 116/83 (25 @ 10:45) (116/83 - 146/64)  RR: 14 (25 @ 10:45) (14 - 23)  SpO2: 99% (25 @ 10:45) (97% - 99%)  Wt(kg): --  I&O's Summary      Appearance: Normal	  Cardiovascular: Normal S1 S2, No JVD, No murmurs, No edema  Respiratory: Lungs clear to auscultation	  Psychiatry: A & O x 3, Mood & affect appropriate  Gastrointestinal:  Soft, Non-tender, + BS	  Extremities: Normal range of motion, No clubbing, cyanosis or edema      TELEMETRY: Sinus rhythm with first degree AV block with HR 50s-70s	    ECG:  Sinsu rhythm with HR 52 bpm; HOWARD 340 ms; Qrs 112ms; Qtc 388 ms  RADIOLOGY:  OTHER: 	  	  LABS:	 	    CARDIAC MARKERS:                          11.1   8.71  )-----------( 308      ( 2025 10:40 )             36.5         135  |  102  |  16  ----------------------------<  99  4.7   |  23  |  1.16    Ca    9.2      2025 10:40    TPro  6.8  /  Alb  4.1  /  TBili  0.3  /  DBili  x   /  AST  29  /  ALT  18  /  AlkPhos  43        TSH: pending         CHIEF COMPLAINT:  Called to evaluate patient with bradycardia for possible PPM placement  HISTORY OF PRESENT ILLNESS:  74 year old man PMH HTN, DM, COPD, UC presented after being advised by his cardiologist for concern for sick sinus syndrome. Patient got bone marrow biopsy on Friday due to anemia and was found to be bradycardic while at the hospital. He went to his cardiologist Dr Osbaldo Aponte on Monday who advised patient to come to the ED for a possible pacemaker as EKG at the cardiologist's office showed sinus rhythm with first degree AV block and junctional beats with HR as low as 41 bpm. Patient waited until today to come to ED. EP is called today to evaluate patient for possible PPM placement. EKG in ED shows sinus rhythm with first degree AV block and HR 52 bpm. Telemetry shows sinus rhythm with first degree AV block and HR 50s-70s. He is not on any AV caryl blockers. Patient has baseline SOB due to COPD which ws progressively getting worse over the last two months. Patient also reports that he has been getting progressively more short of breath for the past few weeks, worst in the past week, thought it was because of his COPD. He denies chest pain, leg swelling, fevers, chills, change in baseline cough. He also denies syncope, or nearsyncope. Prior EKG from 25 shows sinus rhythm with first degree AV block and HR 82 bpm. Echocardiogram at Dr. Tellez's office reportedly with EF 62%    PAST MEDICAL & SURGICAL HISTORY:  Gastroparesis  with pylorus dysfunction  DM (diabetes mellitus)  type II  Chronic obstructive pulmonary disease  Hypertension  Ulcerative colitis  Inguinal hernia  Bronchiectasis  GERD (gastroesophageal reflux disease)  Unilateral primary osteoarthritis, right knee  Dysphonia  Former smoker  Anemia  History of repair of hiatal hernia  childhood  S/P endoscopy  POEM 10/2021, 2021  History of left knee replacement  H/O inguinal hernia repair  S/P cataract surgery    PREVIOUS DIAGNOSTIC TESTING:    Echocardiogram:  pending  	    MEDICATIONS:  Januvia 50 mg oral tablet: 1 tab(s) orally once a day  traZODone 50 mg oral tablet: 1 tab(s) orally once a day (at bedtime)  predniSONE 20 mg oral tablet: 2 tab(s) orally once a day  gabapentin 300 mg oral capsule: 1 cap(s) orally 3 times a day  loperamide 2 mg oral capsule: 2 cap(s) orally every 6 hours As needed Diarrhea  albuterol 90 mcg/inh inhalation aerosol: 2 puff(s) inhaled every 6 hours As needed Shortness of Breath  guaiFENesin 1200 mg oral tablet, extended release: 1 tab(s) orally every 12 hours  cholecalciferol oral tablet: 1000 unit(s) orally once a day  montelukast 10 mg oral tablet: 1 tab(s) orally once a day  fluticasone 50 mcg/inh nasal spray: 1 spray(s) in each nostril 2 times a day  Melatonin 5 mg oral tablet: 1 tab(s) orally once a day (at bedtime)  metroNIDAZOLE 1% topical gel: Apply topically to affected area once a day as needed for  itching  rosuvastatin 10 mg oral tablet: 1 tab(s) orally once a day (at bedtime)  ondansetron 8 mg oral tablet, disintegratin tab(s) orally 3 times a day as needed for  nausea  Rinvoq 15 mg oral tablet, extended release: 1 tab(s) orally once a day  albuterol-budesonide 90 mcg-80 mcg/inh inhalation aerosol: 2 puff(s) inhaled every 6 hours as needed for  shortness of breath and/or wheezing  famotidine 40 mg oral tablet: 1 tab(s) orally once a day (at bedtime)  ferrous sulfate 325 mg (65 mg elemental iron) oral tablet: 1 tab(s) orally once a day  pantoprazole 40 mg oral delayed release tablet: 1 tab(s) orally once a day  Anoro Ellipta 62.5 mcg-25 mcg/inh inhalation powder: 1 puff(s) inhaled once a day        FAMILY HISTORY:  FH: diabetes mellitus (Mother)    SOCIAL HISTORY:      Ex Smoker  [-] Alcohol  [-] Drugs    Allergies    angiotensin converting enzyme inhibitors (Other; Swelling)  Seafood (Hives)    Intolerances    	    REVIEW OF SYSTEMS:  CONSTITUTIONAL: No fever, weight loss, + fatigue  EYES: No eye pain, visual disturbances, or discharge  ENMT:  No difficulty hearing, tinnitus, vertigo; No sinus or throat pain  NECK: No pain or stiffness  RESPIRATORY: No cough, wheezing, chills or hemoptysis; No Shortness of Breath  CARDIOVASCULAR: No chest pain, palpitations, passing out, dizziness, or leg swelling  GASTROINTESTINAL: No abdominal or epigastric pain. No nausea, vomiting, or hematemesis; No diarrhea or constipation. No melena or hematochezia.  GENITOURINARY: No dysuria, frequency, hematuria, or incontinence  NEUROLOGICAL: No headaches, memory loss, loss of strength, numbness, or tremors  SKIN: No itching, burning, rashes, or lesions   LYMPH Nodes: No enlarged glands  ENDOCRINE: No heat or cold intolerance; No hair loss  MUSCULOSKELETAL: No joint pain or swelling; No muscle, back, or extremity pain  PSYCHIATRIC: No depression, anxiety, mood swings, or difficulty sleeping  HEME/LYMPH: No easy bruising, or bleeding gums  ALLERY AND IMMUNOLOGIC: No hives or eczema	    [x] All others negative	  [ ] Unable to obtain    PHYSICAL EXAM:  T(C): 37.2 (25 @ 09:42), Max: 37.2 (25 @ 09:42)  HR: 50 (25 @ 10:45) (50 - 50)  BP: 116/83 (25 @ 10:45) (116/83 - 146/64)  RR: 14 (25 @ 10:45) (14 - 23)  SpO2: 99% (25 @ 10:45) (97% - 99%)  Wt(kg): --  I&O's Summary      Appearance: Normal	  Cardiovascular: Normal S1 S2, No JVD, No murmurs, No edema  Respiratory: Lungs clear to auscultation	  Psychiatry: A & O x 3, Mood & affect appropriate  Gastrointestinal:  Soft, Non-tender, + BS	  Extremities: Normal range of motion, No clubbing, cyanosis or edema      TELEMETRY: Sinus rhythm with first degree AV block with HR 50s-70s	    ECG:  Sinsu rhythm with HR 52 bpm; HOWARD 340 ms; Qrs 112ms; Qtc 388 ms  RADIOLOGY:  OTHER: 	  	  LABS:	 	    CARDIAC MARKERS:                          11.1   8.71  )-----------( 308      ( 2025 10:40 )             36.5         135  |  102  |  16  ----------------------------<  99  4.7   |  23  |  1.16    Ca    9.2      2025 10:40    TPro  6.8  /  Alb  4.1  /  TBili  0.3  /  DBili  x   /  AST  29  /  ALT  18  /  AlkPhos  43        TSH: pending

## 2025-06-26 NOTE — ED PROVIDER NOTE - OBJECTIVE STATEMENT
74 year old man PMH HTN, DM, COPD, UC presenting after being sent in by his cardiologist for concern for sick sinus syndrome. He has been getting progressively more short of breath for the past few weeks, worst in the past week, thought it was because of his COPD. denies chest pain, leg swelling, fevers, chills, change in baseline cough. got bone marrow biopsy on friday due to anemia and was found to be bradycardic, so went to his cardiologist Dr Osbaldo Aponte on monday who told him to come to the ED immediately for a possible pacemaker. denies syncope, presyncopal feelings. denies abd pain, n/v/d/c, dysuria. was unable to come to ED until today.

## 2025-06-26 NOTE — ED PROVIDER NOTE - NSICDXPASTSURGICALHX_GEN_ALL_CORE_FT
PAST SURGICAL HISTORY:  H/O inguinal hernia repair     History of left knee replacement     History of repair of hiatal hernia childhood    S/P cataract surgery     S/P endoscopy POEM 10/2021, 11/2021    
no

## 2025-06-26 NOTE — ED PROVIDER NOTE - CLINICAL SUMMARY MEDICAL DECISION MAKING FREE TEXT BOX
74 year old man PMH HTN, DM, COPD, UC presenting after being sent in by his cardiologist for concern for sick sinus syndrome, complaining of progressive dyspna and dyspnea on exertion, denies fevers, chills, chest pain, change in baseline cough, vitals WNL, no hypoxia, physical exam benign with clear lungs and normal heart sounds, EKG sinus bradycardia in the 50s with first degree AV block, concerning for copd exacerbation vs acs vs PNA vs chf vs arrhytmia. cbc, cmp, trop, bnp, cxr, mag, vbg

## 2025-06-26 NOTE — ED ADULT NURSE NOTE - OBJECTIVE STATEMENT
received patient in #6  alert ox4 ambulatory at base line  came in c/o sent in by PMD for abnormal EKG and for pace maker insertion . denies chest pain. c/o cough and mild SOB. denies fever denies GREEN. patient placed on CM shows first degree block .patient denies any dizziness, respirations even and unlabored. abdomen non distended non tender. skin warm and  dry. labs done as ordered. 20G saline lock to left AC inserted. call bell provided. safety maintained. awaiting for results.

## 2025-06-26 NOTE — H&P ADULT - PROBLEM SELECTOR PROBLEM 4
Patient awake with c/o left side pain 8/10.  Medicated with 0.5 mg of dilaudid iv prn for pain Chronic anemia

## 2025-06-26 NOTE — H&P ADULT - PROBLEM SELECTOR PLAN 3
Follows Dr. Mushtaq Wang  Rinvoq 15mg qd not on formulary- wife will bring in from home  Continue loperamide PRN

## 2025-06-26 NOTE — H&P ADULT - NSHPPHYSICALEXAM_GEN_ALL_CORE
GENERAL: No acute distress  HEAD:  Normocephalic  EYES: Conjunctiva and sclera clear  NECK: Supple, no JVD  CHEST/LUNG: CTAB  HEART: Bradycardic and regular rhythm  ABDOMEN: Soft, non-tender, non-distended  EXTREMITIES:  No clubbing, cyanosis, or edema  NEUROLOGY: A&O x 3  SKIN: No rashes or lesions to visible skin

## 2025-06-26 NOTE — ED ADULT NURSE REASSESSMENT NOTE - NS ED NURSE REASSESS COMMENT FT1
patient on tele, with sinus pause for 2.1 sec notified by tele tech. ,provider made aware. patient denies any pain/SOB. patient placed on bed side ZOLL . will continue to monitor.
patient admitted to [facility]. report given to TAHIR Fischer. patient awaiting for transport.
patient alert ox4 , denies any pain. patient seen by EP NP. awaiting further eval. NAD. denies CP/SOB/Dizziness.

## 2025-06-26 NOTE — H&P ADULT - PROBLEM SELECTOR PLAN 2
Follows Dr. Ezekiel Eckert for COPD and ?bronchiectasis  Home breztri not on formulary -> Advair and Spiriva  Continue azithromycin 250mg q Wed/Fri  Continue montelukast 10mg qd  Continue mucinex BID

## 2025-06-26 NOTE — ED PROVIDER NOTE - PROGRESS NOTE DETAILS
Rosa SOUSA PGY1: discussed case with Dr Aponte patient went to Dr Aponte after 3 year gap in being seen, was doing well in 2022, seen in 2025 and was found to have sinus rhythm with long periods of sinoatrial arrest with junctional escape at 41 bpm  concerning for symptomatic sick sinus syndrome, echo performed with normal systolic function. had normal TSH yesterday. switched losartan to amlodipine due to potassium. Rosa SOUSA PGY1: EP consulted, will see patient

## 2025-06-26 NOTE — ED PROVIDER NOTE - ATTENDING CONTRIBUTION TO CARE
Dr. Jason: 74 yo male with HTN, DM2, COPD (not on home O2), ulcerative colitis, anemia, sent to ED due to concern for need for PPM.  Pt went for recent bone marrow biopsy to evaluate his anemia.  During procedure pt was noted to have HR in the 30s and he felt generalized fatigue/weakness.  He has been feeling weakness a lot chronically, but worse recently.  Per Dr. Lee's discussion with pt's cardiologist Dr. Smith, EKG in his office after bradycardia event was concerning for sick sinus syndrome, which prompted ED referral.  Today in ED EKG shows sinus renata and first degree AV block but no obvious malignant arrhythmias.  Pt's only complaint is that he feels like he cannot walk because he is so generally weak.  On exam pt chronically-ill appearing but in NAD, heart bradycardic but regular at time of my eval, lungs CTAB, abd NTND, extremities without swelling, strength 5/5 in all extremities and skin without rash.    Dr. Smith faxed EKG that was concerning in his office, which was placed in pt's physical chart.    I, Joaquin Jason MD, personally made/approved the management plan for this patient and take responsibility for the patient's management. Dr. Jason: 75 yo male with HTN, DM2, COPD (not on home O2), ulcerative colitis, anemia, sent to ED due to concern for need for PPM.  Pt went for recent bone marrow biopsy to evaluate his anemia.  During procedure pt was noted to have HR in the 30s and he felt generalized fatigue/weakness.  He has been feeling weakness a lot chronically, but worse recently.  Per Dr. Lee's discussion with pt's cardiologist Dr. Smith, EKG in his office after bradycardia event was concerning for sick sinus syndrome, which prompted ED referral.  Today in ED EKG shows sinus renata and first degree AV block but no obvious malignant arrhythmias.  Pt's only complaint is that he feels like he cannot walk because he is so generally weak.  On exam pt chronically-ill appearing but in NAD, heart bradycardic but regular at time of my eval, lungs CTAB, abd NTND, extremities without swelling, strength 5/5 in all extremities and skin without rash.    Dr. Smith faxed EKG that was concerning in his office, which was placed in pt's physical chart.    I, Joaquin Jason MD, personally made/approved the management plan for this patient and take responsibility for the patient's management.

## 2025-06-26 NOTE — H&P ADULT - ASSESSMENT
73 y/o M with PMHx of HTN, NIDDM2, COPD, and UC, who presents with symptomatic sinus node dysfunction, admitted for PPM placement

## 2025-06-26 NOTE — ED ADULT TRIAGE NOTE - CHIEF COMPLAINT QUOTE
sent by Dr. Fadi REYES for abnormal EKG. endorsing SOB, general weakness, lethargy. worsens on exertion Phx HTN, DM. .

## 2025-06-26 NOTE — H&P ADULT - PROBLEM SELECTOR PLAN 5
Hold home Janumet  BID while inpatient  Last A1c 6.0 in 2/2025  LDISS  Continue gabapentin 300mg TID for neuropathy

## 2025-06-26 NOTE — CHART NOTE - NSCHARTNOTEFT_GEN_A_CORE
Telemetry shows episodes of bradycardia with SND and winkeback. Patient with no dizziness, SOB or chest pain. Discussed with patient regarding risks/benefits and alternatives of PPM placement, risks including and not limited to infection, bleeding, pneumothorax, cardiac tamponade and death. All questions answered. Patient consented for the procedure.  - Keep NPO P MN  - Obtain TTE reprot from Dr. Tellez's office  - Plan for PPM placement tomorrow

## 2025-06-26 NOTE — H&P ADULT - PROBLEM SELECTOR PLAN 10
DVT ppx: SQH  Diet: DASH/TLC, CC, no seafood (allergy). NPO after MN for PPM placement tomorrow  Dispo: Likely home pending procedures and clinical improvement

## 2025-06-26 NOTE — H&P ADULT - TIME BILLING
Patient encounter, including chart review, medication review, patient interview, ordering labs and medications, interpreting labs and imaging results, coordination of care with consultants, and discussing plan with patient, wife at bedside, and healthcare team.

## 2025-06-26 NOTE — H&P ADULT - MLM HIDDEN
Pt. Received resting in bed.  VS checked and stable at this time.  General breakfast tray served at this time.  AM meds. Given as per MD orders.  Pain meds. Given as per orders.   Will continue to monitor pt. Status.   yes

## 2025-06-26 NOTE — PATIENT PROFILE ADULT - FALL HARM RISK - HARM RISK INTERVENTIONS

## 2025-06-26 NOTE — H&P ADULT - PROBLEM SELECTOR PLAN 4
Follows with Heme at [facility]  Per pt- recent BMBx outpatient on 6/20, results pending  Continue home iron supplementation  f/u iron studies, B12, folate

## 2025-06-27 ENCOUNTER — RESULT REVIEW (OUTPATIENT)
Age: 75
End: 2025-06-27

## 2025-06-27 LAB
ANION GAP SERPL CALC-SCNC: 13 MMOL/L — SIGNIFICANT CHANGE UP (ref 7–14)
BUN SERPL-MCNC: 15 MG/DL — SIGNIFICANT CHANGE UP (ref 7–23)
CALCIUM SERPL-MCNC: 8.8 MG/DL — SIGNIFICANT CHANGE UP (ref 8.4–10.5)
CHLORIDE SERPL-SCNC: 102 MMOL/L — SIGNIFICANT CHANGE UP (ref 98–107)
CO2 SERPL-SCNC: 21 MMOL/L — LOW (ref 22–31)
CREAT SERPL-MCNC: 1.08 MG/DL — SIGNIFICANT CHANGE UP (ref 0.5–1.3)
EGFR: 72 ML/MIN/1.73M2 — SIGNIFICANT CHANGE UP
EGFR: 72 ML/MIN/1.73M2 — SIGNIFICANT CHANGE UP
FOLATE SERPL-MCNC: >20 NG/ML — HIGH (ref 3.1–17.5)
GLUCOSE BLDC GLUCOMTR-MCNC: 100 MG/DL — HIGH (ref 70–99)
GLUCOSE BLDC GLUCOMTR-MCNC: 102 MG/DL — HIGH (ref 70–99)
GLUCOSE BLDC GLUCOMTR-MCNC: 115 MG/DL — HIGH (ref 70–99)
GLUCOSE BLDC GLUCOMTR-MCNC: 120 MG/DL — HIGH (ref 70–99)
GLUCOSE BLDC GLUCOMTR-MCNC: 145 MG/DL — HIGH (ref 70–99)
GLUCOSE BLDC GLUCOMTR-MCNC: 98 MG/DL — SIGNIFICANT CHANGE UP (ref 70–99)
GLUCOSE SERPL-MCNC: 88 MG/DL — SIGNIFICANT CHANGE UP (ref 70–99)
HCT VFR BLD CALC: 34.7 % — LOW (ref 39–50)
HGB BLD-MCNC: 10.7 G/DL — LOW (ref 13–17)
MCHC RBC-ENTMCNC: 26.6 PG — LOW (ref 27–34)
MCHC RBC-ENTMCNC: 30.8 G/DL — LOW (ref 32–36)
MCV RBC AUTO: 86.1 FL — SIGNIFICANT CHANGE UP (ref 80–100)
MRSA PCR RESULT.: SIGNIFICANT CHANGE UP
NRBC # BLD AUTO: 0 K/UL — SIGNIFICANT CHANGE UP (ref 0–0)
NRBC # FLD: 0 K/UL — SIGNIFICANT CHANGE UP (ref 0–0)
NRBC BLD AUTO-RTO: 0 /100 WBCS — SIGNIFICANT CHANGE UP (ref 0–0)
PLATELET # BLD AUTO: 312 K/UL — SIGNIFICANT CHANGE UP (ref 150–400)
PMV BLD: 9.6 FL — SIGNIFICANT CHANGE UP (ref 7–13)
POTASSIUM SERPL-MCNC: 4.6 MMOL/L — SIGNIFICANT CHANGE UP (ref 3.5–5.3)
POTASSIUM SERPL-SCNC: 4.6 MMOL/L — SIGNIFICANT CHANGE UP (ref 3.5–5.3)
RBC # BLD: 4.03 M/UL — LOW (ref 4.2–5.8)
RBC # FLD: 13.5 % — SIGNIFICANT CHANGE UP (ref 10.3–14.5)
S AUREUS DNA NOSE QL NAA+PROBE: SIGNIFICANT CHANGE UP
SODIUM SERPL-SCNC: 136 MMOL/L — SIGNIFICANT CHANGE UP (ref 135–145)
WBC # BLD: 8.57 K/UL — SIGNIFICANT CHANGE UP (ref 3.8–10.5)
WBC # FLD AUTO: 8.57 K/UL — SIGNIFICANT CHANGE UP (ref 3.8–10.5)

## 2025-06-27 PROCEDURE — 71045 X-RAY EXAM CHEST 1 VIEW: CPT | Mod: 26

## 2025-06-27 PROCEDURE — 93010 ELECTROCARDIOGRAM REPORT: CPT

## 2025-06-27 PROCEDURE — 93306 TTE W/DOPPLER COMPLETE: CPT | Mod: 26

## 2025-06-27 PROCEDURE — 33208 INSRT HEART PM ATRIAL & VENT: CPT | Mod: KX

## 2025-06-27 RX ORDER — CEFAZOLIN SODIUM IN 0.9 % NACL 3 G/100 ML
1000 INTRAVENOUS SOLUTION, PIGGYBACK (ML) INTRAVENOUS EVERY 8 HOURS
Refills: 0 | Status: COMPLETED | OUTPATIENT
Start: 2025-06-28 | End: 2025-06-28

## 2025-06-27 RX ADMIN — MONTELUKAST SODIUM 10 MILLIGRAM(S): 10 TABLET ORAL at 11:44

## 2025-06-27 RX ADMIN — FLUTICASONE PROPIONATE 1 SPRAY(S): 50 SPRAY, METERED NASAL at 06:47

## 2025-06-27 RX ADMIN — Medication 1 DOSE(S): at 09:26

## 2025-06-27 RX ADMIN — Medication 40 MILLIGRAM(S): at 11:44

## 2025-06-27 RX ADMIN — FLUTICASONE PROPIONATE 1 SPRAY(S): 50 SPRAY, METERED NASAL at 19:19

## 2025-06-27 RX ADMIN — DEXTROMETHORPHAN HBR, GUAIFENESIN 1200 MILLIGRAM(S): 200 LIQUID ORAL at 19:20

## 2025-06-27 RX ADMIN — Medication 250 MILLIGRAM(S): at 09:26

## 2025-06-27 RX ADMIN — Medication 40 MILLIGRAM(S): at 06:48

## 2025-06-27 RX ADMIN — AMLODIPINE BESYLATE 5 MILLIGRAM(S): 10 TABLET ORAL at 06:22

## 2025-06-27 RX ADMIN — Medication 1 DOSE(S): at 21:58

## 2025-06-27 RX ADMIN — Medication 2 CAPSULE(S): at 11:43

## 2025-06-27 RX ADMIN — Medication 1 APPLICATION(S): at 06:48

## 2025-06-27 RX ADMIN — Medication 325 MILLIGRAM(S): at 11:43

## 2025-06-27 RX ADMIN — Medication 2 CAPSULE(S): at 19:20

## 2025-06-27 RX ADMIN — GABAPENTIN 300 MILLIGRAM(S): 400 CAPSULE ORAL at 06:21

## 2025-06-27 RX ADMIN — TIOTROPIUM BROMIDE INHALATION SPRAY 2 PUFF(S): 3.12 SPRAY, METERED RESPIRATORY (INHALATION) at 09:28

## 2025-06-27 RX ADMIN — ROSUVASTATIN CALCIUM 10 MILLIGRAM(S): 5 TABLET, FILM COATED ORAL at 21:57

## 2025-06-27 RX ADMIN — GABAPENTIN 300 MILLIGRAM(S): 400 CAPSULE ORAL at 21:57

## 2025-06-27 RX ADMIN — Medication 3 MILLIGRAM(S): at 21:57

## 2025-06-27 RX ADMIN — DEXTROMETHORPHAN HBR, GUAIFENESIN 1200 MILLIGRAM(S): 200 LIQUID ORAL at 06:22

## 2025-06-27 NOTE — CONSULT NOTE ADULT - SUBJECTIVE AND OBJECTIVE BOX
MR:- [id number] :  NAME:-BALA SARGENT:-    DATE OF SERVICE:06-27-25 @ 08:32    Patient was seen,examined and evaluated  by Victor M Greene MD [id number] @ 08:32 .  ER evaluation, Labs and Hospital course was reviewed,    CHIEF COMPLAINT:Dyspnea    HPI:HPI:  75 y/o M with PMHx of HTN, NIDDM2, COPD, UC, who presents with worsening SOB, generalized weakness, lethargy, GREEN x weeks. Pt states his symptoms were initially attributed to his chronic pulmonary disease, however he had no relief with his home inhalers and nebulizers. He was undergoing further anemia workup with a BMBx at [facility] when he was incidentally found to be bradycardic to 30s. He was then advised by his cardiologist Dr. Aponte to go to the ER for further evaluation. Pt otherwise denies any f/c, cough, URI symptoms, CP, n/v/d/AP, LE edema, syncope, or other associated symptoms.    ED Course:  VS Afebrile, /64, HR 50, RR 23, 97% on RA  Given duonebs  EP consulted  Admitted to Medicine for further management (26 Jun 2025 16:38)        CARDIAC HISTORY:  [ ] CAD [ [PCI [ ] CABG [ ] Prior Cath  [ ] Atrial Fibrillation  Devices[ ] PPM [ ] ICD [ ]ILR  Heart Failure [ ] HFrEF [ ] HFpEF    PAST MEDICAL & SURGICAL HISTORY:  Gastroparesis  with pylorus dysfunction      DM (diabetes mellitus)  type II      Chronic obstructive pulmonary disease      Hypertension      Ulcerative colitis      Inguinal hernia      Bronchiectasis      GERD (gastroesophageal reflux disease)      Unilateral primary osteoarthritis, right knee      Dysphonia      Former smoker      Anemia      History of repair of hiatal hernia  childhood      S/P endoscopy  POEM 10/2021, 11/2021      History of left knee replacement      H/O inguinal hernia repair      S/P cataract surgery          MEDICATIONS  (STANDING):  amLODIPine   Tablet 5 milliGRAM(s) Oral daily  azithromycin   Tablet 250 milliGRAM(s) Oral <User Schedule>  chlorhexidine 2% Cloths 1 Application(s) Topical <User Schedule>  famotidine    Tablet 40 milliGRAM(s) Oral daily  ferrous    sulfate 325 milliGRAM(s) Oral daily  fluticasone propionate 50 MICROgram(s)/spray Nasal Spray 1 Spray(s) Both Nostrils two times a day  fluticasone propionate/ salmeterol 100-50 MICROgram(s) Diskus 1 Dose(s) Inhalation two times a day  gabapentin 300 milliGRAM(s) Oral three times a day  glucagon  Injectable 1 milliGRAM(s) IntraMuscular once  guaiFENesin ER 1200 milliGRAM(s) Oral every 12 hours  heparin   Injectable 5000 Unit(s) SubCutaneous every 8 hours  insulin lispro (ADMELOG) corrective regimen sliding scale   SubCutaneous three times a day before meals  insulin lispro (ADMELOG) corrective regimen sliding scale   SubCutaneous at bedtime  montelukast 10 milliGRAM(s) Oral daily  pancrelipase  (CREON 24,000 Lipase Units) 2 Capsule(s) Oral three times a day with meals  pantoprazole    Tablet 40 milliGRAM(s) Oral before breakfast  rosuvastatin 10 milliGRAM(s) Oral at bedtime  tiotropium 2.5 MICROgram(s) Inhaler 2 Puff(s) Inhalation daily  traZODone 50 milliGRAM(s) Oral at bedtime    MEDICATIONS  (PRN):  acetaminophen     Tablet .. 650 milliGRAM(s) Oral every 6 hours PRN Temp greater or equal to 38C (100.4F), Mild Pain (1 - 3)  aluminum hydroxide/magnesium hydroxide/simethicone Suspension 30 milliLiter(s) Oral every 4 hours PRN Dyspepsia  dextrose Oral Gel 15 Gram(s) Oral once PRN Blood Glucose LESS THAN 70 milliGRAM(s)/deciliter  loperamide 2 milliGRAM(s) Oral every 6 hours PRN Diarrhea  melatonin 3 milliGRAM(s) Oral at bedtime PRN Insomnia  ondansetron Injectable 4 milliGRAM(s) IV Push every 8 hours PRN Nausea and/or Vomiting      FAMILY HISTORY:  FH: diabetes mellitus (Mother)      No family history of premature coronary artery disease or sudden cardiac death    SOCIAL HISTORY:  Smoking-[ ] Active  [x ] Former [ ] Non Smoker  Alcohol-[x ] Denies [ ] Social [ ] Daily  Ilicit Drug use-[x ] Denies [ ] Active user    REVIEW OF SYSTEMS:  Constitutional: [ ] fever, [ ]weight loss, [x ]fatigue   Activity [ ] Bedbound,[x ] Ambulates [x ] Unassisted[ ] Cane/Walker [ ] Assistence.  Effort tolerance:[ ] Excellent [ ] Good [ x] Fair [ ] Poor [ ]  Eyes: [ ] visual changes  Respiratory: [x ]shortness of breath;  [ ] cough, [ ]wheezing, [ ]chills, [ ]hemoptysis  Cardiovascular: [ ] chest pain, [ ]palpitations, [ ]dizziness,  [ ]leg swelling[ ]orthopnea [ ]PND  Gastrointestinal: [ ] abdominal pain, [ ]nausea, [ ]vomiting,  [ ]diarrhea,[ ]constipation  Genitourinary: [ ] dysuria, [ ] hematuria  Neurologic: [ ] headaches [ ] tremors[ ] weakness  Skin: [ ] itching, [ ]burning, [ ] rashes  Endocrine: [ ] heat or cold intolerance  Musculoskeletal: [ ] joint pain or swelling; [ ] muscle, back, or extremity pain  Psychiatric: [ ] depression, [ ]anxiety, [ ]mood swings, or [ ]difficulty sleeping  Hematologic: [ ] easy bruising, [ ] bleeding gums       [ x] All others negative	  [ ] Unable to obtain    Vital Signs Last 24 Hrs  T(C): 36.7 (27 Jun 2025 06:20), Max: 37.2 (26 Jun 2025 09:42)  T(F): 98.1 (27 Jun 2025 06:20), Max: 98.9 (26 Jun 2025 09:42)  HR: 52 (27 Jun 2025 06:20) (50 - 61)  BP: 127/77 (27 Jun 2025 06:20) (116/83 - 146/64)  BP(mean): 99 (26 Jun 2025 15:52) (93 - 99)  RR: 17 (27 Jun 2025 06:20) (14 - 23)  SpO2: 100% (27 Jun 2025 06:20) (97% - 100%)    Parameters below as of 27 Jun 2025 06:20  Patient On (Oxygen Delivery Method): room air      I&O's Summary      PHYSICAL EXAM:  General: No acute distress BMI-28  HEENT: EOMI, PERRL[ ] Icteric  Neck: Supple, [ ] JVD  Lungs: Equal air entry bilaterally; [ ] Rales [ ] Rhonchi [ ] Wheezing  Heart: Regular rate and rhythm;[x ] Murmurs-  2 /6 [ x] Systolic [ ] Diastolic [ ] Radiation,No rubs, or gallops  Abdomen: Nontender, bowel sounds present  Extremities: No clubbing, cyanosis, [ ] edema[ ] Calf tenderness  Nervous system:  Alert & Oriented X3, no focal deficits  Psychiatric: Normal affect  Skin: No rashes or lesions      LABS:  06-27    136  |  102  |  15  ----------------------------<  88  4.6   |  21[L]  |  1.08    Ca    8.8      27 Jun 2025 04:26  Mg     2.10     06-26    TPro  6.8  /  Alb  4.1  /  TBili  0.3  /  DBili  x   /  AST  29  /  ALT  18  /  AlkPhos  43  06-26    Creatinine Trend: 1.08<--, 1.16<--                        10.7   8.57  )-----------( 312      ( 27 Jun 2025 04:26 )             34.7

## 2025-06-27 NOTE — PACU DISCHARGE NOTE - MENTAL STATUS: PATIENT PARTICIPATION
Awake Doxepin Pregnancy And Lactation Text: This medication is Pregnancy Category C and it isn't known if it is safe during pregnancy. It is also excreted in breast milk and breast feeding isn't recommended.

## 2025-06-27 NOTE — CHART NOTE - NSCHARTNOTEFT_GEN_A_CORE
ELECTROPHYSIOLOGY      Patient s/p implantation of a dual chamber PPM. Tolerated the procedure well. No complications.  Vital signs stable.   Dressing dry and intact. No bleeding, ecchymosis or hematoma.   The dressing is to be removed tomorrow before discharge.  Post procedure PPM teaching done with patient and his wife.  Written instructions and contact information provided.  Patient received a home monitor with verbal and written instructions.   All questions answered.  He has a follow-up appointment in the device clinic on 7/16/25 at 1:00pm  [address] [place] [address] [phone].   Anticipate discharge in the morning.

## 2025-06-27 NOTE — CHART NOTE - NSCHARTNOTEFT_GEN_A_CORE
Overnight ACP Coverage     Patient, BALA SARGENT, is s/p PPM placement. Left anterior wall is stable. Dressing is intact and dry. No swelling or hematoma noted. CXR ordered to r/o pneumothorax. Pt denies any acute complaints. Will continue to monitor.     T(C): 36.7 (06-27-25 @ 21:00), Max: 37 (06-27-25 @ 13:30)  HR: 70 (06-27-25 @ 21:00) (50 - 88)  BP: 133/71 (06-27-25 @ 21:00) (120/76 - 144/61)  RR: 16 (06-27-25 @ 21:00) (14 - 18)  SpO2: 98% (06-27-25 @ 21:00) (94% - 100%)    Addie Price NP  [id number]

## 2025-06-28 LAB
ANION GAP SERPL CALC-SCNC: 15 MMOL/L — HIGH (ref 7–14)
BASOPHILS # BLD AUTO: 0.06 K/UL — SIGNIFICANT CHANGE UP (ref 0–0.2)
BASOPHILS # BLD MANUAL: 0.12 K/UL — SIGNIFICANT CHANGE UP (ref 0–0.2)
BASOPHILS NFR BLD AUTO: 0.5 % — SIGNIFICANT CHANGE UP (ref 0–2)
BASOPHILS NFR BLD MANUAL: 0.9 % — SIGNIFICANT CHANGE UP (ref 0–2)
BUN SERPL-MCNC: 13 MG/DL — SIGNIFICANT CHANGE UP (ref 7–23)
CALCIUM SERPL-MCNC: 9.1 MG/DL — SIGNIFICANT CHANGE UP (ref 8.4–10.5)
CHLORIDE SERPL-SCNC: 96 MMOL/L — LOW (ref 98–107)
CO2 SERPL-SCNC: 19 MMOL/L — LOW (ref 22–31)
CREAT SERPL-MCNC: 1.01 MG/DL — SIGNIFICANT CHANGE UP (ref 0.5–1.3)
EGFR: 79 ML/MIN/1.73M2 — SIGNIFICANT CHANGE UP
EGFR: 79 ML/MIN/1.73M2 — SIGNIFICANT CHANGE UP
EOSINOPHIL # BLD AUTO: 0.19 K/UL — SIGNIFICANT CHANGE UP (ref 0–0.5)
EOSINOPHIL # BLD MANUAL: 0.12 K/UL — SIGNIFICANT CHANGE UP (ref 0–0.5)
EOSINOPHIL NFR BLD AUTO: 1.5 % — SIGNIFICANT CHANGE UP (ref 0–6)
EOSINOPHIL NFR BLD MANUAL: 0.9 % — SIGNIFICANT CHANGE UP (ref 0–6)
GIANT PLATELETS BLD QL SMEAR: PRESENT
GLUCOSE BLDC GLUCOMTR-MCNC: 127 MG/DL — HIGH (ref 70–99)
GLUCOSE BLDC GLUCOMTR-MCNC: 127 MG/DL — HIGH (ref 70–99)
GLUCOSE BLDC GLUCOMTR-MCNC: 159 MG/DL — HIGH (ref 70–99)
GLUCOSE BLDC GLUCOMTR-MCNC: 184 MG/DL — HIGH (ref 70–99)
GLUCOSE SERPL-MCNC: 125 MG/DL — HIGH (ref 70–99)
HCT VFR BLD CALC: 38.1 % — LOW (ref 39–50)
HGB BLD-MCNC: 12 G/DL — LOW (ref 13–17)
IMM GRANULOCYTES # BLD AUTO: 0.06 K/UL — SIGNIFICANT CHANGE UP (ref 0–0.07)
IMM GRANULOCYTES NFR BLD AUTO: 0.5 % — SIGNIFICANT CHANGE UP (ref 0–0.9)
LYMPHOCYTES # BLD AUTO: 0.6 K/UL — LOW (ref 1–3.3)
LYMPHOCYTES # BLD MANUAL: 0.67 K/UL — LOW (ref 1–3.3)
LYMPHOCYTES NFR BLD AUTO: 4.7 % — LOW (ref 13–44)
LYMPHOCYTES NFR BLD MANUAL: 5.2 % — LOW (ref 13–44)
MAGNESIUM SERPL-MCNC: 1.6 MG/DL — SIGNIFICANT CHANGE UP (ref 1.6–2.6)
MANUAL NEUTROPHIL BANDS #: 0.22 K/UL — SIGNIFICANT CHANGE UP (ref 0–0.84)
MCHC RBC-ENTMCNC: 26.7 PG — LOW (ref 27–34)
MCHC RBC-ENTMCNC: 31.5 G/DL — LOW (ref 32–36)
MCV RBC AUTO: 84.7 FL — SIGNIFICANT CHANGE UP (ref 80–100)
MONOCYTES # BLD AUTO: 1.51 K/UL — HIGH (ref 0–0.9)
MONOCYTES # BLD MANUAL: 1.46 K/UL — HIGH (ref 0–0.9)
MONOCYTES NFR BLD AUTO: 11.7 % — SIGNIFICANT CHANGE UP (ref 2–14)
MONOCYTES NFR BLD MANUAL: 11.3 % — SIGNIFICANT CHANGE UP (ref 2–14)
NEUTROPHILS # BLD AUTO: 10.48 K/UL — HIGH (ref 1.8–7.4)
NEUTROPHILS # BLD MANUAL: 10.32 K/UL — HIGH (ref 1.8–7.4)
NEUTROPHILS NFR BLD AUTO: 81.1 % — HIGH (ref 43–77)
NEUTROPHILS NFR BLD MANUAL: 80 % — HIGH (ref 43–77)
NEUTS BAND # BLD: 1.7 % — SIGNIFICANT CHANGE UP (ref 0–8)
NEUTS BAND NFR BLD: 1.7 % — SIGNIFICANT CHANGE UP (ref 0–8)
NRBC # BLD AUTO: 0 K/UL — SIGNIFICANT CHANGE UP (ref 0–0)
NRBC # FLD: 0 K/UL — SIGNIFICANT CHANGE UP (ref 0–0)
NRBC BLD AUTO-RTO: 0 /100 WBCS — SIGNIFICANT CHANGE UP (ref 0–0)
PHOSPHATE SERPL-MCNC: 4.1 MG/DL — SIGNIFICANT CHANGE UP (ref 2.5–4.5)
PLAT MORPH BLD: NORMAL — SIGNIFICANT CHANGE UP
PLATELET # BLD AUTO: 323 K/UL — SIGNIFICANT CHANGE UP (ref 150–400)
PLATELET COUNT - ESTIMATE: NORMAL — SIGNIFICANT CHANGE UP
PMV BLD: 9.5 FL — SIGNIFICANT CHANGE UP (ref 7–13)
POTASSIUM SERPL-MCNC: 4.6 MMOL/L — SIGNIFICANT CHANGE UP (ref 3.5–5.3)
POTASSIUM SERPL-SCNC: 4.6 MMOL/L — SIGNIFICANT CHANGE UP (ref 3.5–5.3)
RBC # BLD: 4.5 M/UL — SIGNIFICANT CHANGE UP (ref 4.2–5.8)
RBC # FLD: 13.4 % — SIGNIFICANT CHANGE UP (ref 10.3–14.5)
RBC BLD AUTO: NORMAL — SIGNIFICANT CHANGE UP
SODIUM SERPL-SCNC: 130 MMOL/L — LOW (ref 135–145)
WBC # BLD: 12.9 K/UL — HIGH (ref 3.8–10.5)
WBC # FLD AUTO: 12.9 K/UL — HIGH (ref 3.8–10.5)

## 2025-06-28 PROCEDURE — 99223 1ST HOSP IP/OBS HIGH 75: CPT | Mod: GC

## 2025-06-28 RX ADMIN — Medication 2 CAPSULE(S): at 11:44

## 2025-06-28 RX ADMIN — FLUTICASONE PROPIONATE 1 SPRAY(S): 50 SPRAY, METERED NASAL at 17:19

## 2025-06-28 RX ADMIN — DEXTROMETHORPHAN HBR, GUAIFENESIN 1200 MILLIGRAM(S): 200 LIQUID ORAL at 05:17

## 2025-06-28 RX ADMIN — GABAPENTIN 300 MILLIGRAM(S): 400 CAPSULE ORAL at 13:16

## 2025-06-28 RX ADMIN — Medication 1 DOSE(S): at 08:36

## 2025-06-28 RX ADMIN — ROSUVASTATIN CALCIUM 10 MILLIGRAM(S): 5 TABLET, FILM COATED ORAL at 21:23

## 2025-06-28 RX ADMIN — Medication 1 APPLICATION(S): at 05:44

## 2025-06-28 RX ADMIN — Medication 3 MILLIGRAM(S): at 21:23

## 2025-06-28 RX ADMIN — GABAPENTIN 300 MILLIGRAM(S): 400 CAPSULE ORAL at 05:17

## 2025-06-28 RX ADMIN — Medication 2 CAPSULE(S): at 08:36

## 2025-06-28 RX ADMIN — INSULIN LISPRO 1: 100 INJECTION, SOLUTION INTRAVENOUS; SUBCUTANEOUS at 11:46

## 2025-06-28 RX ADMIN — INSULIN LISPRO 1: 100 INJECTION, SOLUTION INTRAVENOUS; SUBCUTANEOUS at 17:36

## 2025-06-28 RX ADMIN — DEXTROMETHORPHAN HBR, GUAIFENESIN 1200 MILLIGRAM(S): 200 LIQUID ORAL at 21:23

## 2025-06-28 RX ADMIN — GABAPENTIN 300 MILLIGRAM(S): 400 CAPSULE ORAL at 21:23

## 2025-06-28 RX ADMIN — AMLODIPINE BESYLATE 5 MILLIGRAM(S): 10 TABLET ORAL at 05:17

## 2025-06-28 RX ADMIN — Medication 40 MILLIGRAM(S): at 05:17

## 2025-06-28 RX ADMIN — MONTELUKAST SODIUM 10 MILLIGRAM(S): 10 TABLET ORAL at 11:45

## 2025-06-28 RX ADMIN — Medication 40 MILLIGRAM(S): at 11:44

## 2025-06-28 RX ADMIN — Medication 100 MILLIGRAM(S): at 00:11

## 2025-06-28 RX ADMIN — Medication 2 CAPSULE(S): at 17:19

## 2025-06-28 RX ADMIN — TIOTROPIUM BROMIDE INHALATION SPRAY 2 PUFF(S): 3.12 SPRAY, METERED RESPIRATORY (INHALATION) at 09:10

## 2025-06-28 RX ADMIN — Medication 30 MILLILITER(S): at 03:40

## 2025-06-28 RX ADMIN — FLUTICASONE PROPIONATE 1 SPRAY(S): 50 SPRAY, METERED NASAL at 05:44

## 2025-06-28 RX ADMIN — Medication 325 MILLIGRAM(S): at 11:45

## 2025-06-28 RX ADMIN — Medication 100 MILLIGRAM(S): at 08:38

## 2025-06-28 NOTE — CONSULT NOTE ADULT - ASSESSMENT
74 year old man PMH HTN, DM, COPD, UC presented after being advised by his cardiologist for concern for sick sinus syndrome. EKG at the cardiologist's office showed sinus rhythm with first degree AV block and junctional beats with HR as low as 41 bpm. EKG in ED shows sinus rhythm with first degree AV block and HR 52 bpm. Telemetry shows sinus rhythm with first degree AV block and HR 50s-70s. He is not on any AV caryl blockers.   - Monitor on telemetry  - Maintain K>4 and Mg>2  - Avoid AV caryl blockers  - Echocardiogram to evaluate LV function and valve function  - Check TFTs   - Will f/u regarding possible PPM placement for sinus node dysfunction    Patient is to be staffed with with attending. Awaiting attending's addendum
74 year old male with PMHx of HTN, NIDDM2, COPD, and UC, who presented with symptomatic sinus node dysfunction now s/p PPM placement. Patient reports having shortness of breath prior to admission and since hospitalization the shortness of breath has improved. Patient sees pulmonologist Dr. Eckert. Is not on home o2. Patient had a CT chest Feb 2025 which showed mildly enlarged mediastinal lymph node as well as new bilateral ground glass as well as a left lower lobe nodule. Pulmonary consulted for shortness of breath.    On exam, patient is breathing comfortably on room air. CXR 6/27 with clear lungs. He has a CT chest 2/16/25 which showed new ground glass opacities or density which is new compared to a previous CT chest 9/17/24, mild increase in size of 5mm left lower lobe nodule, emphysema and mildly enlarged mediastinal lymph node. Patient's shortness of breath is reportedly improved/improving    #Shortness of breath  #Ground glass opacities  #Lung nodule  Enlarged mediastinal lymph node    Recommendation:  -CT chest non-con to evaluate interval change given previous CT findings  -continue advair and spiriva  -can give albuterol q4-6h PRN  -out of bed to chair as tolerate and PT  -patient will need a follow up appointment with his pulmonologist Dr. Ezekiel Eckert upon discharge    Recommendations are not final pending attending attestation.
74 year old man PMH HTN, DM, COPD, UC presented after being advised by his cardiologist for concern for sick sinus syndrome. EKG at the cardiologist's office showed sinus rhythm with first degree AV block and junctional beats with HR as low as 41 bpm. EKG in ED shows sinus rhythm with first degree AV block and HR 52 bpm. Telemetry shows sinus rhythm with first degree AV block and HR 50s-70s. He is not on any AV caryl blockers.     # Symptomatic Bradycardia  Presented with weakness   Nored bradycardia-Sinus node dysfunction  On no AVN agents  Plan for PPM   Prior TTE Normal LV Systolic Function      # Type 2 diabetes mellitus.   Hold home Janumet  BID while inpatient  Last A1c 6.0 in 2/2025    # COPD-Bronchiectasis   Advair and Spiriva  Continue azithromycin 250mg q Wed/Fri  Continue montelukast 10mg qd

## 2025-06-28 NOTE — CHART NOTE - NSCHARTNOTEFT_GEN_A_CORE
Patient is s/p implantation of MDT Dual-Chamber PPM for SND. Dressing was removed with site C/D/I no signs of hematoma. PPM teaching done yesterday. PPM rep came by this morning. On telemetry he is A-paced on demand with atrial rates 60-90s, V-sensed.    Plan:  - Can be discharged from an EP standpoint  - He has a follow-up appointment in the device clinic on 7/16/25 at 1:00pm  4th floor Oncology Building [phone].     D/w Dr. Brenda Mandel MD  Cardiology Fellow PGY-7 Patient is s/p implantation of MDT Dual-Chamber PPM for SND. Dressing was removed with site C/D/I no signs of hematoma. PPM teaching done yesterday. PPM rep came by this morning. On telemetry he is A-paced on demand with atrial rates 60-90s, V-sensed.    Plan:  - Can be discharged from an EP standpoint  - He has a follow-up appointment in the device clinic on 7/16/25 at 1:00pm  4th floor Oncology Building [phone].     D/w Dr. Brenda Mandel MD  Cardiology Fellow PGY-7    Discussed with fellow

## 2025-06-28 NOTE — CONSULT NOTE ADULT - TIME BILLING
- Review of records, telemetry, vital signs and daily labs.   - General and cardiovascular physical examination.  - Generation of cardiovascular treatment plan and completion of note .  - Coordination of care-discussed with ACP, and  on disposition plan .      Patient was seen and examined by me on 06/27/2025 ,interim events noted,labs and radiology studies reviewed.  Victor M Greene MD,FACC.  [address] [place] [address].  [id number].  [phone]  Availabe to call or text on [employer] TEAMS.
Medical management as above, review of results/records, discussion with patient and primary team.  Encounter time excludes time spent teaching resident/fellow.

## 2025-06-28 NOTE — CONSULT NOTE ADULT - SUBJECTIVE AND OBJECTIVE BOX
HPI:    PAST MEDICAL & SURGICAL HISTORY:  Gastroparesis  with pylorus dysfunction      DM (diabetes mellitus)  type II      Chronic obstructive pulmonary disease      Hypertension      Ulcerative colitis      Inguinal hernia      Bronchiectasis      GERD (gastroesophageal reflux disease)      Unilateral primary osteoarthritis, right knee      Dysphonia      Former smoker      Anemia      History of repair of hiatal hernia  childhood      S/P endoscopy  POEM 10/2021, 2021      History of left knee replacement      H/O inguinal hernia repair      S/P cataract surgery          FAMILY HISTORY:  FH: diabetes mellitus (Mother)        SOCIAL HISTORY:  Smoking: [ ] Never Smoked [ ] Former Smoker (__ packs x ___ years) [ ] Current Smoker  (__ packs x ___ years)  Substance Use: [ ] Never Used [ ] Used ____  EtOH Use:  Marital Status: [ ] Single [ ]  [ ]  [ ]   Sexual History:   Occupation:  Recent Travel:  Country of Birth:  Advance Directives:    Allergies    angiotensin converting enzyme inhibitors (Other; Swelling)  Seafood (Hives)    Intolerances        HOME MEDICATIONS:  Home Medications:  albuterol 90 mcg/inh inhalation aerosol: 2 puff(s) inhaled every 6 hours As needed Shortness of Breath (2025 12:58)  azithromycin 250 mg oral tablet: 1 tab(s) orally 2 times a week on Wednesday and Friday (2025 16:50)  Breztri Aerosphere 160 mcg-9 mcg-4.8 mcg/inh inhalation aerosol: 2 puff(s) inhaled 2 times a day (2025 16:48)  Creon 24,000 units oral delayed release capsule: 2 cap(s) orally 3 times a day (with meals) (2025 16:56)  famotidine 40 mg oral tablet: 1 tab(s) orally once a day (at bedtime) (2025 12:52)  ferrous sulfate 325 mg (65 mg elemental iron) oral tablet: 1 tab(s) orally once a day (2025 12:55)  fluticasone 50 mcg/inh nasal spray: 1 spray(s) in each nostril 2 times a day (2025 12:50)  guaiFENesin 1200 mg oral tablet, extended release: 1 tab(s) orally every 12 hours (2025 12:58)  Janumet 50 mg-500 mg oral tablet: 1 tab(s) orally 2 times a day (2025 16:46)  loperamide 2 mg oral capsule: 2 cap(s) orally every 6 hours As needed Diarrhea (2025 12:58)  Melatonin 5 mg oral tablet: 1 tab(s) orally once a day (at bedtime) (2025 12:57)  montelukast 10 mg oral tablet: 1 tab(s) orally once a day (2025 12:50)  Norvasc 5 mg oral tablet: 1 tab(s) orally once a day (2025 17:03)  ondansetron 8 mg oral tablet, disintegratin tab(s) orally 3 times a day as needed for  nausea (2025 12:57)  pantoprazole 20 mg oral delayed release tablet: 1 tab(s) orally once a day (in the morning) (2025 16:52)  Rinvoq 15 mg oral tablet, extended release: 1 tab(s) orally once a day (2025 12:50)  rosuvastatin 10 mg oral tablet: 1 tab(s) orally once a day (at bedtime) (2025 12:57)      REVIEW OF SYSTEMS:  All systems negative except as documented above.    OBJECTIVE:  ICU Vital Signs Last 24 Hrs  T(C): 36.7 (2025 12:00), Max: 36.9 (2025 05:00)  T(F): 98.1 (2025 12:00), Max: 98.4 (2025 05:00)  HR: 69 (2025 12:00) (62 - 88)  BP: 116/67 (2025 12:00) (116/67 - 136/78)  BP(mean): --  ABP: --  ABP(mean): --  RR: 18 (2025 12:00) (14 - 18)  SpO2: 98% (2025 12:00) (94% - 100%)    O2 Parameters below as of 2025 12:00  Patient On (Oxygen Delivery Method): room air              CAPILLARY BLOOD GLUCOSE      POCT Blood Glucose.: 159 mg/dL (2025 11:40)      PHYSICAL EXAM:  General: NAD  HEENT: EOMI, sclera anicteric  Neck: supple  Cardiovascular: RR  Respiratory: CTAB, no wheezes, crackles, or rhonci  Abdomen: soft  Extremities: warm and well perfused, no edema, no clubbing  Skin: no rashes  Neurological: AOx3, no focal deficits    HOSPITAL MEDICATIONS:  Standing Meds:  amLODIPine   Tablet 5 milliGRAM(s) Oral daily  azithromycin   Tablet 250 milliGRAM(s) Oral <User Schedule>  chlorhexidine 2% Cloths 1 Application(s) Topical <User Schedule>  dextrose 5%. 1000 milliLiter(s) IV Continuous <Continuous>  dextrose 5%. 1000 milliLiter(s) IV Continuous <Continuous>  dextrose 50% Injectable 25 Gram(s) IV Push once  dextrose 50% Injectable 12.5 Gram(s) IV Push once  dextrose 50% Injectable 25 Gram(s) IV Push once  famotidine    Tablet 40 milliGRAM(s) Oral daily  ferrous    sulfate 325 milliGRAM(s) Oral daily  fluticasone propionate 50 MICROgram(s)/spray Nasal Spray 1 Spray(s) Both Nostrils two times a day  fluticasone propionate/ salmeterol 100-50 MICROgram(s) Diskus 1 Dose(s) Inhalation two times a day  gabapentin 300 milliGRAM(s) Oral three times a day  glucagon  Injectable 1 milliGRAM(s) IntraMuscular once  guaiFENesin ER 1200 milliGRAM(s) Oral every 12 hours  insulin lispro (ADMELOG) corrective regimen sliding scale   SubCutaneous three times a day before meals  insulin lispro (ADMELOG) corrective regimen sliding scale   SubCutaneous at bedtime  montelukast 10 milliGRAM(s) Oral daily  pancrelipase  (CREON 24,000 Lipase Units) 2 Capsule(s) Oral three times a day with meals  pantoprazole    Tablet 40 milliGRAM(s) Oral before breakfast  rosuvastatin 10 milliGRAM(s) Oral at bedtime  tiotropium 2.5 MICROgram(s) Inhaler 2 Puff(s) Inhalation daily  traZODone 50 milliGRAM(s) Oral at bedtime      PRN Meds:  acetaminophen     Tablet .. 650 milliGRAM(s) Oral every 6 hours PRN  aluminum hydroxide/magnesium hydroxide/simethicone Suspension 30 milliLiter(s) Oral every 4 hours PRN  dextrose Oral Gel 15 Gram(s) Oral once PRN  loperamide 2 milliGRAM(s) Oral every 6 hours PRN  melatonin 3 milliGRAM(s) Oral at bedtime PRN  ondansetron Injectable 4 milliGRAM(s) IV Push every 8 hours PRN      LABS:                        12.0   12.90 )-----------( 323      ( 2025 03:45 )             38.1     Hgb Trend: 12.0<--, 10.7<--, 11.1<--  28    130[L]  |  96[L]  |  13  ----------------------------<  125[H]  4.6   |  19[L]  |  1.01    Ca    9.1      2025 03:45  Phos  4.1       Mg     1.60           Creatinine Trend: 1.01<--, 1.08<--, 1.16<--    Urinalysis Basic - ( 2025 03:45 )    Color: x / Appearance: x / SG: x / pH: x  Gluc: 125 mg/dL / Ketone: x  / Bili: x / Urobili: x   Blood: x / Protein: x / Nitrite: x   Leuk Esterase: x / RBC: x / WBC x   Sq Epi: x / Non Sq Epi: x / Bacteria: x            MICROBIOLOGY:       RADIOLOGY:  [x] Reviewed and interpreted by me   HPI:  74 year old male with PMHx of HTN, NIDDM2, COPD, and UC, who presented with symptomatic sinus node dysfunction now s/p PPM placement. Patient reports having shortness of breath prior to admission and since hospitalization the shortness of breath has improved. Patient sees pulmonologist Dr. Eckert. Is not on home o2. Patient had a CT chest 2025 which showed mildly enlarged mediastinal lymph node as well as new bilateral ground glass as well as a left lower lobe nodule. Pulmonary consulted for shortness of breath.    PAST MEDICAL & SURGICAL HISTORY:  Gastroparesis  with pylorus dysfunction      DM (diabetes mellitus)  type II      Chronic obstructive pulmonary disease      Hypertension      Ulcerative colitis      Inguinal hernia      Bronchiectasis      GERD (gastroesophageal reflux disease)      Unilateral primary osteoarthritis, right knee      Dysphonia      Former smoker      Anemia      History of repair of hiatal hernia  childhood      S/P endoscopy  POEM 10/2021, 2021      History of left knee replacement      H/O inguinal hernia repair      S/P cataract surgery          FAMILY HISTORY:  FH: diabetes mellitus (Mother)        SOCIAL HISTORY:  Smoking: [ ] Never Smoked [ ] Former Smoker (__ packs x ___ years) [ ] Current Smoker  (__ packs x ___ years)  Substance Use: [ ] Never Used [ ] Used ____  EtOH Use:  Marital Status: [ ] Single [ ]  [ ]  [ ]   Sexual History:   Occupation:  Recent Travel:  Country of Birth:  Advance Directives:    Allergies    angiotensin converting enzyme inhibitors (Other; Swelling)  Seafood (Hives)    Intolerances        HOME MEDICATIONS:  Home Medications:  albuterol 90 mcg/inh inhalation aerosol: 2 puff(s) inhaled every 6 hours As needed Shortness of Breath (2025 12:58)  azithromycin 250 mg oral tablet: 1 tab(s) orally 2 times a week on Wednesday and Friday (2025 16:50)  Breztri Aerosphere 160 mcg-9 mcg-4.8 mcg/inh inhalation aerosol: 2 puff(s) inhaled 2 times a day (2025 16:48)  Creon 24,000 units oral delayed release capsule: 2 cap(s) orally 3 times a day (with meals) (2025 16:56)  famotidine 40 mg oral tablet: 1 tab(s) orally once a day (at bedtime) (2025 12:52)  ferrous sulfate 325 mg (65 mg elemental iron) oral tablet: 1 tab(s) orally once a day (2025 12:55)  fluticasone 50 mcg/inh nasal spray: 1 spray(s) in each nostril 2 times a day (2025 12:50)  guaiFENesin 1200 mg oral tablet, extended release: 1 tab(s) orally every 12 hours (2025 12:58)  Janumet 50 mg-500 mg oral tablet: 1 tab(s) orally 2 times a day (2025 16:46)  loperamide 2 mg oral capsule: 2 cap(s) orally every 6 hours As needed Diarrhea (2025 12:58)  Melatonin 5 mg oral tablet: 1 tab(s) orally once a day (at bedtime) (2025 12:57)  montelukast 10 mg oral tablet: 1 tab(s) orally once a day (2025 12:50)  Norvasc 5 mg oral tablet: 1 tab(s) orally once a day (2025 17:03)  ondansetron 8 mg oral tablet, disintegratin tab(s) orally 3 times a day as needed for  nausea (2025 12:57)  pantoprazole 20 mg oral delayed release tablet: 1 tab(s) orally once a day (in the morning) (2025 16:52)  Rinvoq 15 mg oral tablet, extended release: 1 tab(s) orally once a day (2025 12:50)  rosuvastatin 10 mg oral tablet: 1 tab(s) orally once a day (at bedtime) (2025 12:57)      REVIEW OF SYSTEMS:  All systems negative except as documented above.    OBJECTIVE:  ICU Vital Signs Last 24 Hrs  T(C): 36.7 (2025 12:00), Max: 36.9 (2025 05:00)  T(F): 98.1 (2025 12:00), Max: 98.4 (2025 05:00)  HR: 69 (2025 12:00) (62 - 88)  BP: 116/67 (2025 12:00) (116/67 - 136/78)  BP(mean): --  ABP: --  ABP(mean): --  RR: 18 (2025 12:00) (14 - 18)  SpO2: 98% (2025 12:00) (94% - 100%)    O2 Parameters below as of 2025 12:00  Patient On (Oxygen Delivery Method): room air              CAPILLARY BLOOD GLUCOSE      POCT Blood Glucose.: 159 mg/dL (2025 11:40)      PHYSICAL EXAM:  General: NAD  HEENT: EOMI, sclera anicteric  Neck: supple  Cardiovascular: RR  Respiratory: CTAB, no wheezes, crackles, or rhonci  Abdomen: soft  Extremities: warm and well perfused, no edema, no clubbing  Skin: no rashes  Neurological: AOx3, no focal deficits    HOSPITAL MEDICATIONS:  Standing Meds:  amLODIPine   Tablet 5 milliGRAM(s) Oral daily  azithromycin   Tablet 250 milliGRAM(s) Oral <User Schedule>  chlorhexidine 2% Cloths 1 Application(s) Topical <User Schedule>  dextrose 5%. 1000 milliLiter(s) IV Continuous <Continuous>  dextrose 5%. 1000 milliLiter(s) IV Continuous <Continuous>  dextrose 50% Injectable 25 Gram(s) IV Push once  dextrose 50% Injectable 12.5 Gram(s) IV Push once  dextrose 50% Injectable 25 Gram(s) IV Push once  famotidine    Tablet 40 milliGRAM(s) Oral daily  ferrous    sulfate 325 milliGRAM(s) Oral daily  fluticasone propionate 50 MICROgram(s)/spray Nasal Spray 1 Spray(s) Both Nostrils two times a day  fluticasone propionate/ salmeterol 100-50 MICROgram(s) Diskus 1 Dose(s) Inhalation two times a day  gabapentin 300 milliGRAM(s) Oral three times a day  glucagon  Injectable 1 milliGRAM(s) IntraMuscular once  guaiFENesin ER 1200 milliGRAM(s) Oral every 12 hours  insulin lispro (ADMELOG) corrective regimen sliding scale   SubCutaneous three times a day before meals  insulin lispro (ADMELOG) corrective regimen sliding scale   SubCutaneous at bedtime  montelukast 10 milliGRAM(s) Oral daily  pancrelipase  (CREON 24,000 Lipase Units) 2 Capsule(s) Oral three times a day with meals  pantoprazole    Tablet 40 milliGRAM(s) Oral before breakfast  rosuvastatin 10 milliGRAM(s) Oral at bedtime  tiotropium 2.5 MICROgram(s) Inhaler 2 Puff(s) Inhalation daily  traZODone 50 milliGRAM(s) Oral at bedtime      PRN Meds:  acetaminophen     Tablet .. 650 milliGRAM(s) Oral every 6 hours PRN  aluminum hydroxide/magnesium hydroxide/simethicone Suspension 30 milliLiter(s) Oral every 4 hours PRN  dextrose Oral Gel 15 Gram(s) Oral once PRN  loperamide 2 milliGRAM(s) Oral every 6 hours PRN  melatonin 3 milliGRAM(s) Oral at bedtime PRN  ondansetron Injectable 4 milliGRAM(s) IV Push every 8 hours PRN      LABS:                        12.0   12.90 )-----------( 323      ( 2025 03:45 )             38.1     Hgb Trend: 12.0<--, 10.7<--, 11.1<--  06-28    130[L]  |  96[L]  |  13  ----------------------------<  125[H]  4.6   |  19[L]  |  1.01    Ca    9.1      2025 03:45  Phos  4.1       Mg     1.60           Creatinine Trend: 1.01<--, 1.08<--, 1.16<--    Urinalysis Basic - ( 2025 03:45 )    Color: x / Appearance: x / SG: x / pH: x  Gluc: 125 mg/dL / Ketone: x  / Bili: x / Urobili: x   Blood: x / Protein: x / Nitrite: x   Leuk Esterase: x / RBC: x / WBC x   Sq Epi: x / Non Sq Epi: x / Bacteria: x            MICROBIOLOGY:       RADIOLOGY:  [x] Reviewed and interpreted by me

## 2025-06-28 NOTE — CONSULT NOTE ADULT - ATTENDING COMMENTS
74-year-old male with significant past medical history of COPD and ulcerative colitis who comes in for dyspnea was found to have symptomatic sinus node dysfunction and is now status post pacemaker placement.  Patient indicates that he had significant short of breath prior to hospitalization but has improved with the placement of pacemaker.  Patient does have a pulmonologist with Dr. Eckert.  Patient has been told several times that he has COPD and has been given inhalers which has not improved.  Unsure of the severity of his COPD.  Patient was also found to have bilateral groundglass opacifications with a left lower lobe lobule which was not evaluated since February.    #Dyspnea  #Groundglass opacifications  #Left lower lobe nodule    - Patient with improved shortness of breath with placement of pacemaker.  - Can continue inhalers while inpatient  - Would suggest CT scan of the chest for further evaluation of groundglass opacifications and lung nodule  - If patient continues to have improvement of shortness of breath and CT scan does not show worsening of groundglass opacifications or lung nodule then patient can be discharged and follow-up with Dr. Eckert.     Thank you for your consult.

## 2025-06-29 VITALS
HEART RATE: 86 BPM | TEMPERATURE: 98 F | RESPIRATION RATE: 18 BRPM | OXYGEN SATURATION: 100 % | DIASTOLIC BLOOD PRESSURE: 53 MMHG | SYSTOLIC BLOOD PRESSURE: 101 MMHG

## 2025-06-29 LAB
ANION GAP SERPL CALC-SCNC: 16 MMOL/L — HIGH (ref 7–14)
BASOPHILS # BLD AUTO: 0.05 K/UL — SIGNIFICANT CHANGE UP (ref 0–0.2)
BASOPHILS NFR BLD AUTO: 0.4 % — SIGNIFICANT CHANGE UP (ref 0–2)
BUN SERPL-MCNC: 15 MG/DL — SIGNIFICANT CHANGE UP (ref 7–23)
CALCIUM SERPL-MCNC: 8.9 MG/DL — SIGNIFICANT CHANGE UP (ref 8.4–10.5)
CHLORIDE SERPL-SCNC: 98 MMOL/L — SIGNIFICANT CHANGE UP (ref 98–107)
CO2 SERPL-SCNC: 19 MMOL/L — LOW (ref 22–31)
CREAT SERPL-MCNC: 1.02 MG/DL — SIGNIFICANT CHANGE UP (ref 0.5–1.3)
EGFR: 78 ML/MIN/1.73M2 — SIGNIFICANT CHANGE UP
EGFR: 78 ML/MIN/1.73M2 — SIGNIFICANT CHANGE UP
EOSINOPHIL # BLD AUTO: 0.23 K/UL — SIGNIFICANT CHANGE UP (ref 0–0.5)
EOSINOPHIL NFR BLD AUTO: 2 % — SIGNIFICANT CHANGE UP (ref 0–6)
GLUCOSE BLDC GLUCOMTR-MCNC: 139 MG/DL — HIGH (ref 70–99)
GLUCOSE BLDC GLUCOMTR-MCNC: 269 MG/DL — HIGH (ref 70–99)
GLUCOSE SERPL-MCNC: 139 MG/DL — HIGH (ref 70–99)
HCT VFR BLD CALC: 37.3 % — LOW (ref 39–50)
HGB BLD-MCNC: 11.8 G/DL — LOW (ref 13–17)
IMM GRANULOCYTES # BLD AUTO: 0.05 K/UL — SIGNIFICANT CHANGE UP (ref 0–0.07)
IMM GRANULOCYTES NFR BLD AUTO: 0.4 % — SIGNIFICANT CHANGE UP (ref 0–0.9)
LYMPHOCYTES # BLD AUTO: 0.64 K/UL — LOW (ref 1–3.3)
LYMPHOCYTES NFR BLD AUTO: 5.6 % — LOW (ref 13–44)
MAGNESIUM SERPL-MCNC: 1.9 MG/DL — SIGNIFICANT CHANGE UP (ref 1.6–2.6)
MCHC RBC-ENTMCNC: 26.3 PG — LOW (ref 27–34)
MCHC RBC-ENTMCNC: 31.6 G/DL — LOW (ref 32–36)
MCV RBC AUTO: 83.3 FL — SIGNIFICANT CHANGE UP (ref 80–100)
MONOCYTES # BLD AUTO: 1.64 K/UL — HIGH (ref 0–0.9)
MONOCYTES NFR BLD AUTO: 14.5 % — HIGH (ref 2–14)
NEUTROPHILS # BLD AUTO: 8.73 K/UL — HIGH (ref 1.8–7.4)
NEUTROPHILS NFR BLD AUTO: 77.1 % — HIGH (ref 43–77)
NRBC # BLD AUTO: 0 K/UL — SIGNIFICANT CHANGE UP (ref 0–0)
NRBC # FLD: 0 K/UL — SIGNIFICANT CHANGE UP (ref 0–0)
NRBC BLD AUTO-RTO: 0 /100 WBCS — SIGNIFICANT CHANGE UP (ref 0–0)
PHOSPHATE SERPL-MCNC: 3.8 MG/DL — SIGNIFICANT CHANGE UP (ref 2.5–4.5)
PLATELET # BLD AUTO: 292 K/UL — SIGNIFICANT CHANGE UP (ref 150–400)
PMV BLD: 9.4 FL — SIGNIFICANT CHANGE UP (ref 7–13)
POTASSIUM SERPL-MCNC: 4.2 MMOL/L — SIGNIFICANT CHANGE UP (ref 3.5–5.3)
POTASSIUM SERPL-SCNC: 4.2 MMOL/L — SIGNIFICANT CHANGE UP (ref 3.5–5.3)
RBC # BLD: 4.48 M/UL — SIGNIFICANT CHANGE UP (ref 4.2–5.8)
RBC # FLD: 13.3 % — SIGNIFICANT CHANGE UP (ref 10.3–14.5)
SODIUM SERPL-SCNC: 133 MMOL/L — LOW (ref 135–145)
WBC # BLD: 11.34 K/UL — HIGH (ref 3.8–10.5)
WBC # FLD AUTO: 11.34 K/UL — HIGH (ref 3.8–10.5)

## 2025-06-29 PROCEDURE — 71250 CT THORAX DX C-: CPT | Mod: 26

## 2025-06-29 RX ORDER — DICLOFENAC SODIUM 75 MG/1
1 TABLET, DELAYED RELEASE ORAL
Qty: 0 | Refills: 0 | DISCHARGE
Start: 2025-06-29

## 2025-06-29 RX ORDER — DICLOFENAC SODIUM 75 MG/1
75 TABLET, DELAYED RELEASE ORAL DAILY
Refills: 0 | Status: DISCONTINUED | OUTPATIENT
Start: 2025-06-29 | End: 2025-06-29

## 2025-06-29 RX ORDER — FLUTICASONE PROPIONATE 50 UG/1
1 SPRAY, METERED NASAL
Qty: 0 | Refills: 0 | DISCHARGE
Start: 2025-06-29

## 2025-06-29 RX ORDER — TIOTROPIUM BROMIDE INHALATION SPRAY 3.12 UG/1
2 SPRAY, METERED RESPIRATORY (INHALATION)
Qty: 4 | Refills: 0
Start: 2025-06-29 | End: 2025-07-28

## 2025-06-29 RX ADMIN — FLUTICASONE PROPIONATE 1 SPRAY(S): 50 SPRAY, METERED NASAL at 05:26

## 2025-06-29 RX ADMIN — DEXTROMETHORPHAN HBR, GUAIFENESIN 1200 MILLIGRAM(S): 200 LIQUID ORAL at 05:25

## 2025-06-29 RX ADMIN — TIOTROPIUM BROMIDE INHALATION SPRAY 2 PUFF(S): 3.12 SPRAY, METERED RESPIRATORY (INHALATION) at 11:04

## 2025-06-29 RX ADMIN — GABAPENTIN 300 MILLIGRAM(S): 400 CAPSULE ORAL at 15:05

## 2025-06-29 RX ADMIN — Medication 2 CAPSULE(S): at 11:52

## 2025-06-29 RX ADMIN — DICLOFENAC SODIUM 75 MILLIGRAM(S): 75 TABLET, DELAYED RELEASE ORAL at 11:58

## 2025-06-29 RX ADMIN — Medication 325 MILLIGRAM(S): at 11:59

## 2025-06-29 RX ADMIN — MONTELUKAST SODIUM 10 MILLIGRAM(S): 10 TABLET ORAL at 11:58

## 2025-06-29 RX ADMIN — INSULIN LISPRO 3: 100 INJECTION, SOLUTION INTRAVENOUS; SUBCUTANEOUS at 12:09

## 2025-06-29 RX ADMIN — Medication 40 MILLIGRAM(S): at 12:08

## 2025-06-29 RX ADMIN — Medication 1 DOSE(S): at 08:58

## 2025-06-29 RX ADMIN — Medication 40 MILLIGRAM(S): at 05:26

## 2025-06-29 RX ADMIN — Medication 1 APPLICATION(S): at 05:25

## 2025-06-29 RX ADMIN — GABAPENTIN 300 MILLIGRAM(S): 400 CAPSULE ORAL at 05:26

## 2025-06-29 RX ADMIN — Medication 2 CAPSULE(S): at 08:58

## 2025-06-29 NOTE — PROGRESS NOTE ADULT - REASON FOR ADMISSION
SOB, generalized weakness, lethargy

## 2025-06-29 NOTE — DISCHARGE NOTE PROVIDER - NSDCCPCAREPLAN_GEN_ALL_CORE_FT
PRINCIPAL DISCHARGE DIAGNOSIS  Diagnosis: Sinus node dysfunction  Assessment and Plan of Treatment: s/p PPM placement   f/u with EP  as an out patient      SECONDARY DISCHARGE DIAGNOSES  Diagnosis: Type 2 diabetes mellitus  Assessment and Plan of Treatment: Monitor finger sticks pre-meal and bedtime, low salt, fat and carbohydrate diet, minimize glucose intake.  Exercise daily for at least 30 minutes and weight loss.  Follow up with primary care physician and endocrinologist for routine Hemoglobin A1C checks and management.  Follow up with your ophthalmologist for routine yearly vision exams.      Diagnosis: COPD without exacerbation  Assessment and Plan of Treatment: Smoking cessation, continue current medications as prescribed. Follow up with your routine physician's appointments.      Diagnosis: GERD (gastroesophageal reflux disease)  Assessment and Plan of Treatment: Avoid fatty, fried foods, acidic foods such as tomatoes, lime and chocolate. Continue to take your medications as prescribed, exercise daily and weight loss.      Diagnosis: Hypertension  Assessment and Plan of Treatment: Low sodium and fat diet, continue anti-hypertensive medications, and follow up with primary care physician.

## 2025-06-29 NOTE — DISCHARGE NOTE NURSING/CASE MANAGEMENT/SOCIAL WORK - PATIENT PORTAL LINK FT
You can access the FollowMyHealth Patient Portal offered by [facility] by registering at the following website: [email or web address]. By joining [facility]’s FollowMyHealth portal, you will also be able to view your health information using other applications (apps) compatible with our system.

## 2025-06-29 NOTE — PROGRESS NOTE ADULT - TIME BILLING
- Review of records, telemetry, vital signs and daily labs.   - General and cardiovascular physical examination.  - Generation of cardiovascular treatment plan and completion of note .  - Coordination of care-discussed with ACP, and  on disposition plan .      Patient was seen and examined by me on 06/29/2025 ,interim events noted,labs and radiology studies reviewed.  Victor M Greene MD,FACC.  [address] [place] [address].  [id number].  [phone]  Availabe to call or text on [employer] TEAMS.
- Review of records, telemetry, vital signs and daily labs.   - General and cardiovascular physical examination.  - Generation of cardiovascular treatment plan and completion of note .  - Coordination of care-discussed with ACP, and  on disposition plan .      Patient was seen and examined by me on 06/28/2025 ,interim events noted,labs and radiology studies reviewed.  Victor M Greene MD,FACC.  [address] [place] [address].  [facility].  [phone]  Availabe to call or text on [employer] TEAMS.

## 2025-06-29 NOTE — DISCHARGE NOTE PROVIDER - NSDCFUSCHEDAPPT_GEN_ALL_CORE_FT
[facility]  ELECTROPH [phone]  Scheduled Appointment: 07/16/2025    Ramos Alvarez  [facility]  ORTHOSURG [address]  Scheduled Appointment: 07/17/2025    [facility] [address]  Scheduled Appointment: 08/25/2025

## 2025-06-29 NOTE — DISCHARGE NOTE PROVIDER - HOSPITAL COURSE
74-year-old male with significant past medical history of COPD and ulcerative colitis who comes in for dyspnea was found to have symptomatic sinus node dysfunction and is now status post pacemaker placement.  Patient indicates that he had significant short of breath prior to hospitalization but has improved with the placement of pacemaker.  Patient does have a pulmonologist with Dr. Eckert.  Patient has been told several times that he has COPD and has been given inhalers which has not improved.  Unsure of the severity of his COPD.  Patient was also found to have bilateral groundglass opacifications with a left lower lobe lobule which was not evaluated since February.    #Dyspnea  #Groundglass opacifications  #Left lower lobe nodule    - Patient with improved shortness of breath with placement of pacemaker.  - Can continue inhalers while inpatient  - CT chest- no changes   -  can be discharged and follow-up with Dr. Eckert.   - d/w attending Dr. Montanez

## 2025-06-29 NOTE — DISCHARGE NOTE PROVIDER - NSDCMRMEDTOKEN_GEN_ALL_CORE_FT
Advair Diskus 100 mcg-50 mcg/inh inhalation powder: inhaled 2 times a day  albuterol 90 mcg/inh inhalation aerosol: 2 puff(s) inhaled every 6 hours As needed Shortness of Breath  azithromycin 250 mg oral tablet: 1 tab(s) orally 2 times a week on Wednesday and Friday  Creon 24,000 units oral delayed release capsule: 2 cap(s) orally 3 times a day (with meals)  diclofenac sodium 75 mg oral delayed release tablet: 1 tab(s) orally once a day  famotidine 40 mg oral tablet: 1 tab(s) orally once a day (at bedtime)  ferrous sulfate 325 mg (65 mg elemental iron) oral tablet: 1 tab(s) orally once a day  fluticasone 50 mcg/inh nasal spray: 1 spray(s) nasal 2 times a day  gabapentin 300 mg oral capsule: 1 cap(s) orally 3 times a day  guaiFENesin 1200 mg oral tablet, extended release: 1 tab(s) orally every 12 hours  Janumet 50 mg-500 mg oral tablet: 1 tab(s) orally 2 times a day  loperamide 2 mg oral capsule: 2 cap(s) orally every 6 hours As needed Diarrhea  Melatonin 5 mg oral tablet: 1 tab(s) orally once a day (at bedtime)  montelukast 10 mg oral tablet: 1 tab(s) orally once a day  Norvasc 5 mg oral tablet: 1 tab(s) orally once a day  ondansetron 8 mg oral tablet, disintegratin tab(s) orally 3 times a day as needed for  nausea  pantoprazole 20 mg oral delayed release tablet: 1 tab(s) orally once a day (in the morning)  Rinvoq 15 mg oral tablet, extended release: 1 tab(s) orally once a day  rosuvastatin 10 mg oral tablet: 1 tab(s) orally once a day (at bedtime)  tiotropium 2.5 mcg/inh inhalation aerosol: 2 inhaled once a day  traZODone 50 mg oral tablet: 1 tab(s) orally once a day (at bedtime)

## 2025-06-29 NOTE — DISCHARGE NOTE NURSING/CASE MANAGEMENT/SOCIAL WORK - NSDCPEFALRISK_GEN_ALL_CORE
For information on Fall & Injury Prevention, visit: [email or web address] OR  [email or web address] OR  https://www.cdc.gov/steadi/patient.html

## 2025-06-29 NOTE — DISCHARGE NOTE NURSING/CASE MANAGEMENT/SOCIAL WORK - FINANCIAL ASSISTANCE
[facility] provides services at a reduced cost to those who are determined to be eligible through [facility]’s financial assistance program. Information regarding [facility]’s financial assistance program can be found by going to [email or web address] or by calling [phone].

## 2025-06-29 NOTE — PROGRESS NOTE ADULT - ASSESSMENT
75 y/o M with PMHx of HTN, NIDDM2, COPD, and UC, who presents with symptomatic sinus node dysfunction, admitted for PPM placement    Sick sinus dysfx- Appreciate EP input, poss PPM    Copd - cont present nebs, pulm eval    DM2- iss    UC - stable on Rinvoq    Anemia- stable at present , no signs of active bleeding at present    
73 y/o M with PMHx of HTN, NIDDM2, COPD, and UC, who presents with symptomatic sinus node dysfunction, admitted for PPM placement    Sick sinus dysfx- Appreciate EP input, poss PPM    Copd - cont present nebs, pulm eval    DM2- iss    UC - stable on Rinvoq    Anemia- stable at present , no signs of active bleeding at present    
74 year old man PMH HTN, DM, COPD, UC presented after being advised by his cardiologist for concern for sick sinus syndrome. EKG at the cardiologist's office showed sinus rhythm with first degree AV block and junctional beats with HR as low as 41 bpm. EKG here shows sinus rhythm with first degree AV block and HR 52 bpm. Telemetry shows sinus rhythm with first degree AV block and HR 30-70s. He is not on any AV caryl blockers.   - Monitor on telemetry  - Maintain K>4 and Mg>2  - Avoid AV caryl blockers  - Echocardiogram to evaluate LV function and valve function  - Check TFTs     Patient with symptomatic bradycardia would benefit from placement of a dual chamber PPM. The risks, benefits and alternatives were discussed and all questions answered. He has agreed and is NPO for a PPM implantation later today.   
< from: TTE W or WO Ultrasound Enhancing Agent (06.27.25 @ 13:42) >  CONCLUSIONS:      1. Left ventricular cavity is normal in size. Left ventricular wall thickness is normal. Left ventricular systolic function is normal with an ejection fraction of 64% by Gonzalez's method of disks. There are no regional wall motion abnormalities seen.   2. There is mild (grade 1) left ventricular diastolic dysfunction.   3. Normal right ventricular cavity size and probably normal right ventricular systolic function. Tricuspid annular plane systolic excursion (TAPSE) is 2.0 cm (normal >=1.7 cm).   4. Structurally normal mitral valve with normal leaflet excursion. There is calcification of the mitral valve annulus. There is trace mitral regurgitation.    < end of copied text >      74 year old man PMH HTN, DM, COPD, UC presented after being advised by his cardiologist for concern for sick sinus syndrome. EKG at the cardiologist's office showed sinus rhythm with first degree AV block and junctional beats with HR as low as 41 bpm. EKG in ED shows sinus rhythm with first degree AV block and HR 52 bpm. Telemetry shows sinus rhythm with first degree AV block and HR 50s-70s. He is not on any AV caryl blockers.     # Symptomatic Bradycardia  Presented with weakness   Nored bradycardia-Sinus node dysfunction  On no AVN agents  - MDT Dual-Chamber PPM for SND   EP f/u follow-up appointment in the device clinic  Prior TTE Normal LV Systolic Function      # Type 2 diabetes mellitus.   Hold home Janumet  BID while inpatient  Last A1c 6.0 in 2/2025    # COPD-Bronchiectasis   Advair and Spiriva  Continue azithromycin 250mg q Wed/Fri  Continue montelukast 10mg qd    
< from: TTE W or WO Ultrasound Enhancing Agent (06.27.25 @ 13:42) >  CONCLUSIONS:      1. Left ventricular cavity is normal in size. Left ventricular wall thickness is normal. Left ventricular systolic function is normal with an ejection fraction of 64% by Gonzalez's method of disks. There are no regional wall motion abnormalities seen.   2. There is mild (grade 1) left ventricular diastolic dysfunction.   3. Normal right ventricular cavity size and probably normal right ventricular systolic function. Tricuspid annular plane systolic excursion (TAPSE) is 2.0 cm (normal >=1.7 cm).   4. Structurally normal mitral valve with normal leaflet excursion. There is calcification of the mitral valve annulus. There is trace mitral regurgitation.    < end of copied text >      74 year old man PMH HTN, DM, COPD, UC presented after being advised by his cardiologist for concern for sick sinus syndrome. EKG at the cardiologist's office showed sinus rhythm with first degree AV block and junctional beats with HR as low as 41 bpm. EKG in ED shows sinus rhythm with first degree AV block and HR 52 bpm. Telemetry shows sinus rhythm with first degree AV block and HR 50s-70s. He is not on any AV caryl blockers.     # Symptomatic Bradycardia  Presented with weakness   Nored bradycardia-Sinus node dysfunction  On no AVN agents  - MDT Dual-Chamber PPM for SND   EP f/u follow-up appointment in the device clinic  Prior TTE Normal LV Systolic Function      # Type 2 diabetes mellitus.   Hold home Janumet  BID while inpatient  Last A1c 6.0 in 2/2025    # COPD-Bronchiectasis   Advair and Spiriva  Continue azithromycin 250mg q Wed/Fri  Continue montelukast 10mg qd

## 2025-06-29 NOTE — PROGRESS NOTE ADULT - SUBJECTIVE AND OBJECTIVE BOX
SUBJECTIVE / OVERNIGHT EVENTS:pt seen and examined  06-27-25    MEDICATIONS  (STANDING):  amLODIPine   Tablet 5 milliGRAM(s) Oral daily  azithromycin   Tablet 250 milliGRAM(s) Oral <User Schedule>  ceFAZolin   IVPB 1000 milliGRAM(s) IV Intermittent every 8 hours  chlorhexidine 2% Cloths 1 Application(s) Topical <User Schedule>  dextrose 5%. 1000 milliLiter(s) (100 mL/Hr) IV Continuous <Continuous>  dextrose 5%. 1000 milliLiter(s) (50 mL/Hr) IV Continuous <Continuous>  dextrose 50% Injectable 25 Gram(s) IV Push once  dextrose 50% Injectable 12.5 Gram(s) IV Push once  dextrose 50% Injectable 25 Gram(s) IV Push once  famotidine    Tablet 40 milliGRAM(s) Oral daily  ferrous    sulfate 325 milliGRAM(s) Oral daily  fluticasone propionate 50 MICROgram(s)/spray Nasal Spray 1 Spray(s) Both Nostrils two times a day  fluticasone propionate/ salmeterol 100-50 MICROgram(s) Diskus 1 Dose(s) Inhalation two times a day  gabapentin 300 milliGRAM(s) Oral three times a day  glucagon  Injectable 1 milliGRAM(s) IntraMuscular once  guaiFENesin ER 1200 milliGRAM(s) Oral every 12 hours  insulin lispro (ADMELOG) corrective regimen sliding scale   SubCutaneous three times a day before meals  insulin lispro (ADMELOG) corrective regimen sliding scale   SubCutaneous at bedtime  montelukast 10 milliGRAM(s) Oral daily  pancrelipase  (CREON 24,000 Lipase Units) 2 Capsule(s) Oral three times a day with meals  pantoprazole    Tablet 40 milliGRAM(s) Oral before breakfast  rosuvastatin 10 milliGRAM(s) Oral at bedtime  tiotropium 2.5 MICROgram(s) Inhaler 2 Puff(s) Inhalation daily  traZODone 50 milliGRAM(s) Oral at bedtime    MEDICATIONS  (PRN):  acetaminophen     Tablet .. 650 milliGRAM(s) Oral every 6 hours PRN Temp greater or equal to 38C (100.4F), Mild Pain (1 - 3)  aluminum hydroxide/magnesium hydroxide/simethicone Suspension 30 milliLiter(s) Oral every 4 hours PRN Dyspepsia  dextrose Oral Gel 15 Gram(s) Oral once PRN Blood Glucose LESS THAN 70 milliGRAM(s)/deciliter  loperamide 2 milliGRAM(s) Oral every 6 hours PRN Diarrhea  melatonin 3 milliGRAM(s) Oral at bedtime PRN Insomnia  ondansetron Injectable 4 milliGRAM(s) IV Push every 8 hours PRN Nausea and/or Vomiting    T(C): 36.7 (06-27-25 @ 21:00), Max: 37 (06-27-25 @ 13:30)  HR: 70 (06-27-25 @ 21:00) (50 - 88)  BP: 133/71 (06-27-25 @ 21:00) (120/76 - 144/61)  RR: 16 (06-27-25 @ 21:00) (14 - 18)  SpO2: 98% (06-27-25 @ 21:00) (94% - 100%)    CAPILLARY BLOOD GLUCOSE      POCT Blood Glucose.: 145 mg/dL (27 Jun 2025 19:16)  POCT Blood Glucose.: 120 mg/dL (27 Jun 2025 18:52)  POCT Blood Glucose.: 100 mg/dL (27 Jun 2025 15:56)  POCT Blood Glucose.: 102 mg/dL (27 Jun 2025 11:42)  POCT Blood Glucose.: 115 mg/dL (27 Jun 2025 08:10)  POCT Blood Glucose.: 98 mg/dL (27 Jun 2025 06:10)    I&O's Summary      Constitutional: No fever, fatigue  Skin: No rash.  Eyes: No recent vision problems or eye pain.  ENT: No congestion, ear pain, or sore throat.  Cardiovascular: No chest pain or palpation.  Respiratory: No cough, shortness of breath, congestion, or wheezing.  Gastrointestinal: No abdominal pain, nausea, vomiting, or diarrhea.  Genitourinary: No dysuria.  Musculoskeletal: No joint swelling.  Neurologic: No headache.    PHYSICAL EXAM:  GENERAL: NAD  EYES: EOMI, PERRLA  NECK: Supple, No JVD  CHEST/LUNG: dec breath sounds at bases  HEART:  S1 , S2 +  ABDOMEN: soft, bs+  EXTREMITIES:  edema  NEUROLOGY:alert awake      LABS:                        10.7   8.57  )-----------( 312      ( 27 Jun 2025 04:26 )             34.7     06-27    136  |  102  |  15  ----------------------------<  88  4.6   |  21[L]  |  1.08    Ca    8.8      27 Jun 2025 04:26  Mg     2.10     06-26    TPro  6.8  /  Alb  4.1  /  TBili  0.3  /  DBili  x   /  AST  29  /  ALT  18  /  AlkPhos  43  06-26          Urinalysis Basic - ( 27 Jun 2025 04:26 )    Color: x / Appearance: x / SG: x / pH: x  Gluc: 88 mg/dL / Ketone: x  / Bili: x / Urobili: x   Blood: x / Protein: x / Nitrite: x   Leuk Esterase: x / RBC: x / WBC x   Sq Epi: x / Non Sq Epi: x / Bacteria: x        RADIOLOGY & ADDITIONAL TESTS:    Imaging Personally Reviewed:yes    Consultant(s) Notes Reviewed:  yes    Care Discussed with Consultants/Other Providers:yes  
    SUBJECTIVE / OVERNIGHT EVENTS:pt seen and examined  06-28-25    MEDICATIONS  (STANDING):  amLODIPine   Tablet 5 milliGRAM(s) Oral daily  azithromycin   Tablet 250 milliGRAM(s) Oral <User Schedule>  chlorhexidine 2% Cloths 1 Application(s) Topical <User Schedule>  dextrose 5%. 1000 milliLiter(s) (100 mL/Hr) IV Continuous <Continuous>  dextrose 5%. 1000 milliLiter(s) (50 mL/Hr) IV Continuous <Continuous>  dextrose 50% Injectable 25 Gram(s) IV Push once  dextrose 50% Injectable 12.5 Gram(s) IV Push once  dextrose 50% Injectable 25 Gram(s) IV Push once  famotidine    Tablet 40 milliGRAM(s) Oral daily  ferrous    sulfate 325 milliGRAM(s) Oral daily  fluticasone propionate 50 MICROgram(s)/spray Nasal Spray 1 Spray(s) Both Nostrils two times a day  fluticasone propionate/ salmeterol 100-50 MICROgram(s) Diskus 1 Dose(s) Inhalation two times a day  gabapentin 300 milliGRAM(s) Oral three times a day  glucagon  Injectable 1 milliGRAM(s) IntraMuscular once  guaiFENesin ER 1200 milliGRAM(s) Oral every 12 hours  insulin lispro (ADMELOG) corrective regimen sliding scale   SubCutaneous three times a day before meals  insulin lispro (ADMELOG) corrective regimen sliding scale   SubCutaneous at bedtime  montelukast 10 milliGRAM(s) Oral daily  pancrelipase  (CREON 24,000 Lipase Units) 2 Capsule(s) Oral three times a day with meals  pantoprazole    Tablet 40 milliGRAM(s) Oral before breakfast  rosuvastatin 10 milliGRAM(s) Oral at bedtime  tiotropium 2.5 MICROgram(s) Inhaler 2 Puff(s) Inhalation daily  traZODone 50 milliGRAM(s) Oral at bedtime    MEDICATIONS  (PRN):  acetaminophen     Tablet .. 650 milliGRAM(s) Oral every 6 hours PRN Temp greater or equal to 38C (100.4F), Mild Pain (1 - 3)  aluminum hydroxide/magnesium hydroxide/simethicone Suspension 30 milliLiter(s) Oral every 4 hours PRN Dyspepsia  dextrose Oral Gel 15 Gram(s) Oral once PRN Blood Glucose LESS THAN 70 milliGRAM(s)/deciliter  loperamide 2 milliGRAM(s) Oral every 6 hours PRN Diarrhea  melatonin 3 milliGRAM(s) Oral at bedtime PRN Insomnia  ondansetron Injectable 4 milliGRAM(s) IV Push every 8 hours PRN Nausea and/or Vomiting    Vital Signs Last 24 Hrs  T(C): 36.7 (06-28-25 @ 20:00), Max: 36.9 (06-28-25 @ 05:00)  T(F): 98.1 (06-28-25 @ 20:00), Max: 98.4 (06-28-25 @ 05:00)  HR: 64 (06-28-25 @ 20:30) (64 - 75)  BP: 122/73 (06-28-25 @ 20:00) (116/67 - 136/78)  BP(mean): --  RR: 18 (06-28-25 @ 20:30) (16 - 18)  SpO2: 96% (06-28-25 @ 20:30) (96% - 99%)        Constitutional: No fever, fatigue  Skin: No rash.  Eyes: No recent vision problems or eye pain.  ENT: No congestion, ear pain, or sore throat.  Cardiovascular: No chest pain or palpation.  Respiratory: No cough, shortness of breath, congestion, or wheezing.  Gastrointestinal: No abdominal pain, nausea, vomiting, or diarrhea.  Genitourinary: No dysuria.  Musculoskeletal: No joint swelling.  Neurologic: No headache.    PHYSICAL EXAM:  GENERAL: NAD  EYES: EOMI, PERRLA  NECK: Supple, No JVD  CHEST/LUNG: dec breath sounds at bases  HEART:  S1 , S2 +  ABDOMEN: soft, bs+  EXTREMITIES:  edema  NEUROLOGY:alert awake    LABS:  06-28    130[L]  |  96[L]  |  13  ----------------------------<  125[H]  4.6   |  19[L]  |  1.01    Ca    9.1      28 Jun 2025 03:45  Phos  4.1     06-28  Mg     1.60     06-28      Creatinine Trend: 1.01 <--, 1.08 <--, 1.16 <--                        12.0   12.90 )-----------( 323      ( 28 Jun 2025 03:45 )             38.1     Urine Studies:  Urinalysis Basic - ( 28 Jun 2025 03:45 )    Color:  / Appearance:  / SG:  / pH:   Gluc: 125 mg/dL / Ketone:   / Bili:  / Urobili:    Blood:  / Protein:  / Nitrite:    Leuk Esterase:  / RBC:  / WBC    Sq Epi:  / Non Sq Epi:  / Bacteria:                         RADIOLOGY & ADDITIONAL TESTS:    Imaging Personally Reviewed:yes    Consultant(s) Notes Reviewed:  yes    Care Discussed with Consultants/Other Providers:yes  
pt was evaluated by hospitalist earlier during the day 
Patient denies chest pain, shortness of breath, palpitations or lightheadedness at rest.  NPO for PPM latr today.     Vital Signs Last 24 Hrs  T(C): 36.7 (27 Jun 2025 06:20), Max: 36.7 (27 Jun 2025 06:20)  T(F): 98.1 (27 Jun 2025 06:20), Max: 98.1 (27 Jun 2025 06:20)  HR: 52 (27 Jun 2025 06:20) (50 - 61)  BP: 127/77 (27 Jun 2025 06:20) (116/83 - 143/75)  BP(mean): 99 (26 Jun 2025 15:52) (93 - 99)  RR: 17 (27 Jun 2025 06:20) (14 - 18)  SpO2: 100% (27 Jun 2025 06:20) (98% - 100%)    Parameters below as of 27 Jun 2025 06:20  Patient On (Oxygen Delivery Method): room air          EKG  Telemetry: Sinus bradycardia 30-40's overnight. 1st degree AVB.   MEDICATIONS  (STANDING):  amLODIPine   Tablet 5 milliGRAM(s) Oral daily  azithromycin   Tablet 250 milliGRAM(s) Oral <User Schedule>  chlorhexidine 2% Cloths 1 Application(s) Topical <User Schedule>  dextrose 5%. 1000 milliLiter(s) (100 mL/Hr) IV Continuous <Continuous>  dextrose 5%. 1000 milliLiter(s) (50 mL/Hr) IV Continuous <Continuous>  dextrose 50% Injectable 25 Gram(s) IV Push once  dextrose 50% Injectable 12.5 Gram(s) IV Push once  dextrose 50% Injectable 25 Gram(s) IV Push once  famotidine    Tablet 40 milliGRAM(s) Oral daily  ferrous    sulfate 325 milliGRAM(s) Oral daily  fluticasone propionate 50 MICROgram(s)/spray Nasal Spray 1 Spray(s) Both Nostrils two times a day  fluticasone propionate/ salmeterol 100-50 MICROgram(s) Diskus 1 Dose(s) Inhalation two times a day  gabapentin 300 milliGRAM(s) Oral three times a day  glucagon  Injectable 1 milliGRAM(s) IntraMuscular once  guaiFENesin ER 1200 milliGRAM(s) Oral every 12 hours  heparin   Injectable 5000 Unit(s) SubCutaneous every 8 hours  insulin lispro (ADMELOG) corrective regimen sliding scale   SubCutaneous three times a day before meals  insulin lispro (ADMELOG) corrective regimen sliding scale   SubCutaneous at bedtime  montelukast 10 milliGRAM(s) Oral daily  pancrelipase  (CREON 24,000 Lipase Units) 2 Capsule(s) Oral three times a day with meals  pantoprazole    Tablet 40 milliGRAM(s) Oral before breakfast  rosuvastatin 10 milliGRAM(s) Oral at bedtime  tiotropium 2.5 MICROgram(s) Inhaler 2 Puff(s) Inhalation daily  traZODone 50 milliGRAM(s) Oral at bedtime    MEDICATIONS  (PRN):  acetaminophen     Tablet .. 650 milliGRAM(s) Oral every 6 hours PRN Temp greater or equal to 38C (100.4F), Mild Pain (1 - 3)  aluminum hydroxide/magnesium hydroxide/simethicone Suspension 30 milliLiter(s) Oral every 4 hours PRN Dyspepsia  dextrose Oral Gel 15 Gram(s) Oral once PRN Blood Glucose LESS THAN 70 milliGRAM(s)/deciliter  loperamide 2 milliGRAM(s) Oral every 6 hours PRN Diarrhea  melatonin 3 milliGRAM(s) Oral at bedtime PRN Insomnia  ondansetron Injectable 4 milliGRAM(s) IV Push every 8 hours PRN Nausea and/or Vomiting          Physical exam:   Gen- well developed well nourished in NAD  Resp- clear to auscultation. No wheezing, rales or rhonchi  CV- S1 and S2 RRR. No murmurs, gallops or rubs  ABD- soft nontender + bowel sounds  EXT- no edema no calf tenderness  Neuro- grossly nonfocal                             10.7   8.57  )-----------( 312      ( 27 Jun 2025 04:26 )             34.7       06-27    136  |  102  |  15  ----------------------------<  88  4.6   |  21[L]  |  1.08    Ca    8.8      27 Jun 2025 04:26  Mg     2.10     06-26    TPro  6.8  /  Alb  4.1  /  TBili  0.3  /  DBili  x   /  AST  29  /  ALT  18  /  AlkPhos  43  06-26      < from: Transthoracic Echocardiogram (03.24.23 @ 09:00) >  Study Date: 3/24/2023  Location: 52 Sonographer: ASHLYN Jensen  Study quality: Technically Difficult  Referring Physician: Lawrence Montanez MD  Blood Pressure: 143/61 mmHg  Height: 172 cm  Weight: 65 kg  BSA: 1.8 m2  ------------------------------------------------------------------------  PROCEDURE: Transthoracic echocardiogram with 2-D, M-Mode  and complete spectral and color flow Doppler.  INDICATION: Dyspnea, unspecified (R06.00)  ------------------------------------------------------------------------  DIMENSIONS:  Dimensions:     Normal Values:  LA:       ---     2.0 - 4.0 cm  Ao:     3.3 cm    2.0 - 3.8 cm  SEPTUM:   ---     0.6 - 1.2 cm  PWT:      ---     0.6 - 1.1 cm  LVIDd:    ---     3.0 - 5.6 cm  LVIDs:    ---     1.8 - 4.0 cm  Ejection Fraction (Modified Gonzalez Rule): 72 %  ------------------------------------------------------------------------  OBSERVATIONS:  Mitral Valve: Mitralannular calcification, otherwise  normal mitral valve. Mild mitral regurgitation.  Aortic Root: Normal aortic root.  Aortic Valve: Aortic valve not well visualized. Peak  transaortic valve gradient equals 10 mm Hg, mean  transaortic valve gradient equals 4 mm Hg. Minimal aortic  regurgitation.  Peak left ventricular outflow tract  gradient equals 3 mm Hg, mean gradient is equal to 2 mm Hg.  Left Atrium: Moderately dilated left atrium.  LA volume  index = 46 cc/m2.  Left Ventricle: Endocardium notwell visualized; grossly  normal left ventricular systolic function. Normal left  ventricular internal dimensions and wall thicknesses.  Normal left ventricular diastolic function.  Right Heart: Normal right atrium. Normal right ventricular  size andfunction. Normal tricuspid valve. Minimal  tricuspid regurgitation. Pulmonic valve not well  visualized.  Pericardium/PleuraNormal pericardium with no pericardial  effusion.  Hemodynamic: Estimated right ventricular systolic pressure  equals 37 mm Hg, assuming right atrial pressure equals 10  mm Hg, consistent with borderline pulmonary hypertension.  ------------------------------------------------------------------------  CONCLUSIONS:  1. Endocardium not well visualized; grossly normal left  ventricular systolic function.  2. Normal right ventricular size and function.  ------------------------------------------------------------------------  Confirmed on  3/24/2023 - 12:26:01 by Nery Darby              
MR#[id number]  PATIENT NAME:INDIO SARGENT    DATE OF SERVICE: 06-29-25 @ 15:14  Patient was seen and examined by Victor M Greene MD on    06-29-25 @ 15:14 .  Interim events noted.Consultant notes ,Labs,Telemetry reviewed by me       HOSPITAL COURSE: HPI:  75 y/o M with PMHx of HTN, NIDDM2, COPD, UC, who presents with worsening SOB, generalized weakness, lethargy, GREEN x weeks. Pt states his symptoms were initially attributed to his chronic pulmonary disease, however he had no relief with his home inhalers and nebulizers. He was undergoing further anemia workup with a [facility]MBx at [facility] when he was incidentally found to be bradycardic to 30s. He was then advised by his cardiologist Dr. Aponte to go to the ER for further evaluation. Pt otherwise denies any f/c, cough, URI symptoms, CP, n/v/d/AP, LE edema, syncope, or other associated symptoms.    ED Course:  VS Afebrile, /64, HR 50, RR 23, 97% on RA  Given duonebs  EP consulted  Admitted to Medicine for further management (26 Jun 2025 16:38)      INTERIM EVENTS:Patient seen at bedside ,interim events noted.      PMH -reviewed admission note, no change since admission  HEART FAILURE: Acute[ ]Chronic[ ] Systolic[ ] Diastolic[ ] Combined Systolic and Diastolic[ ]  CAD[ ] CABG[ ] PCI[ ]  DEVICES[ ] PPM[ ] ICD[ ] ILR[ ]  ATRIAL FIBRILLATION[ ] Paroxysmal[ ] Permanent[ ] CHADS2-[  ]  JACKIE[ ] CKD1[ ] CKD2[ ] CKD3[ ] CKD4[ ] ESRD[ ]  COPD[ ] HTN[ ]   DM[ ] Type1[ ] Type 2[ ]   CVA[ ] Paresis[ ]    AMBULATION: Assisted[ ] Cane/walker[ ] Independent[ ]    MEDICATIONS  (STANDING):  amLODIPine   Tablet 5 milliGRAM(s) Oral daily  azithromycin   Tablet 250 milliGRAM(s) Oral <User Schedule>  chlorhexidine 2% Cloths 1 Application(s) Topical <User Schedule>  dextrose 5%. 1000 milliLiter(s) (100 mL/Hr) IV Continuous <Continuous>  dextrose 5%. 1000 milliLiter(s) (50 mL/Hr) IV Continuous <Continuous>  dextrose 50% Injectable 25 Gram(s) IV Push once  dextrose 50% Injectable 12.5 Gram(s) IV Push once  dextrose 50% Injectable 25 Gram(s) IV Push once  diclofenac 75 milliGRAM(s) Oral daily  famotidine    Tablet 40 milliGRAM(s) Oral daily  ferrous    sulfate 325 milliGRAM(s) Oral daily  fluticasone propionate 50 MICROgram(s)/spray Nasal Spray 1 Spray(s) Both Nostrils two times a day  fluticasone propionate/ salmeterol 100-50 MICROgram(s) Diskus 1 Dose(s) Inhalation two times a day  gabapentin 300 milliGRAM(s) Oral three times a day  glucagon  Injectable 1 milliGRAM(s) IntraMuscular once  guaiFENesin ER 1200 milliGRAM(s) Oral every 12 hours  insulin lispro (ADMELOG) corrective regimen sliding scale   SubCutaneous three times a day before meals  insulin lispro (ADMELOG) corrective regimen sliding scale   SubCutaneous at bedtime  montelukast 10 milliGRAM(s) Oral daily  pancrelipase  (CREON 24,000 Lipase Units) 2 Capsule(s) Oral three times a day with meals  pantoprazole    Tablet 40 milliGRAM(s) Oral before breakfast  rosuvastatin 10 milliGRAM(s) Oral at bedtime  tiotropium 2.5 MICROgram(s) Inhaler 2 Puff(s) Inhalation daily  traZODone 50 milliGRAM(s) Oral at bedtime    MEDICATIONS  (PRN):  acetaminophen     Tablet .. 650 milliGRAM(s) Oral every 6 hours PRN Temp greater or equal to 38C (100.4F), Mild Pain (1 - 3)  aluminum hydroxide/magnesium hydroxide/simethicone Suspension 30 milliLiter(s) Oral every 4 hours PRN Dyspepsia  dextrose Oral Gel 15 Gram(s) Oral once PRN Blood Glucose LESS THAN 70 milliGRAM(s)/deciliter  loperamide 2 milliGRAM(s) Oral every 6 hours PRN Diarrhea  melatonin 3 milliGRAM(s) Oral at bedtime PRN Insomnia  ondansetron Injectable 4 milliGRAM(s) IV Push every 8 hours PRN Nausea and/or Vomiting            REVIEW OF SYSTEMS:  Constitutional: [ ] fever, [ ]weight loss,  [ ]fatigue [ ]weight gain  Eyes: [ ] visual changes  Respiratory: [ ]shortness of breath;  [ ] cough, [ ]wheezing, [ ]chills, [ ]hemoptysis  Cardiovascular: [ ] chest pain, [ ]palpitations, [ ]dizziness,  [ ]leg swelling[ ]orthopnea[ ]PND  Gastrointestinal: [ ] abdominal pain, [ ]nausea, [ ]vomiting,  [ ]diarrhea [ ]Constipation [ ]Melena  Genitourinary: [ ] dysuria, [ ] hematuria [ ]Pete  Neurologic: [ ] headaches [ ] tremors[ ]weakness [ ]Paralysis Right[ ] Left[ ]  Skin: [ ] itching, [ ]burning, [ ] rashes  Endocrine: [ ] heat or cold intolerance  Musculoskeletal: [ ] joint pain or swelling; [ ] muscle, back, or extremity pain  Psychiatric: [ ] depression, [ ]anxiety, [ ]mood swings, or [ ]difficulty sleeping  Hematologic: [ ] easy bruising, [ ] bleeding gums    [ ] All remaining systems negative except as per above.   [ ]Unable to obtain.  [x] No change in ROS since admission      Vital Signs Last 24 Hrs  T(C): 36.9 (29 Jun 2025 12:10), Max: 36.9 (29 Jun 2025 05:00)  T(F): 98.4 (29 Jun 2025 12:10), Max: 98.5 (29 Jun 2025 05:00)  HR: 86 (29 Jun 2025 12:10) (64 - 86)  BP: 101/53 (29 Jun 2025 12:10) (100/60 - 122/73)  BP(mean): --  RR: 18 (29 Jun 2025 12:10) (17 - 18)  SpO2: 100% (29 Jun 2025 12:10) (96% - 100%)    Parameters below as of 29 Jun 2025 12:10  Patient On (Oxygen Delivery Method): room air      I&O's Summary      PHYSICAL EXAM:  General: No acute distress BMI-  HEENT: EOMI, PERRL  Neck: Supple, [ ] JVD  Lungs: Equal air entry bilaterally; [ ] rales [ ] wheezing [ ] rhonchi  Heart: Regular rate and rhythm; [x ] murmur   2/6 [ x] systolic [ ] diastolic [ ] radiation[ ] rubs [ ]  gallops  Abdomen: Nontender, bowel sounds present  Extremities: No clubbing, cyanosis, [ ] edema [ ]Pulses  equal and intact  Nervous system:  Alert & Oriented X3, no focal deficits  Psychiatric: Normal affect  Skin: No rashes or lesions    LABS:  06-29    133[L]  |  98  |  15  ----------------------------<  139[H]  4.2   |  19[L]  |  1.02    Ca    8.9      29 Jun 2025 06:26  Phos  3.8     06-29  Mg     1.90     06-29      Creatinine Trend: 1.02<--, 1.01<--, 1.08<--, 1.16<--                        11.8   11.34 )-----------( 292      ( 29 Jun 2025 06:26 )             37.3               
MR#[id number]  PATIENT NAME:INDIO SARGENT    DATE OF SERVICE: 06-28-25 @ 12:00  Patient was seen and examined by Victor M Greene MD on    06-28-25 @ 12:00 .  Interim events noted.Consultant notes ,Labs,Telemetry reviewed by me       HOSPITAL COURSE: HPI:  73 y/o M with PMHx of HTN, NIDDM2, COPD, UC, who presents with worsening SOB, generalized weakness, lethargy, GREEN x weeks. Pt states his symptoms were initially attributed to his chronic pulmonary disease, however he had no relief with his home inhalers and nebulizers. He was undergoing further anemia workup with a BMBx at [facility] when he was incidentally found to be bradycardic to 30s. He was then advised by his cardiologist Dr. Aponte to go to the ER for further evaluation. Pt otherwise denies any f/c, cough, URI symptoms, CP, n/v/d/AP, LE edema, syncope, or other associated symptoms.    ED Course:  VS Afebrile, /64, HR 50, RR 23, 97% on RA  Given duonebs  EP consulted  Admitted to Medicine for further management (26 Jun 2025 16:38)      INTERIM EVENTS:Patient seen at bedside ,interim events noted.      PMH -reviewed admission note, no change since admission  HEART FAILURE: Acute[ ]Chronic[ ] Systolic[ ] Diastolic[ ] Combined Systolic and Diastolic[ ]  CAD[ ] CABG[ ] PCI[ ]  DEVICES[ ] PPM[ ] ICD[ ] ILR[ ]  ATRIAL FIBRILLATION[ ] Paroxysmal[ ] Permanent[ ] CHADS2-[  ]  JACKIE[ ] CKD1[ ] CKD2[ ] CKD3[ ] CKD4[ ] ESRD[ ]  COPD[ ] HTN[ ]   DM[ ] Type1[ ] Type 2[ ]   CVA[ ] Paresis[ ]    AMBULATION: Assisted[ ] Cane/walker[ ] Independent[ ]    MEDICATIONS  (STANDING):  amLODIPine   Tablet 5 milliGRAM(s) Oral daily  azithromycin   Tablet 250 milliGRAM(s) Oral <User Schedule>  chlorhexidine 2% Cloths 1 Application(s) Topical <User Schedule>  dextrose 5%. 1000 milliLiter(s) (100 mL/Hr) IV Continuous <Continuous>  dextrose 5%. 1000 milliLiter(s) (50 mL/Hr) IV Continuous <Continuous>  dextrose 50% Injectable 25 Gram(s) IV Push once  dextrose 50% Injectable 12.5 Gram(s) IV Push once  dextrose 50% Injectable 25 Gram(s) IV Push once  famotidine    Tablet 40 milliGRAM(s) Oral daily  ferrous    sulfate 325 milliGRAM(s) Oral daily  fluticasone propionate 50 MICROgram(s)/spray Nasal Spray 1 Spray(s) Both Nostrils two times a day  fluticasone propionate/ salmeterol 100-50 MICROgram(s) Diskus 1 Dose(s) Inhalation two times a day  gabapentin 300 milliGRAM(s) Oral three times a day  glucagon  Injectable 1 milliGRAM(s) IntraMuscular once  guaiFENesin ER 1200 milliGRAM(s) Oral every 12 hours  insulin lispro (ADMELOG) corrective regimen sliding scale   SubCutaneous three times a day before meals  insulin lispro (ADMELOG) corrective regimen sliding scale   SubCutaneous at bedtime  montelukast 10 milliGRAM(s) Oral daily  pancrelipase  (CREON 24,000 Lipase Units) 2 Capsule(s) Oral three times a day with meals  pantoprazole    Tablet 40 milliGRAM(s) Oral before breakfast  rosuvastatin 10 milliGRAM(s) Oral at bedtime  tiotropium 2.5 MICROgram(s) Inhaler 2 Puff(s) Inhalation daily  traZODone 50 milliGRAM(s) Oral at bedtime    MEDICATIONS  (PRN):  acetaminophen     Tablet .. 650 milliGRAM(s) Oral every 6 hours PRN Temp greater or equal to 38C (100.4F), Mild Pain (1 - 3)  aluminum hydroxide/magnesium hydroxide/simethicone Suspension 30 milliLiter(s) Oral every 4 hours PRN Dyspepsia  dextrose Oral Gel 15 Gram(s) Oral once PRN Blood Glucose LESS THAN 70 milliGRAM(s)/deciliter  loperamide 2 milliGRAM(s) Oral every 6 hours PRN Diarrhea  melatonin 3 milliGRAM(s) Oral at bedtime PRN Insomnia  ondansetron Injectable 4 milliGRAM(s) IV Push every 8 hours PRN Nausea and/or Vomiting            REVIEW OF SYSTEMS:  Constitutional: [ ] fever, [ ]weight loss,  [ ]fatigue [ ]weight gain  Eyes: [ ] visual changes  Respiratory: [ ]shortness of breath;  [ ] cough, [ ]wheezing, [ ]chills, [ ]hemoptysis  Cardiovascular: [ ] chest pain, [ ]palpitations, [ ]dizziness,  [ ]leg swelling[ ]orthopnea[ ]PND  Gastrointestinal: [ ] abdominal pain, [ ]nausea, [ ]vomiting,  [ ]diarrhea [ ]Constipation [ ]Melena  Genitourinary: [ ] dysuria, [ ] hematuria [ ]Pete  Neurologic: [ ] headaches [ ] tremors[ ]weakness [ ]Paralysis Right[ ] Left[ ]  Skin: [ ] itching, [ ]burning, [ ] rashes  Endocrine: [ ] heat or cold intolerance  Musculoskeletal: [ ] joint pain or swelling; [ ] muscle, back, or extremity pain  Psychiatric: [ ] depression, [ ]anxiety, [ ]mood swings, or [ ]difficulty sleeping  Hematologic: [ ] easy bruising, [ ] bleeding gums    [ ] All remaining systems negative except as per above.   [ ]Unable to obtain.  [x] No change in ROS since admission      Vital Signs Last 24 Hrs  T(C): 36.9 (28 Jun 2025 05:00), Max: 37 (27 Jun 2025 13:30)  T(F): 98.4 (28 Jun 2025 05:00), Max: 98.6 (27 Jun 2025 13:30)  HR: 75 (28 Jun 2025 05:00) (50 - 88)  BP: 136/78 (28 Jun 2025 05:00) (120/76 - 144/61)  BP(mean): --  RR: 16 (28 Jun 2025 05:00) (14 - 18)  SpO2: 99% (28 Jun 2025 05:00) (94% - 100%)    Parameters below as of 28 Jun 2025 05:00  Patient On (Oxygen Delivery Method): room air      I&O's Summary      PHYSICAL EXAM:  General: No acute distress BMI-  HEENT: EOMI, PERRL  Neck: Supple, [ ] JVD  Lungs: Equal air entry bilaterally; [ ] rales [ ] wheezing [ ] rhonchi  Heart: Regular rate and rhythm; [x ] murmur   2/6 [ x] systolic [ ] diastolic [ ] radiation[ ] rubs [ ]  gallops  Abdomen: Nontender, bowel sounds present  Extremities: No clubbing, cyanosis, [ ] edema [ ]Pulses  equal and intact  Nervous system:  Alert & Oriented X3, no focal deficits  Psychiatric: Normal affect  Skin: No rashes or lesions    LABS:  06-28    130[L]  |  96[L]  |  13  ----------------------------<  125[H]  4.6   |  19[L]  |  1.01    Ca    9.1      28 Jun 2025 03:45  Phos  4.1     06-28  Mg     1.60     06-28      Creatinine Trend: 1.01<--, 1.08<--, 1.16<--                        12.0   12.90 )-----------( 323      ( 28 Jun 2025 03:45 )             38.1

## 2025-06-29 NOTE — DISCHARGE NOTE PROVIDER - CARE PROVIDER_API CALL
Mikey Greenelapatrick  Internal Medicine  [id number] [place] [id number]  Phone: [phone]  Fax: [phone]  Follow Up Time: 1 week

## 2025-07-12 ENCOUNTER — NON-APPOINTMENT (OUTPATIENT)
Age: 75
End: 2025-07-12

## 2025-07-16 ENCOUNTER — NON-APPOINTMENT (OUTPATIENT)
Age: 75
End: 2025-07-16

## 2025-07-16 ENCOUNTER — APPOINTMENT (OUTPATIENT)
Dept: ELECTROPHYSIOLOGY | Facility: CLINIC | Age: 75
End: 2025-07-16
Payer: MEDICARE

## 2025-07-16 VITALS — SYSTOLIC BLOOD PRESSURE: 132 MMHG | HEART RATE: 71 BPM | DIASTOLIC BLOOD PRESSURE: 70 MMHG

## 2025-07-16 PROBLEM — I10 HYPERTENSION: Status: ACTIVE | Noted: 2025-07-16

## 2025-07-16 PROBLEM — E11.9 TYPE 2 DIABETES MELLITUS: Status: ACTIVE | Noted: 2025-07-16

## 2025-07-16 PROCEDURE — 99024 POSTOP FOLLOW-UP VISIT: CPT

## 2025-07-23 RX ORDER — UPADACITINIB 15 MG/1
15 TABLET, EXTENDED RELEASE ORAL
Refills: 0 | Status: ACTIVE | COMMUNITY

## 2025-07-23 RX ORDER — FLUTICASONE PROPIONATE AND SALMETEROL 50; 100 UG/1; UG/1
100-50 POWDER RESPIRATORY (INHALATION)
Refills: 0 | Status: ACTIVE | COMMUNITY

## 2025-07-23 RX ORDER — TIOTROPIUM BROMIDE INHALATION SPRAY 3.12 UG/1
2.5 SPRAY, METERED RESPIRATORY (INHALATION)
Refills: 0 | Status: ACTIVE | COMMUNITY

## 2025-07-23 RX ORDER — FLUTICASONE PROPIONATE 50 MCG
50 SPRAY, SUSPENSION NASAL
Refills: 0 | Status: ACTIVE | COMMUNITY

## 2025-07-23 RX ORDER — PANCRELIPASE 120000; 24000; 76000 [USP'U]/1; [USP'U]/1; [USP'U]/1
24000-76000 CAPSULE, DELAYED RELEASE PELLETS ORAL
Refills: 0 | Status: ACTIVE | COMMUNITY

## 2025-07-23 RX ORDER — CHLORHEXIDINE GLUCONATE 4 %
325 (65 FE) LIQUID (ML) TOPICAL
Refills: 0 | Status: ACTIVE | COMMUNITY

## 2025-07-23 RX ORDER — ROSUVASTATIN CALCIUM 10 MG/1
10 TABLET, FILM COATED ORAL
Refills: 0 | Status: ACTIVE | COMMUNITY

## 2025-07-23 RX ORDER — AMLODIPINE BESYLATE 5 MG/1
5 TABLET ORAL
Refills: 0 | Status: ACTIVE | COMMUNITY

## 2025-07-24 ENCOUNTER — APPOINTMENT (OUTPATIENT)
Dept: ORTHOPEDIC SURGERY | Facility: CLINIC | Age: 75
End: 2025-07-24
Payer: MEDICARE

## 2025-07-24 DIAGNOSIS — Z96.659 PRESENCE OF UNSPECIFIED ARTIFICIAL KNEE JOINT: ICD-10-CM

## 2025-07-24 PROCEDURE — 99213 OFFICE O/P EST LOW 20 MIN: CPT

## 2025-07-24 PROCEDURE — 73562 X-RAY EXAM OF KNEE 3: CPT | Mod: RT

## 2025-08-14 ENCOUNTER — APPOINTMENT (OUTPATIENT)
Dept: OTOLARYNGOLOGY | Facility: CLINIC | Age: 75
End: 2025-08-14
Payer: MEDICARE

## 2025-08-14 ENCOUNTER — NON-APPOINTMENT (OUTPATIENT)
Age: 75
End: 2025-08-14

## 2025-08-14 ENCOUNTER — APPOINTMENT (OUTPATIENT)
Dept: ELECTROPHYSIOLOGY | Facility: CLINIC | Age: 75
End: 2025-08-14
Payer: MEDICARE

## 2025-08-14 VITALS
HEIGHT: 68 IN | RESPIRATION RATE: 17 BRPM | DIASTOLIC BLOOD PRESSURE: 68 MMHG | HEART RATE: 60 BPM | WEIGHT: 165 LBS | SYSTOLIC BLOOD PRESSURE: 126 MMHG | BODY MASS INDEX: 25.01 KG/M2 | OXYGEN SATURATION: 95 %

## 2025-08-14 VITALS — HEART RATE: 61 BPM | SYSTOLIC BLOOD PRESSURE: 140 MMHG | DIASTOLIC BLOOD PRESSURE: 75 MMHG

## 2025-08-14 DIAGNOSIS — J38.3 OTHER DISEASES OF VOCAL CORDS: ICD-10-CM

## 2025-08-14 DIAGNOSIS — I49.5 SICK SINUS SYNDROME: ICD-10-CM

## 2025-08-14 DIAGNOSIS — R49.8 OTHER VOICE AND RESONANCE DISORDERS: ICD-10-CM

## 2025-08-14 DIAGNOSIS — R49.0 DYSPHONIA: ICD-10-CM

## 2025-08-14 PROCEDURE — 31575 DIAGNOSTIC LARYNGOSCOPY: CPT

## 2025-08-14 PROCEDURE — 93280 PM DEVICE PROGR EVAL DUAL: CPT

## 2025-08-14 PROCEDURE — 99214 OFFICE O/P EST MOD 30 MIN: CPT | Mod: 25

## 2025-08-14 RX ORDER — TELMISARTAN 20 MG/1
TABLET ORAL
Refills: 0 | Status: ACTIVE | COMMUNITY

## 2025-08-25 ENCOUNTER — APPOINTMENT (OUTPATIENT)
Dept: ORTHOPEDIC SURGERY | Facility: CLINIC | Age: 75
End: 2025-08-25

## 2025-08-27 ENCOUNTER — APPOINTMENT (OUTPATIENT)
Dept: ELECTROPHYSIOLOGY | Facility: CLINIC | Age: 75
End: 2025-08-27

## (undated) DEVICE — SUT STRATAFIX SPIRAL PDS PLUS 0 23X23CM CP-2 VIOLET

## (undated) DEVICE — SUT VICRYL 1 36" CTX UNDYED

## (undated) DEVICE — DRAPE SPLIT SHEET 77" X 120"

## (undated) DEVICE — SUT STRATAFIX SPIRAL MONOCRYL PLUS 3-0 19" PS-2 UNDYED

## (undated) DEVICE — DRAPE EXTREMITY 87" X 106" X 128"

## (undated) DEVICE — DRAPE U POLY BLUE 60"X60"

## (undated) DEVICE — SUT QUILL MONODERM 0 36CM 36MM

## (undated) DEVICE — GLV 7.5 PROTEXIS (WHITE)

## (undated) DEVICE — SOL IRR BAG NS 0.9% 3000ML

## (undated) DEVICE — LABELS BLANK W PEN

## (undated) DEVICE — DRAPE LAPAROTOMY TRANSVERSE

## (undated) DEVICE — SUT PROLENE 0 30" CT-1

## (undated) DEVICE — TAPE SILK 3"

## (undated) DEVICE — ELCTR GROUNDING PAD ADULT COVIDIEN

## (undated) DEVICE — VENODYNE/SCD SLEEVE CALF MEDIUM

## (undated) DEVICE — PACK TOTAL JOINT

## (undated) DEVICE — SUCTION YANKAUER NO CONTROL VENT

## (undated) DEVICE — DRSG COBAN 6"

## (undated) DEVICE — PACK LIJ BASIC ORTHO

## (undated) DEVICE — HOOD T5 PEELAWAY

## (undated) DEVICE — PROTECTOR HEEL / ELBOW

## (undated) DEVICE — SUT VICRYL 3-0 27" SH UNDYED

## (undated) DEVICE — POSITIONER STRAP ARMBOARD VELCRO TS-30

## (undated) DEVICE — DRSG BENZOIN 0.6CC

## (undated) DEVICE — SAW BLADE STRYKER SAGITTAL 3 HOLE OSCILLATING

## (undated) DEVICE — DRSG STERISTRIPS 0.5 X 4"

## (undated) DEVICE — SAW BLADE STRYKER RECIPROCATING DOUBLE EDGE 68X12.5MM

## (undated) DEVICE — DRSG STOCKINETTE IMPERVIOUS XL

## (undated) DEVICE — MAKO VIZADISC KNEE TRACKING KIT

## (undated) DEVICE — SYR LUER LOK 20CC

## (undated) DEVICE — DRSG ACE BANDAGE 6"

## (undated) DEVICE — SOL IRR POUR NS 0.9% 1500ML

## (undated) DEVICE — CAM-ESU 1501228: Type: DURABLE MEDICAL EQUIPMENT

## (undated) DEVICE — PREP CHLORAPREP HI-LITE ORANGE 26ML

## (undated) DEVICE — SUT VICRYL 2-0 18" TIES UNDYED

## (undated) DEVICE — SUT VICRYL 0 27" CT-2 UNDYED

## (undated) DEVICE — DRAPE TOWEL BLUE 17" X 24"

## (undated) DEVICE — GLV 7 PROTEXIS (WHITE)

## (undated) DEVICE — SUT PROLENE 1 30" CT-1

## (undated) DEVICE — SUT STRATAFIX SPIRAL PDS PLUS 1 18" OS-8

## (undated) DEVICE — SUT VICRYL 2-0 27" SH UNDYED

## (undated) DEVICE — FRAZIER SUCTION TIP 8FR

## (undated) DEVICE — DRSG AQUACEL 3.5 X 10"

## (undated) DEVICE — DRSG AQUACEL 3.5 X 12"

## (undated) DEVICE — WARMING BLANKET FULL ADULT

## (undated) DEVICE — SPONGE DISSECTOR PEANUT

## (undated) DEVICE — STRYKER MIXEVAC 3 BONE CEMENT MIXER

## (undated) DEVICE — SOL IRR POUR NS 0.9% 500ML

## (undated) DEVICE — GOWN XL

## (undated) DEVICE — DRAPE 3/4 SHEET 52X76"

## (undated) DEVICE — GOWN XXL

## (undated) DEVICE — GLV 8 PROTEXIS (CREAM) MICRO

## (undated) DEVICE — DRSG CURITY GAUZE SPONGE 4 X 4" 12-PLY

## (undated) DEVICE — DRAIN PENROSE .5" X 18" LATEX

## (undated) DEVICE — DRSG DERMABOND 0.7ML

## (undated) DEVICE — TOURNIQUET CUFF 34" DUAL PORT W PLC

## (undated) DEVICE — PACK MINOR NO DRAPE

## (undated) DEVICE — TOURNIQUET ESMARK 6"

## (undated) DEVICE — NDL HYPO SAFE 21G X 1.5" (GREEN)

## (undated) DEVICE — WOUND IRR SURGIPHOR

## (undated) DEVICE — MAKO DRAPE KIT

## (undated) DEVICE — ELCTR BOVIE PENCIL SMOKE EVACUATION

## (undated) DEVICE — WARMING BLANKET FULL UNDERBODY

## (undated) DEVICE — HANDPIECE INTERPULSE W/ COAXIAL FAN SPRAY TIP

## (undated) DEVICE — SUT VICRYL PLUS 0 27" OS-6 UNDYED

## (undated) DEVICE — MAKO CHECKPOINT KIT FEMORAL / TIBIAL

## (undated) DEVICE — SOL IRR POUR H2O 1500ML